# Patient Record
Sex: MALE | Race: WHITE | Employment: FULL TIME | URBAN - METROPOLITAN AREA
[De-identification: names, ages, dates, MRNs, and addresses within clinical notes are randomized per-mention and may not be internally consistent; named-entity substitution may affect disease eponyms.]

---

## 2020-03-12 ENCOUNTER — HOSPITAL ENCOUNTER (EMERGENCY)
Age: 30
Discharge: HOME | End: 2020-03-12
Payer: COMMERCIAL

## 2020-03-12 ENCOUNTER — APPOINTMENT (OUTPATIENT)
Dept: GENERAL RADIOLOGY | Age: 30
End: 2020-03-12
Payer: COMMERCIAL

## 2020-03-12 VITALS
HEART RATE: 104 BPM | WEIGHT: 315 LBS | OXYGEN SATURATION: 95 % | DIASTOLIC BLOOD PRESSURE: 73 MMHG | TEMPERATURE: 98.9 F | BODY MASS INDEX: 38.36 KG/M2 | SYSTOLIC BLOOD PRESSURE: 119 MMHG | RESPIRATION RATE: 18 BRPM | HEIGHT: 76 IN

## 2020-03-12 LAB
A/G RATIO: 1.3 (ref 1.1–2.2)
ALBUMIN SERPL-MCNC: 3.8 G/DL (ref 3.4–5)
ALP BLD-CCNC: 61 U/L (ref 40–129)
ALT SERPL-CCNC: 64 U/L (ref 10–40)
ANION GAP SERPL CALCULATED.3IONS-SCNC: 10 MMOL/L (ref 3–16)
AST SERPL-CCNC: 47 U/L (ref 15–37)
BILIRUB SERPL-MCNC: 0.3 MG/DL (ref 0–1)
BUN BLDV-MCNC: 8 MG/DL (ref 7–20)
CALCIUM SERPL-MCNC: 8.5 MG/DL (ref 8.3–10.6)
CHLORIDE BLD-SCNC: 106 MMOL/L (ref 99–110)
CO2: 22 MMOL/L (ref 21–32)
CREAT SERPL-MCNC: 0.7 MG/DL (ref 0.9–1.3)
GFR AFRICAN AMERICAN: >60
GFR NON-AFRICAN AMERICAN: >60
GLOBULIN: 3 G/DL
GLUCOSE BLD-MCNC: 96 MG/DL (ref 70–99)
HCT VFR BLD CALC: 42.3 % (ref 40.5–52.5)
HEMOGLOBIN: 14.2 G/DL (ref 13.5–17.5)
LACTIC ACID: 1.6 MMOL/L (ref 0.4–2)
MCH RBC QN AUTO: 27.2 PG (ref 26–34)
MCHC RBC AUTO-ENTMCNC: 33.5 G/DL (ref 31–36)
MCV RBC AUTO: 81.4 FL (ref 80–100)
PDW BLD-RTO: 14.6 % (ref 12.4–15.4)
PLATELET # BLD: 182 K/UL (ref 135–450)
PMV BLD AUTO: 8.4 FL (ref 5–10.5)
POTASSIUM SERPL-SCNC: 3.8 MMOL/L (ref 3.5–5.1)
RAPID INFLUENZA  B AGN: NEGATIVE
RAPID INFLUENZA A AGN: POSITIVE
RBC # BLD: 5.2 M/UL (ref 4.2–5.9)
SODIUM BLD-SCNC: 138 MMOL/L (ref 136–145)
TOTAL PROTEIN: 6.8 G/DL (ref 6.4–8.2)
WBC # BLD: 6.7 K/UL (ref 4–11)

## 2020-03-12 PROCEDURE — 87804 INFLUENZA ASSAY W/OPTIC: CPT

## 2020-03-12 PROCEDURE — 96374 THER/PROPH/DIAG INJ IV PUSH: CPT

## 2020-03-12 PROCEDURE — 99283 EMERGENCY DEPT VISIT LOW MDM: CPT

## 2020-03-12 PROCEDURE — 6370000000 HC RX 637 (ALT 250 FOR IP): Performed by: NURSE PRACTITIONER

## 2020-03-12 PROCEDURE — 2580000003 HC RX 258: Performed by: NURSE PRACTITIONER

## 2020-03-12 PROCEDURE — 80053 COMPREHEN METABOLIC PANEL: CPT

## 2020-03-12 PROCEDURE — 6360000002 HC RX W HCPCS: Performed by: NURSE PRACTITIONER

## 2020-03-12 PROCEDURE — 83605 ASSAY OF LACTIC ACID: CPT

## 2020-03-12 PROCEDURE — 85027 COMPLETE CBC AUTOMATED: CPT

## 2020-03-12 PROCEDURE — 71046 X-RAY EXAM CHEST 2 VIEWS: CPT

## 2020-03-12 RX ORDER — OSELTAMIVIR PHOSPHATE 75 MG/1
75 CAPSULE ORAL 2 TIMES DAILY
Qty: 10 CAPSULE | Refills: 0 | Status: SHIPPED | OUTPATIENT
Start: 2020-03-12 | End: 2020-03-17

## 2020-03-12 RX ORDER — 0.9 % SODIUM CHLORIDE 0.9 %
1000 INTRAVENOUS SOLUTION INTRAVENOUS ONCE
Status: COMPLETED | OUTPATIENT
Start: 2020-03-12 | End: 2020-03-12

## 2020-03-12 RX ORDER — OSELTAMIVIR PHOSPHATE 75 MG/1
75 CAPSULE ORAL ONCE
Status: COMPLETED | OUTPATIENT
Start: 2020-03-12 | End: 2020-03-12

## 2020-03-12 RX ORDER — ACETAMINOPHEN 500 MG
1000 TABLET ORAL ONCE
Status: COMPLETED | OUTPATIENT
Start: 2020-03-12 | End: 2020-03-12

## 2020-03-12 RX ORDER — KETOROLAC TROMETHAMINE 30 MG/ML
15 INJECTION, SOLUTION INTRAMUSCULAR; INTRAVENOUS ONCE
Status: COMPLETED | OUTPATIENT
Start: 2020-03-12 | End: 2020-03-12

## 2020-03-12 RX ORDER — IBUPROFEN 800 MG/1
800 TABLET ORAL EVERY 8 HOURS PRN
Qty: 20 TABLET | Refills: 0 | Status: SHIPPED | OUTPATIENT
Start: 2020-03-12

## 2020-03-12 RX ADMIN — KETOROLAC TROMETHAMINE 15 MG: 30 INJECTION, SOLUTION INTRAMUSCULAR at 17:34

## 2020-03-12 RX ADMIN — OSELTAMIVIR PHOSPHATE 75 MG: 75 CAPSULE ORAL at 17:34

## 2020-03-12 RX ADMIN — ACETAMINOPHEN 1000 MG: 500 TABLET ORAL at 17:34

## 2020-03-12 RX ADMIN — SODIUM CHLORIDE 1000 ML: 9 INJECTION, SOLUTION INTRAVENOUS at 17:34

## 2020-03-12 SDOH — HEALTH STABILITY: MENTAL HEALTH: HOW OFTEN DO YOU HAVE A DRINK CONTAINING ALCOHOL?: NEVER

## 2020-03-12 ASSESSMENT — ENCOUNTER SYMPTOMS
ABDOMINAL DISTENTION: 0
DIARRHEA: 0
EYES NEGATIVE: 1
VOMITING: 0
ALLERGIC/IMMUNOLOGIC NEGATIVE: 1
ABDOMINAL PAIN: 0
SHORTNESS OF BREATH: 0
CONSTIPATION: 0
BACK PAIN: 0
NAUSEA: 0
WHEEZING: 0
COUGH: 1

## 2020-03-12 ASSESSMENT — PAIN SCALES - GENERAL: PAINLEVEL_OUTOF10: 0

## 2020-03-12 NOTE — ED PROVIDER NOTES
cardiopulmonary process. Xr Chest Standard (2 Vw)    Result Date: 3/12/2020  EXAMINATION: TWO XRAY VIEWS OF THE CHEST 3/12/2020 5:12 pm COMPARISON: None HISTORY: ORDERING SYSTEM PROVIDED HISTORY: cough TECHNOLOGIST PROVIDED HISTORY: Reason for exam:->cough Reason for Exam: flu Acuity: Acute Type of Exam: Initial FINDINGS: The cardiomediastinal silhouette is normal in size. The lungs are clear. No pleural effusion or pneumothorax is present. No acute cardiopulmonary process. MEDICAL DECISION MAKING / ED COURSE:      PROCEDURES:   Procedures    None    Patient was given:  Medications   0.9 % sodium chloride bolus (1,000 mLs Intravenous New Bag 3/12/20 1734)   acetaminophen (TYLENOL) tablet 1,000 mg (1,000 mg Oral Given 3/12/20 1734)   ketorolac (TORADOL) injection 15 mg (15 mg Intravenous Given 3/12/20 1734)   oseltamivir (TAMIFLU) capsule 75 mg (75 mg Oral Given 3/12/20 1734)       Patient is alert and oriented x4. Skin is pwd, no cyanosis or pallor. Moist mucus membranes. Pharynx is clear, uvula is midline and no tonsilar exudate noted. Bilateral TMs with no erythema. Patient is tachy with regular rhythm. Lungs are clear to auscultation throughout all fields. Abdomen is soft, non-tender and non-distended. Distal pulses are intact  Differentials: URI, pneumonia, influenza, bronchitis, sepsis  Labs: unremarkable  Xray: negative  Flu: influenza A  NS bolus given along with tylenol, toradol and Tamiflu  On Re-evaluation at 1850, patient's heart rate and fever have improved. Diagnosis: Influenza A  Patient will be discharged with Tamiflu and ibuprofen. Follow up with PCP this week     The patient tolerated their visit well. I evaluated the patient. The physician was available for consultation as needed. The patient and / or the family were informed of the results of any tests, a time was given to answer questions, a plan was proposed and they agreed with plan. CLINICAL IMPRESSION:  1.

## 2023-01-15 ENCOUNTER — APPOINTMENT (OUTPATIENT)
Dept: CT IMAGING | Age: 33
DRG: 219 | End: 2023-01-15
Payer: COMMERCIAL

## 2023-01-15 ENCOUNTER — ANESTHESIA (OUTPATIENT)
Dept: OPERATING ROOM | Age: 33
End: 2023-01-15
Payer: COMMERCIAL

## 2023-01-15 ENCOUNTER — HOSPITAL ENCOUNTER (INPATIENT)
Age: 33
LOS: 11 days | Discharge: NURSING, REHAB OR LONG-TERM CARE | DRG: 219 | End: 2023-01-26
Attending: STUDENT IN AN ORGANIZED HEALTH CARE EDUCATION/TRAINING PROGRAM | Admitting: INTERNAL MEDICINE
Payer: COMMERCIAL

## 2023-01-15 ENCOUNTER — ANESTHESIA EVENT (OUTPATIENT)
Dept: OPERATING ROOM | Age: 33
End: 2023-01-15
Payer: COMMERCIAL

## 2023-01-15 DIAGNOSIS — R07.89 CHEST WALL DISCOMFORT: ICD-10-CM

## 2023-01-15 DIAGNOSIS — R29.898 WEAKNESS OF RIGHT LOWER EXTREMITY: ICD-10-CM

## 2023-01-15 DIAGNOSIS — R29.898 WEAKNESS OF LEFT LOWER EXTREMITY: ICD-10-CM

## 2023-01-15 DIAGNOSIS — G89.18 ACUTE POST-OPERATIVE PAIN: ICD-10-CM

## 2023-01-15 DIAGNOSIS — I71.21 DISSECTING ANEURYSM OF THORACIC AORTA, STANFORD TYPE A: Primary | ICD-10-CM

## 2023-01-15 DIAGNOSIS — I71.010 DISSECTING ANEURYSM OF THORACIC AORTA, STANFORD TYPE A: Primary | ICD-10-CM

## 2023-01-15 PROBLEM — I71.00 AORTIC DISSECTION (HCC): Status: ACTIVE | Noted: 2023-01-15

## 2023-01-15 LAB
A/G RATIO: 1.6 (ref 1.1–2.2)
ABO/RH: NORMAL
ALBUMIN SERPL-MCNC: 4.4 G/DL (ref 3.4–5)
ALP BLD-CCNC: 69 U/L (ref 40–129)
ALT SERPL-CCNC: 36 U/L (ref 10–40)
ANION GAP SERPL CALCULATED.3IONS-SCNC: 21 MMOL/L (ref 3–16)
AST SERPL-CCNC: 25 U/L (ref 15–37)
BASE EXCESS VENOUS: -6.8 MMOL/L (ref -3–3)
BASOPHILS ABSOLUTE: 0.1 K/UL (ref 0–0.2)
BASOPHILS RELATIVE PERCENT: 0.9 %
BILIRUB SERPL-MCNC: 0.3 MG/DL (ref 0–1)
BUN BLDV-MCNC: 16 MG/DL (ref 7–20)
CALCIUM SERPL-MCNC: 9.2 MG/DL (ref 8.3–10.6)
CARBOXYHEMOGLOBIN: 1 % (ref 0–1.5)
CHLORIDE BLD-SCNC: 102 MMOL/L (ref 99–110)
CO2: 15 MMOL/L (ref 21–32)
CREAT SERPL-MCNC: 1.1 MG/DL (ref 0.9–1.3)
EOSINOPHILS ABSOLUTE: 0.2 K/UL (ref 0–0.6)
EOSINOPHILS RELATIVE PERCENT: 1.6 %
ETHANOL: NORMAL MG/DL (ref 0–0.08)
GFR SERPL CREATININE-BSD FRML MDRD: >60 ML/MIN/{1.73_M2}
GLUCOSE BLD-MCNC: 243 MG/DL (ref 70–99)
HCO3 VENOUS: 17.9 MMOL/L (ref 23–29)
HCT VFR BLD CALC: 45.1 % (ref 40.5–52.5)
HEMOGLOBIN: 14.2 G/DL (ref 13.5–17.5)
LACTIC ACID, SEPSIS: 5.6 MMOL/L (ref 0.4–1.9)
LIPASE: 21 U/L (ref 13–60)
LYMPHOCYTES ABSOLUTE: 4 K/UL (ref 1–5.1)
LYMPHOCYTES RELATIVE PERCENT: 25.9 %
MCH RBC QN AUTO: 26.2 PG (ref 26–34)
MCHC RBC AUTO-ENTMCNC: 31.4 G/DL (ref 31–36)
MCV RBC AUTO: 83.4 FL (ref 80–100)
METHEMOGLOBIN VENOUS: 0.6 %
MONOCYTES ABSOLUTE: 1.2 K/UL (ref 0–1.3)
MONOCYTES RELATIVE PERCENT: 8 %
NEUTROPHILS ABSOLUTE: 9.8 K/UL (ref 1.7–7.7)
NEUTROPHILS RELATIVE PERCENT: 63.6 %
O2 SAT, VEN: 97 %
O2 THERAPY: ABNORMAL
PCO2, VEN: 33.8 MMHG (ref 40–50)
PDW BLD-RTO: 14.3 % (ref 12.4–15.4)
PH VENOUS: 7.34 (ref 7.35–7.45)
PLATELET # BLD: 290 K/UL (ref 135–450)
PMV BLD AUTO: 8 FL (ref 5–10.5)
PO2, VEN: 99.7 MMHG (ref 25–40)
POTASSIUM REFLEX MAGNESIUM: 4 MMOL/L (ref 3.5–5.1)
RBC # BLD: 5.41 M/UL (ref 4.2–5.9)
SODIUM BLD-SCNC: 138 MMOL/L (ref 136–145)
TCO2 CALC VENOUS: 19 MMOL/L
TOTAL PROTEIN: 7.2 G/DL (ref 6.4–8.2)
TROPONIN: <0.01 NG/ML
WBC # BLD: 15.5 K/UL (ref 4–11)

## 2023-01-15 PROCEDURE — 34714 OPN FEM ART EXPOS CNDT CRTJ: CPT | Performed by: THORACIC SURGERY (CARDIOTHORACIC VASCULAR SURGERY)

## 2023-01-15 PROCEDURE — 80053 COMPREHEN METABOLIC PANEL: CPT

## 2023-01-15 PROCEDURE — 82077 ASSAY SPEC XCP UR&BREATH IA: CPT

## 2023-01-15 PROCEDURE — 71275 CT ANGIOGRAPHY CHEST: CPT

## 2023-01-15 PROCEDURE — 2000000000 HC ICU R&B

## 2023-01-15 PROCEDURE — 86900 BLOOD TYPING SEROLOGIC ABO: CPT

## 2023-01-15 PROCEDURE — 86901 BLOOD TYPING SEROLOGIC RH(D): CPT

## 2023-01-15 PROCEDURE — 86923 COMPATIBILITY TEST ELECTRIC: CPT

## 2023-01-15 PROCEDURE — 83690 ASSAY OF LIPASE: CPT

## 2023-01-15 PROCEDURE — 82803 BLOOD GASES ANY COMBINATION: CPT

## 2023-01-15 PROCEDURE — 99285 EMERGENCY DEPT VISIT HI MDM: CPT

## 2023-01-15 PROCEDURE — 96372 THER/PROPH/DIAG INJ SC/IM: CPT

## 2023-01-15 PROCEDURE — 96365 THER/PROPH/DIAG IV INF INIT: CPT

## 2023-01-15 PROCEDURE — 85025 COMPLETE CBC W/AUTO DIFF WBC: CPT

## 2023-01-15 PROCEDURE — 82010 KETONE BODYS QUAN: CPT

## 2023-01-15 PROCEDURE — 96375 TX/PRO/DX INJ NEW DRUG ADDON: CPT

## 2023-01-15 PROCEDURE — 2500000003 HC RX 250 WO HCPCS: Performed by: INTERNAL MEDICINE

## 2023-01-15 PROCEDURE — 6370000000 HC RX 637 (ALT 250 FOR IP): Performed by: STUDENT IN AN ORGANIZED HEALTH CARE EDUCATION/TRAINING PROGRAM

## 2023-01-15 PROCEDURE — 6360000002 HC RX W HCPCS: Performed by: STUDENT IN AN ORGANIZED HEALTH CARE EDUCATION/TRAINING PROGRAM

## 2023-01-15 PROCEDURE — 6360000002 HC RX W HCPCS

## 2023-01-15 PROCEDURE — 2500000003 HC RX 250 WO HCPCS: Performed by: STUDENT IN AN ORGANIZED HEALTH CARE EDUCATION/TRAINING PROGRAM

## 2023-01-15 PROCEDURE — 6360000004 HC RX CONTRAST MEDICATION: Performed by: STUDENT IN AN ORGANIZED HEALTH CARE EDUCATION/TRAINING PROGRAM

## 2023-01-15 PROCEDURE — 33858 AS-AORT GRF F/AORTIC DSJ: CPT | Performed by: THORACIC SURGERY (CARDIOTHORACIC VASCULAR SURGERY)

## 2023-01-15 PROCEDURE — 84484 ASSAY OF TROPONIN QUANT: CPT

## 2023-01-15 PROCEDURE — 96374 THER/PROPH/DIAG INJ IV PUSH: CPT

## 2023-01-15 PROCEDURE — 2500000003 HC RX 250 WO HCPCS

## 2023-01-15 PROCEDURE — 72131 CT LUMBAR SPINE W/O DYE: CPT

## 2023-01-15 PROCEDURE — 83605 ASSAY OF LACTIC ACID: CPT

## 2023-01-15 PROCEDURE — 93005 ELECTROCARDIOGRAM TRACING: CPT | Performed by: STUDENT IN AN ORGANIZED HEALTH CARE EDUCATION/TRAINING PROGRAM

## 2023-01-15 PROCEDURE — 86850 RBC ANTIBODY SCREEN: CPT

## 2023-01-15 DEVICE — HEMASHIELD PLATINUM WOVEN STRAIGHT DOUBLE VELOUR VASCULAR GRAFT WITH GRAFT SIZER ACCESSORY
Type: IMPLANTABLE DEVICE | Site: HEART | Status: FUNCTIONAL
Brand: HEMASHIELD

## 2023-01-15 RX ORDER — SODIUM CHLORIDE 9 MG/ML
INJECTION, SOLUTION INTRAVENOUS PRN
Status: DISCONTINUED | OUTPATIENT
Start: 2023-01-15 | End: 2023-01-16

## 2023-01-15 RX ORDER — DEXTROSE MONOHYDRATE 100 MG/ML
INJECTION, SOLUTION INTRAVENOUS CONTINUOUS PRN
Status: DISCONTINUED | OUTPATIENT
Start: 2023-01-15 | End: 2023-01-16

## 2023-01-15 RX ORDER — LABETALOL HYDROCHLORIDE 5 MG/ML
INJECTION, SOLUTION INTRAVENOUS
Status: COMPLETED
Start: 2023-01-15 | End: 2023-01-15

## 2023-01-15 RX ORDER — ESMOLOL HYDROCHLORIDE 10 MG/ML
25-300 INJECTION, SOLUTION INTRAVENOUS CONTINUOUS
Status: DISCONTINUED | OUTPATIENT
Start: 2023-01-15 | End: 2023-01-16

## 2023-01-15 RX ORDER — LABETALOL HYDROCHLORIDE 5 MG/ML
10 INJECTION, SOLUTION INTRAVENOUS ONCE
Status: COMPLETED | OUTPATIENT
Start: 2023-01-15 | End: 2023-01-15

## 2023-01-15 RX ORDER — 0.9 % SODIUM CHLORIDE 0.9 %
2000 INTRAVENOUS SOLUTION INTRAVENOUS ONCE
Status: DISCONTINUED | OUTPATIENT
Start: 2023-01-15 | End: 2023-01-15

## 2023-01-15 RX ORDER — ACETAMINOPHEN 500 MG
1000 TABLET ORAL ONCE
Status: COMPLETED | OUTPATIENT
Start: 2023-01-15 | End: 2023-01-15

## 2023-01-15 RX ORDER — LABETALOL HYDROCHLORIDE 5 MG/ML
10 INJECTION, SOLUTION INTRAVENOUS ONCE
Status: CANCELLED | OUTPATIENT
Start: 2023-01-15 | End: 2023-01-15

## 2023-01-15 RX ORDER — ORPHENADRINE CITRATE 30 MG/ML
60 INJECTION INTRAMUSCULAR; INTRAVENOUS ONCE
Status: COMPLETED | OUTPATIENT
Start: 2023-01-15 | End: 2023-01-15

## 2023-01-15 RX ADMIN — HYDROMORPHONE HYDROCHLORIDE 0.5 MG: 0.5 INJECTION, SOLUTION INTRAMUSCULAR; INTRAVENOUS; SUBCUTANEOUS at 22:49

## 2023-01-15 RX ADMIN — ESMOLOL HYDROCHLORIDE IN SODIUM CHLORIDE 25 MCG/KG/MIN: 10 INJECTION INTRAVENOUS at 22:47

## 2023-01-15 RX ADMIN — ORPHENADRINE CITRATE 60 MG: 30 INJECTION INTRAMUSCULAR; INTRAVENOUS at 21:32

## 2023-01-15 RX ADMIN — NICARDIPINE HYDROCHLORIDE 5 MG/HR: 0.1 INJECTION, SOLUTION INTRAVENTRICULAR at 23:46

## 2023-01-15 RX ADMIN — IOPAMIDOL 40 ML: 755 INJECTION, SOLUTION INTRAVENOUS at 22:37

## 2023-01-15 RX ADMIN — Medication 0.5 MG: at 22:49

## 2023-01-15 RX ADMIN — LABETALOL HYDROCHLORIDE 10 MG: 5 INJECTION INTRAVENOUS at 22:48

## 2023-01-15 RX ADMIN — ACETAMINOPHEN 1000 MG: 500 TABLET ORAL at 21:32

## 2023-01-15 RX ADMIN — LABETALOL HYDROCHLORIDE 10 MG: 5 INJECTION, SOLUTION INTRAVENOUS at 22:48

## 2023-01-15 ASSESSMENT — PAIN DESCRIPTION - ORIENTATION
ORIENTATION: RIGHT;LEFT
ORIENTATION: RIGHT;LEFT

## 2023-01-15 ASSESSMENT — PAIN DESCRIPTION - LOCATION
LOCATION: LEG
LOCATION: LEG

## 2023-01-15 ASSESSMENT — PAIN SCALES - GENERAL
PAINLEVEL_OUTOF10: 10
PAINLEVEL_OUTOF10: 10

## 2023-01-15 ASSESSMENT — PAIN - FUNCTIONAL ASSESSMENT: PAIN_FUNCTIONAL_ASSESSMENT: NONE - DENIES PAIN

## 2023-01-16 ENCOUNTER — APPOINTMENT (OUTPATIENT)
Dept: GENERAL RADIOLOGY | Age: 33
DRG: 219 | End: 2023-01-16
Payer: COMMERCIAL

## 2023-01-16 ENCOUNTER — ANESTHESIA EVENT (OUTPATIENT)
Dept: OPERATING ROOM | Age: 33
End: 2023-01-16
Payer: COMMERCIAL

## 2023-01-16 ENCOUNTER — ANESTHESIA (OUTPATIENT)
Dept: OPERATING ROOM | Age: 33
End: 2023-01-16
Payer: COMMERCIAL

## 2023-01-16 PROBLEM — I71.010 TYPE 1 DISSECTION OF ASCENDING AORTA (HCC): Status: ACTIVE | Noted: 2023-01-16

## 2023-01-16 PROBLEM — R07.89 CHEST WALL DISCOMFORT: Status: ACTIVE | Noted: 2023-01-16

## 2023-01-16 LAB
ACTIVATED CLOTTING TIME: 116 SEC (ref 99–130)
ACTIVATED CLOTTING TIME: 122 SEC (ref 99–130)
ACTIVATED CLOTTING TIME: 456 SEC (ref 99–130)
ACTIVATED CLOTTING TIME: 472 SEC (ref 99–130)
ACTIVATED CLOTTING TIME: 494 SEC (ref 99–130)
ACTIVATED CLOTTING TIME: 508 SEC (ref 99–130)
ACTIVATED CLOTTING TIME: 585 SEC (ref 99–130)
ANION GAP SERPL CALCULATED.3IONS-SCNC: 11 MMOL/L (ref 3–16)
ANTIBODY SCREEN: NORMAL
BASE EXCESS ARTERIAL: -1 (ref -3–3)
BASE EXCESS ARTERIAL: -1 (ref -3–3)
BASE EXCESS ARTERIAL: -2 (ref -3–3)
BASE EXCESS ARTERIAL: -3 (ref -3–3)
BASE EXCESS ARTERIAL: -3 (ref -3–3)
BASE EXCESS ARTERIAL: -4 (ref -3–3)
BASE EXCESS ARTERIAL: -4 (ref -3–3)
BASE EXCESS ARTERIAL: -5 (ref -3–3)
BASE EXCESS ARTERIAL: -5 (ref -3–3)
BASE EXCESS ARTERIAL: -6 (ref -3–3)
BASE EXCESS ARTERIAL: 0 (ref -3–3)
BASE EXCESS ARTERIAL: 2 (ref -3–3)
BASE EXCESS ARTERIAL: 2 (ref -3–3)
BETA-HYDROXYBUTYRATE: 0.13 MMOL/L (ref 0–0.27)
BUN BLDV-MCNC: 20 MG/DL (ref 7–20)
CALCIUM IONIZED: 0.97 MMOL/L (ref 1.12–1.32)
CALCIUM IONIZED: 1 MMOL/L (ref 1.12–1.32)
CALCIUM IONIZED: 1.04 MMOL/L (ref 1.12–1.32)
CALCIUM IONIZED: 1.06 MMOL/L (ref 1.12–1.32)
CALCIUM IONIZED: 1.07 MMOL/L (ref 1.12–1.32)
CALCIUM IONIZED: 1.12 MMOL/L (ref 1.12–1.32)
CALCIUM IONIZED: 1.12 MMOL/L (ref 1.12–1.32)
CALCIUM IONIZED: 1.14 MMOL/L (ref 1.12–1.32)
CALCIUM IONIZED: 1.17 MMOL/L (ref 1.12–1.32)
CALCIUM IONIZED: 1.2 MMOL/L (ref 1.12–1.32)
CALCIUM IONIZED: 1.22 MMOL/L (ref 1.12–1.32)
CALCIUM SERPL-MCNC: 7.7 MG/DL (ref 8.3–10.6)
CHLORIDE BLD-SCNC: 105 MMOL/L (ref 99–110)
CO2: 25 MMOL/L (ref 21–32)
CREAT SERPL-MCNC: 1.3 MG/DL (ref 0.9–1.3)
EKG ATRIAL RATE: 99 BPM
EKG DIAGNOSIS: NORMAL
EKG P AXIS: 35 DEGREES
EKG P-R INTERVAL: 142 MS
EKG Q-T INTERVAL: 366 MS
EKG QRS DURATION: 98 MS
EKG QTC CALCULATION (BAZETT): 469 MS
EKG R AXIS: 57 DEGREES
EKG T AXIS: 39 DEGREES
EKG VENTRICULAR RATE: 99 BPM
GFR SERPL CREATININE-BSD FRML MDRD: >60 ML/MIN/{1.73_M2}
GLUCOSE BLD-MCNC: 133 MG/DL (ref 70–99)
GLUCOSE BLD-MCNC: 138 MG/DL (ref 70–99)
GLUCOSE BLD-MCNC: 144 MG/DL (ref 70–99)
GLUCOSE BLD-MCNC: 144 MG/DL (ref 70–99)
GLUCOSE BLD-MCNC: 146 MG/DL (ref 70–99)
GLUCOSE BLD-MCNC: 148 MG/DL (ref 70–99)
GLUCOSE BLD-MCNC: 149 MG/DL (ref 70–99)
GLUCOSE BLD-MCNC: 154 MG/DL (ref 70–99)
GLUCOSE BLD-MCNC: 154 MG/DL (ref 70–99)
GLUCOSE BLD-MCNC: 158 MG/DL (ref 70–99)
GLUCOSE BLD-MCNC: 163 MG/DL (ref 70–99)
GLUCOSE BLD-MCNC: 164 MG/DL (ref 70–99)
GLUCOSE BLD-MCNC: 164 MG/DL (ref 70–99)
GLUCOSE BLD-MCNC: 165 MG/DL (ref 70–99)
GLUCOSE BLD-MCNC: 166 MG/DL (ref 70–99)
GLUCOSE BLD-MCNC: 169 MG/DL (ref 70–99)
GLUCOSE BLD-MCNC: 179 MG/DL (ref 70–99)
GLUCOSE BLD-MCNC: 179 MG/DL (ref 70–99)
GLUCOSE BLD-MCNC: 183 MG/DL (ref 70–99)
GLUCOSE BLD-MCNC: 191 MG/DL (ref 70–99)
GLUCOSE BLD-MCNC: 94 MG/DL (ref 70–99)
HCO3 ARTERIAL: 20.4 MMOL/L (ref 21–29)
HCO3 ARTERIAL: 21.5 MMOL/L (ref 21–29)
HCO3 ARTERIAL: 21.8 MMOL/L (ref 21–29)
HCO3 ARTERIAL: 22.4 MMOL/L (ref 21–29)
HCO3 ARTERIAL: 22.8 MMOL/L (ref 21–29)
HCO3 ARTERIAL: 23 MMOL/L (ref 21–29)
HCO3 ARTERIAL: 23.2 MMOL/L (ref 21–29)
HCO3 ARTERIAL: 23.4 MMOL/L (ref 21–29)
HCO3 ARTERIAL: 23.5 MMOL/L (ref 21–29)
HCO3 ARTERIAL: 23.7 MMOL/L (ref 21–29)
HCO3 ARTERIAL: 24.2 MMOL/L (ref 21–29)
HCO3 ARTERIAL: 24.6 MMOL/L (ref 21–29)
HCO3 ARTERIAL: 25.3 MMOL/L (ref 21–29)
HCO3 ARTERIAL: 26.6 MMOL/L (ref 21–29)
HCO3 ARTERIAL: 27.7 MMOL/L (ref 21–29)
HCT VFR BLD CALC: 42.3 % (ref 40.5–52.5)
HEMOGLOBIN: 11.1 GM/DL (ref 13.5–17.5)
HEMOGLOBIN: 11.2 GM/DL (ref 13.5–17.5)
HEMOGLOBIN: 11.4 GM/DL (ref 13.5–17.5)
HEMOGLOBIN: 11.6 GM/DL (ref 13.5–17.5)
HEMOGLOBIN: 11.7 GM/DL (ref 13.5–17.5)
HEMOGLOBIN: 13.3 GM/DL (ref 13.5–17.5)
HEMOGLOBIN: 13.5 GM/DL (ref 13.5–17.5)
HEMOGLOBIN: 13.7 G/DL (ref 13.5–17.5)
HEMOGLOBIN: 14 GM/DL (ref 13.5–17.5)
HEMOGLOBIN: 14.4 GM/DL (ref 13.5–17.5)
HEMOGLOBIN: 9.7 GM/DL (ref 13.5–17.5)
INR BLD: 1.47 (ref 0.87–1.14)
LACTATE: 1.45 MMOL/L (ref 0.4–2)
LACTATE: 1.68 MMOL/L (ref 0.4–2)
LACTATE: 1.95 MMOL/L (ref 0.4–2)
LACTATE: 2 MMOL/L (ref 0.4–2)
LACTATE: 2.06 MMOL/L (ref 0.4–2)
LACTATE: 2.28 MMOL/L (ref 0.4–2)
LACTATE: 2.43 MMOL/L (ref 0.4–2)
LACTATE: 2.56 MMOL/L (ref 0.4–2)
LACTATE: 2.76 MMOL/L (ref 0.4–2)
LACTATE: 3.02 MMOL/L (ref 0.4–2)
LACTATE: 3.96 MMOL/L (ref 0.4–2)
LACTATE: 3.98 MMOL/L (ref 0.4–2)
LACTATE: 5.23 MMOL/L (ref 0.4–2)
MAGNESIUM: 3.4 MG/DL (ref 1.8–2.4)
MCH RBC QN AUTO: 26.6 PG (ref 26–34)
MCHC RBC AUTO-ENTMCNC: 32.3 G/DL (ref 31–36)
MCV RBC AUTO: 82.3 FL (ref 80–100)
O2 SAT, ARTERIAL: 100 % (ref 93–100)
O2 SAT, ARTERIAL: 97 % (ref 93–100)
O2 SAT, ARTERIAL: 99 % (ref 93–100)
PCO2 ARTERIAL: 37.9 MM HG (ref 35–45)
PCO2 ARTERIAL: 39.6 MM HG (ref 35–45)
PCO2 ARTERIAL: 39.8 MM HG (ref 35–45)
PCO2 ARTERIAL: 43.1 MM HG (ref 35–45)
PCO2 ARTERIAL: 43.1 MM HG (ref 35–45)
PCO2 ARTERIAL: 43.2 MM HG (ref 35–45)
PCO2 ARTERIAL: 44.4 MM HG (ref 35–45)
PCO2 ARTERIAL: 44.5 MM HG (ref 35–45)
PCO2 ARTERIAL: 45.5 MM HG (ref 35–45)
PCO2 ARTERIAL: 45.5 MM HG (ref 35–45)
PCO2 ARTERIAL: 46.3 MM HG (ref 35–45)
PCO2 ARTERIAL: 46.7 MM HG (ref 35–45)
PCO2 ARTERIAL: 50 MM HG (ref 35–45)
PCO2 ARTERIAL: 53.1 MM HG (ref 35–45)
PCO2 ARTERIAL: 54.5 MM HG (ref 35–45)
PDW BLD-RTO: 14.6 % (ref 12.4–15.4)
PERFORMED ON: ABNORMAL
PERFORMED ON: NORMAL
PH ARTERIAL: 7.24 (ref 7.35–7.45)
PH ARTERIAL: 7.28 (ref 7.35–7.45)
PH ARTERIAL: 7.28 (ref 7.35–7.45)
PH ARTERIAL: 7.29 (ref 7.35–7.45)
PH ARTERIAL: 7.29 (ref 7.35–7.45)
PH ARTERIAL: 7.3 (ref 7.35–7.45)
PH ARTERIAL: 7.31 (ref 7.35–7.45)
PH ARTERIAL: 7.32 (ref 7.35–7.45)
PH ARTERIAL: 7.34 (ref 7.35–7.45)
PH ARTERIAL: 7.34 (ref 7.35–7.45)
PH ARTERIAL: 7.35 (ref 7.35–7.45)
PH ARTERIAL: 7.37 (ref 7.35–7.45)
PH ARTERIAL: 7.38 (ref 7.35–7.45)
PH ARTERIAL: 7.38 (ref 7.35–7.45)
PH ARTERIAL: 7.42 (ref 7.35–7.45)
PLATELET # BLD: 164 K/UL (ref 135–450)
PMV BLD AUTO: 7.6 FL (ref 5–10.5)
PO2 ARTERIAL: 104.2 MM HG (ref 75–108)
PO2 ARTERIAL: 138 MM HG (ref 75–108)
PO2 ARTERIAL: 156.5 MM HG (ref 75–108)
PO2 ARTERIAL: 175.2 MM HG (ref 75–108)
PO2 ARTERIAL: 206.8 MM HG (ref 75–108)
PO2 ARTERIAL: 223.2 MM HG (ref 75–108)
PO2 ARTERIAL: 233 MM HG (ref 75–108)
PO2 ARTERIAL: 237.6 MM HG (ref 75–108)
PO2 ARTERIAL: 244.7 MM HG (ref 75–108)
PO2 ARTERIAL: 302.3 MM HG (ref 75–108)
PO2 ARTERIAL: 356.9 MM HG (ref 75–108)
PO2 ARTERIAL: 365.3 MM HG (ref 75–108)
PO2 ARTERIAL: 379.1 MM HG (ref 75–108)
PO2 ARTERIAL: 434.1 MM HG (ref 75–108)
PO2 ARTERIAL: 654.2 MM HG (ref 75–108)
POC HEMATOCRIT: 29 % (ref 40.5–52.5)
POC HEMATOCRIT: 33 % (ref 40.5–52.5)
POC HEMATOCRIT: 34 % (ref 40.5–52.5)
POC HEMATOCRIT: 34 % (ref 40.5–52.5)
POC HEMATOCRIT: 39 % (ref 40.5–52.5)
POC HEMATOCRIT: 40 % (ref 40.5–52.5)
POC HEMATOCRIT: 41 % (ref 40.5–52.5)
POC HEMATOCRIT: 42 % (ref 40.5–52.5)
POC PATIENT TEMP: 37
POC POTASSIUM: 4.9 MMOL/L (ref 3.5–5.1)
POC POTASSIUM: 5.1 MMOL/L (ref 3.5–5.1)
POC POTASSIUM: 5.2 MMOL/L (ref 3.5–5.1)
POC POTASSIUM: 5.3 MMOL/L (ref 3.5–5.1)
POC POTASSIUM: 5.7 MMOL/L (ref 3.5–5.1)
POC POTASSIUM: 5.8 MMOL/L (ref 3.5–5.1)
POC POTASSIUM: 5.9 MMOL/L (ref 3.5–5.1)
POC POTASSIUM: 6 MMOL/L (ref 3.5–5.1)
POC POTASSIUM: 6 MMOL/L (ref 3.5–5.1)
POC POTASSIUM: 6.1 MMOL/L (ref 3.5–5.1)
POC POTASSIUM: 7.2 MMOL/L (ref 3.5–5.1)
POC POTASSIUM: 7.5 MMOL/L (ref 3.5–5.1)
POC POTASSIUM: 7.6 MMOL/L (ref 3.5–5.1)
POC SAMPLE TYPE: ABNORMAL
POC SODIUM: 132 MMOL/L (ref 136–145)
POC SODIUM: 132 MMOL/L (ref 136–145)
POC SODIUM: 134 MMOL/L (ref 136–145)
POC SODIUM: 137 MMOL/L (ref 136–145)
POC SODIUM: 139 MMOL/L (ref 136–145)
POC SODIUM: 141 MMOL/L (ref 136–145)
POC SODIUM: 144 MMOL/L (ref 136–145)
POC SODIUM: 146 MMOL/L (ref 136–145)
POTASSIUM SERPL-SCNC: 5.7 MMOL/L (ref 3.5–5.1)
PROTHROMBIN TIME: 17.8 SEC (ref 11.7–14.5)
RBC # BLD: 5.14 M/UL (ref 4.2–5.9)
SODIUM BLD-SCNC: 141 MMOL/L (ref 136–145)
TCO2 ARTERIAL: 22 MMOL/L
TCO2 ARTERIAL: 23 MMOL/L
TCO2 ARTERIAL: 23 MMOL/L
TCO2 ARTERIAL: 24 MMOL/L
TCO2 ARTERIAL: 25 MMOL/L
TCO2 ARTERIAL: 26 MMOL/L
TCO2 ARTERIAL: 26 MMOL/L
TCO2 ARTERIAL: 27 MMOL/L
TCO2 ARTERIAL: 28 MMOL/L
TCO2 ARTERIAL: 29 MMOL/L
WBC # BLD: 24.6 K/UL (ref 4–11)

## 2023-01-16 PROCEDURE — C1768 GRAFT, VASCULAR: HCPCS | Performed by: THORACIC SURGERY (CARDIOTHORACIC VASCULAR SURGERY)

## 2023-01-16 PROCEDURE — C9290 INJ, BUPIVACAINE LIPOSOME: HCPCS | Performed by: THORACIC SURGERY (CARDIOTHORACIC VASCULAR SURGERY)

## 2023-01-16 PROCEDURE — 2000000000 HC ICU R&B

## 2023-01-16 PROCEDURE — 2500000003 HC RX 250 WO HCPCS: Performed by: ANESTHESIOLOGY

## 2023-01-16 PROCEDURE — 2580000003 HC RX 258: Performed by: THORACIC SURGERY (CARDIOTHORACIC VASCULAR SURGERY)

## 2023-01-16 PROCEDURE — 6360000002 HC RX W HCPCS: Performed by: THORACIC SURGERY (CARDIOTHORACIC VASCULAR SURGERY)

## 2023-01-16 PROCEDURE — 2720000010 HC SURG SUPPLY STERILE: Performed by: SURGERY

## 2023-01-16 PROCEDURE — 2580000003 HC RX 258: Performed by: SURGERY

## 2023-01-16 PROCEDURE — 74018 RADEX ABDOMEN 1 VIEW: CPT

## 2023-01-16 PROCEDURE — 71045 X-RAY EXAM CHEST 1 VIEW: CPT

## 2023-01-16 PROCEDURE — 84295 ASSAY OF SERUM SODIUM: CPT

## 2023-01-16 PROCEDURE — 94640 AIRWAY INHALATION TREATMENT: CPT

## 2023-01-16 PROCEDURE — 2720000010 HC SURG SUPPLY STERILE: Performed by: THORACIC SURGERY (CARDIOTHORACIC VASCULAR SURGERY)

## 2023-01-16 PROCEDURE — P9045 ALBUMIN (HUMAN), 5%, 250 ML: HCPCS | Performed by: THORACIC SURGERY (CARDIOTHORACIC VASCULAR SURGERY)

## 2023-01-16 PROCEDURE — 2500000003 HC RX 250 WO HCPCS: Performed by: THORACIC SURGERY (CARDIOTHORACIC VASCULAR SURGERY)

## 2023-01-16 PROCEDURE — 3700000000 HC ANESTHESIA ATTENDED CARE: Performed by: THORACIC SURGERY (CARDIOTHORACIC VASCULAR SURGERY)

## 2023-01-16 PROCEDURE — 76942 ECHO GUIDE FOR BIOPSY: CPT | Performed by: ANESTHESIOLOGY

## 2023-01-16 PROCEDURE — 3600000017 HC SURGERY HYBRID ADDL 15MIN: Performed by: SURGERY

## 2023-01-16 PROCEDURE — 85610 PROTHROMBIN TIME: CPT

## 2023-01-16 PROCEDURE — 83605 ASSAY OF LACTIC ACID: CPT

## 2023-01-16 PROCEDURE — 83036 HEMOGLOBIN GLYCOSYLATED A1C: CPT

## 2023-01-16 PROCEDURE — 80048 BASIC METABOLIC PNL TOTAL CA: CPT

## 2023-01-16 PROCEDURE — 6370000000 HC RX 637 (ALT 250 FOR IP): Performed by: ANESTHESIOLOGY

## 2023-01-16 PROCEDURE — 82330 ASSAY OF CALCIUM: CPT

## 2023-01-16 PROCEDURE — 2709999900 HC NON-CHARGEABLE SUPPLY: Performed by: THORACIC SURGERY (CARDIOTHORACIC VASCULAR SURGERY)

## 2023-01-16 PROCEDURE — 85027 COMPLETE CBC AUTOMATED: CPT

## 2023-01-16 PROCEDURE — C2615 SEALANT, PULMONARY, LIQUID: HCPCS | Performed by: THORACIC SURGERY (CARDIOTHORACIC VASCULAR SURGERY)

## 2023-01-16 PROCEDURE — 6370000000 HC RX 637 (ALT 250 FOR IP): Performed by: THORACIC SURGERY (CARDIOTHORACIC VASCULAR SURGERY)

## 2023-01-16 PROCEDURE — 3600000007 HC SURGERY HYBRID BASE: Performed by: SURGERY

## 2023-01-16 PROCEDURE — 3600000008 HC SURGERY OHS BASE: Performed by: THORACIC SURGERY (CARDIOTHORACIC VASCULAR SURGERY)

## 2023-01-16 PROCEDURE — 93010 ELECTROCARDIOGRAM REPORT: CPT | Performed by: INTERNAL MEDICINE

## 2023-01-16 PROCEDURE — 99222 1ST HOSP IP/OBS MODERATE 55: CPT | Performed by: SURGERY

## 2023-01-16 PROCEDURE — 99222 1ST HOSP IP/OBS MODERATE 55: CPT | Performed by: THORACIC SURGERY (CARDIOTHORACIC VASCULAR SURGERY)

## 2023-01-16 PROCEDURE — A4217 STERILE WATER/SALINE, 500 ML: HCPCS | Performed by: THORACIC SURGERY (CARDIOTHORACIC VASCULAR SURGERY)

## 2023-01-16 PROCEDURE — 36415 COLL VENOUS BLD VENIPUNCTURE: CPT

## 2023-01-16 PROCEDURE — C9290 INJ, BUPIVACAINE LIPOSOME: HCPCS | Performed by: ANESTHESIOLOGY

## 2023-01-16 PROCEDURE — C1768 GRAFT, VASCULAR: HCPCS | Performed by: SURGERY

## 2023-01-16 PROCEDURE — 3700000001 HC ADD 15 MINUTES (ANESTHESIA): Performed by: SURGERY

## 2023-01-16 PROCEDURE — 3700000001 HC ADD 15 MINUTES (ANESTHESIA): Performed by: THORACIC SURGERY (CARDIOTHORACIC VASCULAR SURGERY)

## 2023-01-16 PROCEDURE — 6360000002 HC RX W HCPCS: Performed by: SURGERY

## 2023-01-16 PROCEDURE — 6360000002 HC RX W HCPCS

## 2023-01-16 PROCEDURE — 2580000003 HC RX 258

## 2023-01-16 PROCEDURE — 6360000002 HC RX W HCPCS: Performed by: ANESTHESIOLOGY

## 2023-01-16 PROCEDURE — 88305 TISSUE EXAM BY PATHOLOGIST: CPT

## 2023-01-16 PROCEDURE — A4217 STERILE WATER/SALINE, 500 ML: HCPCS | Performed by: SURGERY

## 2023-01-16 PROCEDURE — 83735 ASSAY OF MAGNESIUM: CPT

## 2023-01-16 PROCEDURE — 94002 VENT MGMT INPAT INIT DAY: CPT

## 2023-01-16 PROCEDURE — 02RX0JZ REPLACEMENT OF THORACIC AORTA, ASCENDING/ARCH WITH SYNTHETIC SUBSTITUTE, OPEN APPROACH: ICD-10-PCS | Performed by: THORACIC SURGERY (CARDIOTHORACIC VASCULAR SURGERY)

## 2023-01-16 PROCEDURE — 85014 HEMATOCRIT: CPT

## 2023-01-16 PROCEDURE — 041L0JH BYPASS LEFT FEMORAL ARTERY TO RIGHT FEMORAL ARTERY WITH SYNTHETIC SUBSTITUTE, OPEN APPROACH: ICD-10-PCS | Performed by: SURGERY

## 2023-01-16 PROCEDURE — C1894 INTRO/SHEATH, NON-LASER: HCPCS | Performed by: THORACIC SURGERY (CARDIOTHORACIC VASCULAR SURGERY)

## 2023-01-16 PROCEDURE — 2709999900 HC NON-CHARGEABLE SUPPLY: Performed by: SURGERY

## 2023-01-16 PROCEDURE — 83874 ASSAY OF MYOGLOBIN: CPT

## 2023-01-16 PROCEDURE — 3700000000 HC ANESTHESIA ATTENDED CARE: Performed by: SURGERY

## 2023-01-16 PROCEDURE — 2580000003 HC RX 258: Performed by: ANESTHESIOLOGY

## 2023-01-16 PROCEDURE — 2500000003 HC RX 250 WO HCPCS

## 2023-01-16 PROCEDURE — 6370000000 HC RX 637 (ALT 250 FOR IP)

## 2023-01-16 PROCEDURE — 5A1221Z PERFORMANCE OF CARDIAC OUTPUT, CONTINUOUS: ICD-10-PCS | Performed by: THORACIC SURGERY (CARDIOTHORACIC VASCULAR SURGERY)

## 2023-01-16 PROCEDURE — 82947 ASSAY GLUCOSE BLOOD QUANT: CPT

## 2023-01-16 PROCEDURE — 84132 ASSAY OF SERUM POTASSIUM: CPT

## 2023-01-16 PROCEDURE — 2700000000 HC OXYGEN THERAPY PER DAY

## 2023-01-16 PROCEDURE — 85347 COAGULATION TIME ACTIVATED: CPT

## 2023-01-16 PROCEDURE — 94761 N-INVAS EAR/PLS OXIMETRY MLT: CPT

## 2023-01-16 PROCEDURE — 82803 BLOOD GASES ANY COMBINATION: CPT

## 2023-01-16 PROCEDURE — 3600000018 HC SURGERY OHS ADDTL 15MIN: Performed by: THORACIC SURGERY (CARDIOTHORACIC VASCULAR SURGERY)

## 2023-01-16 DEVICE — PROPATEN VASCULAR GRAFT TW IR 8MMX40CM 40CM RINGS HEPARIN
Type: IMPLANTABLE DEVICE | Site: GROIN | Status: FUNCTIONAL
Brand: GORE PROPATEN VASCULAR GRAFT

## 2023-01-16 RX ORDER — MEPERIDINE HYDROCHLORIDE 50 MG/ML
25 INJECTION INTRAMUSCULAR; INTRAVENOUS; SUBCUTANEOUS
Status: ACTIVE | OUTPATIENT
Start: 2023-01-16 | End: 2023-01-17

## 2023-01-16 RX ORDER — HYDRALAZINE HYDROCHLORIDE 20 MG/ML
5 INJECTION INTRAMUSCULAR; INTRAVENOUS EVERY 5 MIN PRN
Status: DISCONTINUED | OUTPATIENT
Start: 2023-01-16 | End: 2023-01-20

## 2023-01-16 RX ORDER — SODIUM CHLORIDE 0.9 % (FLUSH) 0.9 %
10 SYRINGE (ML) INJECTION EVERY 12 HOURS SCHEDULED
Status: DISCONTINUED | OUTPATIENT
Start: 2023-01-16 | End: 2023-01-16

## 2023-01-16 RX ORDER — DEXMEDETOMIDINE HYDROCHLORIDE 4 UG/ML
.1-1.5 INJECTION, SOLUTION INTRAVENOUS CONTINUOUS
Status: DISCONTINUED | OUTPATIENT
Start: 2023-01-16 | End: 2023-01-19

## 2023-01-16 RX ORDER — MIDAZOLAM HYDROCHLORIDE 1 MG/ML
INJECTION INTRAMUSCULAR; INTRAVENOUS PRN
Status: DISCONTINUED | OUTPATIENT
Start: 2023-01-16 | End: 2023-01-16 | Stop reason: SDUPTHER

## 2023-01-16 RX ORDER — FAMOTIDINE 20 MG/1
20 TABLET, FILM COATED ORAL 2 TIMES DAILY
Status: DISCONTINUED | OUTPATIENT
Start: 2023-01-16 | End: 2023-01-20

## 2023-01-16 RX ORDER — PROTAMINE SULFATE 10 MG/ML
INJECTION, SOLUTION INTRAVENOUS PRN
Status: DISCONTINUED | OUTPATIENT
Start: 2023-01-16 | End: 2023-01-16 | Stop reason: SDUPTHER

## 2023-01-16 RX ORDER — VANCOMYCIN HYDROCHLORIDE 1 G/20ML
INJECTION, POWDER, LYOPHILIZED, FOR SOLUTION INTRAVENOUS
Status: COMPLETED
Start: 2023-01-16 | End: 2023-01-16

## 2023-01-16 RX ORDER — INSULIN LISPRO 100 [IU]/ML
0-12 INJECTION, SOLUTION INTRAVENOUS; SUBCUTANEOUS
Status: DISCONTINUED | OUTPATIENT
Start: 2023-01-19 | End: 2023-01-16

## 2023-01-16 RX ORDER — ONDANSETRON 4 MG/1
4 TABLET, ORALLY DISINTEGRATING ORAL EVERY 8 HOURS PRN
Status: DISCONTINUED | OUTPATIENT
Start: 2023-01-16 | End: 2023-01-26 | Stop reason: HOSPADM

## 2023-01-16 RX ORDER — ACETAMINOPHEN 325 MG/1
650 TABLET ORAL EVERY 6 HOURS PRN
Status: DISCONTINUED | OUTPATIENT
Start: 2023-01-16 | End: 2023-01-16 | Stop reason: SDUPTHER

## 2023-01-16 RX ORDER — ESMOLOL HYDROCHLORIDE 10 MG/ML
25-300 INJECTION, SOLUTION INTRAVENOUS CONTINUOUS
Status: DISCONTINUED | OUTPATIENT
Start: 2023-01-16 | End: 2023-01-19

## 2023-01-16 RX ORDER — CARBOXYMETHYLCELLULOSE SODIUM 10 MG/ML
1 GEL OPHTHALMIC
Status: DISCONTINUED | OUTPATIENT
Start: 2023-01-16 | End: 2023-01-19

## 2023-01-16 RX ORDER — CALCIUM GLUCONATE 94 MG/ML
INJECTION, SOLUTION INTRAVENOUS PRN
Status: DISCONTINUED | OUTPATIENT
Start: 2023-01-16 | End: 2023-01-16 | Stop reason: SDUPTHER

## 2023-01-16 RX ORDER — POTASSIUM CHLORIDE 29.8 MG/ML
20 INJECTION INTRAVENOUS PRN
Status: DISCONTINUED | OUTPATIENT
Start: 2023-01-16 | End: 2023-01-26 | Stop reason: HOSPADM

## 2023-01-16 RX ORDER — PROMETHAZINE HYDROCHLORIDE 25 MG/1
12.5 TABLET ORAL EVERY 6 HOURS PRN
Status: DISCONTINUED | OUTPATIENT
Start: 2023-01-16 | End: 2023-01-16

## 2023-01-16 RX ORDER — FUROSEMIDE 40 MG/1
40 TABLET ORAL 2 TIMES DAILY
Status: DISCONTINUED | OUTPATIENT
Start: 2023-01-19 | End: 2023-01-18

## 2023-01-16 RX ORDER — SODIUM CHLORIDE 9 MG/ML
INJECTION, SOLUTION INTRAVENOUS CONTINUOUS PRN
Status: DISCONTINUED | OUTPATIENT
Start: 2023-01-16 | End: 2023-01-16 | Stop reason: SDUPTHER

## 2023-01-16 RX ORDER — MIDAZOLAM HYDROCHLORIDE 1 MG/ML
1 INJECTION INTRAMUSCULAR; INTRAVENOUS
Status: DISPENSED | OUTPATIENT
Start: 2023-01-16 | End: 2023-01-17

## 2023-01-16 RX ORDER — INSULIN GLARGINE 100 [IU]/ML
0.15 INJECTION, SOLUTION SUBCUTANEOUS NIGHTLY
Status: DISCONTINUED | OUTPATIENT
Start: 2023-01-18 | End: 2023-01-20

## 2023-01-16 RX ORDER — FENTANYL CITRATE-0.9 % NACL/PF 10 MCG/ML
25-200 PLASTIC BAG, INJECTION (ML) INTRAVENOUS CONTINUOUS
Status: DISCONTINUED | OUTPATIENT
Start: 2023-01-16 | End: 2023-01-16

## 2023-01-16 RX ORDER — FENTANYL CITRATE 0.05 MG/ML
INJECTION, SOLUTION INTRAMUSCULAR; INTRAVENOUS PRN
Status: DISCONTINUED | OUTPATIENT
Start: 2023-01-16 | End: 2023-01-16 | Stop reason: SDUPTHER

## 2023-01-16 RX ORDER — CHLORHEXIDINE GLUCONATE 0.12 MG/ML
15 RINSE ORAL 2 TIMES DAILY
Status: DISCONTINUED | OUTPATIENT
Start: 2023-01-16 | End: 2023-01-26 | Stop reason: HOSPADM

## 2023-01-16 RX ORDER — INSULIN LISPRO 100 [IU]/ML
0-6 INJECTION, SOLUTION INTRAVENOUS; SUBCUTANEOUS NIGHTLY
Status: DISCONTINUED | OUTPATIENT
Start: 2023-01-19 | End: 2023-01-16

## 2023-01-16 RX ORDER — FUROSEMIDE 10 MG/ML
40 INJECTION INTRAMUSCULAR; INTRAVENOUS 2 TIMES DAILY
Status: DISCONTINUED | OUTPATIENT
Start: 2023-01-17 | End: 2023-01-18

## 2023-01-16 RX ORDER — ROCURONIUM BROMIDE 10 MG/ML
INJECTION, SOLUTION INTRAVENOUS PRN
Status: DISCONTINUED | OUTPATIENT
Start: 2023-01-16 | End: 2023-01-16 | Stop reason: SDUPTHER

## 2023-01-16 RX ORDER — POTASSIUM CHLORIDE 7.45 MG/ML
10 INJECTION INTRAVENOUS PRN
Status: DISCONTINUED | OUTPATIENT
Start: 2023-01-16 | End: 2023-01-16

## 2023-01-16 RX ORDER — POTASSIUM CHLORIDE 750 MG/1
10 TABLET, EXTENDED RELEASE ORAL
Status: DISCONTINUED | OUTPATIENT
Start: 2023-01-17 | End: 2023-01-19

## 2023-01-16 RX ORDER — FENTANYL CITRATE-0.9 % NACL/PF 10 MCG/ML
25-200 PLASTIC BAG, INJECTION (ML) INTRAVENOUS CONTINUOUS
Status: DISCONTINUED | OUTPATIENT
Start: 2023-01-16 | End: 2023-01-18

## 2023-01-16 RX ORDER — ACETAMINOPHEN 325 MG/1
650 TABLET ORAL EVERY 4 HOURS PRN
Status: DISCONTINUED | OUTPATIENT
Start: 2023-01-16 | End: 2023-01-18

## 2023-01-16 RX ORDER — DEXTROSE MONOHYDRATE 100 MG/ML
INJECTION, SOLUTION INTRAVENOUS CONTINUOUS PRN
Status: DISCONTINUED | OUTPATIENT
Start: 2023-01-16 | End: 2023-01-26 | Stop reason: HOSPADM

## 2023-01-16 RX ORDER — ONDANSETRON 2 MG/ML
4 INJECTION INTRAMUSCULAR; INTRAVENOUS EVERY 6 HOURS PRN
Status: DISCONTINUED | OUTPATIENT
Start: 2023-01-16 | End: 2023-01-26 | Stop reason: HOSPADM

## 2023-01-16 RX ORDER — ACETAMINOPHEN 650 MG/1
650 SUPPOSITORY RECTAL EVERY 6 HOURS PRN
Status: DISCONTINUED | OUTPATIENT
Start: 2023-01-16 | End: 2023-01-16 | Stop reason: SDUPTHER

## 2023-01-16 RX ORDER — LANOLIN ALCOHOL/MO/W.PET/CERES
400 CREAM (GRAM) TOPICAL 2 TIMES DAILY
Status: DISCONTINUED | OUTPATIENT
Start: 2023-01-17 | End: 2023-01-26 | Stop reason: HOSPADM

## 2023-01-16 RX ORDER — SODIUM CHLORIDE 9 MG/ML
INJECTION, SOLUTION INTRAVENOUS PRN
Status: DISCONTINUED | OUTPATIENT
Start: 2023-01-16 | End: 2023-01-26 | Stop reason: HOSPADM

## 2023-01-16 RX ORDER — PHENYLEPHRINE HCL IN 0.9% NACL 1 MG/10 ML
SYRINGE (ML) INTRAVENOUS PRN
Status: DISCONTINUED | OUTPATIENT
Start: 2023-01-16 | End: 2023-01-16 | Stop reason: SDUPTHER

## 2023-01-16 RX ORDER — ALBUTEROL SULFATE 2.5 MG/3ML
2.5 SOLUTION RESPIRATORY (INHALATION)
Status: DISCONTINUED | OUTPATIENT
Start: 2023-01-16 | End: 2023-01-26

## 2023-01-16 RX ORDER — MORPHINE SULFATE 4 MG/ML
4 INJECTION, SOLUTION INTRAMUSCULAR; INTRAVENOUS
Status: DISCONTINUED | OUTPATIENT
Start: 2023-01-16 | End: 2023-01-26 | Stop reason: HOSPADM

## 2023-01-16 RX ORDER — FONDAPARINUX SODIUM 2.5 MG/.5ML
2.5 INJECTION SUBCUTANEOUS DAILY
Status: DISCONTINUED | OUTPATIENT
Start: 2023-01-17 | End: 2023-01-26 | Stop reason: HOSPADM

## 2023-01-16 RX ORDER — OXYCODONE HYDROCHLORIDE 5 MG/1
5 TABLET ORAL EVERY 4 HOURS PRN
Status: DISCONTINUED | OUTPATIENT
Start: 2023-01-16 | End: 2023-01-26 | Stop reason: HOSPADM

## 2023-01-16 RX ORDER — SODIUM CHLORIDE 9 MG/ML
INJECTION, SOLUTION INTRAVENOUS PRN
Status: DISCONTINUED | OUTPATIENT
Start: 2023-01-16 | End: 2023-01-16

## 2023-01-16 RX ORDER — MAGNESIUM HYDROXIDE 1200 MG/15ML
LIQUID ORAL CONTINUOUS PRN
Status: COMPLETED | OUTPATIENT
Start: 2023-01-16 | End: 2023-01-16

## 2023-01-16 RX ORDER — ETOMIDATE 2 MG/ML
INJECTION INTRAVENOUS PRN
Status: DISCONTINUED | OUTPATIENT
Start: 2023-01-16 | End: 2023-01-16 | Stop reason: SDUPTHER

## 2023-01-16 RX ORDER — BUPIVACAINE HYDROCHLORIDE 2.5 MG/ML
INJECTION, SOLUTION EPIDURAL; INFILTRATION; INTRACAUDAL
Status: COMPLETED | OUTPATIENT
Start: 2023-01-16 | End: 2023-01-16

## 2023-01-16 RX ORDER — METOPROLOL TARTRATE 5 MG/5ML
2.5 INJECTION INTRAVENOUS EVERY 10 MIN PRN
Status: DISCONTINUED | OUTPATIENT
Start: 2023-01-16 | End: 2023-01-20

## 2023-01-16 RX ORDER — SODIUM CHLORIDE 0.9 % (FLUSH) 0.9 %
10 SYRINGE (ML) INJECTION PRN
Status: DISCONTINUED | OUTPATIENT
Start: 2023-01-16 | End: 2023-01-16

## 2023-01-16 RX ORDER — AMINOCAPROIC ACID 250 MG/ML
INJECTION, SOLUTION INTRAVENOUS PRN
Status: DISCONTINUED | OUTPATIENT
Start: 2023-01-16 | End: 2023-01-16 | Stop reason: SDUPTHER

## 2023-01-16 RX ORDER — DEXAMETHASONE SODIUM PHOSPHATE 4 MG/ML
INJECTION, SOLUTION INTRA-ARTICULAR; INTRALESIONAL; INTRAMUSCULAR; INTRAVENOUS; SOFT TISSUE PRN
Status: DISCONTINUED | OUTPATIENT
Start: 2023-01-16 | End: 2023-01-16 | Stop reason: SDUPTHER

## 2023-01-16 RX ORDER — POLYETHYLENE GLYCOL 3350 17 G/17G
17 POWDER, FOR SOLUTION ORAL DAILY
Status: DISCONTINUED | OUTPATIENT
Start: 2023-01-17 | End: 2023-01-26 | Stop reason: HOSPADM

## 2023-01-16 RX ORDER — FAMOTIDINE 10 MG/ML
20 INJECTION, SOLUTION INTRAVENOUS 2 TIMES DAILY
Status: DISCONTINUED | OUTPATIENT
Start: 2023-01-16 | End: 2023-01-20

## 2023-01-16 RX ORDER — INSULIN LISPRO 100 [IU]/ML
0-12 INJECTION, SOLUTION INTRAVENOUS; SUBCUTANEOUS EVERY 4 HOURS
Status: DISCONTINUED | OUTPATIENT
Start: 2023-01-19 | End: 2023-01-20

## 2023-01-16 RX ORDER — MAGNESIUM SULFATE IN WATER 40 MG/ML
2000 INJECTION, SOLUTION INTRAVENOUS PRN
Status: DISCONTINUED | OUTPATIENT
Start: 2023-01-16 | End: 2023-01-26 | Stop reason: HOSPADM

## 2023-01-16 RX ORDER — NITROGLYCERIN 5 MG/ML
INJECTION, SOLUTION INTRAVENOUS PRN
Status: DISCONTINUED | OUTPATIENT
Start: 2023-01-16 | End: 2023-01-16 | Stop reason: SDUPTHER

## 2023-01-16 RX ORDER — SODIUM CHLORIDE 0.9 % (FLUSH) 0.9 %
5-40 SYRINGE (ML) INJECTION PRN
Status: DISCONTINUED | OUTPATIENT
Start: 2023-01-16 | End: 2023-01-26 | Stop reason: HOSPADM

## 2023-01-16 RX ORDER — SUCCINYLCHOLINE CHLORIDE 20 MG/ML
INJECTION INTRAMUSCULAR; INTRAVENOUS PRN
Status: DISCONTINUED | OUTPATIENT
Start: 2023-01-16 | End: 2023-01-16 | Stop reason: SDUPTHER

## 2023-01-16 RX ORDER — ASPIRIN 81 MG/1
81 TABLET ORAL DAILY
Status: DISCONTINUED | OUTPATIENT
Start: 2023-01-17 | End: 2023-01-26 | Stop reason: HOSPADM

## 2023-01-16 RX ORDER — PROTAMINE SULFATE 10 MG/ML
50 INJECTION, SOLUTION INTRAVENOUS
Status: ACTIVE | OUTPATIENT
Start: 2023-01-16 | End: 2023-01-17

## 2023-01-16 RX ORDER — ATORVASTATIN CALCIUM 40 MG/1
40 TABLET, FILM COATED ORAL NIGHTLY
Status: DISCONTINUED | OUTPATIENT
Start: 2023-01-17 | End: 2023-01-26 | Stop reason: HOSPADM

## 2023-01-16 RX ORDER — HEPARIN SODIUM 1000 [USP'U]/ML
INJECTION, SOLUTION INTRAVENOUS; SUBCUTANEOUS PRN
Status: DISCONTINUED | OUTPATIENT
Start: 2023-01-16 | End: 2023-01-16 | Stop reason: SDUPTHER

## 2023-01-16 RX ORDER — ONDANSETRON 2 MG/ML
4 INJECTION INTRAMUSCULAR; INTRAVENOUS EVERY 6 HOURS PRN
Status: DISCONTINUED | OUTPATIENT
Start: 2023-01-16 | End: 2023-01-16

## 2023-01-16 RX ORDER — SODIUM CHLORIDE 0.9 % (FLUSH) 0.9 %
5-40 SYRINGE (ML) INJECTION EVERY 12 HOURS SCHEDULED
Status: DISCONTINUED | OUTPATIENT
Start: 2023-01-16 | End: 2023-01-26 | Stop reason: HOSPADM

## 2023-01-16 RX ORDER — VANCOMYCIN HYDROCHLORIDE 1 G/20ML
INJECTION, POWDER, LYOPHILIZED, FOR SOLUTION INTRAVENOUS PRN
Status: DISCONTINUED | OUTPATIENT
Start: 2023-01-16 | End: 2023-01-16 | Stop reason: SDUPTHER

## 2023-01-16 RX ORDER — MAGNESIUM SULFATE IN WATER 40 MG/ML
2000 INJECTION, SOLUTION INTRAVENOUS PRN
Status: DISCONTINUED | OUTPATIENT
Start: 2023-01-16 | End: 2023-01-16 | Stop reason: SDUPTHER

## 2023-01-16 RX ORDER — ONDANSETRON 2 MG/ML
INJECTION INTRAMUSCULAR; INTRAVENOUS PRN
Status: DISCONTINUED | OUTPATIENT
Start: 2023-01-16 | End: 2023-01-16 | Stop reason: SDUPTHER

## 2023-01-16 RX ORDER — DEXTROSE AND SODIUM CHLORIDE 5; .45 G/100ML; G/100ML
INJECTION, SOLUTION INTRAVENOUS CONTINUOUS
Status: DISCONTINUED | OUTPATIENT
Start: 2023-01-16 | End: 2023-01-19

## 2023-01-16 RX ORDER — MORPHINE SULFATE 2 MG/ML
2 INJECTION, SOLUTION INTRAMUSCULAR; INTRAVENOUS
Status: DISCONTINUED | OUTPATIENT
Start: 2023-01-16 | End: 2023-01-26 | Stop reason: HOSPADM

## 2023-01-16 RX ORDER — ALBUMIN, HUMAN INJ 5% 5 %
25 SOLUTION INTRAVENOUS PRN
Status: DISCONTINUED | OUTPATIENT
Start: 2023-01-16 | End: 2023-01-20

## 2023-01-16 RX ORDER — PROPOFOL 10 MG/ML
INJECTION, EMULSION INTRAVENOUS PRN
Status: DISCONTINUED | OUTPATIENT
Start: 2023-01-16 | End: 2023-01-16 | Stop reason: SDUPTHER

## 2023-01-16 RX ADMIN — ALBUMIN (HUMAN) 12.5 G: 12.5 INJECTION, SOLUTION INTRAVENOUS at 10:25

## 2023-01-16 RX ADMIN — Medication 100 MCG: at 17:13

## 2023-01-16 RX ADMIN — FAMOTIDINE 20 MG: 10 INJECTION, SOLUTION INTRAVENOUS at 20:31

## 2023-01-16 RX ADMIN — FENTANYL CITRATE 25 MCG/HR: 0.05 INJECTION, SOLUTION INTRAMUSCULAR; INTRAVENOUS at 06:36

## 2023-01-16 RX ADMIN — BUPIVACAINE 20 ML: 13.3 INJECTION, SUSPENSION, LIPOSOMAL INFILTRATION at 01:05

## 2023-01-16 RX ADMIN — Medication 75 MCG/HR: at 08:18

## 2023-01-16 RX ADMIN — Medication 3000 MG: at 15:06

## 2023-01-16 RX ADMIN — Medication 100 MCG: at 04:45

## 2023-01-16 RX ADMIN — Medication 200 MCG: at 17:40

## 2023-01-16 RX ADMIN — ROCURONIUM BROMIDE 20 MG: 10 SOLUTION INTRAVENOUS at 16:32

## 2023-01-16 RX ADMIN — SODIUM ZIRCONIUM CYCLOSILICATE 5 G: 5 POWDER, FOR SUSPENSION ORAL at 08:57

## 2023-01-16 RX ADMIN — Medication 100 MCG: at 15:27

## 2023-01-16 RX ADMIN — PROTAMINE SULFATE 500 MG: 10 INJECTION, SOLUTION INTRAVENOUS at 04:33

## 2023-01-16 RX ADMIN — VANCOMYCIN HYDROCHLORIDE 2000 MG: 1 INJECTION, POWDER, LYOPHILIZED, FOR SOLUTION INTRAVENOUS at 01:26

## 2023-01-16 RX ADMIN — AMINOCAPROIC ACID 1 G/HR: 250 INJECTION, SOLUTION INTRAVENOUS at 01:35

## 2023-01-16 RX ADMIN — Medication 100 MCG/HR: at 15:16

## 2023-01-16 RX ADMIN — ROCURONIUM BROMIDE 20 MG: 10 SOLUTION INTRAVENOUS at 16:06

## 2023-01-16 RX ADMIN — SODIUM CHLORIDE: 9 INJECTION, SOLUTION INTRAVENOUS at 17:47

## 2023-01-16 RX ADMIN — MUPIROCIN: 20 OINTMENT TOPICAL at 20:31

## 2023-01-16 RX ADMIN — SODIUM BICARBONATE 50 MEQ: 84 INJECTION, SOLUTION INTRAVENOUS at 04:58

## 2023-01-16 RX ADMIN — CARBOXYMETHYLCELLULOSE SODIUM 1 DROP: 10 GEL OPHTHALMIC at 20:31

## 2023-01-16 RX ADMIN — Medication 100 MCG: at 04:50

## 2023-01-16 RX ADMIN — Medication 100 MCG: at 16:43

## 2023-01-16 RX ADMIN — Medication 200 MCG: at 17:33

## 2023-01-16 RX ADMIN — ROCURONIUM BROMIDE 100 MG: 10 SOLUTION INTRAVENOUS at 00:38

## 2023-01-16 RX ADMIN — VANCOMYCIN HYDROCHLORIDE 2000 MG: 1 INJECTION, POWDER, LYOPHILIZED, FOR SOLUTION INTRAVENOUS at 13:17

## 2023-01-16 RX ADMIN — ROCURONIUM BROMIDE 50 MG: 10 SOLUTION INTRAVENOUS at 15:02

## 2023-01-16 RX ADMIN — FENTANYL CITRATE 250 MCG: 50 INJECTION, SOLUTION INTRAMUSCULAR; INTRAVENOUS at 00:31

## 2023-01-16 RX ADMIN — Medication 100 MCG: at 16:56

## 2023-01-16 RX ADMIN — ROCURONIUM BROMIDE 50 MG: 10 SOLUTION INTRAVENOUS at 04:47

## 2023-01-16 RX ADMIN — CALCIUM GLUCONATE 1 G: 98 INJECTION, SOLUTION INTRAVENOUS at 16:29

## 2023-01-16 RX ADMIN — ESMOLOL HYDROCHLORIDE IN SODIUM CHLORIDE 50 MCG/KG/MIN: 10 INJECTION INTRAVENOUS at 08:06

## 2023-01-16 RX ADMIN — ROCURONIUM BROMIDE 50 MG: 10 SOLUTION INTRAVENOUS at 03:06

## 2023-01-16 RX ADMIN — Medication 100 MCG: at 16:12

## 2023-01-16 RX ADMIN — ONDANSETRON 4 MG: 2 INJECTION INTRAMUSCULAR; INTRAVENOUS at 15:30

## 2023-01-16 RX ADMIN — SODIUM BICARBONATE 50 MEQ: 84 INJECTION, SOLUTION INTRAVENOUS at 08:58

## 2023-01-16 RX ADMIN — FENTANYL CITRATE 250 MCG: 50 INJECTION, SOLUTION INTRAMUSCULAR; INTRAVENOUS at 04:47

## 2023-01-16 RX ADMIN — ACETAMINOPHEN 325MG 650 MG: 325 TABLET ORAL at 10:30

## 2023-01-16 RX ADMIN — ALBUMIN (HUMAN) 12.5 G: 12.5 INJECTION, SOLUTION INTRAVENOUS at 09:05

## 2023-01-16 RX ADMIN — Medication 100 MCG: at 17:05

## 2023-01-16 RX ADMIN — BUPIVACAINE HYDROCHLORIDE 20 ML: 2.5 INJECTION, SOLUTION EPIDURAL; INFILTRATION; INTRACAUDAL; PERINEURAL at 01:05

## 2023-01-16 RX ADMIN — SODIUM BICARBONATE 50 MEQ: 84 INJECTION, SOLUTION INTRAVENOUS at 04:45

## 2023-01-16 RX ADMIN — CALCIUM CHLORIDE INJECTION 1000 MG: 100 INJECTION, SOLUTION INTRAVENOUS at 08:52

## 2023-01-16 RX ADMIN — Medication 150 MCG: at 01:09

## 2023-01-16 RX ADMIN — ALBUTEROL SULFATE 2.5 MG: 2.5 SOLUTION RESPIRATORY (INHALATION) at 11:55

## 2023-01-16 RX ADMIN — SODIUM BICARBONATE 50 MEQ: 84 INJECTION, SOLUTION INTRAVENOUS at 01:10

## 2023-01-16 RX ADMIN — SODIUM CHLORIDE 4 UNITS/HR: 9 INJECTION, SOLUTION INTRAVENOUS at 02:46

## 2023-01-16 RX ADMIN — ROCURONIUM BROMIDE 20 MG: 10 SOLUTION INTRAVENOUS at 17:10

## 2023-01-16 RX ADMIN — AMINOCAPROIC ACID 5000 MG: 250 INJECTION, SOLUTION INTRAVENOUS at 00:58

## 2023-01-16 RX ADMIN — PROPOFOL 100 MG: 10 INJECTION, EMULSION INTRAVENOUS at 17:53

## 2023-01-16 RX ADMIN — DEXAMETHASONE SODIUM PHOSPHATE 8 MG: 4 INJECTION, SOLUTION INTRAMUSCULAR; INTRAVENOUS at 15:30

## 2023-01-16 RX ADMIN — FENTANYL CITRATE 250 MCG: 50 INJECTION, SOLUTION INTRAMUSCULAR; INTRAVENOUS at 01:36

## 2023-01-16 RX ADMIN — MIDAZOLAM 2 MG/HR: 5 INJECTION INTRAMUSCULAR; INTRAVENOUS at 06:49

## 2023-01-16 RX ADMIN — Medication 100 MCG: at 16:30

## 2023-01-16 RX ADMIN — Medication 100 MCG: at 17:21

## 2023-01-16 RX ADMIN — SODIUM CHLORIDE: 9 INJECTION, SOLUTION INTRAVENOUS at 14:45

## 2023-01-16 RX ADMIN — DEXTROSE MONOHYDRATE 14 MCG/MIN: 50 INJECTION, SOLUTION INTRAVENOUS at 19:21

## 2023-01-16 RX ADMIN — MIDAZOLAM HYDROCHLORIDE 4 MG: 2 INJECTION, SOLUTION INTRAMUSCULAR; INTRAVENOUS at 00:27

## 2023-01-16 RX ADMIN — CEFAZOLIN 3000 MG: 10 INJECTION, POWDER, FOR SOLUTION INTRAVENOUS at 20:33

## 2023-01-16 RX ADMIN — CALCIUM GLUCONATE 1 G: 98 INJECTION, SOLUTION INTRAVENOUS at 16:47

## 2023-01-16 RX ADMIN — ROCURONIUM BROMIDE 30 MG: 10 SOLUTION INTRAVENOUS at 15:20

## 2023-01-16 RX ADMIN — DEXTROSE AND SODIUM CHLORIDE: 5; 450 INJECTION, SOLUTION INTRAVENOUS at 08:08

## 2023-01-16 RX ADMIN — ALBUTEROL SULFATE 2.5 MG: 2.5 SOLUTION RESPIRATORY (INHALATION) at 08:06

## 2023-01-16 RX ADMIN — NITROGLYCERIN 100 MCG: 5 INJECTION, SOLUTION INTRAVENOUS at 02:29

## 2023-01-16 RX ADMIN — FENTANYL CITRATE 250 MCG: 50 INJECTION, SOLUTION INTRAMUSCULAR; INTRAVENOUS at 02:31

## 2023-01-16 RX ADMIN — ALBUTEROL SULFATE 2.5 MG: 2.5 SOLUTION RESPIRATORY (INHALATION) at 20:18

## 2023-01-16 RX ADMIN — HEPARIN SODIUM 6000 UNITS: 1000 INJECTION, SOLUTION INTRAVENOUS; SUBCUTANEOUS at 16:04

## 2023-01-16 RX ADMIN — SODIUM BICARBONATE 50 MEQ: 84 INJECTION, SOLUTION INTRAVENOUS at 04:51

## 2023-01-16 RX ADMIN — HEPARIN SODIUM 52000 UNITS: 1000 INJECTION, SOLUTION INTRAVENOUS; SUBCUTANEOUS at 02:35

## 2023-01-16 RX ADMIN — ETOMIDATE 20 MG: 2 INJECTION INTRAVENOUS at 00:31

## 2023-01-16 RX ADMIN — CEFAZOLIN 2 G: 10 INJECTION, POWDER, FOR SOLUTION INTRAVENOUS at 05:12

## 2023-01-16 RX ADMIN — SODIUM CHLORIDE: 9 INJECTION, SOLUTION INTRAVENOUS at 14:53

## 2023-01-16 RX ADMIN — MIDAZOLAM HYDROCHLORIDE 2 MG: 2 INJECTION, SOLUTION INTRAMUSCULAR; INTRAVENOUS at 04:47

## 2023-01-16 RX ADMIN — ESMOLOL HYDROCHLORIDE IN SODIUM CHLORIDE 35 MCG/KG/MIN: 10 INJECTION INTRAVENOUS at 23:27

## 2023-01-16 RX ADMIN — MIDAZOLAM HYDROCHLORIDE 2 MG: 2 INJECTION, SOLUTION INTRAMUSCULAR; INTRAVENOUS at 03:06

## 2023-01-16 RX ADMIN — DEXTROSE MONOHYDRATE 5 MCG/MIN: 50 INJECTION, SOLUTION INTRAVENOUS at 01:35

## 2023-01-16 RX ADMIN — SODIUM CHLORIDE: 9 INJECTION, SOLUTION INTRAVENOUS at 00:22

## 2023-01-16 RX ADMIN — ROCURONIUM BROMIDE 50 MG: 10 SOLUTION INTRAVENOUS at 06:14

## 2023-01-16 RX ADMIN — SUCCINYLCHOLINE CHLORIDE 180 MG: 20 INJECTION, SOLUTION INTRAMUSCULAR; INTRAVENOUS at 00:32

## 2023-01-16 RX ADMIN — CEFAZOLIN 2 G: 10 INJECTION, POWDER, FOR SOLUTION INTRAVENOUS at 01:19

## 2023-01-16 RX ADMIN — Medication 100 MCG: at 05:10

## 2023-01-16 RX ADMIN — DEXMEDETOMIDINE 1 MCG/KG/HR: 100 INJECTION, SOLUTION INTRAVENOUS at 04:49

## 2023-01-16 RX ADMIN — DEXTROSE AND SODIUM CHLORIDE: 5; 450 INJECTION, SOLUTION INTRAVENOUS at 21:50

## 2023-01-16 RX ADMIN — SODIUM BICARBONATE 50 MEQ: 84 INJECTION, SOLUTION INTRAVENOUS at 11:37

## 2023-01-16 RX ADMIN — Medication 15 ML: at 20:31

## 2023-01-16 ASSESSMENT — PULMONARY FUNCTION TESTS
PIF_VALUE: 22
PIF_VALUE: 29
PIF_VALUE: 20
PIF_VALUE: 21
PIF_VALUE: 20
PIF_VALUE: 21
PIF_VALUE: 21
PIF_VALUE: 20
PIF_VALUE: 14
PIF_VALUE: 20
PIF_VALUE: 20
PIF_VALUE: 19
PIF_VALUE: 22
PIF_VALUE: 21
PIF_VALUE: 29
PIF_VALUE: 21
PIF_VALUE: 40
PIF_VALUE: 20
PIF_VALUE: 25
PIF_VALUE: 21
PIF_VALUE: 21
PIF_VALUE: 20

## 2023-01-16 NOTE — CONSULTS
Vascular Surgery Consultation    Date of Admission:  1/15/2023  9:08 PM  Date of Consultation:  1/16/2023    PCP:  Meghan Grijalva DO       Chief Complaint: CP, LE numbness. History of Present Illness: We are asked to see this patient in consultation by Dr. Jenne Crigler regarding Aortic dissection. Cierra Saleem is a 34 y.o. male who presented with above c/o. He was found to have a Type A Aortic dissection. He was taken to OR where repair of ascending aorta performed. Pre and Post op he did have palpable or doppler pulse in left foot. He is currently in ICU on Levo and Esmolol. Lactate level is coming down. Past Medical History:  Past Medical History:   Diagnosis Date    Influenza A 03/12/2020    Marfan syndrome        Past Surgical History:  History reviewed. No pertinent surgical history.     Home Medications:   Prior to Admission medications    Not on File        Facility Administered Medications:    sodium chloride flush  5-40 mL IntraVENous 2 times per day    [START ON 1/17/2023] fondaparinux  2.5 mg SubCUTAneous Daily    [START ON 1/17/2023] aspirin  81 mg Oral Daily    aspirin  325 mg Oral Once    chlorhexidine  15 mL Mouth/Throat BID    [START ON 1/17/2023] furosemide  40 mg IntraVENous BID    [START ON 1/19/2023] furosemide  40 mg Oral BID    [START ON 1/17/2023] magnesium oxide  400 mg Oral BID    mupirocin   Nasal BID    [Held by provider] potassium chloride  10 mEq Oral TID WC    [START ON 1/17/2023] bisacodyl  5 mg Oral Daily    [START ON 1/17/2023] polyethylene glycol  17 g Oral Daily    [START ON 1/17/2023] metoprolol tartrate  12.5 mg Oral BID    [START ON 1/17/2023] atorvastatin  40 mg Oral Nightly    famotidine  20 mg Oral BID    Or    famotidine (PEPCID) injection  20 mg IntraVENous BID    ceFAZolin (ANCEF) IVPB  3,000 mg IntraVENous Q8H    vancomycin (VANCOCIN) IV  2,000 mg IntraVENous Q12H    albuterol  2.5 mg Nebulization Q4H WA    [START ON 1/18/2023] insulin glargine  0.15 Units/kg SubCUTAneous Nightly    carboxymethylcellulose PF  1 drop Both Eyes 6 times per day    [START ON 1/19/2023] insulin lispro  0-12 Units SubCUTAneous Q4H       Allergies:  Patient has no known allergies. Social History:      Social History     Socioeconomic History    Marital status: Single     Spouse name: Not on file    Number of children: Not on file    Years of education: Not on file    Highest education level: Not on file   Occupational History    Not on file   Tobacco Use    Smoking status: Never    Smokeless tobacco: Never   Vaping Use    Vaping Use: Never used   Substance and Sexual Activity    Alcohol use: Not Currently     Comment: socially    Drug use: Never    Sexual activity: Not on file   Other Topics Concern    Not on file   Social History Narrative    Not on file     Social Determinants of Health     Financial Resource Strain: Not on file   Food Insecurity: Not on file   Transportation Needs: Not on file   Physical Activity: Not on file   Stress: Not on file   Social Connections: Not on file   Intimate Partner Violence: Not on file   Housing Stability: Not on file       Family History:    History reviewed. No pertinent family history. Review of Systems:  A 14 point review of systems was completed. Pertinent positives identified in the HPI, all other review of systems negative. Physical Examination:    BP (!) 112/57   Pulse 94   Temp 98.3 °F (36.8 °C) (Oral)   Resp 23   Ht 6' 4\" (1.93 m)   Wt (!) 340 lb (154.2 kg)   SpO2 98%   BMI 41.39 kg/m²        Admission Weight: (!) 340 lb (154.2 kg)       General appearance: NAD  Eyes: PERRLA  Neck: no JVD, no lymphadenopathy. Respiratory: effort is unlabored, no crackles, wheezes or rubs. Cardiovascular: regular, no murmur. No carotid bruits. Pulses:    DP PT   RIGHT doppler doppler   LEFT - -   GI: abdomen soft, nondistended, no organomegaly. Musculoskeletal: strength and tone normal.  Extremities: BLE cool.   Good capp refill bilaterally. Neuro/psychiatric: grossly intact. MEDICAL DECISION MAKING/TESTING      CTA: images personally reviewed and interpreted. Type A Aortic dissection. No contrast seen in Left Common Iliac artery. Reconstituted at distal external with good contrast opacification at bilateral femoral bifurcations. Labs:   CBC:   Recent Labs     01/15/23  2115 01/16/23  0059 01/16/23  0546 01/16/23  0630 01/16/23  0633   WBC 15.5*  --   --   --  24.6*   HGB 14.2   < > 13.3* 14.0 13.7   HCT 45.1  --   --   --  42.3   MCV 83.4  --   --   --  82.3     --   --   --  164    < > = values in this interval not displayed. BMP:   Recent Labs     01/15/23  2115 01/16/23  0633    141   K 4.0 5.7*    105   CO2 15* 25   BUN 16 20   CREATININE 1.1 1.3   CALCIUM 9.2 7.7*   MG  --  3.40*     Cardiac Enzymes:   Recent Labs     01/15/23  2115   TROPONINI <0.01     PT/INR:   Recent Labs     01/16/23 0633   PROTIME 17.8*   INR 1.47*     APTT: No results for input(s): APTT in the last 72 hours. Liver Profile:  Lab Results   Component Value Date/Time    AST 25 01/15/2023 09:15 PM    ALT 36 01/15/2023 09:15 PM    BILITOT 0.3 01/15/2023 09:15 PM    ALKPHOS 69 01/15/2023 09:15 PM   No results found for: CHOL, HDL, TRIG  TSH:  No results found for: TSH  UA: No results found for: NITRITE, COLORU, PHUR, LABCAST, [address], RBCUA, MUCUS, TRICHOMONAS, YEAST, BACTERIA, CLARITYU, SPECGRAV, LEUKOCYTESUR, UROBILINOGEN, BILIRUBINUR, BLOODU, GLUCOSEU, AMORPHOUS        Assessment/Plan:  Type A Aortic dissection with lower extremity malperfusion. Now s/p Ascending repair. Lack of doppler signal concerning but good capp refill at present and no overt signs of ischemia. Lactate decreasing. Will continue to observe for improvement- proximal repair may direct more flow into true lumen to re expand and improve flow through compressed true lumen of iliac artery.   If signs of worsening then would need either angio and endovascular intervention or fem-fem bypass.

## 2023-01-16 NOTE — PROGRESS NOTES
01/16/23 0631   Patient Observation   Heart Rate 96   Resp 16   SpO2 98 %   Breath Sounds   Right Upper Lobe Diminished   Right Middle Lobe Diminished   Right Lower Lobe Diminished   Left Upper Lobe Diminished   Left Lower Lobe Diminished   Vent Information   Ventilator Initiate Yes   Vent Mode AC/VC   Ventilator Settings   Vt (Set, mL) 600 mL   Resp Rate (Set) 16 bmp   PEEP/CPAP (cmH2O) 5   FiO2  100 %   Pressure Support (cm H2O) 0 cm H2O   Vent Patient Data (Readings)   Vt (Measured) 612 mL   Peak Inspiratory Pressure (cmH2O) 21 cmH2O   Rate Measured 16 br/min   Minute Volume (L/min) 9.8 Liters   Mean Airway Pressure (cmH2O) 11 cmH20   I:E Ratio 1:1.60   Vent Alarm Settings   High Pressure (cmH2O) 40 cmH2O   Low Minute Volume (lpm) 2 L/min   Low Exhaled Vt (ml) 200 mL   RR High (bpm) 40 br/min   Additional Respiratoray Assessments   Humidification Source HME   Ambu Bag With Mask At Bedside Yes   ETT    Placement Date/Time: 01/16/23 0036   Present on Admission/Arrival: No  Placed By: In surgery  Placement Verified By: Auscultation;Capnometry  Preoxygenation: Yes  Mask Ventilation: Ventilated by mask (1)  Technique: Video laryngoscopy  Airway Type: Cu...    Secured At 23 cm   Measured From Lips   ETT Placement Center   Secured By Commercial tube stanton

## 2023-01-16 NOTE — ED NOTES
10 mg Labetalol given IVP as verbal ordered by Dr. [place]. Unable to place order in [facility] at this time.        Velma Li RN  01/15/23 6366

## 2023-01-16 NOTE — ANESTHESIA POSTPROCEDURE EVALUATION
Department of Anesthesiology  Postprocedure Note    Patient: Ashanti Beltran  MRN: [id number]  YOB: 1990  Date of evaluation: 1/16/2023      Procedure Summary     Date: 01/16/23 Room / Location: [address] / [facility]    Anesthesia Start: 0022 Anesthesia Stop: 6818    Procedure: ASCENDING AORTA VALVE REPLACEMENT WITH TUBE GRAFT USING AN EPPERSON GRAFT SIZE 28CM  ANTEGRADE FLOW TO THE LEFT LEG, RESUSPENSION OF AORTIC VALVE (Chest) Diagnosis:       Chest wall discomfort      (THORASCIC AORTIC DISECTION)    Surgeons: Best Chowdhury MD Responsible Provider: Sara Alcantar MD    Anesthesia Type: general ASA Status: 4 - Emergent          Anesthesia Type: No value filed.     Manav Phase I:      Manav Phase II:        Anesthesia Post Evaluation    Patient location during evaluation: ICU  Patient participation: complete - patient cannot participate  Level of consciousness: sedated and ventilated  Pain score: 0  Airway patency: patent  Nausea & Vomiting: no nausea and no vomiting  Complications: no  Cardiovascular status: vasoactive/inotropes  Respiratory status: acceptable  Hydration status: stable

## 2023-01-16 NOTE — PROGRESS NOTES
Vascular    Patient reassessed. R Foot warm, left cold but pink. Strong R Doppler signals, still no left doppler signals. Will take to OR for Fem-fem bypass rather than risk worsening ischemia. Discussed with wife.

## 2023-01-16 NOTE — PROGRESS NOTES
CVTS Cardiothoracic Progress Note:                                Chief Complaint:  Post op follow-up    Surgery:  ASCENDING AORTIC REPLACEMENT WITH TUBE GRAFT USING AN EPPERSON GRAFT SIZE 28CM  ANTEGRADE FLOW TO THE LEFT LEG, RESUSPENSION OF AORTIC VALVE 1/16    Post Op Course:      1/16 Patient intubated, sedated. PEEP 5, Fio2 85%. On several gtts for hemodynamic support at this time. Past Medical History:   Diagnosis Date    Influenza A 03/12/2020    Marfan syndrome         History reviewed. No pertinent surgical history. Allergies as of 01/15/2023    (No Known Allergies)        Patient Active Problem List   Diagnosis    Aortic dissection (HCC)    Type 1 dissection of ascending aorta        Vital Signs: BP (!) 112/57   Pulse 94   Temp 98.3 °F (36.8 °C) (Oral)   Resp 23   Ht 6' 4\" (1.93 m)   Wt (!) 340 lb (154.2 kg)   SpO2 98%   BMI 41.39 kg/m²        Admission Weight: Weight: (!) 340 lb (154.2 kg)    Weight on 1/15 (154.2 kg) Pre-op (stated)      Intake/Output:   Intake/Output Summary (Last 24 hours) at 1/16/2023 0933  Last data filed at 1/16/2023 0700  Gross per 24 hour   Intake 3154.83 ml   Output 1000 ml   Net 2154.83 ml        Extubation Time:  Transition Time:    Gtts: norepi 22, esmolol 75, fentanyl, versed     LABORATORY DATA:     CBC:   Recent Labs     01/15/23  2115 01/16/23  0059 01/16/23  0546 01/16/23  0630 01/16/23  0633   WBC 15.5*  --   --   --  24.6*   HGB 14.2   < > 13.3* 14.0 13.7   HCT 45.1  --   --   --  42.3   MCV 83.4  --   --   --  82.3     --   --   --  164    < > = values in this interval not displayed.      BMP:   Recent Labs     01/15/23  2115 01/16/23  0633    141   K 4.0 5.7*    105   CO2 15* 25   BUN 16 20   CREATININE 1.1 1.3     MG:    Recent Labs     01/16/23  0633   MG 3.40*      Cardiac Enzymes:   Recent Labs     01/15/23  2115   TROPONINI <0.01     PT/INR:   Recent Labs     01/16/23  0633   PROTIME 17.8*   INR 1.47*     APTT: No results for input(s): APTT in the last 72 hours. CXRAY: 1/16/23  FINDINGS:   Medical devices: Endotracheal tube tip projects over the midthoracic trachea. Enteric tube courses past the diaphragm with distal tip not visualized. A   surgical drain projecting over the mediastinum is present. A left IJ   approach central venous sheath catheter is present, cinched along the lower   cervical soft tissues. Interval placement of median sternotomy wires. Mediastinum/Heart: No significant interval change in the cardiomediastinal   contours compared to prior exam.       Lungs: Patchy airspace disease is present projecting over the left lung apex   as well as the right mid lung. Pleura: No findings to suggest pneumothorax or large pleural effusion. Bones/Soft tissues: Nothing acute. Impression   Patchy airspace disease at the left lung apex as well as the right mid lung,   which is nonspecific but likely represents atelectasis. Asymmetric pulmonary   edema may have a similar appearance. Medical support devices as described above.     ___________________________________________________________    Subjective:   Dietary Intake: remains intubated   Nausea: n/a   Pain Control: sedated  Complaints: Bowels: have not moved    Objective:   General appearance: intubated & sedated  Lungs: Diminished bilaterally  Heart: S1S2; SR on monitor  Chest: symmetrical expansion with inspiration and expirations; no rocking of sternum noted   Abdomen: soft, nontender  Bowel sounds: normoactive  Kidneys:  Cr 1.3;  ml/shift  Wound/Incisions: Midsternal incision with dressing CDI;  Pacing wires taped and secure  Extremities: BLE pulses absent; 1+ swelling noted in BLE  Neurological: sedated  Chest tubes/Drains: Chest tube # 1 RP with 0 ml/shift serosanguinous drainage in 8 hours overnight; Chest tube # 2 with 10 ml/shift serosanguinous drainage in 8 hours overnight; no airleaks noted in either tube     Assessment: Post-op: 0 days. Condition: In critical condition. Plan:   1. Cardiovascular: s/p ascending aorta replacement 1/16  ASA, statin  Gtts: esmolol at 75 mcg/kg/min, norepi 22 mcg/min  Pulses absent in BLE - discussed with vascular; extremities remain warm, with cap refill present; lactic trending down (1.45 from 2.76). Dr. Kin Jimenez is going to recheck extremities in several hours to determine need for surgery vs not. 2. Pulmonary: intubated FiO2 85%, PEEP 5  Will remain intubated as vascular may take patient to OR today    3. Neurology: sedated    4. Nephrology: Cr 1.3,  mL/8 hrs  Diurese as tolerated    5. Endocrinology: insulin gtt, HgbA1C ordered    6. Hematology: H&H 13.7/42.3     7. Microbiology: nothing at this time    7. Nutrition: ADAT after extubation    8. Labs: labs & imaging reviewed as above  Hypocalcemia - 1g Ca replacement  Hyperkalemia - Lokelma x 1 given, recheck K after admin    9. Post-op Drains/Wires: all chest tubes & TPWs to remain in place at this time    10. D/C Goals: discharge planning following, too soon to tell    11.  Continue post-op care of patient in the ICU    Meds:    The patient is on a beta-blocker   The patient is NOT on an ace-i/ARB - 2/2 hypotension   The patient is on a statin   The patient is NOT on oral antiplatelet therapy - 2/2 possible return to OR with vascular    Elías Veliz PA-C  1/16/2023  9:33 AM

## 2023-01-16 NOTE — ANESTHESIA PROCEDURE NOTES
Central Venous Line:    A central venous line was placed using surface landmarks, in the OR for the following indication(s): central venous access and CVP monitoring. 1/16/2023 12:50 AM1/16/2023 12:58 AM    Sterility preparation included the following: hand hygiene performed prior to procedure, maximum sterile barriers used and sterile technique used to drape from head to toe. The patient was placed in Trendelenburg position. The right internal jugular vein was prepped. The site was prepped with Chloraprep. A 9 Fr (size), 20 (length), introducer triple lumen was placed. During the procedure, the following specific steps were taken: target vein identified, needle advanced into vein and blood aspirated and guidewire advanced into vein. Post insertion care included: all ports aspirated, all ports flushed easily, guidewire removed intact, Biopatch applied, line sutured in place and dressing applied. During the procedure the patient experienced: patient tolerated procedure well with no complications and EBL < 5mL.       Outcomes: uncomplicated and patient tolerated procedure wellno  Anesthesia type: general..No  Staffing  Performed: Anesthesiologist   Anesthesiologist: Judith Avila MD  Preanesthetic Checklist  Completed: patient identified, timeout performed and monitors applied/VS acknowledged

## 2023-01-16 NOTE — ANESTHESIA PRE PROCEDURE
Department of Anesthesiology  Preprocedure Note       Name:  Svetlana Hines   Age:  34 y.o.  :  1990                                          MRN:  [id number]         Date:  2023      Surgeon: Mehnaz Woodard):  Yara Garay MD    Procedure: Procedure(s): FEMORAL FEMORAL BYPASS    Medications prior to admission:   Prior to Admission medications    Not on File       Current medications:    No current facility-administered medications for this visit. No current outpatient medications on file.      Facility-Administered Medications Ordered in Other Visits   Medication Dose Route Frequency Provider Last Rate Last Admin    protamine injection 50 mg  50 mg IntraVENous Once PRN Key Babb MD        dextrose 5 % and 0.45 % sodium chloride infusion   IntraVENous Continuous Kia Ann MD 75 mL/hr at 23 1345 NoRateChange at 23 1345    sodium chloride flush 0.9 % injection 5-40 mL  5-40 mL IntraVENous 2 times per day Key Babb MD        sodium chloride flush 0.9 % injection 5-40 mL  5-40 mL IntraVENous PRN Key Babb MD        0.9 % sodium chloride infusion   IntraVENous PRN Key Babb MD        [START ON 2023] fondaparinux (ARIXTRA) injection 2.5 mg  2.5 mg SubCUTAneous Daily Kia Ann MD        ondansetron (ZOFRAN-ODT) disintegrating tablet 4 mg  4 mg Oral Q8H PRN Kia Ann MD        Or    ondansetron (ZOFRAN) injection 4 mg  4 mg IntraVENous Q6H PRN Kia Dial MD        [START ON 2023] aspirin EC tablet 81 mg  81 mg Oral Daily Kia Dial MD        aspirin EC tablet 325 mg  325 mg Oral Once Key Babb MD        acetaminophen (TYLENOL) tablet 650 mg  650 mg Oral Q4H PRN Kia Ann MD   650 mg at 23 1030    oxyCODONE (ROXICODONE) immediate release tablet 5 mg  5 mg Oral Q4H PRN Kia Ann MD        morphine (PF) injection 2 mg  2 mg IntraVENous Q2H PRN Kia Ann MD        Or    morphine sulfate (PF) injection 4 mg  4 mg IntraVENous Q2H PRN Kia Chandler MD        meperidine (DEMEROL) injection 25 mg  25 mg IntraVENous Once PRN Meghan Ellis MD        chlorhexidine (PERIDEX) 0.12 % solution 15 mL  15 mL Mouth/Throat BID Kia Chandler MD        [START ON 1/17/2023] furosemide (LASIX) injection 40 mg  40 mg IntraVENous BID Meghan Ellis MD        [START ON 1/19/2023] furosemide (LASIX) tablet 40 mg  40 mg Oral BID Kia Chandler MD        hydrALAZINE (APRESOLINE) injection 5 mg  5 mg IntraVENous Q5 Min PRN Kia Chandler MD        metoprolol (LOPRESSOR) injection 2.5 mg  2.5 mg IntraVENous Q10 Min PRN Meghan Ellis MD        [START ON 1/17/2023] magnesium oxide (MAG-OX) tablet 400 mg  400 mg Oral BID Meghan Ellis MD        mupirocin (BACTROBAN) 2 % ointment   Nasal BID Meghan Ellis MD        [Held by provider] potassium chloride (KLOR-CON M) extended release tablet 10 mEq  10 mEq Oral TID  Kia Ann MD        sodium bicarbonate 8.4 % injection 50 mEq  50 mEq IntraVENous Q30 Min PRN Kia Ann MD   50 mEq at 01/16/23 1137    midazolam (VERSED) injection 1 mg  1 mg IntraVENous Q1H PRN Kia Chandler MD        [START ON 1/17/2023] bisacodyl (DULCOLAX) EC tablet 5 mg  5 mg Oral Daily Kia Chandler MD        [START ON 1/17/2023] polyethylene glycol (GLYCOLAX) packet 17 g  17 g Oral Daily Kia Ann MD        magnesium hydroxide (MILK OF MAGNESIA) 400 MG/5ML suspension 30 mL  30 mL Oral Daily PRN Kia Chandler MD        [START ON 1/17/2023] metoprolol tartrate (LOPRESSOR) tablet 12.5 mg  12.5 mg Oral BID Meghan Ellis MD        [START ON 1/17/2023] atorvastatin (LIPITOR) tablet 40 mg  40 mg Oral Nightly Kia Ann MD        famotidine (PEPCID) tablet 20 mg  20 mg Oral BID Kia Ann MD        Or    famotidine (PEPCID) injection 20 mg  20 mg IntraVENous BID Kia Ann MD        ceFAZolin (ANCEF) 3000 mg in dextrose 5 % 100 mL IVPB  3,000 mg IntraVENous Cordelia Acres, MD Ardyth Moritz vancomycin (VANCOCIN) 2,000 mg in dextrose 5 % 500 mL IVPB  2,000 mg IntraVENous Q12H Stacey Christine  mL/hr at 01/16/23 1317 2,000 mg at 01/16/23 1317    potassium chloride 20 mEq/50 mL IVPB (Central Line)  20 mEq IntraVENous PRN Kia Rodriges MD        magnesium sulfate 2000 mg in 50 mL IVPB premix  2,000 mg IntraVENous PRN Stacey Christine MD        calcium chloride 1,000 mg in sodium chloride 0.9 % 100 mL IVPB  1,000 mg IntraVENous PRN Stacey Christine MD   Stopped at 01/16/23 1224    Or    calcium chloride 2,000 mg in sodium chloride 0.9 % 100 mL IVPB  2,000 mg IntraVENous PRN Kia Rodriges MD        albuterol (PROVENTIL) nebulizer solution 2.5 mg  2.5 mg Nebulization Q4H WA Kia Ann MD   2.5 mg at 01/16/23 1155    albumin human 5 % IV solution 25 g  25 g IntraVENous PRN Kai Ann  mL/hr at 01/16/23 1025 12.5 g at 01/16/23 1025    norepinephrine (LEVOPHED) 16 mg in dextrose 5 % 250 mL infusion  1-30 mcg/min IntraVENous Continuous PRN Kia Ann MD 18.8 mL/hr at 01/16/23 1146 20 mcg/min at 01/16/23 1146    niCARdipine (CARDENE) 50 mg in dextrose 5 % 250 mL infusion  5 mg/hr IntraVENous Continuous PRN Kia Ann MD        insulin regular (HUMULIN R;NOVOLIN R) 100 Units in sodium chloride 0.9 % 100 mL infusion  1 Units/hr IntraVENous Continuous Kia Ann MD 2.3 mL/hr at 01/16/23 1345 2.34 Units/hr at 01/16/23 1345    [START ON 1/18/2023] insulin glargine (LANTUS) injection vial 23 Units  0.15 Units/kg SubCUTAneous Nightly Kia Ann MD        glucose chewable tablet 16 g  4 tablet Oral PRN Kia Ann MD        dextrose bolus 10% 125 mL  125 mL IntraVENous PRN Kia Ann MD        Or    dextrose bolus 10% 250 mL  250 mL IntraVENous PRN Kia Ann MD        glucagon (rDNA) injection 1 mg  1 mg IntraMUSCular PRN Kia Ann MD        dextrose 10 % infusion   IntraVENous Continuous PRN Kia Ann MD        midazolam (VERSED) 100 mg in dextrose 5 % 100 mL infusion  1-10 mg/hr IntraVENous Continuous Kia Mcelroy MD 2 mL/hr at 01/16/23 1345 2 mg/hr at 01/16/23 1345    carboxymethylcellulose PF (THERATEARS) 1 % ophthalmic gel 1 drop  1 drop Both Eyes 6 times per day Aileen Livingston MD       Creston Sicard [START ON 1/19/2023] insulin lispro (HUMALOG) injection vial 0-12 Units  0-12 Units SubCUTAneous Q4H Aileen Livingston MD        fentaNYL (SUBLIMAZE) 1,000 mcg in sodium chloride 0.9% 100 mL infusion   mcg/hr IntraVENous Continuous Kia Ann MD 10 mL/hr at 01/16/23 1345 100 mcg/hr at 01/16/23 1345    dexmedetomidine (PRECEDEX) 400 mcg in sodium chloride 0.9 % 100 mL infusion  0.1-1.5 mcg/kg/hr IntraVENous Continuous Kia Mcelroy MD   Held at 01/16/23 0802    esmolol (BREVIBLOC) 2.5gm/250ml NS infusion   mcg/kg/min IntraVENous Continuous Kia Ann MD 23.1 mL/hr at 01/16/23 1345 25 mcg/kg/min at 01/16/23 1345    ceFAZolin (ANCEF) 3000 mg in dextrose 5 % 100 mL IVPB  3,000 mg IntraVENous On Call to Brooklyn Weiss MD           Allergies:  No Known Allergies    Problem List:    Patient Active Problem List   Diagnosis Code    [facility] I71.00    Type 1 dissection of ascending aorta I71.010    Chest wall discomfort R07.89       Past Medical History:        Diagnosis Date    Influenza A 03/12/2020    Marfan syndrome        Past Surgical History:        Procedure Laterality Date    THORACIC AORTIC ANEURYSM REPAIR N/A 1/15/2023    ASCENDING AORTA VALVE REPLACEMENT WITH TUBE GRAFT USING AN EPPERSON GRAFT SIZE 28CM  ANTEGRADE FLOW TO THE LEFT LEG, RESUSPENSION OF AORTIC VALVE performed by Aileen Livingston MD at [address] [place] [address] History:    Social History     Tobacco Use    Smoking status: Never    Smokeless tobacco: Never   Substance Use Topics    Alcohol use: Not Currently     Comment: socially                                Counseling given: Not Answered      Vital Signs (Current): There were no vitals filed for this visit. BP Readings from Last 3 Encounters:   01/16/23 (!) 112/57   03/12/20 119/73       NPO Status:                                                                                 BMI:   Wt Readings from Last 3 Encounters:   01/15/23 (!) 340 lb (154.2 kg)   03/12/20 (!) 350 lb (158.8 kg)     There is no height or weight on file to calculate BMI.    CBC:   Lab Results   Component Value Date/Time    WBC 24.6 01/16/2023 06:33 AM    RBC 5.14 01/16/2023 06:33 AM    HGB 13.7 01/16/2023 06:33 AM    HCT 42.3 01/16/2023 06:33 AM    MCV 82.3 01/16/2023 06:33 AM    RDW 14.6 01/16/2023 06:33 AM     01/16/2023 06:33 AM       CMP:   Lab Results   Component Value Date/Time     01/16/2023 06:33 AM    K 5.7 01/16/2023 06:33 AM    K 4.0 01/15/2023 09:15 PM     01/16/2023 06:33 AM    CO2 25 01/16/2023 06:33 AM    BUN 20 01/16/2023 06:33 AM    CREATININE 1.3 01/16/2023 06:33 AM    GFRAA >60 03/12/2020 05:35 PM    AGRATIO 1.6 01/15/2023 09:15 PM    LABGLOM >60 01/16/2023 06:33 AM    GLUCOSE 164 01/16/2023 06:33 AM    PROT 7.2 01/15/2023 09:15 PM    CALCIUM 7.7 01/16/2023 06:33 AM    BILITOT 0.3 01/15/2023 09:15 PM    ALKPHOS 69 01/15/2023 09:15 PM    AST 25 01/15/2023 09:15 PM    ALT 36 01/15/2023 09:15 PM       POC Tests:   Recent Labs     01/16/23  0630 01/16/23  0830 01/16/23  1115 01/16/23  1201 01/16/23  1315   POCGLU 163*   < > 148*   < > 138*   POCNA 144  --   --   --   --    POCK 5.3*   < > 4.9  --   --    POCHCT 41.0  --   --   --   --     < > = values in this interval not displayed.        Coags:   Lab Results   Component Value Date/Time    PROTIME 17.8 01/16/2023 06:33 AM    INR 1.47 01/16/2023 06:33 AM       HCG (If Applicable): No results found for: PREGTESTUR, PREGSERUM, HCG, HCGQUANT     ABGs:   Lab Results   Component Value Date/Time    PHART 7.323 01/16/2023 11:15 AM    PO2ART 206.8 01/16/2023 11:15 AM    JBK5KFR 43.2 01/16/2023 11:15 AM    JVZ6SKY 22.4 01/16/2023 11:15 AM    BEART -4 01/16/2023 11:15 AM    C5RAZYBS 100 01/16/2023 11:15 AM        Type & Screen (If Applicable):  No results found for: LABABO, LABRH    Drug/Infectious Status (If Applicable):  No results found for: HIV, HEPCAB    COVID-19 Screening (If Applicable): No results found for: COVID19        Anesthesia Evaluation  Patient summary reviewed and Nursing notes reviewed  Airway: Mallampati: Unable to assess / NA       Comment: Intubated, on vent    Intubated Dental:    (+) poor dentition      Pulmonary:Negative Pulmonary ROS breath sounds clear to auscultation                             Cardiovascular:            Rhythm: regular  Rate: normal                 ROS comment: Marfan syndrome s/p type a dissection repair in last 24 hours    PE comment: On esmolol and norepi   Neuro/Psych:   Negative Neuro/Psych ROS              GI/Hepatic/Renal:   (+) morbid obesity          Endo/Other: Negative Endo/Other ROS                    Abdominal:   (+) obese,           Vascular: negative vascular ROS. Other Findings:             Anesthesia Plan      general     ASA 4       Induction: intravenous. central line, arterial line and CVP  MIPS: Postoperative opioids intended and Prophylactic antiemetics administered. Plan discussed with CRNA.     Attending anesthesiologist reviewed and agrees with Preprocedure content                Myra Patricia MD   1/16/2023

## 2023-01-16 NOTE — ANESTHESIA PROCEDURE NOTES
Peripheral Block    Patient location during procedure: OR  Reason for block: post-op pain management and at surgeon's request  Start time: 1/16/2023 1:05 AM  End time: 1/16/2023 1:10 AM  Staffing  Performed: anesthesiologist   Anesthesiologist: Kayla Reynolds MD  Preanesthetic Checklist  Completed: patient identified, IV checked, site marked, risks and benefits discussed, surgical/procedural consents, equipment checked, pre-op evaluation, timeout performed, anesthesia consent given, oxygen available and monitors applied/VS acknowledged  Peripheral Block   Patient position: supine  Prep: ChloraPrep  Provider prep: sterile gloves and mask  Patient monitoring: IV access and continuous pulse ox  Block type: PECS II  Laterality: bilateral  Injection technique: single-shot  Guidance: ultrasound guided  Local infiltration: lidocaine  Infiltration strength: 2 %  Local infiltration: lidocaine  Dose: 1 mL    Needle   Needle type: insulated echogenic nerve stimulator needle   Needle gauge: 22 G  Needle localization: ultrasound guidance  Needle length: 5 cm  Assessment   Injection assessment: negative aspiration for heme  Paresthesia pain: none  Slow fractionated injection: yes  Hemodynamics: stable  Real-time US image taken/store: yes  Outcomes: patient tolerated procedure well    Medications Administered  bupivacaine (PF) 0.25 % - Perineural   20 mL - 1/16/2023 1:05:00 AM  bupivacaine liposome 1.3 % - Perineural   20 mL - 1/16/2023 1:05:00 AM

## 2023-01-16 NOTE — CARE COORDINATION
Pt is here w Aortic Dissection- urgent OR performed. Has Marfan syndrome. May need additional procedure. Pt is from home w spouse and infant child. Call placed to spouse for full CM assess, unable to leave message. CM is following. Edy Wen RN     [id number]    CASE MANAGEMENT INITIAL ASSESSMENT      Reviewed chart and completed assessment with patient:spouse at bedside- pt intub on vent  Family present: spouse  Explained Case Management role/services. yes    Primary contact information:    Health Care Decision Maker :   Primary Decision Maker: Ravi Duarte - Spouse - [phone]          Can this person be reached and be able to respond quickly, such as within a few minutes or hours? Yes  Who would be your back-up decision maker? Name none named  Phone Number:    Admit date/status:1/15/2023 IP  Diagnosis:Aortic Dissection   Is this a Readmission?:  No    Readmission Screening completed?: No     Insurance:BCBS   Precert required for SNF: Yes       3 night stay required: No    Living arrangements, Adls, care needs, prior to admission:Lives w spouse and 3 children- youngest is 9 weeks old- in [address] [place] [address]. IPTA- no DME or services. Durable Medical Equipment at home:  Walker__Cane__RTS__ BSC__Shower Chair__  02__ HHN__ CPAP__  BiPap__  Hospital Bed__ W/C___ Other__none___    Services in the home and/or outpatient, prior to admission:none    Current PCP: States has none- one is listed but is an OBGYN. [facility] info                               Medications: Prescription coverage? Yes Will pt require financial assistance with medications No     Transportation needs: family         PT/OT recs:none yet    Hospital Exemption Notification (HEN):na    Barriers to discharge:unknown    Plan/comments:pending clinical course     ECOC on chart for MD signature    Edy Wen RN

## 2023-01-16 NOTE — BRIEF OP NOTE
Brief Postoperative Note      Patient: Amari French  YOB: 1990  MRN: [id number]    Date of Procedure: 1/15/2023    Pre-Op Diagnosis: THORASCIC AORTIC DISECTION    Post-Op Diagnosis: Same       Procedure(s):  ASCENDING AORTA VALVE REPLACEMENT WITH TUBE GRAFT USING AN EPPERSON GRAFT SIZE 28CM  ANTEGRADE FLOW TO THE LEFT LEG, RESUSPENSION OF AORTIC VALVE    Surgeon(s):  Neisha Baeza MD    Assistant:  Surgical Assistant: Jaren Jaramillo    Anesthesia: General    Estimated Blood Loss (mL): 689    Complications: None    Specimens:   ID Type Source Tests Collected by Time Destination   A : AORTA  Tissue Tissue SURGICAL PATHOLOGY Neisha Baeza MD 1/16/2023 0428        Implants:  Implant Name Type Inv. Item Serial No.  Lot No. LRB No. Used Action   GRAFT VASC L30CM [id number] THOR CLLGN SFT FLX WVN DBL DARSHAN - [id number] Vascular grafts GRAFT VASC L30CM [id number] THOR CLLGN SFT FLX WVN DBL DARSHAN [id number] Group 1 Per[employer]- [id number] N/A 1 Implanted         Drains:   Chest Tube Right Pleural 1 (Active)       Chest Tube Left Mediastinal 2 (Active)       Urinary Catheter 01/16/23 Colin-Temperature (Active)       Findings: Antegrade flow to the left leg was established by 7 Turkish cannula and connected to arterial bypass cannula. Tear was identified and ascending aorta and resected aortic valve was suspended.   No aortic regurg seen in the    Electronically signed by Neisha Baeza MD on 1/16/2023 at 5:39 AM

## 2023-01-16 NOTE — CONSULTS
Consultation H&P    Date of Admission:  1/15/2023  9:08 PM  Date of Consultation:  1/16/2023    PCP:  Vianney Maki DO      Chief Complaint: Chest pain    History of Present Illness: We are asked to see this patient in consultation by Dr. elias  Jameel Salgado is a 34 y.o. male who significant chest pain came to the emergency room CT scan showed type a dissection acute severe enough to come to the emergency room improved with blood pressure control     Past Medical History:  Past Medical History:   Diagnosis Date    Influenza A 03/12/2020    Marfan syndrome        Past Surgical History:  History reviewed. No pertinent surgical history. Home Medications:   Prior to Admission medications    Medication Sig Start Date End Date Taking?  Authorizing Provider   ibuprofen (ADVIL;MOTRIN) 800 MG tablet Take 1 tablet by mouth every 8 hours as needed for Pain or Fever 3/12/20   [facility] - CNP        Facility Administered Medications:    del nido cardioplegia custom solution   Perfusion Once    Followed by    del nido cardioplegia custom solution   Perfusion Once    Followed by    del nido cardioplegia custom solution   Perfusion Once       Allergies:  No Known Allergies     Social History:    Working:   Caffeine:   Lifestyle:    Social History     Socioeconomic History    Marital status: Single     Spouse name: Not on file    Number of children: Not on file    Years of education: Not on file    Highest education level: Not on file   Occupational History    Not on file   Tobacco Use    Smoking status: Never    Smokeless tobacco: Never   Vaping Use    Vaping Use: Never used   Substance and Sexual Activity    Alcohol use: Not Currently     Comment: socially    Drug use: Never    Sexual activity: Not on file   Other Topics Concern    Not on file   Social History Narrative    Not on file     Social Determinants of Health     Financial Resource Strain: Not on file   Food Insecurity: Not on file   Transportation Needs: Not on file   Physical Activity: Not on file   Stress: Not on file   Social Connections: Not on file   Intimate Partner Violence: Not on file   Housing Stability: Not on file       Family History:  Heart Disease:   Stroke:   Cancer:   Diabetes:   Hypertension:   Aneurysm/PVD:     History reviewed. No pertinent family history. Review of Systems:  Constitutional:  No night sweats, headaches, weight loss. Eyes:  No glaucoma, cataracts. ENMT:  No nosebleeds, deviated septum. Cardiac:  No arrhythmias, previous MI. Vascular:  No claudication, varicosities. GI:  No PUD, heartburn. :  No kidney stones, frequent UTIs  Musculoskeletal:  No arthritis, gout. Respiratory:  No SOB, emphysema, asthma. Integumentary:  No dermatitis, itching, rash. Neurological:  No stroke, TIAs, seizures. Psychiatric:  No depression, anxiety. Endocrine: No diabetes, thyroid issues. Hematologic:  No bleeding, easy bruising. Immunologic:  No known cancer, steroid therapies. Physical Examination:    BP (!) 112/57   Pulse 84   Temp 98.3 °F (36.8 °C) (Oral)   Resp 16   Ht 6' 4\" (1.93 m)   Wt (!) 340 lb (154.2 kg)   SpO2 94%   BMI 41.39 kg/m²      BP RUE:  BP LUE:   Admission Weight: (!) 340 lb (154.2 kg)   Hand dominance:    General appearance: NAD, well nourished  Eyes: anicteric, PERRLA  ENMT: no scars or lesions, no nasal deformity, normal dentition, no cyanosis of oral mucosa  Neck: no masses, no thyroid enlargement, no JVD. Respiratory: effort is unlabored, symmetric, no crackles, wheezes or rubs. No palpable/percussable abnormalities. Cardiovascular: regular, no murmur. PMI normal, no thrill. No carotid bruits. No edema or varicosities. Abdominal aorta cannot be appreciated given body habitus. GI: abdomen soft, nondistended, no organomegaly. No masses. Lymphatic: no cervical/supraclavicular adenopathy  Musculoskeletal: strength and tone normal. Full ROM. No scoliosis.   Extremities: warm and pink. No clubbing or petechiae. Skin: no dermatitis or ulceration. No nodularity or induration. Neuro: CN grossly intact. Sensation and motor function grossly intact. Psychiatric: oriented, appropriate mood/affect. MEDICAL DECISION MAKING/TESTING  Studies personally reviewed. Cath:    Echo:     CXR:     CT:   Aortic dissection that starts in the ascending thoracic aorta near the aortic   root and extends to the aortic hiatus. The dissection flap appears to extend   into the proximal aspect of the brachiocephalic artery and left common   carotid artery. Periaortic stranding and inflammation involving the distal abdominal aorta   extending along the common iliac arteries bilaterally that is worse on the   left compared to the right. There is associated loss of flow in the left   common iliac artery extending into the proximal left external and internal   iliac arteries with subsequent reconstitution. This is of uncertain etiology. Hepatic steatosis. Uncomplicated colonic diverticulosis. Findings were discussed with Jennifer Suarez at 10:57 pm on 1/15/2023. Carotid duplex:     PFT      Labs:   CBC:   Recent Labs     01/15/23  2115   WBC 15.5*   HGB 14.2   HCT 45.1   MCV 83.4        BMP:   Recent Labs     01/15/23  2115      K 4.0      CO2 15*   BUN 16   CREATININE 1.1   CALCIUM 9.2     Cardiac Enzymes:   Recent Labs     01/15/23  2115   Keyla Domitila <0.01     PT/INR: No results for input(s): PROTIME, INR in the last 72 hours. APTT: No results for input(s): APTT in the last 72 hours.   Liver Profile:  Lab Results   Component Value Date/Time    AST 25 01/15/2023 09:15 PM    ALT 36 01/15/2023 09:15 PM    BILITOT 0.3 01/15/2023 09:15 PM    ALKPHOS 69 01/15/2023 09:15 PM   No results found for: CHOL, HDL, TRIG  UA: No results found for: NITRITE, COLORU, PHUR, LABCAST, WBCUA, RBCUA, MUCUS, TRICHOMONAS, YEAST, BACTERIA, CLARITYU, SPECGRAV, LEUKOCYTESUR, UROBILINOGEN, [facility], BLOODU, GLUCOSEU, AMORPHOUS    History obtained: chart, pt    Risk factors:       Diagnosis:    Type a dissection  Acute left leg ischemia  Lactic acidosis  Renal insufficiency  Morbidly obese    Plan:   Type a dissection starting from the road all the way down to the by iliac with total occlusion of the left iliac and reconstitution of the external iliac left side patient barely able to move his leg his lactic acid was 5.6. #1 emergency type a dissection repair with all indicated procedure hypothermic arrest possible aortic valve replacement  #2 we will establish flow to the left lower leg by cutdown report femoral artery placement on the right leg bypass and create an antegrade flow with 7 [employer] connection to the arterial line  This patient after his dissection repair did not improve vascular is aware for possible femorofemoral bypass  #3 unfortunately I am really worried about the rest of his vasculature as significant amount of his blood vessels come off false lumen    Typical periop/postop course reviewed including initial limitations on driving/heavy lifting. Risks, benefits and postoperative complications discussed including bleeding, infection, stroke, death, postop pulmonary and renal issues. I spent 60 minutes of care and visit with this patient, greater than 50% of time was spent in counseling and coordinating care, image interpretation, discussion with other caregiver.   And future planning    Yessica Lynn MD FACS

## 2023-01-16 NOTE — PROGRESS NOTES
WIFE AT BEDSIDE DR. PHILIPPE EXPLAINING PROCEDURE. RISK AND BENEFITS EXPLAINED. CONSENTS SIGNED. NO BELONGINGS SECURED BY THE OR. EXPLAINED COMMUNICATION AND DISPOSITION TO THE ICU.

## 2023-01-16 NOTE — ANESTHESIA PROCEDURE NOTES
Procedure Performed: LAMAR       Start Time:  1/16/2023 1:40 AM       End Time:   1/16/2023 1:50 AM    Preanesthesia Checklist:  Patient identified, IV assessed, risks and benefits discussed, monitors and equipment assessed, procedure being performed at surgeon's request and anesthesia consent obtained. General Procedure Information  Diagnostic Indications for Echo:  hemodynamic monitoring  Physician Requesting Echo: Gifty Dial MD  Location performed:  OR  Intubated  Bite block placed  Heart visualized  Probe Insertion:  Easy  Probe Type:  3D  Modalities:  2D only    Echocardiographic and Doppler Measurements    Ventricles    Right Ventricle:  Cavity size normal.  Hypertrophy not present. Thrombus not present. Global function normal.    Left Ventricle:  Cavity size normal.  Hypertrophy not present. Thrombus not present. Global Function normal.          Valves    Aortic Valve: Annulus normal.  Stenosis not present. Regurgitation severe. Leaflets normal.  Leaflet motions normal.      Mitral Valve: Annulus normal.  Stenosis not present. Regurgitation mild. Leaflets normal.  Leaflet motions normal.      Tricuspid Valve: Annulus normal.  Stenosis not present. Regurgitation mild. Leaflet motions normal.    Pulmonic Valve: Annulus normal.  Stenosis not present. Regurgitation none. Aorta    Ascending Aorta:  Size aneurysmal.  Diameter 5.1 cm. Dissection present. Aortic Arch:  Size dilated. Diameter 3.8 cm. Dissection present. Descending Aorta:  Size normal.  Dissection present.         Atria    Right Atrium:  Size normal.    Left Atrium:  Size normal.  Left atrial appendage normal.      Septa    Atrial Septum:  Intra-atrial septal morphology normal.      Ventricular Septum:  Intra-ventricular septum morphology normal.          Other Findings  Pericardium:  normal  Pleural Effusion:  none  Pulmonary Arteries:  normal

## 2023-01-16 NOTE — CONSULTS
[facility] Cardiothoracic Surgery  Per   RE type A dissection  2925-  returned page

## 2023-01-16 NOTE — BRIEF OP NOTE
Brief Postoperative Note      Patient: Nadia Mae  YOB: 1990  MRN: [id number]    Date of Procedure: 1/16/2023    Pre-Op Diagnosis: LEFT LOWER EXREMITY ISCHEMIA    Post-Op Diagnosis: Same       Procedure(s): FEMORAL FEMORAL BYPASS    Surgeon(s):  Renetta Santos MD    Assistant:  Surgical Assistant: Kaley Curtis    Anesthesia: General    Estimated Blood Loss (mL): 344     Complications: None    Specimens:   * No specimens in log *    Implants:  Implant Name Type Inv.  Item Serial No.  Lot No. LRB No. Used Action   GRAFT VASC L40CM DIA8MM RAD L40CM EPTFE HEP THN WALLED - [id number] Vascular grafts GRAFT VASC L40CM DIA8MM RAD L40CM EPTFE HEP THN WALLED [id number]  [employer]   1 Implanted         Drains:   Chest Tube Right Pleural 1 (Active)   Output (ml) 5 ml 01/16/23 1157       Chest Tube Left Mediastinal 2 (Active)   Output (ml) 10 ml 01/16/23 1157       NG/OG/NJ/NE Tube Orogastric Right mouth (Active)       Urinary Catheter 01/16/23 Colin-Temperature (Active)   Output (mL) 75 mL 01/16/23 1200           Electronically signed by Renetta Santos MD on 1/16/2023 at 5:46 PM

## 2023-01-16 NOTE — ANESTHESIA PRE PROCEDURE
Department of Anesthesiology  Preprocedure Note       Name:  Yadira Martinez   Age:  34 y.o.  :  1990                                          MRN:  [id number]         Date:  2023      Surgeon: Ramandeep Jenkins):  Alena Downing MD    Procedure: Procedure(s):  THORACIC AORTIC ANEURYSM REPAIR ASCENDING- DISECTION    Medications prior to admission:   Prior to Admission medications    Medication Sig Start Date End Date Taking?  Authorizing Provider   ibuprofen (ADVIL;MOTRIN) 800 MG tablet Take 1 tablet by mouth every 8 hours as needed for Pain or Fever 3/12/20   [facility] - CNP       Current medications:    Current Facility-Administered Medications   Medication Dose Route Frequency Provider Last Rate Last Admin    ceFAZolin (ANCEF) IVPB             ceFAZolin (ANCEF) IVPB             esmolol (BREVIBLOC) 2.5gm/250ml NS infusion   mcg/kg/min IntraVENous Continuous Tommy Bloch, MD   Stopped at 23 0154    0.9 % sodium chloride infusion   IntraVENous PRN Tommy Bloch, MD        niCARdipine (CARDENE) 20 mg in 0.86 % sodium chloride 200 mL infusion  2.5-15 mg/hr IntraVENous Continuous Jody Victor DO   Stopped at 01/15/23 2353    [place] Cardioplegic Solution: Bag 1   Perfusion Once Alena Downing MD        Followed by   [facility] Cardioplegic Solution: Bag 2   Perfusion Once Alena Downing MD        Followed by   [facility] Cardioplegic Solution: Bag 3   Perfusion Once Alena Downing MD        norepinephrine (LEVOPHED) 16 mg in dextrose 5 % 250 mL infusion  1-100 mcg/min IntraVENous Continuous Gale Nicole MD        insulin regular (HUMULIN R;NOVOLIN R) 100 Units in sodium chloride 0.9 % 100 mL infusion  1-50 Units/hr IntraVENous Continuous Gale Nicole MD        glucose chewable tablet 16 g  4 tablet Oral PRN Gale Nicole MD        dextrose bolus 10% 125 mL  125 mL IntraVENous PRN Gale Nicole MD        Or    dextrose bolus 10% 250 mL  250 mL IntraVENous PRN Angeles Johnson MD        glucagon (rDNA) injection 1 mg  1 mg SubCUTAneous PRN Angeles Johnson MD        dextrose 10 % infusion   IntraVENous Continuous PRN Angeles Johnson MD        dexmedetomidine (PRECEDEX) 400 mcg in sodium chloride 0.9 % 100 mL infusion  0.1-1.5 mcg/kg/hr IntraVENous Continuous Angeles Johnson MD         Current Outpatient Medications   Medication Sig Dispense Refill    ibuprofen (ADVIL;MOTRIN) 800 MG tablet Take 1 tablet by mouth every 8 hours as needed for Pain or Fever 20 tablet 0     Facility-Administered Medications Ordered in Other Encounters   Medication Dose Route Frequency Provider Last Rate Last Admin    sodium bicarbonate 8.4 % injection   IntraVENous PRN Angeles Johnson MD   50 mEq at 01/16/23 0110    phenylephrine (DELORIS-SYNEPHRINE) 1 MG/10ML prefilled syringe (Push Dose)   IntraVENous PRN Angeles Johnson MD   150 mcg at 01/16/23 0109    etomidate (AMIDATE) injection   IntraVENous PRN Angeles Johnson MD   20 mg at 01/16/23 0031    rocuronium (ZEMURON) injection   IntraVENous PRN Angeles Johnson MD   100 mg at 01/16/23 0038    succinylcholine (ANECTINE) injection   IntraVENous PRN Angeles Johnson MD   180 mg at 01/16/23 0032    fentaNYL (SUBLIMAZE) injection   IntraVENous PRN Angeles Johnson MD   250 mcg at 01/16/23 0136    midazolam (VERSED) injection   IntraVENous PRN Angeles Johnson MD   4 mg at 01/16/23 0027    0.9 % sodium chloride infusion   IntraVENous Continuous PRN Angeles Johnson MD   New Bag at 01/16/23 0022    ceFAZolin (ANCEF) 2000 mg in dextrose 5 % 100 mL IVPB   IntraVENous PRN Angeles Johnson MD   2 g at 01/16/23 0119    aminocaproic acid (AMICAR) injection   IntraVENous PRN Angeles Johnson MD   5,000 mg at 01/16/23 0058    vancomycin (VANCOCIN) injection   IntraVENous PRN Angeles Johnson MD   2,000 mg at 01/16/23 0126    aminocaproic acid (AMICAR) 10,000 mg in sodium chloride 0.9 % 500 mL infusion   IntraVENous Continuous MAXX Garvey MD 50 mL/hr at 01/16/23 0135 1 g/hr at 01/16/23 0135       Allergies:  No Known Allergies    Problem List:    Patient Active Problem List   Diagnosis Code    [facility] I[facility].00       Past Medical History:        Diagnosis Date    Influenza A 03/12/2020    Marfan syndrome        Past Surgical History:  History reviewed. No pertinent surgical history. Social History:    Social History     Tobacco Use    Smoking status: Never    Smokeless tobacco: Never   Substance Use Topics    Alcohol use: Not Currently     Comment: socially                                Counseling given: Not Answered      Vital Signs (Current):   Vitals:    01/15/23 2356 01/16/23 0000 01/16/23 0005 01/16/23 0009   BP:  (!) 107/54 (!) 96/58 (!) 112/57   Pulse: 85 86 84 84   Resp:  23 14 16   Temp:       TempSrc:       SpO2:  93% 94% 94%   Weight:       Height:                                                  BP Readings from Last 3 Encounters:   01/16/23 (!) 112/57   03/12/20 119/73       NPO Status:                                                                                 BMI:   Wt Readings from Last 3 Encounters:   01/15/23 (!) 340 lb (154.2 kg)   03/12/20 (!) 350 lb (158.8 kg)     Body mass index is 41.39 kg/m².     CBC:   Lab Results   Component Value Date/Time    WBC 15.5 01/15/2023 09:15 PM    RBC 5.41 01/15/2023 09:15 PM    HGB 14.4 01/16/2023 12:59 AM    HCT 45.1 01/15/2023 09:15 PM    MCV 83.4 01/15/2023 09:15 PM    RDW 14.3 01/15/2023 09:15 PM     01/15/2023 09:15 PM       CMP:   Lab Results   Component Value Date/Time     01/15/2023 09:15 PM    K 4.0 01/15/2023 09:15 PM     01/15/2023 09:15 PM    CO2 15 01/15/2023 09:15 PM    BUN 16 01/15/2023 09:15 PM    CREATININE 1.1 01/15/2023 09:15 PM    GFRAA >60 03/12/2020 05:35 PM    AGRATIO 1.6 01/15/2023 09:15 PM    LABGLOM >60 01/15/2023 09:15 PM    GLUCOSE 243 01/15/2023 09:15 PM    PROT 7.2 01/15/2023 09:15 PM    CALCIUM 9.2 01/15/2023 09:15 PM    BILITOT 0.3 01/15/2023 09:15 PM    ALKPHOS 69 01/15/2023 09:15 PM    AST 25 01/15/2023 09:15 PM    ALT 36 01/15/2023 09:15 PM       POC Tests:   Recent Labs     01/16/23  0059   POCGLU 144*   POCNA 146*   POCK 5.3*   POCHCT 42.0       Coags: No results found for: PROTIME, INR, APTT    HCG (If Applicable): No results found for: PREGTESTUR, PREGSERUM, HCG, HCGQUANT     ABGs:   Lab Results   Component Value Date/Time    PHART 7.283 01/16/2023 12:59 AM    PO2ART 175.2 01/16/2023 12:59 AM    KNN3UOY 43.1 01/16/2023 12:59 AM    SPM9QZJ 20.4 01/16/2023 12:59 AM    BEART -6 01/16/2023 12:59 AM    N5ZSTJWJ 99 01/16/2023 12:59 AM        Type & Screen (If Applicable):  No results found for: LABABO, LABRH    Drug/Infectious Status (If Applicable):  No results found for: HIV, HEPCAB    COVID-19 Screening (If Applicable): No results found for: COVID19        Anesthesia Evaluation  Patient summary reviewed and Nursing notes reviewed  Airway: Mallampati: IV  TM distance: <3 FB   Neck ROM: full  Mouth opening: < 3 FB   Dental:    (+) poor dentition      Pulmonary:Negative Pulmonary ROS and normal exam  breath sounds clear to auscultation                             Cardiovascular:            Rhythm: regular  Rate: normal                 ROS comment: Marfan syndrome     Neuro/Psych:   Negative Neuro/Psych ROS              GI/Hepatic/Renal:   (+) morbid obesity          Endo/Other: Negative Endo/Other ROS                    Abdominal:   (+) obese,           Vascular: negative vascular ROS. Other Findings:           Anesthesia Plan      general     ASA 4 - emergent       Induction: intravenous. central line, arterial line, continuous noninvasive hemodynamic monitor, CVP and LAMAR  MIPS: Postoperative opioids intended and Prophylactic antiemetics administered. Anesthetic plan and risks discussed with patient.                         Kayla Reynolds MD   1/16/2023

## 2023-01-16 NOTE — ED PROVIDER NOTES
[facility]  ED  EMERGENCY DEPARTMENT ENCOUNTER        Pt Name: Sergey Mcdonough  MRN: [id number]  [place] 1990  Date of evaluation: 1/15/2023  Provider: Jasper William MD  PCP: Oswaldo Aponte DO  Note Started: 11:13 PM EST 1/15/23    CHIEF COMPLAINT       Chief Complaint   Patient presents with    Numbness     BLE numbness and tingling since 1900. Chest Pain     L pain, 9/10, non radiating. Resolved before arrival       HISTORY OF PRESENT ILLNESS: 1 or more Elements     History from : Patient    Limitations to history : None    Sergey Mcdonough is a 34 y.o. male who presents complaint of chest pain that occurred prior to arrival, described as severe radiating from the chest to the neck, sharp. Occurred after an episode of vomiting after the patient exited his shower, patient subsequently fell onto the bathroom floor, now complaining of back pain described as in the middle of his back, the lumbar region, states that he has difficulty feeling both of his legs, right worse than left, associated with tingling, patient voices that he is worried about being paralyzed. Symptoms not otherwise alleviated or exacerbated by other factors. Nursing Notes were all reviewed and agreed with or any disagreements were addressed in the HPI. REVIEW OF SYSTEMS :      Positives and Pertinent negatives as per HPI. ROS otherwise unremarkable. SURGICAL HISTORY   History reviewed. No pertinent surgical history. CURRENTMEDICATIONS       Previous Medications    IBUPROFEN (ADVIL;MOTRIN) 800 MG TABLET    Take 1 tablet by mouth every 8 hours as needed for Pain or Fever       ALLERGIES     Patient has no known allergies. FAMILYHISTORY     History reviewed. No pertinent family history.      SOCIAL HISTORY       Social History     Tobacco Use    Smoking status: Never    Smokeless tobacco: Never   Vaping Use    Vaping Use: Never used   Substance Use Topics    Alcohol use: Not Currently     Comment: socially    Drug use: Never       SCREENINGS                         CIWA Assessment  BP: (!) 135/57  Heart Rate: 89           PHYSICAL EXAM  1 or more Elements     ED Triage Vitals   BP Temp Temp Source Heart Rate Resp SpO2 Height Weight   01/15/23 2108 01/15/23 2108 01/15/23 2108 01/15/23 2108 01/15/23 2108 01/15/23 2108 01/15/23 2104 01/15/23 2104   (!) 167/60 98.3 °F (36.8 °C) Oral (!) 101 16 97 % 6' 4\" (1.93 m) (!) 340 lb (154.2 kg)       Physical Exam  Vitals and nursing note reviewed. Constitutional:       General: He is in acute distress (2/2 pain). Appearance: He is obese. He is toxic-appearing and diaphoretic. HENT:      Head: Normocephalic and atraumatic. Mouth/Throat:      Mouth: Mucous membranes are moist.      Pharynx: Oropharynx is clear. Eyes:      Extraocular Movements: Extraocular movements intact. Pupils: Pupils are equal, round, and reactive to light. Cardiovascular:      Rate and Rhythm: Regular rhythm. Tachycardia present. Pulses:           Dorsalis pedis pulses are 0 on the right side and 1+ on the left side. Posterior tibial pulses are 0 on the right side and 1+ on the left side. Pulmonary:      Effort: Pulmonary effort is normal.      Breath sounds: Normal breath sounds. Abdominal:      Palpations: Abdomen is soft. Tenderness: There is no abdominal tenderness. There is no guarding. Musculoskeletal:      Cervical back: Normal range of motion and neck supple. No rigidity or tenderness. Normal range of motion. Thoracic back: Normal. No deformity or bony tenderness. Normal range of motion. Lumbar back: Bony tenderness (L3) present. No deformity. Right lower leg: No edema. Left lower leg: No edema. Skin:     General: Skin is cool. Capillary Refill: Capillary refill takes more than 3 seconds. Coloration: Skin is mottled. Neurological:      Mental Status: He is alert.       Sensory: Sensory deficit (right lower extremity, intact saddle sensation, normal rectal tone) present. Motor: Weakness (unable to dorsiflex the right foot) present. DIAGNOSTIC RESULTS   LABS:    Labs Reviewed   CBC WITH AUTO DIFFERENTIAL - Abnormal; Notable for the following components:       Result Value    WBC 15.5 (*)     Neutrophils Absolute 9.8 (*)     All other components within normal limits   COMPREHENSIVE METABOLIC PANEL W/ REFLEX TO MG FOR LOW K - Abnormal; Notable for the following components:    CO2 15 (*)     Anion Gap 21 (*)     Glucose 243 (*)     All other components within normal limits   LACTATE, SEPSIS - Abnormal; Notable for the following components:    Lactic Acid, Sepsis 5.6 (*)     All other components within normal limits    Narrative:     Jennifer Lu tel. [phone],  Chemistry results called to and read back by Theo Leahy RN, 01/15/2023  23:08, by Tg Lan   BLOOD GAS, VENOUS - Abnormal; Notable for the following components:    pH, Osmany 7.342 (*)     pCO2, Osmany 33.8 (*)     pO2, Osmany 99.7 (*)     HCO3, Venous 17.9 (*)     Base Excess, Osmany -6.8 (*)     All other components within normal limits   TROPONIN   ETHANOL   LIPASE   LACTATE, SEPSIS   URINALYSIS WITH REFLEX TO CULTURE   BETA-HYDROXYBUTYRATE   TYPE AND SCREEN   PREPARE RBC (CROSSMATCH)       When ordered only abnormal lab results are displayed. All other labs were within normal range or not returned as of this dictation. EKG: The Ekg interpreted by me shows  Rhythm normal sinus rhythm  Rate of 99 bpm  Axis is normal  Intervals and durations normal  ST Segments: Normal  No prior EKG for comparison. RADIOLOGY:   Non-plain film images such as CT, Ultrasound and MRI are read by the radiologist. Plain radiographic images are visualized and preliminarily interpreted by the ED Provider with the below findings:    Obvious type aortic dissection on the CTA once imaging able to be reviewed.     Interpretation per the Radiologist below, if available at the time of this note:    CT LUMBAR SPINE WO CONTRAST   Final Result   No acute fracture or traumatic malalignment identified within the lumbar   spine. Please refer to the abdomen and pelvis CT for additional details regarding   the edema in the retroperitoneum. CTA CHEST ABDOMEN PELVIS W CONTRAST   Final Result   Aortic dissection that starts in the ascending thoracic aorta near the aortic   root and extends to the aortic hiatus. The dissection flap appears to extend   into the proximal aspect of the brachiocephalic artery and left common   carotid artery. Periaortic stranding and inflammation involving the distal abdominal aorta   extending along the common iliac arteries bilaterally that is worse on the   left compared to the right. There is associated loss of flow in the left   common iliac artery extending into the proximal left external and internal   iliac arteries with subsequent reconstitution. This is of uncertain etiology. Hepatic steatosis. Uncomplicated colonic diverticulosis. Findings were discussed with Jihan Gary at 10:57 pm on 1/15/2023. CT LUMBAR SPINE WO CONTRAST    Result Date: 1/15/2023  EXAMINATION: CT OF THE LUMBAR SPINE WITHOUT CONTRAST  1/15/2023 TECHNIQUE: CT of the lumbar spine was performed without the administration of intravenous contrast. Multiplanar reformatted images are provided for review. Adjustment of mA and/or kV according to patient size was utilized. Automated exposure control, iterative reconstruction, and/or weight based adjustment of the mA/kV was utilized to reduce the radiation dose to as low as reasonably achievable.  COMPARISON: None HISTORY: ORDERING SYSTEM PROVIDED HISTORY: fall, lumbar back pain, tenderness at L3, decreased strength bilateral lower extremities TECHNOLOGIST PROVIDED HISTORY: Reason for exam:->fall, lumbar back pain, tenderness at L3, decreased strength bilateral lower extremities Decision Support Exception - unselect if not a suspected or confirmed emergency medical condition->Emergency Medical Condition (MA) Reason for Exam: fell today and went down on his knees back pain FINDINGS: BONES/ALIGNMENT: No acute fracture or traumatic malalignment identified within the lumbar spine. DEGENERATIVE CHANGES: Mild disc disease is seen within the upper lumbar spine, characterized by vacuum effect. No disc herniation is found. No central canal stenosis identified. SOFT TISSUES/RETROPERITONEUM: Edema within the retroperitoneum is noted, which will be better evaluated on the abdomen pelvis CT. The paravertebral muscles are symmetric. No acute fracture or traumatic malalignment identified within the lumbar spine. Please refer to the abdomen and pelvis CT for additional details regarding the edema in the retroperitoneum. CTA CHEST ABDOMEN PELVIS W CONTRAST    Result Date: 1/15/2023  EXAMINATION: CTA OF THE CHEST, ABDOMEN AND PELVIS WITH CONTRAST 1/15/2023 10:36 pm: TECHNIQUE: CTA of the chest, abdomen and pelvis was performed after the administration of intravenous contrast.  Multiplanar reformatted images are provided for review. MIP images are provided for review. Automated exposure control, iterative reconstruction, and/or weight based adjustment of the mA/kV was utilized to reduce the radiation dose to as low as reasonably achievable. COMPARISON: None. HISTORY: ORDERING SYSTEM PROVIDED HISTORY: chest pain rad to the back, decreased bilateral lower extremity strength, dx of Marsalvador's TECHNOLOGIST PROVIDED HISTORY: Reason for exam:->chest pain rad to the back, decreased bilateral lower extremity strength, dx of Marfan's Decision Support Exception - unselect if not a suspected or confirmed emergency medical condition->Emergency Medical Condition (MA) Reason for Exam: fell on his knees today and now can not feel his legs. back pain FINDINGS: CTA CHEST: Thoracic aorta:  There is a dissection flap that appears to start in the ascending thoracic aorta near the aortic root and this extends to the aortic hiatus. The flap may extend into the origin of the brachiocephalic and left common carotid artery. Mediastinum: No evidence of mediastinal lymphadenopathy. The heart and pericardium demonstrate no acute abnormality. Lungs/Pleura: The lungs are without acute process. No focal consolidation or pulmonary edema. No evidence of pleural effusion or pneumothorax. Soft Tissues/Bones: No acute bone or soft tissue abnormality. CTA ABDOMEN: Abdominal aorta/Branches: The dissection flap appears to terminate near the aortic hiatus. The celiac, superior mesenteric, bilateral renal, and inferior mesenteric arteries are patent. Organs: There is hepatic steatosis. The gallbladder has been removed. The pancreas, spleen, and adrenal glands are unremarkable. The kidneys are without obstructive uropathy. GI/Bowel: The stomach is unremarkable. Loops of small bowel are normal in caliber without evidence for obstruction. The colon contains air and fecal residue and is otherwise unremarkable. There is uncomplicated colonic diverticulosis. The appendix is normal.  There is no intraperitoneal free air or free fluid. Peritoneum/Retroperitoneum: The inferior vena cava is unremarkable. The psoas muscles are symmetric. There is no retroperitoneal or mesenteric adenopathy. Bones/Soft Tissues: The extra-abdominal soft tissues are unremarkable. There is no acute osseous abnormality. CTA PELVIS: Aorta/Iliacs: There is mild stranding adjacent to the inferior aspect of the abdominal aorta and along the proximal aspect of the common iliac arteries. This is worse on the left compared to the right and there is lack of flow visualized in the distal left common iliac artery extending into the proximal aspect of the left internal and external iliac arteries. There is reconstitution of the remainder of the left internal and external iliac arteries.  Other: The urinary bladder is unremarkable. The prostate gland is unremarkable. There is no pelvic adenopathy. There is no pelvic free air or free fluid. Bones/Soft Tissues: The extra pelvic soft tissues are unremarkable. There is no acute osseous abnormality. Aortic dissection that starts in the ascending thoracic aorta near the aortic root and extends to the aortic hiatus. The dissection flap appears to extend into the proximal aspect of the brachiocephalic artery and left common carotid artery. Periaortic stranding and inflammation involving the distal abdominal aorta extending along the common iliac arteries bilaterally that is worse on the left compared to the right. There is associated loss of flow in the left common iliac artery extending into the proximal left external and internal iliac arteries with subsequent reconstitution. This is of uncertain etiology. Hepatic steatosis. Uncomplicated colonic diverticulosis. Findings were discussed with Sanjay Deleon at 10:57 pm on 1/15/2023. CRITICAL CARE TIME   I independently provided 62 minutes of non-concurrent critical care out of the total shared critical care time provided, excluding separately reportable procedures. There was a high probability of clinically significant/life threatening deterioration in the patient's condition which required my urgent intervention. Repeat neurovascular exam revealing pulse deficit in the right lower extremity, neurodeficit in the right lower extremity, obtainment of Marfan's history from the chart as patient notes that he had no prior medical problems, management of esmolol drip for heart rate control, BP control after established diagnosis of type A aortic dissection, discussion of the patient's case with cardiothoracic surgery, Dr. Delphine Parada and vascular surgery, Dr. Tony Lentz, for acute surgical management of type A aortic dissection.     PAST MEDICAL HISTORY      has a past medical history of Influenza A (03/12/2020) and Marfan syndrome.     EMERGENCY DEPARTMENT COURSE and DIFFERENTIAL DIAGNOSIS/MDM:   Vitals:    Vitals:    01/15/23 2255 01/15/23 2256 01/15/23 2300 01/15/23 2307   BP: (!) 181/72 (!) 181/72 (!) 169/70 (!) 135/57   Pulse: 89 89 86 89   Resp: 19 14 20 23   Temp:       TempSrc:       SpO2: 90% 92% 92%    Weight:       Height:           Patient was given the following medications:  Medications   esmolol (BREVIBLOC) 2.5gm/250ml NS infusion (75 mcg/kg/min × 154.2 kg IntraVENous Rate/Dose Change 1/15/23 2308)   0.9 % sodium chloride infusion (has no administration in time range)   orphenadrine (NORFLEX) injection 60 mg (60 mg IntraMUSCular Given 1/15/23 2132)   acetaminophen (TYLENOL) tablet 1,000 mg (1,000 mg Oral Given 1/15/23 2132)   iopamidol (ISOVUE-370) 76 % injection 40 mL (40 mLs IntraVENous Given 1/15/23 2237)   HYDROmorphone (DILAUDID) injection 0.5 mg (0.5 mg IntraVENous Given 1/15/23 2249)   labetalol (NORMODYNE;TRANDATE) injection 10 mg (10 mg IntraVENous Given 1/15/23 2248)             Is this patient to be included in the SEP-1 Core Measure due to severe sepsis or septic shock?   No   Exclusion criteria - the patient is NOT to be included for SEP-1 Core Measure due to:  Alternative explanation for abnormal labs/vitals that do not relate to sepsis, see MDM for further explanation    Chronic Conditions: Marfan syndrome    CONSULTS: (Who and What was discussed)  IP CONSULT TO VASCULAR SURGERY  IP CONSULT TO CARDIOTHORACIC SURGERY    Discussion with Other Profesionals : Admitting Team Dr. Rader who agreed to admit the patient, came down to evaluate the patient while the patient was still in the ER pending surgery, Consultant Dr. Ann with cardiothoracic surgery and Dr. Elvis Thorpe with vascular surgery for surgical management of aortic dissection, and Radiologist to discuss findings of type a aortic dissection.    Social Determinants : None    Records Reviewed : None    CC/HPI Summary, DDx, ED  Course, and Reassessment: 76-year-old man with history of Marfan syndrome who had an episode of vomiting and then fell while getting out of the shower, subsequently sharp tearing type chest pain that he had in route that is now resolved, mottled lower extremities skin, complaint of lower extremity weakness, appears to be worse in the right, patient able to move both lower extremities but has difficulty dorsiflexing the right foot. There was also lumbar tenderness on logroll assessment there is concern for potential lumbar spine fracture with associated neurologic deficit. Patient had normal rectal tone and no saddle anesthesia. The absence of palpable pulse in the right lower extremity and diminished pulse in the left lower extremity raise concern for acute vascular emergency, given the patient's history of Marfan syndrome aortic dissection. Patient labs obtained, requested patient go to a CT without labs. Labs did result however prior to patient returning from CT. Given the abnormalities there there was concern for potential diabetic ketoacidosis given the elevated anion gap, bicarbonate of 15, hyperglycemia to 243, no prior history of diabetes. This also may be secondary to stress hyperglycemia secondary to an acute problem. CT chest abdomen pelvis independently reviewed by me prior to radiology interpretation reveals a type a thoracic aortic dissection, esmolol immediately verbally ordered as my epic was locked out at this time. Radiologist called me shortly thereafter to give me the critical results, we also reviewed together the lumbar spine and there is no evidence of any traumatic injury to the lumbar spine. All of the neurovascular deficits are secondary to the aortic dissection. Labetalol push IV was ordered given that that medication is readily available in the [facility] and the esmolol comes from pharmacy. Goal heart rate less than 60 with goal systolic less than 792.   Required continued up titration throughout the patient's emergency department stay, vascular surgery was immediately consulted as well, spoke with Dr. Tarik Rogers who recommended cardiothoracic surgery consultation, and that he would be available for assistance with the abdominal aortic component, cardiothoracic surgery consult was immediately placed. Spoke with Dr. Ora Andrews who immediately began coming to the hospital for emergent repair of the dissection, OR team called in. On serial reassessments, patient without further development of any other pulse deficit or neurologic deficit, no weakness in the bilateral upper extremities, normal mentation, no perfusion deficit in the upper extremities. Patient does have improvement in his blood pressure, heart rate difficult to control but staying below 90, mostly in the 80s during evaluation, requires continued up titration of esmolol, this is actively directed by me to nursing. Discussed with Dr. Meir Carlson who will admit the patient to the ICU after surgery. Patient went to the OR without clinical deterioration from arrival.    Disposition Considerations (tests considered but not done, Shared Decision Making, Pt Expectation of Test or Tx.): Patient to go directly to the OR, then the CVICU. I am the Primary Clinician of Record. FINAL IMPRESSION      1. Dissecting aneurysm of thoracic aorta, Leo type A    2. Weakness of right lower extremity    3.  Weakness of left lower extremity          DISPOSITION/PLAN     DISPOSITION Decision To Admit 01/15/2023 11:09:16 PM             (Please note that portions of this note were completed with a voice recognition program.  Efforts were made to edit the dictations but occasionally words are mis-transcribed.)    Michael Blake MD (electronically signed)            Michael Blake MD  01/17/23 [id number]

## 2023-01-16 NOTE — H&P
Hospital Medicine History & Physical      PCP: Vianney Maki DO    Date of Admission: 1/15/2023    Date of Service: Pt seen/examined on 1/15/2023    Pt seen/examined face to face on and admitted as inpatient with expected LOS to be two days but can change depending on diagnostic work up and treatment response. Chief Complaint:    Chief Complaint   Patient presents with    Numbness     BLE numbness and tingling since 1900. Chest Pain     L pain, 9/10, non radiating. Resolved before arrival        History Of Present Illness:      34 y.o. male who presented to [facility] with past medical history of Marfan syndrome, presented to the ED with chief complaint of severe chest pain 10/10 nonradiating in addition to lower extremity numbness      Patient reported that his medication has not been to work for almost a week now. Patient today a at a [employer], patient was at home and then suddenly without any warning signs start developing some chest pain that was severe 9/10 associated with nausea, shortness of breath initially they thought it was anxiety however the patient condition continued to worsen and his lower extremity change color with having some numbness thus came to the ED. In the ED patient underwent CT that showed type A aortic dissection. Patient denied having any trauma, motorcycle accident, straining, or lifting any heavy object recently. Patient does work at [employer] where he does lift heavy objects sometimes but has not worked for over a week. Patient denied having any drug abuse drugs or alcoholism        Past Medical History:          Diagnosis Date    Influenza A 03/12/2020    Marfan syndrome        Past Surgical History:    Surgical History reviewed with patient and negative for recent surgies. History reviewed. No pertinent surgical history. Medications Prior to Admission:      Prior to Admission medications    Medication Sig Start Date End Date Taking?  Authorizing Provider   ibuprofen (ADVIL;MOTRIN) 800 MG tablet Take 1 tablet by mouth every 8 hours as needed for Pain or Fever 3/12/20   Brandon Musa, APRN - CNP       Allergies:  Patient has no known allergies. Social History:          TOBACCO:   reports that he has never smoked. He has never used smokeless tobacco.  ETOH:   reports that he does not currently use alcohol. E-cigarette/Vaping       Questions Responses    E-cigarette/Vaping Use Never User    Start Date     Passive Exposure     Quit Date     Counseling Given     Comments               Family History:      Family History reviewed with patient, and does not pertain and non-contributory to the current illness  Positive for Marfan syndrome cardiac disease  History reviewed. No pertinent family history. REVIEW OF SYSTEMS:     Constitutional:  No Fever, No Chills, No Night Sweats  ENT/Mouth:  No Nasal Congestion,  No Hoarseness, No new mouth lesion  Eyes:  No Eye Pain, No Redness, No Discharge  Cardiovascular: + Chest Pain, No Orthopnea, No Palpitations  Respiratory:  No Cough, No Sputum, No Dyspnea  Gastrointestinal: No Vomiting, No Diarrhea, No abdominal pain  Genitourinary: No Urinary Frequency, No Hematuria, No Urinary pain  Musculoskeletal:  No worsening Arthralgias, No worsening Myalgias  Skin:  No new Skin Lesions, No new skin rash  Neuro:  No new weakness, No new numbness. Psych:  No suicial ideation, No Violence ideation    PHYSICAL EXAM PERFORMED:    BP (!) 119/58   Pulse 85   Temp 98.3 °F (36.8 °C) (Oral)   Resp 18   Ht 6' 4\" (1.93 m)   Wt (!) 340 lb (154.2 kg)   SpO2 95%   BMI 41.39 kg/m²     General appearance:  mild acute distress, appears older than stated age  HEENT:   atraumatic, sclera anicteric, Conjunctivae clear. Neck: Supple,Trachea midline, no goiter  Respiratory:minimal accessory muscle usage, Normal respiratory effort.  Clear to auscultation, bilaterally without wheezing  Cardiovascular:  Regular rate and rhythm, capillary refill 2 seconds  Abdomen: Soft, non-tender, non-distended with normal bowel sounds. Musculoskeletal:  No clubbing, cyanosis. trace edema LE bilaterally. Skin: turgor normal.  No new rashes or lesions. Neurologic: Alert and oriented x4, no new focal sensory/motor deficits. Labs:     Recent Labs     01/15/23  2115   WBC 15.5*   HGB 14.2   HCT 45.1        Recent Labs     01/15/23  2115      K 4.0      CO2 15*   BUN 16   CREATININE 1.1   CALCIUM 9.2     Recent Labs     01/15/23  2115   AST 25   ALT 36   BILITOT 0.3   ALKPHOS 69     No results for input(s): INR in the last 72 hours. Recent Labs     01/15/23  2115   TROPONINI <0.01       Urinalysis:    No results found for: Radha Lazier, BACTERIA, RBCUA, BLOODU, [place], GLUCOSEU    Radiology:       EKG:  I have reviewed the EKG with the following interpretation:   Normal sinus rhythm QTc 469  CT LUMBAR SPINE WO CONTRAST   Final Result   No acute fracture or traumatic malalignment identified within the lumbar   spine. Please refer to the abdomen and pelvis CT for additional details regarding   the edema in the retroperitoneum. CTA CHEST ABDOMEN PELVIS W CONTRAST   Final Result   Aortic dissection that starts in the ascending thoracic aorta near the aortic   root and extends to the aortic hiatus. The dissection flap appears to extend   into the proximal aspect of the brachiocephalic artery and left common   carotid artery. Periaortic stranding and inflammation involving the distal abdominal aorta   extending along the common iliac arteries bilaterally that is worse on the   left compared to the right. There is associated loss of flow in the left   common iliac artery extending into the proximal left external and internal   iliac arteries with subsequent reconstitution. This is of uncertain etiology. Hepatic steatosis. Uncomplicated colonic diverticulosis.       Findings were discussed with Damien Mcclendon at 10:57 pm on 1/15/2023. ASSESSMENT AND PLAN:    Aortic type A dissection: Likely suspected spontaneous secondary to patient having risk factor of Marfan syndrome  CT surgery consulted, much appreciated  Plan to do emergent surgery  IV drips ordered for BP control  Start patient on esmolol for heart rate control of less than 60  Once achieved and patient's BP remains elevated above 860 systolic will need to have another agent to be added. Lactic acidosis: Secondary to hypoperfusion from dissection  IV BP control    Leukocytosis: Most likely reactive in setting of type a dissection next    Class III obesity:Complicating assessment and treatment. Placing patient at risk for multiple co-morbidities as well as early death and contributing to the patient's presentation. Education, and counseling      Face-to-face discussion with the primary ER physician in regards to symptoms, history, physical exam, diagnosis and treatment, collaborative decision was to admit the patient. total critical care time caring for this patient with life threatening, unstable organ failure, including direct patient contact, management of life support systems, review of data including imaging and labs, discussions with other team members and physicians at least 55 min so far today, excluding procedures.       Diet: NPO except meds ordered    DVT Prophylaxis: held    Dispo:   Expected LOS of two days         Michelle Ramon DO

## 2023-01-16 NOTE — PROGRESS NOTES
Occupational & Physical Therapy    Chart reviewed, attempted to see pt for OT & PT eval this date;  per RN hold therapies this date, just extubated. [facility].  PT

## 2023-01-17 ENCOUNTER — APPOINTMENT (OUTPATIENT)
Dept: GENERAL RADIOLOGY | Age: 33
DRG: 219 | End: 2023-01-17
Payer: COMMERCIAL

## 2023-01-17 LAB
A/G RATIO: 1.6 (ref 1.1–2.2)
ALBUMIN SERPL-MCNC: 3.1 G/DL (ref 3.4–5)
ALP BLD-CCNC: 41 U/L (ref 40–129)
ALT SERPL-CCNC: 231 U/L (ref 10–40)
ANION GAP SERPL CALCULATED.3IONS-SCNC: 7 MMOL/L (ref 3–16)
AST SERPL-CCNC: 878 U/L (ref 15–37)
BANDED NEUTROPHILS RELATIVE PERCENT: 4 % (ref 0–7)
BASE EXCESS ARTERIAL: -1 (ref -3–3)
BASE EXCESS ARTERIAL: -2 (ref -3–3)
BASE EXCESS ARTERIAL: -2 (ref -3–3)
BASE EXCESS ARTERIAL: -3 (ref -3–3)
BASE EXCESS ARTERIAL: -3 (ref -3–3)
BASOPHILS ABSOLUTE: 0 K/UL (ref 0–0.2)
BASOPHILS RELATIVE PERCENT: 0 %
BILIRUB SERPL-MCNC: 0.5 MG/DL (ref 0–1)
BUN BLDV-MCNC: 21 MG/DL (ref 7–20)
CALCIUM IONIZED: 1.06 MMOL/L (ref 1.12–1.32)
CALCIUM IONIZED: 1.09 MMOL/L (ref 1.12–1.32)
CALCIUM IONIZED: 1.09 MMOL/L (ref 1.12–1.32)
CALCIUM IONIZED: 1.1 MMOL/L (ref 1.12–1.32)
CALCIUM SERPL-MCNC: 7.2 MG/DL (ref 8.3–10.6)
CHLORIDE BLD-SCNC: 107 MMOL/L (ref 99–110)
CO2: 23 MMOL/L (ref 21–32)
CREAT SERPL-MCNC: 1 MG/DL (ref 0.9–1.3)
EOSINOPHILS ABSOLUTE: 0 K/UL (ref 0–0.6)
EOSINOPHILS RELATIVE PERCENT: 0 %
ESTIMATED AVERAGE GLUCOSE: 114 MG/DL
GFR SERPL CREATININE-BSD FRML MDRD: >60 ML/MIN/{1.73_M2}
GLUCOSE BLD-MCNC: 103 MG/DL (ref 70–99)
GLUCOSE BLD-MCNC: 114 MG/DL (ref 70–99)
GLUCOSE BLD-MCNC: 119 MG/DL (ref 70–99)
GLUCOSE BLD-MCNC: 119 MG/DL (ref 70–99)
GLUCOSE BLD-MCNC: 120 MG/DL (ref 70–99)
GLUCOSE BLD-MCNC: 123 MG/DL (ref 70–99)
GLUCOSE BLD-MCNC: 126 MG/DL (ref 70–99)
GLUCOSE BLD-MCNC: 130 MG/DL (ref 70–99)
GLUCOSE BLD-MCNC: 132 MG/DL (ref 70–99)
GLUCOSE BLD-MCNC: 134 MG/DL (ref 70–99)
GLUCOSE BLD-MCNC: 135 MG/DL (ref 70–99)
GLUCOSE BLD-MCNC: 152 MG/DL (ref 70–99)
GLUCOSE BLD-MCNC: 155 MG/DL (ref 70–99)
GLUCOSE BLD-MCNC: 161 MG/DL (ref 70–99)
GLUCOSE BLD-MCNC: 164 MG/DL (ref 70–99)
GLUCOSE BLD-MCNC: 165 MG/DL (ref 70–99)
GLUCOSE BLD-MCNC: 198 MG/DL (ref 70–99)
HBA1C MFR BLD: 5.6 %
HCO3 ARTERIAL: 22.6 MMOL/L (ref 21–29)
HCO3 ARTERIAL: 23.1 MMOL/L (ref 21–29)
HCO3 ARTERIAL: 23.3 MMOL/L (ref 21–29)
HCO3 ARTERIAL: 23.4 MMOL/L (ref 21–29)
HCO3 ARTERIAL: 24.3 MMOL/L (ref 21–29)
HCT VFR BLD CALC: 36.8 % (ref 40.5–52.5)
HEMOGLOBIN: 11.3 GM/DL (ref 13.5–17.5)
HEMOGLOBIN: 11.6 GM/DL (ref 13.5–17.5)
HEMOGLOBIN: 11.6 GM/DL (ref 13.5–17.5)
HEMOGLOBIN: 11.9 G/DL (ref 13.5–17.5)
INR BLD: 1.42 (ref 0.87–1.14)
LACTATE: 0.8 MMOL/L (ref 0.4–2)
LACTATE: 0.83 MMOL/L (ref 0.4–2)
LACTATE: 1.04 MMOL/L (ref 0.4–2)
LYMPHOCYTES ABSOLUTE: 2 K/UL (ref 1–5.1)
LYMPHOCYTES RELATIVE PERCENT: 10 %
MAGNESIUM: 2.9 MG/DL (ref 1.8–2.4)
MCH RBC QN AUTO: 27 PG (ref 26–34)
MCHC RBC AUTO-ENTMCNC: 32.5 G/DL (ref 31–36)
MCV RBC AUTO: 83.3 FL (ref 80–100)
MONOCYTES ABSOLUTE: 2 K/UL (ref 0–1.3)
MONOCYTES RELATIVE PERCENT: 10 %
NEUTROPHILS ABSOLUTE: 16.2 K/UL (ref 1.7–7.7)
NEUTROPHILS RELATIVE PERCENT: 76 %
O2 SAT, ARTERIAL: 94 % (ref 93–100)
O2 SAT, ARTERIAL: 95 % (ref 93–100)
O2 SAT, ARTERIAL: 96 % (ref 93–100)
O2 SAT, ARTERIAL: 96 % (ref 93–100)
O2 SAT, ARTERIAL: 99 % (ref 93–100)
PCO2 ARTERIAL: 37.8 MM HG (ref 35–45)
PCO2 ARTERIAL: 40.5 MM HG (ref 35–45)
PCO2 ARTERIAL: 43.4 MM HG (ref 35–45)
PCO2 ARTERIAL: 43.6 MM HG (ref 35–45)
PCO2 ARTERIAL: 44.6 MM HG (ref 35–45)
PDW BLD-RTO: 15.1 % (ref 12.4–15.4)
PERFORMED ON: ABNORMAL
PH ARTERIAL: 7.33 (ref 7.35–7.45)
PH ARTERIAL: 7.34 (ref 7.35–7.45)
PH ARTERIAL: 7.36 (ref 7.35–7.45)
PH ARTERIAL: 7.36 (ref 7.35–7.45)
PH ARTERIAL: 7.38 (ref 7.35–7.45)
PLATELET # BLD: 167 K/UL (ref 135–450)
PLATELET SLIDE REVIEW: ADEQUATE
PMV BLD AUTO: 7.8 FL (ref 5–10.5)
PO2 ARTERIAL: 121.7 MM HG (ref 75–108)
PO2 ARTERIAL: 71.6 MM HG (ref 75–108)
PO2 ARTERIAL: 80.5 MM HG (ref 75–108)
PO2 ARTERIAL: 85.4 MM HG (ref 75–108)
PO2 ARTERIAL: 89.4 MM HG (ref 75–108)
POC HEMATOCRIT: 33 % (ref 40.5–52.5)
POC HEMATOCRIT: 34 % (ref 40.5–52.5)
POC HEMATOCRIT: 34 % (ref 40.5–52.5)
POC PATIENT TEMP: 37
POC PATIENT TEMP: 37
POC POTASSIUM: 4.6 MMOL/L (ref 3.5–5.1)
POC POTASSIUM: 4.7 MMOL/L (ref 3.5–5.1)
POC POTASSIUM: 4.8 MMOL/L (ref 3.5–5.1)
POC SAMPLE TYPE: ABNORMAL
POC SODIUM: 142 MMOL/L (ref 136–145)
POTASSIUM REFLEX MAGNESIUM: 4.8 MMOL/L (ref 3.5–5.1)
POTASSIUM SERPL-SCNC: 4.8 MMOL/L (ref 3.5–5.1)
PROTHROMBIN TIME: 17.3 SEC (ref 11.7–14.5)
RBC # BLD: 4.42 M/UL (ref 4.2–5.9)
RBC # BLD: NORMAL 10*6/UL
SODIUM BLD-SCNC: 137 MMOL/L (ref 136–145)
TCO2 ARTERIAL: 24 MMOL/L
TCO2 ARTERIAL: 24 MMOL/L
TCO2 ARTERIAL: 25 MMOL/L
TCO2 ARTERIAL: 25 MMOL/L
TCO2 ARTERIAL: 26 MMOL/L
TOTAL PROTEIN: 5 G/DL (ref 6.4–8.2)
WBC # BLD: 20.3 K/UL (ref 4–11)

## 2023-01-17 PROCEDURE — 83735 ASSAY OF MAGNESIUM: CPT

## 2023-01-17 PROCEDURE — 2500000003 HC RX 250 WO HCPCS: Performed by: THORACIC SURGERY (CARDIOTHORACIC VASCULAR SURGERY)

## 2023-01-17 PROCEDURE — 87040 BLOOD CULTURE FOR BACTERIA: CPT

## 2023-01-17 PROCEDURE — 82330 ASSAY OF CALCIUM: CPT

## 2023-01-17 PROCEDURE — 84295 ASSAY OF SERUM SODIUM: CPT

## 2023-01-17 PROCEDURE — P9045 ALBUMIN (HUMAN), 5%, 250 ML: HCPCS | Performed by: THORACIC SURGERY (CARDIOTHORACIC VASCULAR SURGERY)

## 2023-01-17 PROCEDURE — 87086 URINE CULTURE/COLONY COUNT: CPT

## 2023-01-17 PROCEDURE — 80053 COMPREHEN METABOLIC PANEL: CPT

## 2023-01-17 PROCEDURE — 6360000002 HC RX W HCPCS: Performed by: THORACIC SURGERY (CARDIOTHORACIC VASCULAR SURGERY)

## 2023-01-17 PROCEDURE — 85014 HEMATOCRIT: CPT

## 2023-01-17 PROCEDURE — 2000000000 HC ICU R&B

## 2023-01-17 PROCEDURE — 94799 UNLISTED PULMONARY SVC/PX: CPT

## 2023-01-17 PROCEDURE — 71045 X-RAY EXAM CHEST 1 VIEW: CPT

## 2023-01-17 PROCEDURE — 82947 ASSAY GLUCOSE BLOOD QUANT: CPT

## 2023-01-17 PROCEDURE — 85025 COMPLETE CBC W/AUTO DIFF WBC: CPT

## 2023-01-17 PROCEDURE — 99024 POSTOP FOLLOW-UP VISIT: CPT | Performed by: NURSE PRACTITIONER

## 2023-01-17 PROCEDURE — 36592 COLLECT BLOOD FROM PICC: CPT

## 2023-01-17 PROCEDURE — 94003 VENT MGMT INPAT SUBQ DAY: CPT

## 2023-01-17 PROCEDURE — 2700000000 HC OXYGEN THERAPY PER DAY

## 2023-01-17 PROCEDURE — 6370000000 HC RX 637 (ALT 250 FOR IP): Performed by: THORACIC SURGERY (CARDIOTHORACIC VASCULAR SURGERY)

## 2023-01-17 PROCEDURE — 82803 BLOOD GASES ANY COMBINATION: CPT

## 2023-01-17 PROCEDURE — 87070 CULTURE OTHR SPECIMN AEROBIC: CPT

## 2023-01-17 PROCEDURE — 84132 ASSAY OF SERUM POTASSIUM: CPT

## 2023-01-17 PROCEDURE — 2580000003 HC RX 258: Performed by: THORACIC SURGERY (CARDIOTHORACIC VASCULAR SURGERY)

## 2023-01-17 PROCEDURE — 83605 ASSAY OF LACTIC ACID: CPT

## 2023-01-17 PROCEDURE — 87205 SMEAR GRAM STAIN: CPT

## 2023-01-17 PROCEDURE — 94640 AIRWAY INHALATION TREATMENT: CPT

## 2023-01-17 PROCEDURE — 36415 COLL VENOUS BLD VENIPUNCTURE: CPT

## 2023-01-17 PROCEDURE — 94761 N-INVAS EAR/PLS OXIMETRY MLT: CPT

## 2023-01-17 PROCEDURE — 85610 PROTHROMBIN TIME: CPT

## 2023-01-17 RX ADMIN — MORPHINE SULFATE 2 MG: 2 INJECTION, SOLUTION INTRAMUSCULAR; INTRAVENOUS at 20:53

## 2023-01-17 RX ADMIN — FAMOTIDINE 20 MG: 10 INJECTION, SOLUTION INTRAVENOUS at 20:35

## 2023-01-17 RX ADMIN — ATORVASTATIN CALCIUM 40 MG: 40 TABLET, FILM COATED ORAL at 20:45

## 2023-01-17 RX ADMIN — ALBUTEROL SULFATE 2.5 MG: 2.5 SOLUTION RESPIRATORY (INHALATION) at 20:51

## 2023-01-17 RX ADMIN — ESMOLOL HYDROCHLORIDE IN SODIUM CHLORIDE 40 MCG/KG/MIN: 10 INJECTION INTRAVENOUS at 06:41

## 2023-01-17 RX ADMIN — SODIUM CHLORIDE, PRESERVATIVE FREE 10 ML: 5 INJECTION INTRAVENOUS at 10:27

## 2023-01-17 RX ADMIN — ESMOLOL HYDROCHLORIDE IN SODIUM CHLORIDE 25 MCG/KG/MIN: 10 INJECTION INTRAVENOUS at 17:04

## 2023-01-17 RX ADMIN — FONDAPARINUX SODIUM 2.5 MG: 2.5 INJECTION, SOLUTION SUBCUTANEOUS at 10:26

## 2023-01-17 RX ADMIN — POLYETHYLENE GLYCOL 3350 17 G: 17 POWDER, FOR SOLUTION ORAL at 10:27

## 2023-01-17 RX ADMIN — MIDAZOLAM 1 MG: 1 INJECTION INTRAMUSCULAR; INTRAVENOUS at 03:33

## 2023-01-17 RX ADMIN — Medication 400 MG: at 10:25

## 2023-01-17 RX ADMIN — CARBOXYMETHYLCELLULOSE SODIUM 1 DROP: 10 GEL OPHTHALMIC at 20:35

## 2023-01-17 RX ADMIN — CARBOXYMETHYLCELLULOSE SODIUM 1 DROP: 10 GEL OPHTHALMIC at 00:00

## 2023-01-17 RX ADMIN — MUPIROCIN: 20 OINTMENT TOPICAL at 10:27

## 2023-01-17 RX ADMIN — CARBOXYMETHYLCELLULOSE SODIUM 1 DROP: 10 GEL OPHTHALMIC at 12:27

## 2023-01-17 RX ADMIN — SODIUM CHLORIDE, PRESERVATIVE FREE 10 ML: 5 INJECTION INTRAVENOUS at 20:35

## 2023-01-17 RX ADMIN — VANCOMYCIN HYDROCHLORIDE 2000 MG: 1 INJECTION, POWDER, LYOPHILIZED, FOR SOLUTION INTRAVENOUS at 01:11

## 2023-01-17 RX ADMIN — ALBUTEROL SULFATE 2.5 MG: 2.5 SOLUTION RESPIRATORY (INHALATION) at 12:13

## 2023-01-17 RX ADMIN — ALBUTEROL SULFATE 2.5 MG: 2.5 SOLUTION RESPIRATORY (INHALATION) at 16:40

## 2023-01-17 RX ADMIN — CALCIUM CHLORIDE INJECTION 1000 MG: 100 INJECTION, SOLUTION INTRAVENOUS at 05:46

## 2023-01-17 RX ADMIN — CARBOXYMETHYLCELLULOSE SODIUM 1 DROP: 10 GEL OPHTHALMIC at 09:16

## 2023-01-17 RX ADMIN — Medication 15 ML: at 20:57

## 2023-01-17 RX ADMIN — CEFAZOLIN 3000 MG: 10 INJECTION, POWDER, FOR SOLUTION INTRAVENOUS at 13:32

## 2023-01-17 RX ADMIN — Medication 0.5 MCG/KG/HR: at 08:03

## 2023-01-17 RX ADMIN — CARBOXYMETHYLCELLULOSE SODIUM 1 DROP: 10 GEL OPHTHALMIC at 05:11

## 2023-01-17 RX ADMIN — CEFAZOLIN 3000 MG: 10 INJECTION, POWDER, FOR SOLUTION INTRAVENOUS at 05:12

## 2023-01-17 RX ADMIN — ALBUMIN (HUMAN) 12.5 G: 12.5 INJECTION, SOLUTION INTRAVENOUS at 16:22

## 2023-01-17 RX ADMIN — MUPIROCIN: 20 OINTMENT TOPICAL at 20:57

## 2023-01-17 RX ADMIN — Medication 400 MG: at 20:45

## 2023-01-17 RX ADMIN — ACETAMINOPHEN 325MG 650 MG: 325 TABLET ORAL at 22:21

## 2023-01-17 RX ADMIN — VANCOMYCIN HYDROCHLORIDE 2000 MG: 1 INJECTION, POWDER, LYOPHILIZED, FOR SOLUTION INTRAVENOUS at 15:43

## 2023-01-17 RX ADMIN — Medication 100 MCG/HR: at 05:32

## 2023-01-17 RX ADMIN — Medication 15 ML: at 10:26

## 2023-01-17 RX ADMIN — FAMOTIDINE 20 MG: 10 INJECTION, SOLUTION INTRAVENOUS at 10:32

## 2023-01-17 RX ADMIN — ALBUMIN (HUMAN) 12.5 G: 12.5 INJECTION, SOLUTION INTRAVENOUS at 08:32

## 2023-01-17 RX ADMIN — SODIUM CHLORIDE 7.3 UNITS/HR: 9 INJECTION, SOLUTION INTRAVENOUS at 01:08

## 2023-01-17 RX ADMIN — ALBUTEROL SULFATE 2.5 MG: 2.5 SOLUTION RESPIRATORY (INHALATION) at 08:42

## 2023-01-17 RX ADMIN — ALBUMIN (HUMAN) 12.5 G: 12.5 INJECTION, SOLUTION INTRAVENOUS at 09:46

## 2023-01-17 RX ADMIN — CARBOXYMETHYLCELLULOSE SODIUM 1 DROP: 10 GEL OPHTHALMIC at 15:54

## 2023-01-17 RX ADMIN — CEFAZOLIN 3000 MG: 10 INJECTION, POWDER, FOR SOLUTION INTRAVENOUS at 22:26

## 2023-01-17 RX ADMIN — METOPROLOL TARTRATE 12.5 MG: 25 TABLET, FILM COATED ORAL at 20:45

## 2023-01-17 RX ADMIN — ACETAMINOPHEN 325MG 650 MG: 325 TABLET ORAL at 10:25

## 2023-01-17 ASSESSMENT — PULMONARY FUNCTION TESTS
PIF_VALUE: 22
PIF_VALUE: 22
PIF_VALUE: 23
PIF_VALUE: 22
PIF_VALUE: 17
PIF_VALUE: 18
PIF_VALUE: 22
PIF_VALUE: 21
PIF_VALUE: 18
PIF_VALUE: 21
PIF_VALUE: 22
PIF_VALUE: 21
PIF_VALUE: 18
PIF_VALUE: 40
PIF_VALUE: 30
PIF_VALUE: 18
PIF_VALUE: 18
PIF_VALUE: 24
PIF_VALUE: 21
PIF_VALUE: 31
PIF_VALUE: 21

## 2023-01-17 ASSESSMENT — PAIN SCALES - WONG BAKER: WONGBAKER_NUMERICALRESPONSE: 4

## 2023-01-17 NOTE — PROGRESS NOTES
01/17/23 0003   Patient Observation   Heart Rate 99   Resp 14   SpO2 98 %   Breath Sounds   Right Upper Lobe Clear   Right Middle Lobe Diminished   Right Lower Lobe Diminished   Left Upper Lobe Clear   Left Lower Lobe Diminished   Vent Information   Vent Mode AC/VC   Ventilator Settings   Vt (Set, mL) 600 mL   Resp Rate (Set) 16 bmp   PEEP/CPAP (cmH2O) 5   FiO2  50 %   Pressure Support (cm H2O) 0 cm H2O   Vent Patient Data (Readings)   Vt (Measured) 632 mL   Peak Inspiratory Pressure (cmH2O) 21 cmH2O   Rate Measured 16 br/min   Minute Volume (L/min) 10.3 Liters   Mean Airway Pressure (cmH2O) 9.3 cmH20   I:E Ratio 1:2.10   Vent Alarm Settings   High Pressure (cmH2O) 40 cmH2O   Low Minute Volume (lpm) 2 L/min   Low Exhaled Vt (ml) 200 mL   RR High (bpm) 40 br/min   Additional Respiratoray Assessments   Humidification Source HME   Ambu Bag With Mask At Bedside Yes   ETT    Placement Date/Time: 01/16/23 0036   Present on Admission/Arrival: No  Placed By: In surgery  Placement Verified By: Auscultation;Capnometry  Preoxygenation: Yes  Mask Ventilation: Ventilated by mask (1)  Technique: Video laryngoscopy  Airway Type: Cu...    Secured At 24 cm   Measured From Lips   ETT Placement Center   Secured By Commercial tube stanton   Site Assessment Dry   Cuff Pressure 26 cm H2O

## 2023-01-17 NOTE — PROGRESS NOTES
Spontaneous parameters done at 1334, see flowsheet for further information. Pt pre oxygenated with 100% FIO2, orally suctioned and deep suction down ETT. Extubated and placed on 4 lpm o2 nc, sat 93%, Rebeka well.

## 2023-01-17 NOTE — PROGRESS NOTES
01/17/23 0848   Patient Observation   Heart Rate (!) 101   Resp 17   SpO2 95 %   Breath Sounds   Right Upper Lobe Diminished;Rhonchi   Right Middle Lobe Diminished   Right Lower Lobe Rhonchi;Diminished   Left Upper Lobe Diminished   Left Lower Lobe Diminished   Vent Information   Equipment Changed HME   Vent Mode AC/VC   Ventilator Settings   Vt (Set, mL) 600 mL   Resp Rate (Set) 16 bmp   PEEP/CPAP (cmH2O) 5   FiO2  40 %   Pressure Support (cm H2O) 0 cm H2O   Vent Patient Data (Readings)   Vt (Measured) 609 mL   Peak Inspiratory Pressure (cmH2O) 21 cmH2O   Rate Measured 19 br/min   Minute Volume (L/min) 11.9 Liters   Mean Airway Pressure (cmH2O) 6.3 cmH20   I:E Ratio 1:2.1   Flow Sensitivity 3 L/min   Vent Alarm Settings   High Pressure (cmH2O) 40 cmH2O   Low Minute Volume (lpm) 2 L/min   Low Exhaled Vt (ml) 200 mL   High Exhaled Vt (ml) 1000 mL   RR High (bpm) 40 br/min   Apnea (secs) 20 secs   Additional Respiratoray Assessments   Humidification Source HME   Ambu Bag With Mask At Bedside Yes   Airway Clearance   Suction ET Tube;Oral   Suction Device Inline suction catheter;Courtuer   Sputum Method Obtained Endotracheal   Sputum Amount   (none)   ETT    Placement Date/Time: 01/16/23 0036   Present on Admission/Arrival: No  Placed By: In surgery  Placement Verified By: Auscultation;Capnometry  Preoxygenation: Yes  Mask Ventilation: Ventilated by mask (1)  Technique: Video laryngoscopy  Airway Type: Cu...    Secured At 24 cm   Measured From Lips   ETT Placement Right   Secured By Commercial tube do   Site Assessment Dry   Cuff Pressure 30 cm H2O   Tie/Do Changed No   Supplemental Gases   Supplemental Gases None   Respiratory   Respiratory Interventions   (hhn with aerogen)

## 2023-01-17 NOTE — PROGRESS NOTES
01/16/23 2022   Patient Observation   Heart Rate (!) 101   Resp 17   SpO2 98 %   Breath Sounds   Right Upper Lobe Clear   Right Middle Lobe Diminished   Right Lower Lobe Diminished   Left Upper Lobe Clear   Left Lower Lobe Diminished   Vent Information   Vent Mode AC/VC   Ventilator Settings   Vt (Set, mL) 600 mL   Resp Rate (Set) 16 bmp   PEEP/CPAP (cmH2O) 5   FiO2  60 %   Pressure Support (cm H2O) 0 cm H2O   Vent Patient Data (Readings)   Vt (Measured) 615 mL   Peak Inspiratory Pressure (cmH2O) 22 cmH2O   Rate Measured 17 br/min   Minute Volume (L/min) 9.26 Liters   Mean Airway Pressure (cmH2O) 5.4 cmH20   I:E Ratio 1:2.10   Vent Alarm Settings   High Pressure (cmH2O) 40 cmH2O   Low Minute Volume (lpm) 2 L/min   Low Exhaled Vt (ml) 200 mL   RR High (bpm) 40 br/min   Additional Respiratoray Assessments   Humidification Source HME   Ambu Bag With Mask At Bedside Yes   Airway Clearance   Suction ET Tube   Sputum Amount Other (comment)  (none)   ETT    Placement Date/Time: 01/16/23 0036   Present on Admission/Arrival: No  Placed By: In surgery  Placement Verified By: Auscultation;Capnometry  Preoxygenation: Yes  Mask Ventilation: Ventilated by mask (1)  Technique: Video laryngoscopy  Airway Type: Cu...    Secured At 24 cm   Measured From Lips   ETT Placement Right   Secured By Commercial tube stanton   Site Assessment Dry   Cuff Pressure 28 cm H2O

## 2023-01-17 NOTE — PROGRESS NOTES
01/17/23 0413   Patient Observation   Heart Rate (!) 104   Resp 18   SpO2 96 %   Breath Sounds   Right Upper Lobe Rhonchi   Right Middle Lobe Rhonchi   Right Lower Lobe Rhonchi   Left Upper Lobe Rhonchi   Left Lower Lobe Rhonchi   Vent Information   Equipment Changed HME   Vent Mode AC/VC   Ventilator Settings   Vt (Set, mL) 600 mL   Resp Rate (Set) 16 bmp   PEEP/CPAP (cmH2O) 5   FiO2  50 %   Pressure Support (cm H2O) 0 cm H2O   Vent Patient Data (Readings)   Vt (Measured) 604 mL   Peak Inspiratory Pressure (cmH2O) 18 cmH2O   Rate Measured 17 br/min   Minute Volume (L/min) 10.4 Liters   Mean Airway Pressure (cmH2O) 4.9 cmH20   I:E Ratio 1:1.80   Vent Alarm Settings   High Pressure (cmH2O) 40 cmH2O   Low Minute Volume (lpm) 2 L/min   Low Exhaled Vt (ml) 200 mL   RR High (bpm) 40 br/min   Additional Respiratoray Assessments   Humidification Source HME   Ambu Bag With Mask At Bedside Yes   Airway Clearance   Suction ET Tube   Suction Device Inline suction catheter   Sputum Method Obtained Endotracheal   Sputum Amount Large   Sputum Color/Odor Bloody; Tan   Sputum Consistency Tenacious;Mucous plugs; Thick   ETT    Placement Date/Time: 01/16/23 0036   Present on Admission/Arrival: No  Placed By: In surgery  Placement Verified By: Auscultation;Capnometry  Preoxygenation: Yes  Mask Ventilation: Ventilated by mask (1)  Technique: Video laryngoscopy  Airway Type: Cu...    Secured At 24 cm   Measured From Lips   ETT Placement Left   Secured By Commercial tube stanton   Site Assessment Dry   Cuff Pressure 30 cm H2O

## 2023-01-17 NOTE — ANESTHESIA POSTPROCEDURE EVALUATION
Department of Anesthesiology  Postprocedure Note    Patient: Alisa Galloway  MRN: [id number]  YOB: 1990  Date of evaluation: 1/16/2023      Procedure Summary     Date: 01/16/23 Room / Location[place] [facility] [place] [address] ([place]BRID) / [facility]    Anesthesia Start: 5586 Anesthesia Stop: 1838    Procedure: FEMORAL FEMORAL BYPASS (Bilateral: Groin) Diagnosis:       Vascular insufficiency of extremity      (femoral vascular insufficiency)    Surgeons: Jeanette Ross MD Responsible Provider: Kath Sandoval MD    Anesthesia Type: general ASA Status: 4          Anesthesia Type: No value filed.     Manav Phase I:      Manav Phase II:        Anesthesia Post Evaluation    Comments: Postoperative Anesthesia Note    Name:    Alisa Galloway  MRN:      [id number]    Patient Vitals in the past 12 hrs:  01/16/23 1930, Pulse:99, Resp:16, SpO2:98 %  01/16/23 1900, Temp:99.9 °F (37.7 °C), Temp src:Oral, Pulse:96, Resp:16, SpO2:98 %  01/16/23 1200, Temp:(!) 101.6 °F (38.7 °C), Temp src:Axillary  01/16/23 1157, Pulse:100, Resp:26, SpO2:100 %  01/16/23 1156, Pulse:99, Resp:26, SpO2:100 %  01/16/23 1045, Pulse:96, Resp:19, SpO2:100 %  01/16/23 1030, Pulse:96, Resp:19, SpO2:100 %  01/16/23 1015, Pulse:98, Resp:28, SpO2:100 %  01/16/23 1000, Pulse:96, Resp:19, SpO2:99 %  01/16/23 0945, Pulse:95, Resp:19, SpO2:100 %  01/16/23 0930, Pulse:95, Resp:18  01/16/23 0915, Pulse:95, Resp:19  01/16/23 0900, Pulse:96, Resp:18  01/16/23 0845, Pulse:95, Resp:19  01/16/23 0830, Pulse:95, Resp:20  01/16/23 0816, Pulse:94, Resp:23, SpO2:98 %  01/16/23 0815, Pulse:94, Resp:23  01/16/23 0800, Temp:(!) 101.7 °F (38.7 °C), Temp src:Bladder, Pulse:95, Resp:29, SpO2:97 %     LABS:    CBC  Lab Results       Component                Value               Date/Time                  WBC                      24.6 (H)            01/16/2023 06:33 AM        HGB                      13.7                01/16/2023 06:33 AM        HCT                      42.3                01/16/2023 06:33 AM        PLT                      164                 01/16/2023 06:33 AM   RENAL  Lab Results       Component                Value               Date/Time                  NA                       141                 01/16/2023 06:33 AM        K                        5.7 (H)             01/16/2023 06:33 AM        K                        4.0                 01/15/2023 09:15 PM        CL                       105                 01/16/2023 06:33 AM        CO2                      25                  01/16/2023 06:33 AM        BUN                      20                  01/16/2023 06:33 AM        CREATININE               1.3                 01/16/2023 06:33 AM        GLUCOSE                  164 (H)             01/16/2023 06:33 AM   COAGS  Lab Results       Component                Value               Date/Time                  PROTIME                  17.8 (H)            01/16/2023 06:33 AM        INR                      1.47 (H)            01/16/2023 06:33 AM     Intake & Output:  In: 4654.8 (I.V.:3054.8; Blood:1500)  Out: 2275 (Urine:1955)    Nausea & Vomiting:  No    Level of Consciousness:  Awake    Pain Assessment:  Adequate analgesia    Anesthesia Complications:  No apparent anesthetic complications    SUMMARY      Vital signs stable  OK to discharge from Stage I post anesthesia care.  Care transferred from Anesthesiology department on discharge from perioperative area

## 2023-01-17 NOTE — OP NOTE
[address] [address]                                OPERATIVE REPORT    PATIENT NAME: Reji Lomas                     :        1990  MED REC NO:   [id number]                          ROOM:       0236  ACCOUNT NO:   [de-identified]                           ADMIT DATE: 01/15/2023  PROVIDER:     Paulo Mae MD    DATE OF PROCEDURE:  2023    PREOPERATIVE DIAGNOSIS:  Aortic dissection with ischemic left lower  extremity. POSTOPERATIVE DIAGNOSIS:  Aortic dissection with ischemic left lower  extremity. PROCEDURE:  Left to right femoral-femoral bypass using 8 mm internally  reinforced GORE Propaten graft. ANESTHESIA:  General endotracheal.    SURGEON:  Paulo Mae MD    INDICATION:  The patient is a 69-year-old male who presented one day  prior with acute type A aortic dissection. The initial angiograms  demonstrated occlusion of the left common iliac artery with  reconstitution of the distal external iliac artery. The patient was  taken emergently to the operating room and underwent replacement of the  ascending aorta. Postoperatively in the intensive care unit, the  patient continued to have evidence of ischemia of the left foot with no  Doppler signals. There were good Doppler signals in the right foot. The patient was taken to the operating room at this time to undergo  femoral-femoral bypass to reestablished flow into the left lower  extremity. PROCEDURE:  The patient was brought to the operating room, placed in  supine position. General endotracheal anesthesia induced. After  adequate anesthesia, the lower abdomen, bilateral groins and upper  thighs were prepped and draped in sterile fashion. The patient had  undergone an incisions in both groins from the prior procedure. The  staples were removed from bilateral groin incisions.   Then sutures were  removed and the femoral arteries were re-exposed. The previous  dissection by cardiothoracic surgery had exposed only the superficial  femoral arteries. The common femoral arteries were not exposed. On the  left side, this required extension of the incision more proximally. Retractors were placed in the common, deep and superficial femoral  arteries were isolated and encircled with vessel loops in a similar  fashion. The right common femoral artery was completely dissected as  was the deep femoral and superficial femoral artery and these were  encircled with vessel loops. Using blunt dissection, a tunnel was  created in the space of Retzius underneath the abdominal wall. A  Taina-Wick tunneler was then passed through this tunnel and an 8-mm GORE  Propaten graft was pulled through the tunnel. The patient was then  given 5000 units of intravenous heparin. After approximately 3 minutes  starting in the right side, the common, deep and superficial femoral  arteries were clamped. A longitudinal arteriotomy was made in the  common femoral artery. The graft was cut to the appropriate angle and  the graft to femoral anastomosis was performed using running 5-0  GORE-JESSICA suture. After completing the anastomosis, clamps were released  and the arteries were backbled and flushed through the open end of the  graft which was then clamped, flow was then restored in the right lower  extremity. I should note there was a palpable femoral pulse in the  right, but no palpable pulse in the femoral artery on the left. On the  left side, the arteries were again clamped. Longitudinal arteriotomy  was made. The graft was cut to the appropriate length and angle and the  femoral anastomosis performed to the common femoral artery using running  5-0 GORE-JESSICA suture. After appropriate backbleeding and flushing, flow  was established into the left lower extremity. There were excellent  Doppler signals both on the right than the left.   The operative sites  were then irrigated with antibiotic saline solution. The subcutaneous  and deep tissues were closed in multiple layers using 2-0 Vicryl suture,  3-0 Vicryl suture and skin edges reapproximated using skin staples and  intermittent 3-0 Vicryl vertical mattress sutures were also placed. Bilateral groin incisions were dressed with Prineo negative pressure  incisional wound dressings. There were no complications to this  procedure. Estimated blood was approximately 200 mL. At the end of  procedure, all sponge, needle and instrument counts were correct. The  patient remained intubated in a stable, but critical condition and was  transferred back to the intensive care unit. He was noted to have  Doppler pulses now in the right dorsalis pedis and posterior tibial at  the ankle and foot.         Susana Mayer MD    D: 01/16/2023 18:55:16       T: 01/16/2023 18:57:36     [email or web address]  Job#: [id number]     Doc#: [id number]    CC:

## 2023-01-17 NOTE — PROGRESS NOTES
01/17/23 1215   Patient Observation   Heart Rate 100   Resp 16   SpO2 94 %   Breath Sounds   Right Upper Lobe Diminished;Clear   Right Middle Lobe Diminished   Right Lower Lobe Clear;Diminished   Left Upper Lobe Diminished   Left Lower Lobe Diminished   Vent Information   Vent Mode (S)  AC/VC  (pt placed on sbt 5/5 at 1220)   Ventilator Settings   Vt (Set, mL) 600 mL   Resp Rate (Set) 16 bmp   PEEP/CPAP (cmH2O) 5   FiO2  30 %   Pressure Support (cm H2O) 0 cm H2O   Vent Patient Data (Readings)   Vt (Measured) 612 mL   Peak Inspiratory Pressure (cmH2O) 31 cmH2O   Rate Measured 16 br/min   Minute Volume (L/min) 9.81 Liters   Mean Airway Pressure (cmH2O) 13 cmH20   I:E Ratio 1:2.1   Flow Sensitivity 3 L/min   Vent Alarm Settings   High Pressure (cmH2O) 40 cmH2O   Low Minute Volume (lpm) 2 L/min   Low Exhaled Vt (ml) 200 mL   High Exhaled Vt (ml) 1000 mL   RR High (bpm) 40 br/min   Apnea (secs) 20 secs   Additional Respiratoray Assessments   Humidification Source HME   Ambu Bag With Mask At Bedside Yes   Airway Clearance   Suction ET Tube;Oral   Suction Device Inline suction catheter   Sputum Method Obtained Endotracheal   Sputum Amount Scant   Sputum Color/Odor Clear   Sputum Consistency Thin   ETT    Placement Date/Time: 01/16/23 0036   Present on Admission/Arrival: No  Placed By: In surgery  Placement Verified By: Auscultation;Capnometry  Preoxygenation: Yes  Mask Ventilation: Ventilated by mask (1)  Technique: Video laryngoscopy  Airway Type: Cu...    Secured At 24 cm   Measured From Lips   ETT Placement Left   Secured By Commercial tube stanton   Site Assessment Dry   Cuff Pressure 34 cm H2O   Supplemental Gases   Supplemental Gases None   Ventilator Associated Pneumonia Bundle   Oral Care Performed Mouth suctioned   Respiratory   Respiratory Interventions   (hhn with aerogen, pt placed on aerogen at 1220 5/5 sat 96%) axillary

## 2023-01-17 NOTE — PROGRESS NOTES
CVTS Cardiothoracic Progress Note:                                Chief Complaint:  Post op follow-up    Surgeries:    1/16/23 ASCENDING AORTIC REPLACEMENT WITH TUBE GRAFT USING AN EPPERSON GRAFT SIZE 28CM  ANTEGRADE FLOW TO THE LEFT LEG, RESUSPENSION OF AORTIC VALVE (Dr. Ann)     1/16/23:  Left to right femoral-femoral bypass using 8 mm internally  reinforced GORE Propaten graft. (Dr. Thorpe)     Post Op Course:      1/16 Patient intubated, sedated. PEEP 5, Fio2 85%. On several gtts for hemodynamic support at this time.   1/17 remains intubated, vent settings of 40% FiO2 with PEEP 5.      Past Medical History:   Diagnosis Date    Influenza A 03/12/2020    Marfan syndrome         Past Surgical History:   Procedure Laterality Date    THORACIC AORTIC ANEURYSM REPAIR N/A 1/15/2023    ASCENDING AORTA VALVE REPLACEMENT WITH TUBE GRAFT USING AN EPPRESON GRAFT SIZE 28CM  ANTEGRADE FLOW TO THE LEFT LEG, RESUSPENSION OF AORTIC VALVE performed by Kia Ann MD at [facility]        Allergies as of 01/15/2023    (No Known Allergies)        Patient Active Problem List   Diagnosis    Aortic dissection (HCC)    Type 1 dissection of ascending aorta    Chest wall discomfort        Vital Signs: BP (!) 112/57   Pulse (!) 101   Temp (!) 100.6 °F (38.1 °C) (Bladder)   Resp 17   Ht 6' 4\" (1.93 m)   Wt (!) 338 lb 10 oz (153.6 kg)   SpO2 95%   BMI 41.22 kg/m²        Admission Weight: Weight: (!) 340 lb (154.2 kg)    Weight on 1/15 (154.2 kg) Pre-op (stated)      Intake/Output:   Intake/Output Summary (Last 24 hours) at 1/17/2023 0930  Last data filed at 1/17/2023 0800  Gross per 24 hour   Intake 7354.92 ml   Output 2015 ml   Net 5339.92 ml        Extubation Time:  Transition Time:    Gtts: norepi 12, esmolol 30, fentanyl 50, versed 2, insulin    LABORATORY DATA:     CBC:   Recent Labs     01/15/23  2115 01/16/23  0059 01/16/23  0630 01/16/23  0633 01/17/23  0405   WBC 15.5*  --   --  24.6* 20.3*   HGB 14.2   < > 14.0 13.7 11.9*  HCT 45.1  --   --  42.3 36.8*   MCV 83.4  --   --  82.3 83.3     --   --  164 167    < > = values in this interval not displayed. BMP:   Recent Labs     01/15/23  2115 01/16/23  0633 01/17/23  0405    141 137   K 4.0 5.7* 4.8  4.8    105 107   CO2 15* 25 23   BUN 16 20 21*   CREATININE 1.1 1.3 1.0     MG:    Recent Labs     01/16/23 0633 01/17/23  0405   MG 3.40* 2.90*      Cardiac Enzymes:   Recent Labs     01/15/23  2115   TROPONINI <0.01     PT/INR:   Recent Labs     01/16/23 0633 01/17/23  0405   PROTIME 17.8* 17.3*   INR 1.47* 1.42*       CXRAY: 1/16/23  FINDINGS:   Medical devices: Endotracheal tube tip projects over the midthoracic trachea. Enteric tube courses past the diaphragm with distal tip not visualized. A   surgical drain projecting over the mediastinum is present. A left IJ   approach central venous sheath catheter is present, cinched along the lower   cervical soft tissues. Interval placement of median sternotomy wires. Mediastinum/Heart: No significant interval change in the cardiomediastinal   contours compared to prior exam.       Lungs: Patchy airspace disease is present projecting over the left lung apex   as well as the right mid lung. Pleura: No findings to suggest pneumothorax or large pleural effusion. Bones/Soft tissues: Nothing acute. Impression   Patchy airspace disease at the left lung apex as well as the right mid lung,   which is nonspecific but likely represents atelectasis. Asymmetric pulmonary   edema may have a similar appearance.        Medical support devices as described above.     ___________________________________________________________    Subjective:   Dietary Intake: NPO  Nausea: n/a   Pain Control: sedated  Complaints: none  Bowels: have not moved    Objective:   General appearance: intubated & sedated  Lungs: Diminished bilaterally  Heart: S1S2; SR on monitor  Chest: symmetrical expansion with inspiration and expirations; no rocking of sternum noted   Abdomen: soft, nontender  Bowel sounds: normoactive  Kidneys:  Cr 1 ; UOP 1710 ml/shift  Wound/Incisions: Midsternal incision with dressing CDI; Pacing wires taped and secure  Extremities: BLE pulses palpable, Right pedal +1, Left pedal +1; 1+ swelling noted in BLE  Neurological: sedated  Chest tubes/Drains: Chest tube # 1 RP with 20-35-15= 70 ml/shift serosanguinous drainage in 24 hours overnight; Chest tube # 2 with 90-60-70= 220 ml/shift serosanguinous drainage in 24 hours overnight; no airleaks noted in either tube     Assessment:   Post-op: 3 days. Condition: In critical condition. Plan:   1. Cardiovascular: s/p ascending aorta replacement 1/16; also s/p L to Rt fem-fem bypass. ASA, statin, BB being held while he is on pressor support  Gtts: esmolol at 30 mcg/kg/min, norepi 12 mcg/min  Pulses now palpable in BLE - extremities remain warm, with cap refill present. 2. Pulmonary: intubated, vent being weaned as tolerated. His  FiO2 is down to 40%, PEEP 5. Hope to be able to extubate him this afternoon. 3. Neurology: only on precedex    4. Nephrology: Cr 1, UOP 1710 mL/8 hrs  Diurese as tolerated, continue iv lasix 40 mg bid. 5. Endocrinology: insulin gtt, HgbA1C ordered 1/16    6. Hematology: acute blood loss anemia; H&H 11.9/36.8     7. Microbiology: nothing at this time    7. Nutrition: ADAT after extubation     8. Labs: labs & imaging reviewed as above  Hypocalcemia - 1g Ca replacement  Hyperkalemia - 4.8, resolved. 9. Post-op Drains/Wires: all chest tubes & TPWs to remain in place at this time    10. D/C Goals: discharge planning following, too soon to tell    11.  Continue post-op care of patient in the ICU    Meds:    The patient is on a beta-blocker   The patient is NOT on an ace-i/ARB - 2/2 hypotension   The patient is on a statin   The patient is on oral antiplatelet therapy     Critical care time spent on this patient was 60 rhonda.   ________________________________________________    MERCY Harvey CNP  1/17/2023  9:30 AM

## 2023-01-17 NOTE — PROGRESS NOTES
Shift: Day    Procedure: ASCENDING AORTA VALVE REPLACEMENT WITH TUBE GRAFT USING AN EPPERSON GRAFT SIZE 28CM  ANTEGRADE FLOW TO THE LEFT LEG, RESUSPENSION OF AORTIC VALVE    Admit from OR (time and date): 1/16/23 0630    Transition (time and date):     Surgery, return to OR yes - Fem to Fem bypass with Dr. Bernardo Harvey at handoff  stable    Most recent vitals: BP (!) 112/57   Pulse 100   Temp (!) 101.6 °F (38.7 °C) (Axillary)   Resp 26   Ht 6' 4\" (1.93 m)   Wt (!) 340 lb (154.2 kg)   SpO2 100%   BMI 41.39 kg/m²      Increased O2 requirements: no O2 requirements: Oxygen Therapy  SpO2: 100 %  O2 Device: Ventilator  FiO2 : 70 %  Vent Mode: AC/VC     Admission weight Weight: (!) 340 lb (154.2 kg)  Today's weight   Wt Readings from Last 1 Encounters:   01/15/23 (!) 340 lb (154.2 kg)         EF: unknown    Drop in Urinary Output no     Rhythm Changes Normal Sinus Rhythm 90's    Pacing Wires Removed:  [] Yes  [x] NO  [] Platelets < 98,958  [] Arrhythmia  [] Bradycardia [] Valve Replacement  [] Pacing for Cardiac Index  []Physician Order    Lines/Drains  LDA Insertion Date Discontinued Date Dressing Changes   Art line 1/16     Central Line 1/16     Colin 1/16     Chest Tube P: 1/16  M: 1/16     Wires  1/16     ETT 1/16     TYRELL Drain      VasCath      Impella        Interventions After Office Hours  Problem(Brief) Date Time Intervention Physician contacted                                               Drip rates at handoff:    dextrose 5 % and 0.45 % NaCl 75 mL/hr at 01/16/23 1453    sodium chloride 5 mL/hr at 01/16/23 1453    norepinephrine 14 mcg/min (01/16/23 1921)    niCARdipine      insulin 2.34 Units/hr (01/16/23 1453)    dextrose      midazolam 2 mg/hr (01/16/23 1453)    fentaNYL 75 mcg/hr (01/16/23 1744)    dexmedetomidine Stopped (01/16/23 0802)    esmolol 25 mcg/kg/min (01/16/23 1453)       Hospital Course:  POD# DOS  -Ca replaced  -Sarah Hawley given for  5.9 K -VSS  -Fem to Fem bypass with  Ila @ [id number]  -OGT [facility]     Lab Data:  CBC:   Recent Labs     01/15/23  2115 01/16/23  0059 01/16/23  0630 01/16/23 0633   WBC 15.5*  --   --  24.6*   HGB 14.2   < > 14.0 13.7   HCT 45.1  --   --  42.3   MCV 83.4  --   --  82.3     --   --  164    < > = values in this interval not displayed. BMP:    Recent Labs     01/15/23  2115 01/16/23  0633    141   K 4.0 5.7*   CO2 15* 25   BUN 16 20   CREATININE 1.1 1.3     LIVR:   Recent Labs     01/15/23  2115   AST 25   ALT 36     PT/INR:   Recent Labs     01/15/23  2115 01/16/23  0633   PROT 7.2  --    INR  --  1.47*     APTT: No results for input(s): APTT in the last 72 hours.   ABG:   Recent Labs     01/16/23  1004 01/16/23  1115   PHART 7.309* 7.323*   DMA9XMC 46.7* 43.2   PO2ART 223.2* 206.8*

## 2023-01-17 NOTE — PROGRESS NOTES
Vascular    S/P Fem-fem  Palp R DP pulse  L DP and PT strong by doppler  Prevena groin dressings intact with good seal.

## 2023-01-17 NOTE — PROGRESS NOTES
Physical Therapy/Occupational Therapy    Orders received, chart reviewed. Attempted PT/OT Evaluation  Pt remains ventilated. Will continue to follow. Thank you.     Gladys Babinski, OTR/COMF[facility]

## 2023-01-17 NOTE — CONSULTS
Comprehensive Nutrition Assessment    Type and Reason for Visit:  Initial, Consult    Nutrition Recommendations/Plan:   If unable to extubate, recommend initiation of tube feeds via OG   Recommend TF initiation. Order: \"Diet: Tube feed continuous/ NPO\". Initiate Vital HP (peptide based high protein formula) at 10 mL/hr and as tolerated, increase by 10 mL/hr q 4 hours until goal of 60 mL/hr is met via O/G   Recommend 60 mL H20 flush q 4 hours. Monitor IVF infusion, Na labs and need for adjustments in water flush  Recommend 1 Bottle Proteinex P2Go daily via syringe. Flush with 30 mL H20 before and after  Monitor TF tolerance (abd distention, bowel habits, N/V, cramping)  Monitor nutrition adequacy, pertinent labs, bowel habits, wt changes, and clinical progress    Malnutrition Assessment:  Malnutrition Status: At risk for malnutrition (Comment) (01/17/23 1149)    Context:  Acute Illness     Findings of the 6 clinical characteristics of malnutrition:  Energy Intake:  Mild decrease in energy intake (Comment)  Weight Loss:  Unable to assess       Nutrition Assessment:    New vent. Admitted with aortic dissection. Hx of Marfan's syndrome. S/p ascending aorta replacement on 1/15. S/p femoral bypass on 1/16. Pt remains intubated following procedures, FiO2 40% and PEEP 5. Sedated on precedex and fentanyl. On amio. Hypotensive on levo. Pt with OG in place. On D5 with 1/2 NS at 75 mL/hr to provide 306 kcal from dextrose. MD with hopes to extubate this afternoon. If unable to extubate today, recommend initiation of tube feeds via OG. Tube feed recommendations provided. Consulted for diet education, not appropriate at this time as pt on vent. Will monitor. Nutrition Related Findings:    +7 L per I&Os. A1c of 5.6% 1/16/23. +Bowel regimen.  Wound Type: Surgical Incision       Current Nutrition Intake & Therapies:    Average Meal Intake: NPO  Average Supplements Intake: NPO  Diet NPO Exceptions are: Ice Chips, Sips of [employer] with Meds  Current Tube Feeding (TF) Orders:  Feeding Route: Orogastric  Formula: Peptide Based High Protein  Schedule: Continuous  Additives/Modulars: Protein  Goal TF & Flush Orders Provides: Vital HP at goal rate of 60 mL/hr x20 hours to provide 1200 mL TV, 1200 kcal, 105 g protein, and 1003 mL free water. +1 bottle of proteinex daily for additional 104 kcal and 26 g protein. +60 mL free water flush q 4 hrs. Anthropometric Measures:  Height: 6' 4\" (193 cm)  Ideal Body Weight (IBW): 202 lbs (92 kg)       Current Body Weight: 338 lb (153.3 kg), 167.3 % IBW. Weight Source: Bed Scale  Current BMI (kg/m2): 41.2  Usual Body Weight:  (stated weight hx 330-340 lb)                       BMI Categories: Obese Class 3 (BMI 40.0 or greater)    Estimated Daily Nutrient Needs:  Energy Requirements Based On: Kcal/kg (15-18)  Weight Used for Energy Requirements: Ideal  Energy (kcal/day): 9223-0493 kcal  Weight Used for Protein Requirements: Ideal (1.2-2.0 g/kg)  Protein (g/day): 110-184 g  Method Used for Fluid Requirements: 1 ml/kcal  Fluid (ml/day): 0864-7281 mL    Nutrition Diagnosis:   Inadequate energy intake related to inadequate protein-energy intake, lack or limited access to food as evidenced by NPO or clear liquid status due to medical condition, intubation    Nutrition Interventions:   Food and/or Nutrient Delivery: Continue NPO, Start Tube Feeding  Nutrition Education/Counseling: No recommendation at this time  Coordination of Nutrition Care: Continue to monitor while inpatient       Goals:     Goals: Initiate nutrition support, within 2 days       Nutrition Monitoring and Evaluation:   Behavioral-Environmental Outcomes: None Identified  Food/Nutrient Intake Outcomes: Enteral Nutrition Intake/Tolerance  Physical Signs/Symptoms Outcomes: Biochemical Data, GI Status, Hemodynamic Status, Fluid Status or Edema, Nutrition Focused Physical Findings, Weight    Discharge Planning:     Too soon to determine     Gwenlyn Kawasaki, MS, RD, LD  Contact: [phone]

## 2023-01-17 NOTE — PROGRESS NOTES
Shift: Day    Procedure: ASCENDING AORTA VALVE REPLACEMENT WITH TUBE GRAFT USING AN EPPERSON GRAFT SIZE 28CM  ANTEGRADE FLOW TO THE LEFT LEG, RESUSPENSION OF AORTIC VALVE    Admit from OR (time and date): 1/16/23 0630    Transition (time and date):     Surgery, return to OR yes - Fem - Fem bypass with Dr. Thorpe     Nursing assessment at handoff  stable    Most recent vitals: BP (!) 112/57   Pulse (!) 104   Temp (!) 100.6 °F (38.1 °C) (Bladder)   Resp 16   Ht 6' 4\" (1.93 m)   Wt (!) 338 lb 10 oz (153.6 kg)   SpO2 95%   BMI 41.22 kg/m²      Increased O2 requirements: no O2 requirements: Oxygen Therapy  SpO2: 95 %  Pulse Oximetry Type: Continuous  Pulse Oximeter Device Mode: Continuous  Pulse Oximeter Device Location: Finger  O2 Device: Ventilator  FiO2 : 50 %  Vent Mode: AC/VC     Admission weight Weight: (!) 340 lb (154.2 kg)  Today's weight   Wt Readings from Last 1 Encounters:   01/17/23 (!) 338 lb 10 oz (153.6 kg)         EF: unknown    Drop in Urinary Output no     Rhythm Changes Normal Sinus Rhythm 90's    Pacing Wires Removed:  [] Yes  [x] NO  [] Platelets < 50,000  [] Arrhythmia  [] Bradycardia [] Valve Replacement  [] Pacing for Cardiac Index  []Physician Order    Lines/Drains  LDA Insertion Date Discontinued Date Dressing Changes   Art line 1/16     Central Line 1/16     Colin 1/16     Chest Tube P: 1/16  M: 1/16     Wires  1/16     ETT 1/16     TYRELL Drain      VasCath      Impella        Interventions After Office Hours  Problem(Brief) Date Time Intervention Physician contacted                                               Drip rates at handoff:    dextrose 5 % and 0.45 % NaCl 75 mL/hr at 01/17/23 0642    sodium chloride 5 mL/hr at 01/16/23 1453    norepinephrine 12 mcg/min (01/17/23 0642)    niCARdipine      insulin 3.9 Units/hr (01/17/23 0642)    dextrose      midazolam 4 mg/hr (01/17/23 0350)    fentaNYL 100 mcg/hr (01/17/23 0642)    dexmedetomidine Stopped (01/16/23 0802)    esmolol 40 mcg/kg/min  (01/17/23 9870)       Hospital Course:  POD# DOS  -Ca replaced  -Jesse Black given for  5.9 K -VSS  -Fem to Fem bypass with Dr. Fide Jhaveri @ [id number]  -OGT [facility]    POD NOS:  -Levo weaned slightly. -Remains on levo, esmolol, insulin, fentanyl, and versed.  -Fi02 weaned on vent. -Wakes up, moves all extremities, follows commands. Lab Data:  CBC:   Recent Labs     01/16/23 0633 01/17/23  0405   WBC 24.6* 20.3*   HGB 13.7 11.9*   HCT 42.3 36.8*   MCV 82.3 83.3    167       BMP:    Recent Labs     01/16/23 0633 01/17/23  0405    137   K 5.7* 4.8  4.8   CO2 25 23   BUN 20 21*   CREATININE 1.3 1.0       LIVR:   Recent Labs     01/15/23  2115 01/17/23  0405   AST 25 878*   ALT 36 231*       PT/INR:   Recent Labs     01/15/23  2115 01/16/23  0633 01/17/23  0405   PROT 7.2  --  5.0*   INR  --  1.47* 1.42*       APTT: No results for input(s): APTT in the last 72 hours.   ABG:   Recent Labs     01/17/23 0401 01/17/23  0638   PHART 7.338* 7.327*   QRV7DTT 43.6 44.6   PO2ART 89.4 85.4

## 2023-01-17 NOTE — PROGRESS NOTES
01/17/23 1334   Treatment   Treatment Type Spontaneous parameters   Oxygen Therapy/Pulse Ox   FiO2  30 %   Heart Rate (!) 106   Resp 26   SpO2 98 %   Spontaneous Parameters   NIF -45 cmH2O   Spont Vt 889 mL   Slow Vital Capacity 445   $NIF Charge Row $Yes   Patient Observation   Observations RSBI 30

## 2023-01-18 ENCOUNTER — APPOINTMENT (OUTPATIENT)
Dept: GENERAL RADIOLOGY | Age: 33
DRG: 219 | End: 2023-01-18
Payer: COMMERCIAL

## 2023-01-18 LAB
A/G RATIO: 2 (ref 1.1–2.2)
ALBUMIN SERPL-MCNC: 3 G/DL (ref 3.4–5)
ALP BLD-CCNC: 44 U/L (ref 40–129)
ALT SERPL-CCNC: 155 U/L (ref 10–40)
ANION GAP SERPL CALCULATED.3IONS-SCNC: 10 MMOL/L (ref 3–16)
ANION GAP SERPL CALCULATED.3IONS-SCNC: 8 MMOL/L (ref 3–16)
AST SERPL-CCNC: 687 U/L (ref 15–37)
BANDED NEUTROPHILS RELATIVE PERCENT: 9 % (ref 0–7)
BASE EXCESS ARTERIAL: -1 (ref -3–3)
BASE EXCESS ARTERIAL: -4 (ref -3–3)
BASOPHILS ABSOLUTE: 0 K/UL (ref 0–0.2)
BASOPHILS RELATIVE PERCENT: 0 %
BILIRUB SERPL-MCNC: 0.5 MG/DL (ref 0–1)
BUN BLDV-MCNC: 21 MG/DL (ref 7–20)
BUN BLDV-MCNC: 24 MG/DL (ref 7–20)
CALCIUM IONIZED: 1 MMOL/L (ref 1.12–1.32)
CALCIUM IONIZED: 1.01 MMOL/L (ref 1.12–1.32)
CALCIUM IONIZED: 1.01 MMOL/L (ref 1.12–1.32)
CALCIUM IONIZED: 1.11 MMOL/L (ref 1.12–1.32)
CALCIUM SERPL-MCNC: 7.1 MG/DL (ref 8.3–10.6)
CALCIUM SERPL-MCNC: 7.5 MG/DL (ref 8.3–10.6)
CHLORIDE BLD-SCNC: 105 MMOL/L (ref 99–110)
CHLORIDE BLD-SCNC: 106 MMOL/L (ref 99–110)
CO2: 22 MMOL/L (ref 21–32)
CO2: 23 MMOL/L (ref 21–32)
CREAT SERPL-MCNC: 0.9 MG/DL (ref 0.9–1.3)
CREAT SERPL-MCNC: 1.1 MG/DL (ref 0.9–1.3)
EOSINOPHILS ABSOLUTE: 0 K/UL (ref 0–0.6)
EOSINOPHILS RELATIVE PERCENT: 0 %
GFR SERPL CREATININE-BSD FRML MDRD: >60 ML/MIN/{1.73_M2}
GFR SERPL CREATININE-BSD FRML MDRD: >60 ML/MIN/{1.73_M2}
GLUCOSE BLD-MCNC: 103 MG/DL (ref 70–99)
GLUCOSE BLD-MCNC: 104 MG/DL (ref 70–99)
GLUCOSE BLD-MCNC: 105 MG/DL (ref 70–99)
GLUCOSE BLD-MCNC: 109 MG/DL (ref 70–99)
GLUCOSE BLD-MCNC: 114 MG/DL (ref 70–99)
GLUCOSE BLD-MCNC: 114 MG/DL (ref 70–99)
GLUCOSE BLD-MCNC: 116 MG/DL (ref 70–99)
GLUCOSE BLD-MCNC: 119 MG/DL (ref 70–99)
GLUCOSE BLD-MCNC: 123 MG/DL (ref 70–99)
GLUCOSE BLD-MCNC: 74 MG/DL (ref 70–99)
GLUCOSE BLD-MCNC: 88 MG/DL (ref 70–99)
GLUCOSE BLD-MCNC: 91 MG/DL (ref 70–99)
GLUCOSE BLD-MCNC: 99 MG/DL (ref 70–99)
HCO3 ARTERIAL: 20.5 MMOL/L (ref 21–29)
HCO3 ARTERIAL: 23.1 MMOL/L (ref 21–29)
HCT VFR BLD CALC: 29.6 % (ref 40.5–52.5)
HEMOGLOBIN: 9.1 GM/DL (ref 13.5–17.5)
HEMOGLOBIN: 9.8 G/DL (ref 13.5–17.5)
INR BLD: 1.39 (ref 0.87–1.14)
LACTATE: 0.58 MMOL/L (ref 0.4–2)
LYMPHOCYTES ABSOLUTE: 3.6 K/UL (ref 1–5.1)
LYMPHOCYTES RELATIVE PERCENT: 16 %
MAGNESIUM: 2.8 MG/DL (ref 1.8–2.4)
MCH RBC QN AUTO: 27.2 PG (ref 26–34)
MCHC RBC AUTO-ENTMCNC: 33.2 G/DL (ref 31–36)
MCV RBC AUTO: 82 FL (ref 80–100)
MONOCYTES ABSOLUTE: 2.2 K/UL (ref 0–1.3)
MONOCYTES RELATIVE PERCENT: 10 %
MYOGLOBIN URINE: 1306 MG/L (ref 0–1)
NEUTROPHILS ABSOLUTE: 16.4 K/UL (ref 1.7–7.7)
NEUTROPHILS RELATIVE PERCENT: 65 %
O2 SAT, ARTERIAL: 95 % (ref 93–100)
O2 SAT, ARTERIAL: 99 % (ref 93–100)
PCO2 ARTERIAL: 31.9 MM HG (ref 35–45)
PCO2 ARTERIAL: 33.5 MM HG (ref 35–45)
PDW BLD-RTO: 15.4 % (ref 12.4–15.4)
PERFORMED ON: ABNORMAL
PERFORMED ON: NORMAL
PH ARTERIAL: 7.42 (ref 7.35–7.45)
PH ARTERIAL: 7.45 (ref 7.35–7.45)
PH VENOUS: 7.41 (ref 7.35–7.45)
PH VENOUS: 7.51 (ref 7.35–7.45)
PLATELET # BLD: 178 K/UL (ref 135–450)
PLATELET SLIDE REVIEW: ADEQUATE
PMV BLD AUTO: 8 FL (ref 5–10.5)
PO2 ARTERIAL: 114.4 MM HG (ref 75–108)
PO2 ARTERIAL: 73.6 MM HG (ref 75–108)
POC HEMATOCRIT: 27 % (ref 40.5–52.5)
POC POTASSIUM: 3.9 MMOL/L (ref 3.5–5.1)
POC SAMPLE TYPE: ABNORMAL
POC SAMPLE TYPE: ABNORMAL
POC SODIUM: 142 MMOL/L (ref 136–145)
POTASSIUM REFLEX MAGNESIUM: 4.4 MMOL/L (ref 3.5–5.1)
POTASSIUM SERPL-SCNC: 3.9 MMOL/L (ref 3.5–5.1)
POTASSIUM SERPL-SCNC: 4.4 MMOL/L (ref 3.5–5.1)
PROTHROMBIN TIME: 17 SEC (ref 11.7–14.5)
RBC # BLD: 3.61 M/UL (ref 4.2–5.9)
RBC # BLD: NORMAL 10*6/UL
SODIUM BLD-SCNC: 136 MMOL/L (ref 136–145)
SODIUM BLD-SCNC: 138 MMOL/L (ref 136–145)
TCO2 ARTERIAL: 22 MMOL/L
TCO2 ARTERIAL: 24 MMOL/L
TOTAL PROTEIN: 4.5 G/DL (ref 6.4–8.2)
URINE CULTURE, ROUTINE: NORMAL
WBC # BLD: 22.2 K/UL (ref 4–11)

## 2023-01-18 PROCEDURE — 6370000000 HC RX 637 (ALT 250 FOR IP): Performed by: THORACIC SURGERY (CARDIOTHORACIC VASCULAR SURGERY)

## 2023-01-18 PROCEDURE — 82803 BLOOD GASES ANY COMBINATION: CPT

## 2023-01-18 PROCEDURE — 6360000002 HC RX W HCPCS: Performed by: NURSE PRACTITIONER

## 2023-01-18 PROCEDURE — 83735 ASSAY OF MAGNESIUM: CPT

## 2023-01-18 PROCEDURE — 94660 CPAP INITIATION&MGMT: CPT

## 2023-01-18 PROCEDURE — 99024 POSTOP FOLLOW-UP VISIT: CPT | Performed by: NURSE PRACTITIONER

## 2023-01-18 PROCEDURE — 97110 THERAPEUTIC EXERCISES: CPT

## 2023-01-18 PROCEDURE — 82947 ASSAY GLUCOSE BLOOD QUANT: CPT

## 2023-01-18 PROCEDURE — 2500000003 HC RX 250 WO HCPCS: Performed by: THORACIC SURGERY (CARDIOTHORACIC VASCULAR SURGERY)

## 2023-01-18 PROCEDURE — 83605 ASSAY OF LACTIC ACID: CPT

## 2023-01-18 PROCEDURE — 2700000000 HC OXYGEN THERAPY PER DAY

## 2023-01-18 PROCEDURE — 6360000002 HC RX W HCPCS: Performed by: THORACIC SURGERY (CARDIOTHORACIC VASCULAR SURGERY)

## 2023-01-18 PROCEDURE — 71045 X-RAY EXAM CHEST 1 VIEW: CPT

## 2023-01-18 PROCEDURE — 97163 PT EVAL HIGH COMPLEX 45 MIN: CPT

## 2023-01-18 PROCEDURE — 2000000000 HC ICU R&B

## 2023-01-18 PROCEDURE — 92526 ORAL FUNCTION THERAPY: CPT

## 2023-01-18 PROCEDURE — 94669 MECHANICAL CHEST WALL OSCILL: CPT

## 2023-01-18 PROCEDURE — 85014 HEMATOCRIT: CPT

## 2023-01-18 PROCEDURE — 97166 OT EVAL MOD COMPLEX 45 MIN: CPT

## 2023-01-18 PROCEDURE — 94640 AIRWAY INHALATION TREATMENT: CPT

## 2023-01-18 PROCEDURE — 2580000003 HC RX 258: Performed by: NURSE PRACTITIONER

## 2023-01-18 PROCEDURE — 85610 PROTHROMBIN TIME: CPT

## 2023-01-18 PROCEDURE — 85025 COMPLETE CBC W/AUTO DIFF WBC: CPT

## 2023-01-18 PROCEDURE — 92610 EVALUATE SWALLOWING FUNCTION: CPT

## 2023-01-18 PROCEDURE — 2500000003 HC RX 250 WO HCPCS: Performed by: STUDENT IN AN ORGANIZED HEALTH CARE EDUCATION/TRAINING PROGRAM

## 2023-01-18 PROCEDURE — 6370000000 HC RX 637 (ALT 250 FOR IP): Performed by: NURSE PRACTITIONER

## 2023-01-18 PROCEDURE — 84132 ASSAY OF SERUM POTASSIUM: CPT

## 2023-01-18 PROCEDURE — 2580000003 HC RX 258: Performed by: THORACIC SURGERY (CARDIOTHORACIC VASCULAR SURGERY)

## 2023-01-18 PROCEDURE — 84295 ASSAY OF SERUM SODIUM: CPT

## 2023-01-18 PROCEDURE — 2580000003 HC RX 258: Performed by: STUDENT IN AN ORGANIZED HEALTH CARE EDUCATION/TRAINING PROGRAM

## 2023-01-18 PROCEDURE — 80053 COMPREHEN METABOLIC PANEL: CPT

## 2023-01-18 PROCEDURE — 82330 ASSAY OF CALCIUM: CPT

## 2023-01-18 PROCEDURE — 94761 N-INVAS EAR/PLS OXIMETRY MLT: CPT

## 2023-01-18 PROCEDURE — 36415 COLL VENOUS BLD VENIPUNCTURE: CPT

## 2023-01-18 RX ORDER — INSULIN LISPRO 100 [IU]/ML
0-4 INJECTION, SOLUTION INTRAVENOUS; SUBCUTANEOUS NIGHTLY
Status: DISCONTINUED | OUTPATIENT
Start: 2023-01-18 | End: 2023-01-18

## 2023-01-18 RX ORDER — METHOCARBAMOL 750 MG/1
750 TABLET, FILM COATED ORAL 4 TIMES DAILY
Status: DISCONTINUED | OUTPATIENT
Start: 2023-01-18 | End: 2023-01-25

## 2023-01-18 RX ORDER — INSULIN LISPRO 100 [IU]/ML
0-4 INJECTION, SOLUTION INTRAVENOUS; SUBCUTANEOUS ONCE
Status: DISCONTINUED | OUTPATIENT
Start: 2023-01-19 | End: 2023-01-20

## 2023-01-18 RX ORDER — GABAPENTIN 100 MG/1
100 CAPSULE ORAL 3 TIMES DAILY
Status: DISCONTINUED | OUTPATIENT
Start: 2023-01-18 | End: 2023-01-26 | Stop reason: HOSPADM

## 2023-01-18 RX ORDER — ACETAMINOPHEN 325 MG/1
650 TABLET ORAL EVERY 6 HOURS
Status: DISCONTINUED | OUTPATIENT
Start: 2023-01-18 | End: 2023-01-26 | Stop reason: HOSPADM

## 2023-01-18 RX ADMIN — CARBOXYMETHYLCELLULOSE SODIUM 1 DROP: 10 GEL OPHTHALMIC at 04:29

## 2023-01-18 RX ADMIN — METOPROLOL TARTRATE 12.5 MG: 25 TABLET, FILM COATED ORAL at 20:09

## 2023-01-18 RX ADMIN — FONDAPARINUX SODIUM 2.5 MG: 2.5 INJECTION, SOLUTION SUBCUTANEOUS at 09:25

## 2023-01-18 RX ADMIN — Medication 15 ML: at 19:58

## 2023-01-18 RX ADMIN — ALBUTEROL SULFATE 2.5 MG: 2.5 SOLUTION RESPIRATORY (INHALATION) at 11:05

## 2023-01-18 RX ADMIN — SODIUM CHLORIDE 2.4 UNITS/HR: 9 INJECTION, SOLUTION INTRAVENOUS at 08:22

## 2023-01-18 RX ADMIN — CALCIUM CHLORIDE INJECTION 1000 MG: 100 INJECTION, SOLUTION INTRAVENOUS at 11:02

## 2023-01-18 RX ADMIN — METHOCARBAMOL TABLETS 750 MG: 750 TABLET, COATED ORAL at 12:54

## 2023-01-18 RX ADMIN — FAMOTIDINE 20 MG: 10 INJECTION, SOLUTION INTRAVENOUS at 20:09

## 2023-01-18 RX ADMIN — ALBUTEROL SULFATE 2.5 MG: 2.5 SOLUTION RESPIRATORY (INHALATION) at 16:03

## 2023-01-18 RX ADMIN — ESMOLOL HYDROCHLORIDE IN SODIUM CHLORIDE 50 MCG/KG/MIN: 10 INJECTION INTRAVENOUS at 20:20

## 2023-01-18 RX ADMIN — SODIUM CHLORIDE, PRESERVATIVE FREE 10 ML: 5 INJECTION INTRAVENOUS at 09:58

## 2023-01-18 RX ADMIN — CARBOXYMETHYLCELLULOSE SODIUM 1 DROP: 10 GEL OPHTHALMIC at 04:33

## 2023-01-18 RX ADMIN — POTASSIUM CHLORIDE 20 MEQ: 400 INJECTION, SOLUTION INTRAVENOUS at 19:16

## 2023-01-18 RX ADMIN — Medication 15 ML: at 09:36

## 2023-01-18 RX ADMIN — ALBUTEROL SULFATE 2.5 MG: 2.5 SOLUTION RESPIRATORY (INHALATION) at 09:04

## 2023-01-18 RX ADMIN — MUPIROCIN: 20 OINTMENT TOPICAL at 09:36

## 2023-01-18 RX ADMIN — ALBUTEROL SULFATE 2.5 MG: 2.5 SOLUTION RESPIRATORY (INHALATION) at 20:19

## 2023-01-18 RX ADMIN — MUPIROCIN: 20 OINTMENT TOPICAL at 19:58

## 2023-01-18 RX ADMIN — ASPIRIN 81 MG: 81 TABLET, COATED ORAL at 09:27

## 2023-01-18 RX ADMIN — GABAPENTIN 100 MG: 100 CAPSULE ORAL at 12:54

## 2023-01-18 RX ADMIN — GABAPENTIN 100 MG: 100 CAPSULE ORAL at 10:59

## 2023-01-18 RX ADMIN — ACETAMINOPHEN 325MG 650 MG: 325 TABLET ORAL at 12:55

## 2023-01-18 RX ADMIN — CALCIUM CHLORIDE INJECTION 1000 MG: 100 INJECTION, SOLUTION INTRAVENOUS at 04:46

## 2023-01-18 RX ADMIN — METHOCARBAMOL TABLETS 750 MG: 750 TABLET, COATED ORAL at 17:00

## 2023-01-18 RX ADMIN — ESMOLOL HYDROCHLORIDE IN SODIUM CHLORIDE 150 MCG/KG/MIN: 10 INJECTION INTRAVENOUS at 02:11

## 2023-01-18 RX ADMIN — FUROSEMIDE 10 MG/HR: 10 INJECTION, SOLUTION INTRAMUSCULAR; INTRAVENOUS at 16:20

## 2023-01-18 RX ADMIN — POLYETHYLENE GLYCOL 3350 17 G: 17 POWDER, FOR SOLUTION ORAL at 09:27

## 2023-01-18 RX ADMIN — CALCIUM CHLORIDE INJECTION 1000 MG: 100 INJECTION, SOLUTION INTRAVENOUS at 21:56

## 2023-01-18 RX ADMIN — SODIUM CHLORIDE, PRESERVATIVE FREE 10 ML: 5 INJECTION INTRAVENOUS at 19:58

## 2023-01-18 RX ADMIN — METOPROLOL TARTRATE 12.5 MG: 25 TABLET, FILM COATED ORAL at 09:27

## 2023-01-18 RX ADMIN — CARBOXYMETHYLCELLULOSE SODIUM 1 DROP: 10 GEL OPHTHALMIC at 19:58

## 2023-01-18 RX ADMIN — BISACODYL 5 MG: 5 TABLET, COATED ORAL at 09:27

## 2023-01-18 RX ADMIN — FAMOTIDINE 20 MG: 10 INJECTION, SOLUTION INTRAVENOUS at 09:27

## 2023-01-18 RX ADMIN — METHOCARBAMOL TABLETS 750 MG: 750 TABLET, COATED ORAL at 20:09

## 2023-01-18 RX ADMIN — ATORVASTATIN CALCIUM 40 MG: 40 TABLET, FILM COATED ORAL at 20:09

## 2023-01-18 RX ADMIN — OXYCODONE 5 MG: 5 TABLET ORAL at 12:54

## 2023-01-18 RX ADMIN — GABAPENTIN 100 MG: 100 CAPSULE ORAL at 20:09

## 2023-01-18 RX ADMIN — DEXTROSE AND SODIUM CHLORIDE: 5; 450 INJECTION, SOLUTION INTRAVENOUS at 15:56

## 2023-01-18 RX ADMIN — POTASSIUM CHLORIDE 20 MEQ: 400 INJECTION, SOLUTION INTRAVENOUS at 07:58

## 2023-01-18 RX ADMIN — FUROSEMIDE 10 MG/HR: 10 INJECTION, SOLUTION INTRAMUSCULAR; INTRAVENOUS at 08:25

## 2023-01-18 RX ADMIN — ACETAMINOPHEN 325MG 650 MG: 325 TABLET ORAL at 20:09

## 2023-01-18 ASSESSMENT — PAIN DESCRIPTION - LOCATION: LOCATION: GENERALIZED

## 2023-01-18 ASSESSMENT — PAIN SCALES - GENERAL
PAINLEVEL_OUTOF10: 6
PAINLEVEL_OUTOF10: 0

## 2023-01-18 NOTE — PROGRESS NOTES
01/18/23 1600   Oxygen Therapy/Pulse Ox   O2 Therapy Oxygen humidified   O2 Device Heated high flow cannula   O2 Flow Rate (L/min) 35 L/min   FiO2  (S)  45 %   Heart Rate (!) 103   Resp 18   SpO2 97 %   Humidification Source Heated wire   Humidification Temp 37   Humidification Temp Measured 37

## 2023-01-18 NOTE — PROGRESS NOTES
0124- Patient began having increased blood pressure support as titration of Esmolol increased. Heart rate goal was not attainable, and blood pressure would decrease as Esmolol increased. Edgardo labs, electrolyte replacements required. Spoke to MD, advised to stop Esmolol immediately, titrate levo down, and replace calcium per [facility]     0448- Patient had increased respiratory effort, HFNC 6L. Patient tolerating well, Sat 90%  0520- Patient sating 87%, increased HFNC 10L, sating 88.  Spoke to MD, requested BIPAP

## 2023-01-18 NOTE — PROGRESS NOTES
CVTS Cardiothoracic Progress Note:                                Chief Complaint:  Post op follow-up    Surgeries:    1/16/23 ASCENDING AORTIC REPLACEMENT WITH TUBE GRAFT USING AN EPPERSON GRAFT SIZE 28CM  ANTEGRADE FLOW TO THE LEFT LEG, RESUSPENSION OF AORTIC VALVE (Dr. Cory Ford)     1/16/23:  Left to right femoral-femoral bypass using 8 mm internally  reinforced GORE Propaten graft. (Dr. Alex Fay)     Post Op Course:      1/16 Patient intubated, sedated. PEEP 5, Fio2 85%. On several gtts for hemodynamic support at this time. 1/17 remains intubated, vent settings of 40% FiO2 with PEEP 5.    1/18 sitting up in bed on BiPap, awake, alert, moving all extremities.     Past Medical History:   Diagnosis Date    Influenza A 03/12/2020    Marfan syndrome         Past Surgical History:   Procedure Laterality Date    FEMORAL-FEMORAL BYPASS GRAFT Bilateral 1/16/2023    FEMORAL FEMORAL BYPASS performed by Asif Horowitz MD at [facility] Inf N/A 1/15/2023    ASCENDING AORTA  REPLACEMENT WITH TUBE GRAFT USING AN EPPERSON GRAFT SIZE 28CM  ANTEGRADE FLOW TO THE LEFT LEG, RESUSPENSION OF AORTIC VALVE performed by Jude Harding MD at [address] [place] [address] as of 01/15/2023    (No Known Allergies)        Patient Active Problem List   Diagnosis    Aortic dissection (HCC)    Type 1 dissection of ascending aorta    Chest wall discomfort        Vital Signs: BP (!) 93/43   Pulse (!) 104   Temp 99.6 °F (37.6 °C) (Bladder)   Resp 26   Ht 6' 4\" (1.93 m)   Wt (!) 319 lb 3.6 oz (144.8 kg)   SpO2 91%   BMI 38.86 kg/m²  O2 Flow Rate (L/min): 40 L/min     Admission Weight: Weight: (!) 340 lb (154.2 kg)    Weight on 1/15 (154.2 kg) Pre-op (stated)  1/18 144.8 kg    Intake/Output:   Intake/Output Summary (Last 24 hours) at 1/18/2023 1107  Last data filed at 1/18/2023 1100  Gross per 24 hour   Intake 3693.44 ml   Output 2070 ml   Net 1623.44 ml      Extubation Time: 1/17 at 13:40     Gtts: lasix and insulin    LABORATORY DATA:     CBC:   Recent Labs     01/16/23  0633 01/17/23  0405 01/17/23  1014 01/17/23  1316 01/18/23  0326 01/18/23  0815   WBC 24.6* 20.3*  --   --  22.2*  --    HGB 13.7 11.9*   < > 11.3* 9.8* 9.1*   HCT 42.3 36.8*  --   --  29.6*  --    MCV 82.3 83.3  --   --  82.0  --     167  --   --  178  --     < > = values in this interval not displayed. BMP:   Recent Labs     01/16/23  0633 01/17/23  0405 01/18/23  0326    137 136   K 5.7* 4.8  4.8 4.4  4.4    107 105   CO2 25 23 23   BUN 20 21* 24*   CREATININE 1.3 1.0 1.1     MG:    Recent Labs     01/16/23  0633 01/17/23  0405 01/18/23  0326   MG 3.40* 2.90* 2.80*      Cardiac Enzymes:   Recent Labs     01/15/23  2115   TROPONINI <0.01     PT/INR:   Recent Labs     01/16/23  0633 01/17/23  0405 01/18/23  0326   PROTIME 17.8* 17.3* 17.0*   INR 1.47* 1.42* 1.39*       CXRAY: 1/16/23  FINDINGS:   Medical devices: Endotracheal tube tip projects over the midthoracic trachea. Enteric tube courses past the diaphragm with distal tip not visualized. A   surgical drain projecting over the mediastinum is present. A left IJ   approach central venous sheath catheter is present, cinched along the lower   cervical soft tissues. Interval placement of median sternotomy wires. Mediastinum/Heart: No significant interval change in the cardiomediastinal   contours compared to prior exam.       Lungs: Patchy airspace disease is present projecting over the left lung apex   as well as the right mid lung. Pleura: No findings to suggest pneumothorax or large pleural effusion. Bones/Soft tissues: Nothing acute. Impression   Patchy airspace disease at the left lung apex as well as the right mid lung,   which is nonspecific but likely represents atelectasis. Asymmetric pulmonary   edema may have a similar appearance. Medical support devices as described above. ___________________________________________________________    Subjective:   Dietary Intake: improving  Nausea: n/a   Pain Control: moderate, could be improved  Complaints: none  Bowels: have not moved    Objective:   General appearance: awake, A&O, in nad  Lungs: Diminished bilaterally  Heart: S1S2; SR on monitor  Chest: symmetrical expansion with inspiration and expirations; no rocking of sternum noted   Abdomen: soft, nontender  Bowel sounds: normoactive  Kidneys:  Cr 1.1 ; UOP 1295 ml/shift  Wound/Incisions: Midsternal incision with dressing CDI; Pacing wires taped and secure  Extremities: BLE pulses palpable, Right pedal +1, Left pedal +1; 1+ swelling noted in BLE  Neurological: intact, non focal exam   Chest tubes/Drains: Chest tube # 1 RP with 35-10-35= 80 ml/shift serosanguinous drainage in 24 hours overnight; Chest tube # 2 with 125-30-25= 180 ml/shift serosanguinous drainage in 24 hours overnight; no airleaks noted in either tube     Assessment:   Post-op: 4 days. Condition: In critical condition. Plan:   1. Cardiovascular: s/p ascending aorta replacement 1/16; also s/p L to Rt fem-fem bypass. ASA, statin, PO BB being held     2. Pulmonary: extubated yesterday afternoon. Was on BiPap this morning and is now on vapotherm so he is able to have EZPAP/treatments etc.     3. Neurology: needs better pain control, will add scheduled tylenol, gabapentin and robaxin. Keeping prn pain  meds. 4. Nephrology: Cr 1.1, UOP 1295 mL/24 hrs  Diurese as tolerated, continue iv lasix 40 mg bid. 5. Endocrinology: BG stable, continue Lantus and SSI. HgbA1C 5.6    6. Hematology: acute blood loss anemia; H&H 9.8/29.6    7. Microbiology: nothing at this time    7. Nutrition: cardiac    8. Labs: labs & imaging reviewed as above  Hypocalcemia - 1g Ca replacement    9. Post-op Drains/Wires: all chest tubes & TPWs to remain in place at this time    10. D/C Goals: discharge planning following, too soon to tell    11. Continue post-op care of patient in the ICU    Meds:    The patient is on a beta-blocker   The patient is NOT on an ace-i/ARB - 2/2 hypotension   The patient is on a statin   The patient is on oral antiplatelet therapy     Critical care time spent on this patient was 40 minutes.   ________________________________________________    MERCY Santos - CNP  1/18/2023  11:07 AM

## 2023-01-18 NOTE — CARE COORDINATION
[facility] - Acute Rehab Unit   After review, this patient is felt to be:       []  Appropriate for Acute Inpatient Rehab    []  Appropriate for Acute Inpatient Rehab Pending Insurance Authorization    []  Not appropriate for Acute Inpatient Rehab    [x]  Referral received and ARU reviewing patient; Evaluation ongoing. Will follow for therapy progress  Requested PMR consult. Will notify DCP with further updates.  Thank you for the referral.   Jenni Tanner RN

## 2023-01-18 NOTE — PROGRESS NOTES
01/18/23 0621   NIV Type   Skin Assessment Clean, dry, & intact   Skin Protection for O2 Device Yes   Orientation Middle   Location Nose   NIV Started/Stopped On   Equipment Type v60   Mode Bilevel   Mask Type Full face mask   Mask Size Large   Settings/Measurements   IPAP 12 cmH20   CPAP/EPAP 6 cmH2O   Vt (Measured) 667 mL   Resp 16   FiO2  100 %   Minute Volume (L/min) 11.9 Liters   Mask Leak (lpm) 55 lpm   Comfort Level Good   Using Accessory Muscles No   SpO2 95   Patient's Home Machine No

## 2023-01-18 NOTE — PROGRESS NOTES
01/18/23 0900   Oxygen Therapy/Pulse Ox   O2 Therapy Oxygen humidified   O2 Device Heated high flow cannula   O2 Flow Rate (L/min) 40 L/min   FiO2  60 %   Heart Rate 96   Resp 16   SpO2 97 %   Humidification Temp 37   $Pulse Oximeter $Spot check (multiple/continuous)

## 2023-01-18 NOTE — PROGRESS NOTES
Speech Language Pathology  Clinical Bedside Swallow Assessment  Facility/Department: [facility] C2 CARD TELEMETRY      Instrumentation: Not clinically indicated at this time. Will continue to monitor  Diet recommendation: IDDSI 6 Soft and Bite Sized Solids (per pt preference); IDDSI 0 Thin Liquids; Meds crushed/whole in puree as able  Risk management: upright for all intake, stay upright for at least 30 mins after intake, small bites/sips, assist feed, oral care q4 hrs to reduce adverse affects in the event of aspiration, increase physical mobility as able, alternate bites/sips, slow rate of intake, general aspiration precautions, and hold PO and contact SLP if s/s of aspiration or worsening respiratory status develop. Manual Standard  : 1990 (27 y.o.)   MRN: [id number]  ROOM: [facility]/0236-01  ADMISSION DATE: 1/15/2023  PATIENT DIAGNOSIS(ES): Aortic dissection (HCC) [I71.00]  Chest wall discomfort [R07.89]  Type 1 dissection of ascending aorta [I71.010]  Chief Complaint   Patient presents with    Numbness     BLE numbness and tingling since 1900. Chest Pain     L pain, 9/10, non radiating.  Resolved before arrival     Patient Active Problem List    Diagnosis Date Noted    Type 1 dissection of ascending aorta 2023    Chest wall discomfort 2023    Aortic dissection ([facility] [address] 01/15/2023     Past Medical History:   Diagnosis Date    Influenza A 2020    Marfan syndrome      Past Surgical History:   Procedure Laterality Date    FEMORAL-FEMORAL BYPASS GRAFT Bilateral 2023    FEMORAL FEMORAL BYPASS performed by Maureen Ruelas MD at [address] Inf N/A 1/15/2023    ASCENDING AORTA  REPLACEMENT WITH TUBE GRAFT USING AN EPPERSON GRAFT SIZE 28CM  ANTEGRADE FLOW TO THE LEFT LEG, RESUSPENSION OF AORTIC VALVE performed by Avani Helton MD at [address]     No Known Allergies    DATE ONSET: 01/15/2023    Date of Evaluation: 2023   Evaluating Therapist: Hali Crigler, SLP    Chart Reviewed: : [x] Yes [] No    Current Diet: Diet NPO Exceptions are: Ice Chips, Sips of Water with Meds    Recent Chest Radiography: [x] Chest XR   [] CT of Chest  Date: 01/16/2023  Impressions  Impression   Cardiomegaly with scattered infiltrates. Pain: The patient does not complain of pain     Reason for Referral  Nika Marrero was referred for a bedside swallow evaluation to assess the efficiency of their swallow function, identify signs and symptoms of aspiration and make recommendations regarding safe dietary consistencies, effective compensatory strategies, and safe eating environment. Assessment    Medical record review/interview: Per MD H&P: \"33 y.o. male who presented to [facility] with past medical history of Marfan syndrome, presented to the ED with chief complaint of severe chest pain 10/10 nonradiating in addition to lower extremity numbness        Patient reported that his medication has not been to work for almost a week now. Patient today a at a [employer], patient was at home and then suddenly without any warning signs start developing some chest pain that was severe 9/10 associated with nausea, shortness of breath initially they thought it was anxiety however the patient condition continued to worsen and his lower extremity change color with having some numbness thus came to the ED. In the ED patient underwent CT that showed type A aortic dissection. Patient denied having any trauma, motorcycle accident, straining, or lifting any heavy object recently. Patient does work at [employer] where he does lift heavy objects sometimes but has not worked for over a week.   Patient denied having any drug abuse drugs or alcoholism\"    Predisposing dysphagia risk factors: N/A  Clinical signs of possible chronic dysphagia: reduced PO intake  Precipitating dysphagia risk factors: reduced physical mobility, increased O2 demands, and recent intubation    Patient Complaints:  Odynophagia: [] Yes [x] No  Globus Sensation: [] Yes [x] No  SOB with PO intake: [] Yes [x] No  Increased WOB with PO intake: [] Yes [x] No  Reflux Sx's: [] Yes [x] No  Weight loss: [] Yes [x] No  Coughing/Choking with PO intake: [x] Yes [] No- \"occasionally\"   Reduced Appetite: [x] Yes [] No    Additional Reported Symptoms/Complaints/Hospital Course:   No prior SLP intervention on record. Vitals/labs:    SpO2: 96  RR: 21  BP: 93/43  HR: 103  O2 device: 35L FiO2 55    CBC:   Recent Labs     01/18/23 0326 01/18/23  0815   WBC 22.2*  --    HGB 9.8* 9.1*     --       BMP:  Recent Labs     01/18/23 0326      K 4.4  4.4      CO2 23   BUN 24*   CREATININE 1.1   GLUCOSE 116*          Cranial nerve exam:   CN V (trigeminal): ophthalmic, maxillary, and mandibular facial sensation- WNL b/l  CN VII (facial): WNL b/l  CN IX/X (glossopharyngeal/vagus): MPT: DNT; pitch range: DNT; vocal quality: Reduced and weak; cough: Weak- perceptually, Congested, and Non-Productive  CN XII (hypoglossal): WNL b/l    Laryngeal function exam:   Secretions: oral mucosa pink and dry   Vocal quality: See CN exam above  MPT: See CN exam above  S/Z ratio: DNT  Pitch range: See CN exam above  Cough: See CN exam above    Oral Care Status:    [] Oral Care [facility]  [x] Poor oral care status  [] Edentulous  [] Upper Dentures  [] Lower Dentures  [] Missing/Broken Teeth  [] Evidence of dental cavities/carries    PO trials:   IDDSI 0 (thin): via cup and straw: WFL     IDDSI 4 (puree): WFL     IDDSI 7 (regular): WFL     3 oz water: PASS    Impressions:  No clinical signs of oropharyngeal dysphagia. Swallow prognosis is good. Instrumental swallow study is not indicated. Given tolerance to PO at bedside and lack of clinical s/s of aspiration at bedside, pt is safe for oral diet at this time    Instrumentation: Not clinically indicated at this time.  Will continue to monitor  Diet recommendation: IDDSI 6 Soft and Bite Sized Solids (per pt preference); IDDSI 0 Thin Liquids; Meds crushed/whole in puree as able  Risk management: upright for all intake, stay upright for at least 30 mins after intake, small bites/sips, assist feed, oral care q4 hrs to reduce adverse affects in the event of aspiration, increase physical mobility as able, alternate bites/sips, slow rate of intake, general aspiration precautions, and hold PO and contact SLP if s/s of aspiration or worsening respiratory status develop.     Prognosis: Good    Recommended Intervention:   [x] Dysphagia tx  [] Videostroboscopy                      [] NPO   [] MBS       [] Speech/Cog Eval  [x] Therapeutic PO Trials     [x] Ice Chips   [] Other:  [] FEES                                                 Dysphagia Therapeutic Intervention:   []  Bolus control Exercises  []  Oral Motor Exercises  []  [employer] Protocol  []  Thermal Stimulation  []  Oral Care    []  Vital Stim/NMES  []  Laryngeal Exercises  [x]  Patient/Family Education  []  Pharyngeal Exercises  []  Therapeutic PO trials with SLP  [x]  Diet tolerance monitoring  []  Other:     Referrals:  [] ENT    [] PT  [] Pulmonology [] GI  [] Neurology  [] RD  [] OT   []     Goals:  Short Term Goals:  Timeframe for Short Term Goals: (5 days 01/23/2023)  Goal 1: The patient will tolerate recommended diet with no clinical s/s of aspiration 5/5  Goal 2: The patient will tolerate therapeutic diet upgrade trials with no clinical s/s of aspiration 5/5  Goal 3: The patient/caregiver will demonstrate understanding of compensatory swallow strategies, for improved swallow safety    Long Term Goals:   Timeframe for Long Term Goals: (7 days 01/25/2023)  Goal 1: The patient will tolerate least restrictive diet with no clinical s/s of aspiration or worsening respiratory/pulmonary status    Treatment:  Skilled instruction completed with patient re: evidenced based practice regarding recommendations and POC, importance of oral care to reduce adverse affects in the event of aspiration, and instruction of recommended compensatory strategies developed based upon clinical exam. Pt able to recall/demonstrate compensatory strategies with mod cues. Pt Education: SLP educated the patient re: Role of SLP, rationale for completion of assessment, anatomical components of swallow structures as they pertain to airway protection, results of assessment, recommendations, and POC  Pt Education Response: verbalized understanding, would benefit from ongoing education, RN aware, and caregiver aware    Duration/Frequency of Tx: 2-3x/wk     Individuals Consulted:   [x]  Patient     []  NP         [x]  RN   []  RD                   []  MD      [x]  Family Member                        []  PA    []  Other:      Safety Devices / Report:  [x]  All fall risk precautions in place [x]  Safety handoff completed with RN  [x]  Bed alarm in place  [x]  Left in bed     []  Chair alarm in place  []  Left in chair   [x]  Call light in reach   []  Other:                     Total Treatment Time / Charges       Time in Time out Total Time / units   Swallow Eval/Tx Time  1030 1053 23 min/ 2 units     Signature:  Milady BECKMAN-LIZ  Clinical Fellow  [id number]

## 2023-01-18 NOTE — PLAN OF CARE
UE There ex, Bed mobility, Transfers, ADL training, Adaptative equipment training, Therapeutic activity, Neuromuscular re-education, Patient education

## 2023-01-18 NOTE — PROGRESS NOTES
01/18/23 1112   Oxygen Therapy/Pulse Ox   O2 Flow Rate (L/min) 35 L/min   FiO2  55 %   Heart Rate (!) 103   Resp 21   SpO2 96 % [place]

## 2023-01-18 NOTE — CARE COORDINATION
[facility]    Referral received from  to follow for home care services. I will follow for needs, and speak with patient to verify demos.     Lela Jerome RN, BSN CTN  [facility] [phone]

## 2023-01-18 NOTE — PROGRESS NOTES
01/18/23 0816   NIV Type   NIV Started/Stopped On   Equipment Type v60   Mode Bilevel   Settings/Measurements   IPAP 12 cmH20   CPAP/EPAP 6 cmH2O   Vt (Measured) 1000 mL   Rate Ordered 32   Resp 15   FiO2  100 %   I Time/ I Time % 1 s   Breath Sounds   Right Upper Lobe Diminished   Right Middle Lobe Diminished   Right Lower Lobe Diminished   Left Upper Lobe Diminished   Left Lower Lobe Diminished   Oxygen Therapy/Pulse Ox   Heart Rate 86   SpO2 100 %

## 2023-01-18 NOTE — PLAN OF CARE
SLP completed evaluation. Please refer to notes in EMR.     Jacinta BECKMAN-SLP  Clinical Fellow  [id number]

## 2023-01-18 NOTE — CARE COORDINATION
LOS 3. Care managed by CV Surg, Vasc. Aortic dissect- urgent OR. Extub yesterday- now Vapotherm. Still w CTs. From home w spouse and 3 children- IPTA. CM is following. Ivan Beavers RN     64 Discussed dispo w spouse at bedside- [facility] rehab or DME. No preference of providers. Call to [address] [place] [address] for poss 02- may need BSC as restroom is upstairs. Call to [facility]-  will follow. Next called ARU- tentative referral given- await therapy evals when stable.  Ivan Beavers RN

## 2023-01-18 NOTE — PROGRESS NOTES
Physical Therapy  Facility/Department: [facility] C2 CARD TELEMETRY  Physical Therapy Initial Assessment/Treatment     Name: Jean-Pierre Miller  : 1990  MRN: [id number]  Date of Service: 2023    Discharge Recommendations:  IP Rehab   PT Equipment Recommendations  Equipment Needed: No      Patient Diagnosis(es): The primary encounter diagnosis was Dissecting aneurysm of thoracic aorta, Leo type A. Diagnoses of Chest wall discomfort, Weakness of right lower extremity, and Weakness of left lower extremity were also pertinent to this visit. Past Medical History:  has a past medical history of Influenza A and Marfan syndrome. Past Surgical History:  has a past surgical history that includes Femoral-femoral Bypass Graft (Bilateral, 2023) and Thoracic aortic aneurysm repair (N/A, 1/15/2023). Assessment   Body Structures, Functions, Activity Limitations Requiring Skilled Therapeutic Intervention: Decreased functional mobility ; Decreased posture;Decreased balance;Decreased strength;Decreased ADL status; Decreased ROM; Decreased endurance  Assessment: Pt to [facility] with diagnosis of aortic dissection, post op AVR on 1/15 and femoral-femoral bypass on . PTA pt lives in [facility] with wife and 3 kids, bed/bathroom are on second floor. Pt currently limited to bed level exerices per RN. REquire mod A for L LE and min A for R LE for AAROM. Pt not safe to return home at this time, it is rec pt DC to IPR when deemed medically appropriate. Will continue to follow in acute setting in order to address the above functional deficits. Co-tx collaboration this date to safely meet goals and will have better occupational performance outcomes with in a co-treatment than 1:1 session.     Therapy Prognosis: Fair  Barriers to Learning: None  Requires PT Follow-Up: Yes  Activity Tolerance  Activity Tolerance: Patient tolerated evaluation without incident;Patient limited by fatigue;Patient limited by endurance;Treatment limited secondary to medical complications     Plan   Physcial Therapy Plan  General Plan: 3-5 times per week  Current Treatment Recommendations: Strengthening, ROM, Balance training, Gait training, Home exercise program, Safety education & training, Stair training, Functional mobility training, Neuromuscular re-education, Patient/Caregiver education & training, Transfer training, Therapeutic activities, Endurance training, Equipment evaluation, education, & procurement, Positioning  Safety Devices  Type of Devices: Patient at risk for falls, Left in bed, Nurse notified, Gait belt, Call light within reach, Heels elevated for pressure relief, Bed alarm in place  Restraints  Restraints Initially in Place: No     Restrictions  Restrictions/Precautions  Restrictions/Precautions: Fall Risk, NPO (Diet NPO Exceptions are: Ice Chips, Sips of Water with Meds)  Position Activity Restriction  Sternal Precautions: No Pushing, No Pulling, 5# Lifting Restrictions  Other position/activity restrictions: up in chair for meals, ambulate patient     Subjective   Pain: Denies pain at rest  General  Chart Reviewed: Yes  Patient assessed for rehabilitation services?: Yes  Response To Previous Treatment: Not applicable  Family / Caregiver Present: No (Wife arrived at [address] [place] [address])  Referring Practitioner: Nestor Thakur MD  Referral Date : 01/16/23  Diagnosis: Aortic dissection. Post-op AVR on 1/15/23  Follows Commands: Within Functional Limits  General Comment  Comments: Pt in bed upon arrival in chiar position. Rn cleared for bed level exericses  Subjective  Subjective: Pt agreeable to therapy eval and tx.          Social/Functional History  Social/Functional History  Lives With: Spouse (3 kids at home (35yo and 9week old))  Type of Home: [facility]  Home Layout: Two level, Bed/Bath upstairs  Home Access: Level entry  Bathroom Shower/Tub: Tub/Shower unit, Shower chair with back  [employer]: Standard  Home Equipment: Crutches  Has the patient had two or more falls in the past year or any fall with injury in the past year?: No (1 fall that led to this admission)  ADL Assistance: [address]: Independent  Homemaking Responsibilities: Yes  Ambulation Assistance: Independent  Transfer Assistance: Independent  Active : Yes  Type of Occupation: [place]    Vision/Hearing  Vision  Vision: Within Functional Limits  Hearing  Hearing: Within functional limits      Cognition   Orientation  Overall Orientation Status: Within Functional Limits  Orientation Level: Oriented X4     Objective   Heart Rate: (!) 106  Heart Rate Source: Monitor  BP: (!) 123/54  BP Location: Arterial  BP Method: Automatic  MAP (Calculated): 77  Resp: 22  SpO2: 98 %  O2 Device: Heated high flow cannula  Comment: 10L O2     Observation/Palpation  Edema: L foot and ankle swelling  Gross Assessment  AROM: Grossly decreased, non-functional  PROM: Grossly decreased, non-functional  Strength: Grossly decreased, non-functional        Bed Mobility Training  Bed Mobility Training: No (RN cleared for bed level exericses only this date)  Transfer Training  Transfer Training: No  Gait Training  Gait Training: No    Exercise Treatment: BLE AAROM 10x each: AP, knee/hip flexion, hip abaduction. min A on R LE, mod A on L LE        OutComes Score  AM-PAC Score  AM-PAC Inpatient Mobility Raw Score : 7 (01/18/23 1218)  AM-PAC Inpatient T-Scale Score : 26.42 (01/18/23 1218)  Mobility Inpatient CMS 0-100% Score: 92.36 (01/18/23 1218)  Mobility Inpatient CMS G-Code Modifier : CM (01/18/23 1218)    Goals  Short Term Goals  Time Frame for Short Term Goals: 1/27/23  Short Term Goal 1: p will complete bed mobility max Ax2  Short Term Goal 2: pt will sit EOB for 5 min with mod A  Short Term Goal 3: pt will complete tranfers with max Ax2  Short Term Goal 4: pt will participate in gait assessment when appropriate.   Short Term Goal 5: pt will participate in 12-15 reps of BLE exercises by 1/23/22  Additional Goals?: No  Patient Goals   Patient Goals : \"To get stronger\"     Education  Patient Education  Education Given To: Patient; Family  Education Provided: Role of Therapy;Plan of Care;Home Exercise Program  Education Method: Verbal;Demonstration  Barriers to Learning: None  Education Outcome: Verbalized understanding;Continued education needed      Therapy Time   Individual Concurrent Group Co-treatment   Time In 1120         Time Out 1140         Minutes 20         Timed Code Treatment Minutes: 10 Minutes (10 min eval)       Nury Erickson, PT, DPT    If pt is unable to be seen after this session, please let this note serve as discharge summary. Please see case management note for discharge disposition. Thank you.

## 2023-01-18 NOTE — PROGRESS NOTES
Occupational Therapy  Facility/Department: [facility] C2 CARD TELEMETRY  Occupational Therapy Initial Assessment    Name: Candelaria Pacheco  : 1990  MRN: [id number]  Date of Service: 2023    Discharge Recommendations:  IP Rehab  Barriers to home discharge:   [x] Steps to access home entry or bed/bath:      [x] Reported available assist at home upon discharge limited            Patient Diagnosis(es): The primary encounter diagnosis was Dissecting aneurysm of thoracic aorta, Leo type A. Diagnoses of Chest wall discomfort, Weakness of right lower extremity, and Weakness of left lower extremity were also pertinent to this visit. Past Medical History:  has a past medical history of Influenza A and Marfan syndrome. Past Surgical History:  has a past surgical history that includes Femoral-femoral Bypass Graft (Bilateral, 2023) and Thoracic aortic aneurysm repair (N/A, 1/15/2023). Assessment   Performance deficits / Impairments: Decreased functional mobility ; Decreased safe awareness;Decreased ADL status; Decreased cognition;Decreased strength;Decreased endurance;Decreased high-level IADLs;Decreased coordination  Assessment: pt from home normally independent with IADL's & functional mobility without AD, working; pt admitted with Dissecting aneurysm of thoracic aorta, s/p AVR 1-15-23 & Fem-Fem Bypass 22, extubated but on VAPO Therm, dependent with all care in bed, lethargic but cooperative for BUE AROM exercises; pt to benefit from skilled OT services;  REQUIRES OT FOLLOW-UP: Yes  Activity Tolerance  Activity Tolerance: Patient Tolerated treatment well  Activity Tolerance Comments: vitals stable per ICU monitering        Plan   Occupational Therapy Plan  Times Per Week: 3-5 x/ week  Current Treatment Recommendations: ROM, Balance training, Functional mobility training, Endurance training, Patient/Caregiver education & training, Safety education & training, Positioning, Self-Care / ADL Restrictions  Restrictions/Precautions  Restrictions/Precautions: Fall Risk, NPO, Surgical Protocols, General Precautions, Cardiac  Position Activity Restriction  Sternal Precautions: No Pushing, No Pulling, 5# Lifting Restrictions  Other position/activity restrictions: Chest x 2, ICU monitering, VapoTherm, larsen catheter, sternal wound vac    Subjective   General  Chart Reviewed: Yes  Patient assessed for rehabilitation services?: Yes  Family / Caregiver Present: Yes (at end of session)  Referring Practitioner: Sonam Jose MD 1/16/23  Diagnosis: Aortic dissection with ischemic left lower  extremity, s/p Left to right femoral-femoral bypass reinforced GORE Propaten graft; thorascic aortic disection, s/p ascending aorta valve replacement 1/16  Subjective  Subjective: denies pain at rest  General Comment  Comments: RN cleared pt for OT eval; pt sitting up in [place] -bed \" position in ICU bed, agreeable to therapy     Social/Functional History  Social/Functional History  Lives With: Spouse (3 kids at home (35yo and 9week old))  Type of Home: [facility]  Home Layout: Two level, Bed/Bath upstairs  Home Access: Level entry  Bathroom Shower/Tub: Tub/Shower unit, Shower chair with back  Bathroom Toilet: Standard  Home Equipment: Crutches  Has the patient had two or more falls in the past year or any fall with injury in the past year?: No (1 fall that led to this admission)  ADL Assistance: Independent  Homemaking Assistance: Independent  Homemaking Responsibilities: Yes  Ambulation Assistance: Independent  Transfer Assistance: Independent  Active : Yes  Occupation: Full time employment  Type of Occupation: [employer]  Additional Comments: pt ~ 80% correct in providing PLOF/home environment, wife came at end of session & clarified home environment       Objective   Heart Rate: (!) 106  Heart Rate Source: Monitor  BP: (!) 123/54  BP Location: Arterial  BP Method: Automatic  MAP (Calculated): 77  Resp: 20  SpO2: 98 %  O2 Device: Heated high flow cannula  Comment: 10L O2          Observation/Palpation  Edema: L foot and ankle swelling  Safety Devices  Type of Devices: Patient at risk for falls; Left in bed;Nurse notified;Call light within reach; Heels elevated for pressure relief;Bed alarm in place; All nikolas prominences offloaded  Restraints  Restraints Initially in Place: No    Bed Mobility Training: No (RN cleared for bed level exericses only this date)       AROM: Generally decreased, functional  Strength: Grossly decreased, non-functional  Coordination: Grossly decreased, non-functional  Tone: Normal    ADL  Feeding: NPO  Grooming: Dependent/Total  UE Dressing: Dependent/Total  LE Dressing: Dependent/Total  Toileting: Dependent/Total     Activity Tolerance  Activity Tolerance: Patient tolerated evaluation without incident;Patient limited by fatigue;Patient limited by endurance;Treatment limited secondary to medical complications  Bed mobility  Supine to Sit: Unable to assess (due to multiple critical lines, on VAPO Therm)     Vision  Vision: Within Functional Limits    Hearing  Hearing: Within functional limits    Cognition  Overall Cognitive Status: Exceptions  Arousal/Alertness: Delayed responses to stimuli  Following Commands:  Follows one step commands with increased time  Attention Span: Attends with cues to redirect  Memory: Decreased long term memory;Decreased recall of biographical Information;Decreased short term memory  Safety Judgement: Decreased awareness of need for safety  Insights: Not aware of deficits  Initiation: Requires cues for some  Sequencing: Requires cues for some  Orientation  Overall Orientation Status: Within Functional Limits  Orientation Level: Oriented X4               Exercise Treatment: BUE AROM in high mix's  Hand flex/ext: x   10  Reps  Elbow flex/ext:  x   10 Reps  Forearm sup/pron:  x   10 Reps        AM-PAC Score        AM-PAC Inpatient Daily Activity Raw Score: 6 (01/18/23 1330)  AM-PAC Inpatient ADL T-Scale Score : 17.07 (01/18/23 1330)  ADL Inpatient CMS 0-100% Score: 100 (01/18/23 1330)  ADL Inpatient CMS G-Code Modifier : CN (01/18/23 1330)    Tinneti Score       Goals  Short Term Goals  Time Frame for Short Term Goals: 1 week(1-25-23)  Short Term Goal 1: mod assist of 2 with functional transfers by 1-25-23  Short Term Goal 2: min assist with UE self care seated in chair by 1-23-23  Short Term Goal 3: supervision with 15 reps BUE AROM  Short Term Goal 4: min cues for sternal precautions for transfers;   Patient Goals   Patient goals : Unable to verbalize at this time       Therapy Time   Individual Concurrent Group Co-treatment   Time In 1118         Time Out 1140         Minutes [address], [place]

## 2023-01-18 NOTE — PROGRESS NOTES
Patient comfortable SpO2 97% with RR of 18.    01/18/23 1600   Oxygen Therapy/Pulse Ox   O2 Therapy Oxygen humidified   O2 Device Heated high flow cannula   O2 Flow Rate (L/min) (S)  30 L/min   FiO2  (S)  45 %   Heart Rate (!) 103   Resp 18   SpO2 97 %   Humidification Source Heated wire   Humidification Temp 37   Humidification Temp Measured 37

## 2023-01-19 ENCOUNTER — APPOINTMENT (OUTPATIENT)
Dept: GENERAL RADIOLOGY | Age: 33
DRG: 219 | End: 2023-01-19
Payer: COMMERCIAL

## 2023-01-19 LAB
ANION GAP SERPL CALCULATED.3IONS-SCNC: 10 MMOL/L (ref 3–16)
ANION GAP SERPL CALCULATED.3IONS-SCNC: 8 MMOL/L (ref 3–16)
BANDED NEUTROPHILS RELATIVE PERCENT: 4 % (ref 0–7)
BASOPHILS ABSOLUTE: 0 K/UL (ref 0–0.2)
BASOPHILS RELATIVE PERCENT: 0 %
BLOOD BANK DISPENSE STATUS: NORMAL
BLOOD BANK PRODUCT CODE: NORMAL
BPU ID: NORMAL
BUN BLDV-MCNC: 21 MG/DL (ref 7–20)
BUN BLDV-MCNC: 22 MG/DL (ref 7–20)
CALCIUM IONIZED: 0.98 MMOL/L (ref 1.12–1.32)
CALCIUM IONIZED: 1.04 MMOL/L (ref 1.12–1.32)
CALCIUM SERPL-MCNC: 8.4 MG/DL (ref 8.3–10.6)
CALCIUM SERPL-MCNC: 9.4 MG/DL (ref 8.3–10.6)
CHLORIDE BLD-SCNC: 101 MMOL/L (ref 99–110)
CHLORIDE BLD-SCNC: 101 MMOL/L (ref 99–110)
CO2: 26 MMOL/L (ref 21–32)
CO2: 27 MMOL/L (ref 21–32)
CREAT SERPL-MCNC: 0.9 MG/DL (ref 0.9–1.3)
CREAT SERPL-MCNC: 0.9 MG/DL (ref 0.9–1.3)
DESCRIPTION BLOOD BANK: NORMAL
EOSINOPHILS ABSOLUTE: 0.3 K/UL (ref 0–0.6)
EOSINOPHILS RELATIVE PERCENT: 2 %
GFR SERPL CREATININE-BSD FRML MDRD: >60 ML/MIN/{1.73_M2}
GFR SERPL CREATININE-BSD FRML MDRD: >60 ML/MIN/{1.73_M2}
GLUCOSE BLD-MCNC: 111 MG/DL (ref 70–99)
GLUCOSE BLD-MCNC: 118 MG/DL (ref 70–99)
GLUCOSE BLD-MCNC: 118 MG/DL (ref 70–99)
GLUCOSE BLD-MCNC: 122 MG/DL (ref 70–99)
GLUCOSE BLD-MCNC: 154 MG/DL (ref 70–99)
GLUCOSE BLD-MCNC: 85 MG/DL (ref 70–99)
HCT VFR BLD CALC: 29.3 % (ref 40.5–52.5)
HEMOGLOBIN: 9.4 G/DL (ref 13.5–17.5)
LYMPHOCYTES ABSOLUTE: 2.7 K/UL (ref 1–5.1)
LYMPHOCYTES RELATIVE PERCENT: 19 %
MCH RBC QN AUTO: 26.4 PG (ref 26–34)
MCHC RBC AUTO-ENTMCNC: 32.1 G/DL (ref 31–36)
MCV RBC AUTO: 82.2 FL (ref 80–100)
MONOCYTES ABSOLUTE: 1.3 K/UL (ref 0–1.3)
MONOCYTES RELATIVE PERCENT: 9 %
NEUTROPHILS ABSOLUTE: 10.1 K/UL (ref 1.7–7.7)
NEUTROPHILS RELATIVE PERCENT: 66 %
PDW BLD-RTO: 14.3 % (ref 12.4–15.4)
PERFORMED ON: ABNORMAL
PERFORMED ON: NORMAL
PH VENOUS: 7.51 (ref 7.35–7.45)
PH VENOUS: 7.52 (ref 7.35–7.45)
PLATELET # BLD: 166 K/UL (ref 135–450)
PLATELET SLIDE REVIEW: ADEQUATE
PMV BLD AUTO: 7.9 FL (ref 5–10.5)
POTASSIUM SERPL-SCNC: 3.8 MMOL/L (ref 3.5–5.1)
POTASSIUM SERPL-SCNC: 4 MMOL/L (ref 3.5–5.1)
RBC # BLD: 3.57 M/UL (ref 4.2–5.9)
RBC # BLD: NORMAL 10*6/UL
SLIDE REVIEW: ABNORMAL
SODIUM BLD-SCNC: 135 MMOL/L (ref 136–145)
SODIUM BLD-SCNC: 138 MMOL/L (ref 136–145)
WBC # BLD: 14.4 K/UL (ref 4–11)

## 2023-01-19 PROCEDURE — 85025 COMPLETE CBC W/AUTO DIFF WBC: CPT

## 2023-01-19 PROCEDURE — 2580000003 HC RX 258: Performed by: STUDENT IN AN ORGANIZED HEALTH CARE EDUCATION/TRAINING PROGRAM

## 2023-01-19 PROCEDURE — 6360000002 HC RX W HCPCS: Performed by: THORACIC SURGERY (CARDIOTHORACIC VASCULAR SURGERY)

## 2023-01-19 PROCEDURE — 97530 THERAPEUTIC ACTIVITIES: CPT

## 2023-01-19 PROCEDURE — 2700000000 HC OXYGEN THERAPY PER DAY

## 2023-01-19 PROCEDURE — 6370000000 HC RX 637 (ALT 250 FOR IP): Performed by: THORACIC SURGERY (CARDIOTHORACIC VASCULAR SURGERY)

## 2023-01-19 PROCEDURE — 6360000002 HC RX W HCPCS: Performed by: NURSE PRACTITIONER

## 2023-01-19 PROCEDURE — 97110 THERAPEUTIC EXERCISES: CPT

## 2023-01-19 PROCEDURE — 99024 POSTOP FOLLOW-UP VISIT: CPT | Performed by: NURSE PRACTITIONER

## 2023-01-19 PROCEDURE — 94761 N-INVAS EAR/PLS OXIMETRY MLT: CPT

## 2023-01-19 PROCEDURE — 97535 SELF CARE MNGMENT TRAINING: CPT

## 2023-01-19 PROCEDURE — 2580000003 HC RX 258: Performed by: NURSE PRACTITIONER

## 2023-01-19 PROCEDURE — 94640 AIRWAY INHALATION TREATMENT: CPT

## 2023-01-19 PROCEDURE — 82330 ASSAY OF CALCIUM: CPT

## 2023-01-19 PROCEDURE — 2500000003 HC RX 250 WO HCPCS: Performed by: STUDENT IN AN ORGANIZED HEALTH CARE EDUCATION/TRAINING PROGRAM

## 2023-01-19 PROCEDURE — 2580000003 HC RX 258: Performed by: THORACIC SURGERY (CARDIOTHORACIC VASCULAR SURGERY)

## 2023-01-19 PROCEDURE — 6370000000 HC RX 637 (ALT 250 FOR IP): Performed by: NURSE PRACTITIONER

## 2023-01-19 PROCEDURE — 2000000000 HC ICU R&B

## 2023-01-19 PROCEDURE — 94669 MECHANICAL CHEST WALL OSCILL: CPT

## 2023-01-19 PROCEDURE — 80048 BASIC METABOLIC PNL TOTAL CA: CPT

## 2023-01-19 PROCEDURE — 71045 X-RAY EXAM CHEST 1 VIEW: CPT

## 2023-01-19 RX ORDER — FUROSEMIDE 10 MG/ML
40 INJECTION INTRAMUSCULAR; INTRAVENOUS 3 TIMES DAILY
Status: DISCONTINUED | OUTPATIENT
Start: 2023-01-19 | End: 2023-01-24

## 2023-01-19 RX ORDER — POTASSIUM CHLORIDE 20 MEQ/1
20 TABLET, EXTENDED RELEASE ORAL
Status: DISCONTINUED | OUTPATIENT
Start: 2023-01-19 | End: 2023-01-26 | Stop reason: HOSPADM

## 2023-01-19 RX ADMIN — FUROSEMIDE 40 MG: 10 INJECTION, SOLUTION INTRAMUSCULAR; INTRAVENOUS at 14:40

## 2023-01-19 RX ADMIN — CALCIUM CHLORIDE INJECTION 2000 MG: 100 INJECTION, SOLUTION INTRAVENOUS at 02:54

## 2023-01-19 RX ADMIN — FUROSEMIDE 10 MG/HR: 10 INJECTION, SOLUTION INTRAMUSCULAR; INTRAVENOUS at 02:53

## 2023-01-19 RX ADMIN — ALBUTEROL SULFATE 2.5 MG: 2.5 SOLUTION RESPIRATORY (INHALATION) at 11:41

## 2023-01-19 RX ADMIN — ACETAMINOPHEN 325MG 650 MG: 325 TABLET ORAL at 14:40

## 2023-01-19 RX ADMIN — POTASSIUM CHLORIDE 20 MEQ: 20 TABLET, EXTENDED RELEASE ORAL at 11:15

## 2023-01-19 RX ADMIN — ALBUTEROL SULFATE 2.5 MG: 2.5 SOLUTION RESPIRATORY (INHALATION) at 19:20

## 2023-01-19 RX ADMIN — FUROSEMIDE 40 MG: 10 INJECTION, SOLUTION INTRAMUSCULAR; INTRAVENOUS at 20:18

## 2023-01-19 RX ADMIN — CARBOXYMETHYLCELLULOSE SODIUM 1 DROP: 10 GEL OPHTHALMIC at 11:17

## 2023-01-19 RX ADMIN — POTASSIUM CHLORIDE 20 MEQ: 400 INJECTION, SOLUTION INTRAVENOUS at 06:15

## 2023-01-19 RX ADMIN — MORPHINE SULFATE 4 MG: 4 INJECTION, SOLUTION INTRAMUSCULAR; INTRAVENOUS at 14:14

## 2023-01-19 RX ADMIN — METHOCARBAMOL TABLETS 750 MG: 750 TABLET, COATED ORAL at 08:54

## 2023-01-19 RX ADMIN — BISACODYL 5 MG: 5 TABLET, COATED ORAL at 08:55

## 2023-01-19 RX ADMIN — INSULIN GLARGINE 23 UNITS: 100 INJECTION, SOLUTION SUBCUTANEOUS at 20:25

## 2023-01-19 RX ADMIN — GABAPENTIN 100 MG: 100 CAPSULE ORAL at 14:40

## 2023-01-19 RX ADMIN — OXYCODONE 5 MG: 5 TABLET ORAL at 08:53

## 2023-01-19 RX ADMIN — METHOCARBAMOL TABLETS 750 MG: 750 TABLET, COATED ORAL at 20:17

## 2023-01-19 RX ADMIN — CARBOXYMETHYLCELLULOSE SODIUM 1 DROP: 10 GEL OPHTHALMIC at 05:48

## 2023-01-19 RX ADMIN — Medication 400 MG: at 08:54

## 2023-01-19 RX ADMIN — METOPROLOL TARTRATE 12.5 MG: 25 TABLET, FILM COATED ORAL at 08:55

## 2023-01-19 RX ADMIN — POTASSIUM CHLORIDE 20 MEQ: 400 INJECTION, SOLUTION INTRAVENOUS at 01:45

## 2023-01-19 RX ADMIN — METOPROLOL TARTRATE 12.5 MG: 25 TABLET, FILM COATED ORAL at 11:15

## 2023-01-19 RX ADMIN — POTASSIUM CHLORIDE 20 MEQ: 400 INJECTION, SOLUTION INTRAVENOUS at 00:44

## 2023-01-19 RX ADMIN — METHOCARBAMOL TABLETS 750 MG: 750 TABLET, COATED ORAL at 17:00

## 2023-01-19 RX ADMIN — ASPIRIN 81 MG: 81 TABLET, COATED ORAL at 08:54

## 2023-01-19 RX ADMIN — OXYCODONE 5 MG: 5 TABLET ORAL at 18:22

## 2023-01-19 RX ADMIN — MUPIROCIN: 20 OINTMENT TOPICAL at 20:19

## 2023-01-19 RX ADMIN — METOPROLOL TARTRATE 25 MG: 25 TABLET, FILM COATED ORAL at 20:17

## 2023-01-19 RX ADMIN — MUPIROCIN: 20 OINTMENT TOPICAL at 08:56

## 2023-01-19 RX ADMIN — POLYETHYLENE GLYCOL 3350 17 G: 17 POWDER, FOR SOLUTION ORAL at 08:55

## 2023-01-19 RX ADMIN — ALBUTEROL SULFATE 2.5 MG: 2.5 SOLUTION RESPIRATORY (INHALATION) at 08:31

## 2023-01-19 RX ADMIN — Medication 15 ML: at 08:56

## 2023-01-19 RX ADMIN — SODIUM CHLORIDE, PRESERVATIVE FREE 10 ML: 5 INJECTION INTRAVENOUS at 11:16

## 2023-01-19 RX ADMIN — GABAPENTIN 100 MG: 100 CAPSULE ORAL at 08:55

## 2023-01-19 RX ADMIN — POTASSIUM CHLORIDE 20 MEQ: 20 TABLET, EXTENDED RELEASE ORAL at 18:22

## 2023-01-19 RX ADMIN — FAMOTIDINE 20 MG: 20 TABLET, FILM COATED ORAL at 08:53

## 2023-01-19 RX ADMIN — SODIUM CHLORIDE, PRESERVATIVE FREE 10 ML: 5 INJECTION INTRAVENOUS at 20:19

## 2023-01-19 RX ADMIN — GABAPENTIN 100 MG: 100 CAPSULE ORAL at 20:18

## 2023-01-19 RX ADMIN — ACETAMINOPHEN 325MG 650 MG: 325 TABLET ORAL at 18:22

## 2023-01-19 RX ADMIN — Medication 400 MG: at 20:17

## 2023-01-19 RX ADMIN — FAMOTIDINE 20 MG: 20 TABLET, FILM COATED ORAL at 20:18

## 2023-01-19 RX ADMIN — FONDAPARINUX SODIUM 2.5 MG: 2.5 INJECTION, SOLUTION SUBCUTANEOUS at 08:54

## 2023-01-19 RX ADMIN — CARBOXYMETHYLCELLULOSE SODIUM 1 DROP: 10 GEL OPHTHALMIC at 08:54

## 2023-01-19 RX ADMIN — ATORVASTATIN CALCIUM 40 MG: 40 TABLET, FILM COATED ORAL at 20:17

## 2023-01-19 RX ADMIN — DEXTROSE AND SODIUM CHLORIDE: 5; 450 INJECTION, SOLUTION INTRAVENOUS at 04:04

## 2023-01-19 RX ADMIN — Medication 15 ML: at 20:19

## 2023-01-19 RX ADMIN — INSULIN LISPRO 2 UNITS: 100 INJECTION, SOLUTION INTRAVENOUS; SUBCUTANEOUS at 14:54

## 2023-01-19 RX ADMIN — FUROSEMIDE 40 MG: 10 INJECTION, SOLUTION INTRAMUSCULAR; INTRAVENOUS at 11:15

## 2023-01-19 RX ADMIN — ALBUTEROL SULFATE 2.5 MG: 2.5 SOLUTION RESPIRATORY (INHALATION) at 15:59

## 2023-01-19 RX ADMIN — ACETAMINOPHEN 325MG 650 MG: 325 TABLET ORAL at 08:55

## 2023-01-19 ASSESSMENT — PAIN DESCRIPTION - PAIN TYPE: TYPE: SURGICAL PAIN

## 2023-01-19 ASSESSMENT — PAIN DESCRIPTION - LOCATION
LOCATION: CHEST
LOCATION: CHEST

## 2023-01-19 ASSESSMENT — PAIN DESCRIPTION - ONSET: ONSET: ON-GOING

## 2023-01-19 ASSESSMENT — PAIN SCALES - GENERAL
PAINLEVEL_OUTOF10: 0
PAINLEVEL_OUTOF10: 10
PAINLEVEL_OUTOF10: 4
PAINLEVEL_OUTOF10: 0
PAINLEVEL_OUTOF10: 0

## 2023-01-19 ASSESSMENT — PAIN DESCRIPTION - DESCRIPTORS: DESCRIPTORS: SHARP

## 2023-01-19 ASSESSMENT — PAIN - FUNCTIONAL ASSESSMENT: PAIN_FUNCTIONAL_ASSESSMENT: PREVENTS OR INTERFERES WITH MANY ACTIVE NOT PASSIVE ACTIVITIES

## 2023-01-19 ASSESSMENT — PAIN DESCRIPTION - ORIENTATION: ORIENTATION: MID

## 2023-01-19 ASSESSMENT — PAIN DESCRIPTION - FREQUENCY: FREQUENCY: INTERMITTENT

## 2023-01-19 NOTE — PROGRESS NOTES
CVTS Cardiothoracic Progress Note:                                Chief Complaint:  Post op follow-up    Surgeries:    1/16/23 ASCENDING AORTIC REPLACEMENT WITH TUBE GRAFT USING AN EPPERSON GRAFT SIZE 28CM  ANTEGRADE FLOW TO THE LEFT LEG, RESUSPENSION OF AORTIC VALVE (Dr. Evans Post)     1/16/23:  Left to right femoral-femoral bypass using 8 mm internally  reinforced GORE Propaten graft. (Dr. Marks Party)     Post Op Course:      1/16 Patient intubated, sedated. PEEP 5, Fio2 85%. On several gtts for hemodynamic support at this time. 1/17 remains intubated, vent settings of 40% FiO2 with PEEP 5.    1/18 sitting up in bed on BiPap, awake, alert, moving all extremities. 1/19 sitting up in bed, his wife is visiting at bedside.      Past Medical History:   Diagnosis Date    Influenza A 03/12/2020    Marfan syndrome         Past Surgical History:   Procedure Laterality Date    FEMORAL-FEMORAL BYPASS GRAFT Bilateral 1/16/2023    FEMORAL FEMORAL BYPASS performed by [facility] Inf N/A 1/15/2023    ASCENDING AORTA  REPLACEMENT WITH TUBE GRAFT USING AN EPPERSON GRAFT SIZE 28CM  ANTEGRADE FLOW TO THE LEFT LEG, RESUSPENSION OF AORTIC VALVE performed by Arianna Noble MD at [address] [place] [address] as of 01/15/2023    (No Known Allergies)        Patient Active Problem List   Diagnosis    Aortic dissection (HCC)    Type 1 dissection of ascending aorta    Chest wall discomfort        Vital Signs: BP (!) 126/55   Pulse (!) 110   Temp (!) 100.7 °F (38.2 °C) (Bladder)   Resp 22   Ht 6' 4\" (1.93 m)   Wt (!) 327 lb 2.6 oz (148.4 kg)   SpO2 94%   BMI 39.82 kg/m²  O2 Flow Rate (L/min): 30 L/min     Admission Weight: Weight: (!) 340 lb (154.2 kg)    Weight on 1/15 (154.2 kg) Pre-op (stated)  1/18 144.8 kg  1/19 148.4 kg     Intake/Output:   Intake/Output Summary (Last 24 hours) at 1/19/2023 1057  Last data filed at 1/19/2023 0600  Gross per 24 hour   Intake 1136.28 ml   Output 6511 ml   Net -5374.72 ml      Extubation Time: 1/17 at 13:40     LABORATORY DATA:     CBC:   Recent Labs     01/17/23  0405 01/17/23  1014 01/18/23  0326 01/18/23  0815 01/19/23  0527   WBC 20.3*  --  22.2*  --  14.4*   HGB 11.9*   < > 9.8* 9.1* 9.4*   HCT 36.8*  --  29.6*  --  29.3*   MCV 83.3  --  82.0  --  82.2     --  178  --  166    < > = values in this interval not displayed. BMP:   Recent Labs     01/18/23  1811 01/19/23  0013 01/19/23  0527    135* 138   K 3.9 3.8 4.0    101 101   CO2 22 26 27   BUN 21* 21* 22*   CREATININE 0.9 0.9 0.9     MG:    Recent Labs     01/17/23  0405 01/18/23  0326   MG 2.90* 2.80*        PT/INR:   Recent Labs     01/17/23  0405 01/18/23  0326   PROTIME 17.3* 17.0*   INR 1.42* 1.39*       CXRAY: 1/16/23  FINDINGS:   Medical devices: Endotracheal tube tip projects over the midthoracic trachea. Enteric tube courses past the diaphragm with distal tip not visualized. A   surgical drain projecting over the mediastinum is present. A left IJ   approach central venous sheath catheter is present, cinched along the lower   cervical soft tissues. Interval placement of median sternotomy wires. Mediastinum/Heart: No significant interval change in the cardiomediastinal   contours compared to prior exam.       Lungs: Patchy airspace disease is present projecting over the left lung apex   as well as the right mid lung. Pleura: No findings to suggest pneumothorax or large pleural effusion. Bones/Soft tissues: Nothing acute. Impression   Patchy airspace disease at the left lung apex as well as the right mid lung,   which is nonspecific but likely represents atelectasis. Asymmetric pulmonary   edema may have a similar appearance. Medical support devices as described above.      CXR: 1/19 stable  ___________________________________________________________    Subjective:   Dietary Intake: improving  Nausea: n/a   Pain Control: adequate  Complaints: none  Bowels: have not moved    Objective:   General appearance: awake, A&O, in nad  Lungs: Diminished bilaterally  Heart: S1S2; SR on monitor  Chest: symmetrical expansion with inspiration and expirations; no rocking of sternum noted   Abdomen: soft, nontender  Bowel sounds: normoactive  Kidneys:  Cr 0.9 ; UOP 6890 ml/shift  Wound/Incisions: Midsternal incision with dressing CDI; Pacing wires taped and secure  Extremities: BLE pulses palpable, Right pedal +1, Left pedal +1; 1+ swelling noted in BLE  Neurological: intact, non focal exam   Chest tubes/Drains: Chest tube # 1 RP with 5-5-6= 16 ml/shift serosanguinous drainage in 24 hours overnight; Chest tube # 2 with 75-10-0= 85 ml/shift serosanguinous drainage in 24 hours overnight; no airleaks noted in either tube     Assessment:   Post-op: 5 days. Condition: In critical condition. Plan:   1. Cardiovascular: s/p ascending aorta replacement 1/16; also s/p L to Rt fem-fem bypass. ASA, statin, BB. Needs tight BP control, <140 mmHg    2. Pulmonary: remains on Vapotherm, 40% FiO2 at 30 L/min, saturating 94%, wean as tolerated. Needs expansion maneuvers- is, oobtc, pt/ot, ambulation. 3. Neurology: analgesia as needed. 4. Nephrology: Cr 0.9, UOP almost 7L mL/24 hrs. Will d/c lasix gtt and change to 40 mg IVP TID. 5. Endocrinology: BG stable, no Hx of DM, HgbA1C 5.6. Will d/c Lantus and keep SSI. 6. Hematology: acute blood loss anemia; H&H stable    7. Microbiology: nothing at this time    7. Nutrition: cardiac    8. Labs: labs & imaging reviewed as above    9. Post-op Drains/Wires: will remove TPW's this morning. Two hours later, and after ambulation, will d/c chest tubes as they meet criteria. 10. D/C Goals: discharge planning following, too soon to tell    11.  Continue post-op care of patient in the ICU    Meds:    The patient is on a beta-blocker   The patient is NOT on an ace-i/ARB - 2/2 hypotension   The patient is on a statin   The patient is on oral antiplatelet therapy     ________________________________________________    MERCY Dunn CNP  1/19/2023  10:57 AM

## 2023-01-19 NOTE — PROGRESS NOTES
Occupational Therapy  Facility/Department: [facility] C2 CARD TELEMETRY  Daily Treatment Note  NAME: Sergey Mcdonough  : 1990  MRN: [id number]    Date of Service: 2023    Discharge Recommendations:  Subacute/Skilled Nursing Facility vs IP rehab pending progress and activity tolerance     AM-PAC score  AM-PAC Inpatient Daily Activity Raw Score: 6 (23 121)  AM-PAC Inpatient ADL T-Scale Score : 17.07 (23)  ADL Inpatient CMS 0-100% Score: 100 (23)  ADL Inpatient CMS G-Code Modifier : CN (23)      Patient Diagnosis(es): The primary encounter diagnosis was Dissecting aneurysm of thoracic aorta, Leo type A. Diagnoses of Chest wall discomfort, Weakness of right lower extremity, and Weakness of left lower extremity were also pertinent to this visit. Assessment    Assessment: Patient seen as Co-tx collaboration this date to safely meet goals and will have better occupational performance outcomes with in a co-treatment than 1:1 session. Patient able to follow simple 1 step commands today, flat affect. TotalA for bed mobility and sitting balance 2 mins EOB with heavy L lateral lean (unable to correct without therapist ax2). Patient totalA for ADLs, encouraged and education/demo BUE following sternal precautions, pt wife acknowledged understanding. Maxi-luis used for bed to chair transfer given pts poor sitting balance. Pt functioning below baseline, however at this time, patient would not be able to tolerate intense IP rehab level of therapy. Will continue to assess his strength/activity tolerance each session for determinatation of most appropriate d/c disposition.   Activity Tolerance: Patient tolerated evaluation without incident;Patient limited by fatigue;Patient limited by endurance  Discharge Recommendations: [address] (vs [facility])      Plan   Occupational Therapy Plan  Times Per Week: 3-5 x/ week Restrictions  Restrictions/Precautions  Restrictions/Precautions: Fall Risk;NPO;Surgical Protocols; General Precautions;Cardiac  Position Activity Restriction  Sternal Precautions: No Pushing; No Pulling;5# Lifting Restrictions  Other position/activity restrictions: Chest x 2, ICU monitering, VapoTherm, larsen catheter, sternal wound vac    Subjective   Subjective  Subjective: Pt reports no pain; agreeable to OT session, not resistive  Orientation  Overall Orientation Status: Within Functional Limits  Orientation Level: Oriented X4  Pain: Denies pain at rest  Cognition  Overall Cognitive Status: Exceptions  Arousal/Alertness: Delayed responses to stimuli  Following Commands: Follows one step commands with increased time  Attention Span: Attends with cues to redirect  Memory: Decreased long term memory;Decreased recall of biographical Information;Decreased short term memory  Safety Judgement: Decreased awareness of need for safety  Problem Solving: Decreased awareness of errors  Insights: Not aware of deficits  Initiation: Requires cues for some  Sequencing: Requires cues for some        Objective    Vitals  Heart Rate: (!) 103  BP: 95/60  BP Location: Left upper arm  MAP (Calculated): 72  SpO2: 95 %  O2 Device: Heated high flow cannula  Comment: 10L O2     Bed Mobility Training  Bed Mobility Training: Yes  Supine to Sit: Assist X2;Total assistance; Adaptive equipment; Additional time (HOB elevated)  Sit to Supine: Total assistance;Assist X2;Additional time (HOB flat)  Scooting: Total assistance;Assist X2  Balance  Sitting: Impaired (Total Ax2 for sitting balance)  Sitting - Static: Poor (constant support)  Sitting - Dynamic: Poor (constant support)  Transfer Training  Transfer Training: Yes  Overall Level of Assistance:  Total assistance;Assist X2  Bed to Chair: Total assistance;Assist X2 (with maxi luis lift)        OT Exercises  Exercise Treatment: Iris Sharps in high mix's in chair  Elbow/hands x 10 reps (flexion/ext and pro/sup)     Safety Devices  Type of Devices: Patient at risk for falls;Nurse notified;Call light within reach; Heels elevated for pressure relief; All nikolas prominences offloaded;Left in chair (RN in room with pt at end of session)  Restraints  Restraints Initially in Place: No          Goals  Short Term Goals  Time Frame for Short Term Goals: 1 week(1-25-23)-ongoing as of 1/19  Short Term Goal 1: mod assist of 2 with functional transfers by 1-25-23  Short Term Goal 2: min assist with UE self care seated in chair by 1-23-23  Short Term Goal 3: supervision with 15 reps BUE AROM  Short Term Goal 4: min cues for sternal precautions for transfers; Patient Goals   Patient goals : Unable to verbalize at this time       Therapy Time   Individual Concurrent Group Co-treatment   Time In 1050         Time Out 1120         Minutes 30         Timed Code Treatment Minutes: 30 Minutes       Eatonbrody Ocampo, RIGOBERTOR/L  If pt is unable to be seen after this session, please let this note serve as discharge summary. Please see case management note for discharge disposition. Thank you.

## 2023-01-19 NOTE — CARE COORDINATION
LOS 4. Care managed by CV surg, Vasc. S/P Aortic Dissect- urgent OR. On vapotherm, Has ARU recs- referred. Will need cert. From home  w spouse and children. [address] 77 w DME is back up plan. Discussed dispo in detail w spouse yesterday. CM following.   Brisa Ambriz RN

## 2023-01-19 NOTE — PROGRESS NOTES
Physician Progress Note      Nba Peguero  CSN #:                  [id number]  :                       1990  ADMIT DATE:       1/15/2023 9:08 PM  DISCH DATE:  RESPONDING  PROVIDER #:        Humberto Parisi CNP          QUERY TEXT:    Pt admitted with chest pains and numbness and underwent AVR and Fem Fem bypass   on . If possible, please document in the progress notes and discharge   summary if you are evaluating and/or treating any of the following: The medical record reflects the following:  Risk Factors: Marfan syndrome, Obesity  Clinical Indicators: Surgeries on , remained intubated on  until   extubated at 1340. Pt required Bipap w/ FiO2 100% started on  at [facility] for   prior O2 sat as low as 88% when on 10 L NC. Pt currently on FiO2 55% and HFNC   maintaining 98% O2 sat  Treatment: Mechanical ventilation, post extubation required Bipap and HFNC    Thank you,  Nancy Dye RN, BSN  [email or web address]  Options provided:  -- Acute pulmonary insufficiency following surgery  -- Acute respiratory failure unrelated to surgery but due to, please specify. -- Acute respiratory failure due to the surgery  -- Other - I will add my own diagnosis  -- Disagree - Not applicable / Not valid  -- Disagree - Clinically unable to determine / Unknown  -- Refer to Clinical Documentation Reviewer    PROVIDER RESPONSE TEXT:    This patient has acute postoperative pulmonary insufficiency.     Query created by: Jerrica Leyva on 2023 4:16 PM      Electronically signed by:  Bev Parisi CNP 2023 8:28 AM

## 2023-01-19 NOTE — PROGRESS NOTES
01/19/23 0326   Oxygen Therapy/Pulse Ox   O2 Therapy Oxygen humidified   O2 Device Heated high flow cannula   O2 Flow Rate (L/min) 30 L/min   FiO2  40 %   Heart Rate 94   Resp 16   SpO2 97 %   Humidification Source Heated wire   Humidification Temp 37   Humidification Temp Measured 37   Pulse Oximeter Device Mode Continuous   Pulse Oximeter Device Location Finger

## 2023-01-19 NOTE — PROGRESS NOTES
Shift: Day    Procedure: ASCENDING AORTA VALVE REPLACEMENT WITH TUBE GRAFT USING AN EPPERSON GRAFT SIZE 28CM  ANTEGRADE FLOW TO THE LEFT LEG, RESUSPENSION OF AORTIC VALVE    Admit from OR (time and date): 1/16/23 0630    Transition (time and date):     Surgery, return to OR yes - Fem - Fem bypass with Dr. Bernardo Harvey at handoff  stable    Most recent vitals: BP (!) 126/55   Pulse (!) 105   Temp (!) 100.7 °F (38.2 °C) (Bladder)   Resp 21   Ht 6' 4\" (1.93 m)   Wt (!) 327 lb 2.6 oz (148.4 kg)   SpO2 95%   BMI 39.82 kg/m²      Increased O2 requirements: no O2 requirements: Oxygen Therapy  SpO2: 95 %  Pulse Oximetry Type: Continuous  Pulse Oximeter Device Mode: Continuous  Pulse Oximeter Device Location: Finger  O2 Device: Heated high flow cannula  Oximetry Probe Site Changed: Yes  Skin Assessment: Clean, dry, & intact  Skin Protection for O2 Device: Yes  Orientation: Middle  Location: Nose  FiO2 : 40 %  O2 Flow Rate (L/min): 30 L/min  Vent Mode: (S) AC/VC (pt placed on sbt 5/5 at 1220)     Admission weight Weight: (!) 340 lb (154.2 kg)  Today's weight   Wt Readings from Last 1 Encounters:   01/19/23 (!) 327 lb 2.6 oz (148.4 kg)         EF: unknown    Drop in Urinary Output no     Rhythm Changes Normal Sinus Rhythm 90's    Pacing Wires Removed:  [] Yes  [x] NO  [] Platelets < 51,807  [] Arrhythmia  [] Bradycardia [] Valve Replacement  [] Pacing for Cardiac Index  []Physician Order    Lines/Drains  LDA Insertion Date Discontinued Date Dressing Changes   Art line 1/16     Central Line 1/16     Colin 1/16     Chest Tube P: 1/16  M: 1/16     Wires  1/16     ETT 1/16     TYRELL Drain      VasCath      Impella        Interventions After Office Hours  Problem(Brief) Date Time Intervention Physician contacted                                               Drip rates at handoff:    furosemide (LASIX) 1mg/ml infusion 10 mg/hr (01/19/23 0253)    dextrose 5 % and 0.45 % NaCl 75 mL/hr at 01/19/23 0404    sodium chloride 5 mL/hr at 01/16/23 1453    norepinephrine Stopped (01/18/23 0515)    niCARdipine      dextrose      dexmedetomidine Stopped (01/17/23 1525)    esmolol Stopped (01/18/23 2159)       Hospital Course:  POD# DOS  -Ca replaced  -Gato Rana given for  5.9 K -VSS  -Fem to Fem bypass with Dr. Stepan Sorensen @ [id number]  -OGT LWS    POD NOS:  -Levo weaned slightly. -Remains on levo, esmolol, insulin, fentanyl, and versed.  -Fi02 weaned on vent. -Wakes up, moves all extremities, follows commands. POD#3 01/18:   -HR in 90s, BP 110s-120s overnight   -notified on call CTS about blood sugar 74 and 23 units Lantus order. Will hold Lantus right now per order, and new order of low dose sliding scale order noted. Blood glucose recheck 118.   -Wife Shola Steven updated. -K replaced, Ca replace. -IS practiced with patient this shift   -bathed, CTS dressing changed   -thi morning pt's weight is 148.4kg  -Total urine output: 4050ml on Lasix gtt   -total MCT output: 0ml  -total PCT output: 6ml  -Daily BMP, CBC, CXR completed. Lab Data:  CBC:   Recent Labs     01/18/23  0326 01/18/23  0815 01/19/23  0527   WBC 22.2*  --  14.4*   HGB 9.8* 9.1* 9.4*   HCT 29.6*  --  29.3*   MCV 82.0  --  82.2     --  166       BMP:    Recent Labs     01/19/23  0013 01/19/23  0527   * 138   K 3.8 4.0   CO2 26 27   BUN 21* 22*   CREATININE 0.9 0.9       LIVR:   Recent Labs     01/17/23  0405 01/18/23  0326   * 687*   * 155*       PT/INR:   Recent Labs     01/17/23  0405 01/18/23  0326   PROT 5.0* 4.5*   INR 1.42* 1.39*       APTT: No results for input(s): APTT in the last 72 hours.   ABG:   Recent Labs     01/18/23  0341 01/18/23  0815   PHART 7.448 7.416   ARV6TRZ 33.5* 31.9*   PO2ART 73.6* 114.4*          Electronically signed by Gray Marrero RN on 1/19/2023 at 6:10 AM

## 2023-01-19 NOTE — CARE COORDINATION
[facility] - Acute Rehab Unit   After review, this patient is felt to be:       []  Appropriate for Acute Inpatient Rehab    [x]  Appropriate for Acute Inpatient Rehab Pending Insurance Authorization    []  Not appropriate for Acute Inpatient Rehab    []  Referral received and ARU reviewing patient; Evaluation ongoing. will need precert initiated when medically appropriate. Left VM for Chloe, CM  Will notify DCP with further updates.  Thank you for the referral.   Maged Bejarano RN

## 2023-01-19 NOTE — PROGRESS NOTES
01/18/23 2345   Oxygen Therapy/Pulse Ox   O2 Therapy Oxygen humidified   O2 Device Heated high flow cannula   O2 Flow Rate (L/min) 30 L/min   FiO2  (S)  40 %   Heart Rate 93   Resp (!) 0   SpO2 98 %   Humidification Source Heated wire   Humidification Temp 37   Humidification Temp Measured 37   Pulse Oximeter Device Mode Continuous   Pulse Oximeter Device Location Finger

## 2023-01-19 NOTE — PROGRESS NOTES
Physical Therapy  Facility/Department: [facility] C2 CARD TELEMETRY  Daily Treatment Note  NAME: Marc Zarco  : 1990  MRN: [id number]    Date of Service: 2023    Discharge Recommendations:  Subacute/Skilled Nursing Facility   PT Equipment Recommendations  Equipment Needed: No    Patient Diagnosis(es): The primary encounter diagnosis was Dissecting aneurysm of thoracic aorta, [place] type A. Diagnoses of Chest wall discomfort, Weakness of right lower extremity, and Weakness of left lower extremity were also pertinent to this visit. Assessment   Assessment: Pt with fair participation. Able to complete supine exercises with min A and max VC. Requires total Ax2 for bed mobility and balance at EOB with strong left posterior lean. Not safe to sit EOB for extended time due to poor balance and high level assist required. Used maxi-luis lift to transfer to the chair. Pt will continue to benefit from skilled PT services in acute setting in order to address the above functional deficits. It is rec pt DC to SNF at this time, if pt progresses well and ability to participate imporves pt may benefit from IPR. Co-tx collaboration this date to safely meet goals and will have better occupational performance outcomes with in a co-treatment than 1:1 session. Activity Tolerance: Patient tolerated evaluation without incident;Patient limited by fatigue;Patient limited by endurance  Equipment Needed: No     Plan    Physcial Therapy Plan  General Plan: 3-5 times per week  Current Treatment Recommendations: Strengthening;ROM;Balance training;Gait training;Home exercise program;Safety education & training;Stair training;Functional mobility training;Neuromuscular re-education;Patient/Caregiver education & training;Transfer training; Therapeutic activities; Endurance training;Equipment evaluation, education, & procurement;Positioning     Restrictions  Restrictions/Precautions  Restrictions/Precautions: Fall Risk, NPO, Surgical Protocols, General Precautions, Cardiac  Position Activity Restriction  Sternal Precautions: No Pushing, No Pulling, 5# Lifting Restrictions  Other position/activity restrictions: Chest x 2, ICU monitering, VapoTherm, larsen catheter, sternal wound vac     Subjective    Subjective  Subjective: Pt in bed upon arrival, Pt agreeable to PT/OT cotx. RN cleared for PT. Pain: Denies pain at rest  Orientation  Overall Orientation Status: Within Functional Limits  Orientation Level: Oriented X4     Objective   Vitals  Heart Rate: (!) 103  BP: 95/60  BP Location: Left upper arm  MAP (Calculated): 72  SpO2: 95 %  O2 Device: Heated high flow cannula  Comment: 10L O2  Bed Mobility Training  Bed Mobility Training: Yes  Supine to Sit: Assist X2;Total assistance; Adaptive equipment; Additional time (HOB elevated)  Sit to Supine: Total assistance;Assist X2;Additional time (HOB flat)  Scooting: Total assistance;Assist X2  Balance  Sitting: Impaired (Total Ax2 for sitting balance)  Sitting - Static: Poor (constant support)  Sitting - Dynamic: Poor (constant support)  Transfer Training  Transfer Training: Yes  Overall Level of Assistance: Total assistance;Assist X2  Bed to Chair: Total assistance;Assist X2 (with maxi luis lift)     PT Exercises  Exercise Treatment: BLE exericses supine 5-10x each: AP, heel slides, SLR min A for full ROM     Safety Devices  Type of Devices: Patient at risk for falls;Nurse notified;Call light within reach; Heels elevated for pressure relief; All nikolas prominences offloaded;Left in chair (RN in room with pt at end of session)  Restraints  Restraints Initially in Place: No     [facility] 6 Clicks Inpatient Mobility:  AM-PAC Mobility Inpatient   How much difficulty turning over in bed?: A Lot  How much difficulty sitting down on / standing up from a chair with arms?: Unable  How much difficulty moving from lying on back to sitting on side of bed?: Unable  How much help from another person moving to and from a bed to a chair?: Total  How much help from another person needed to walk in hospital room?: Total  How much help from another person for climbing 3-5 steps with a railing?: Total  AM-PAC Inpatient Mobility Raw Score : 7  AM-PAC Inpatient T-Scale Score : 26.42  Mobility Inpatient CMS 0-100% Score: 92.36  Mobility Inpatient CMS G-Code Modifier : CM    Goals  Short Term Goals  Time Frame for Short Term Goals: 1/27/23  Short Term Goal 1: pt will complete bed mobility max Ax2  Short Term Goal 2: pt will sit EOB for 5 min with mod A  Short Term Goal 3: pt will complete tranfers with max Ax2  Short Term Goal 4: pt will participate in gait assessment when appropriate. Short Term Goal 5: pt will participate in 12-15 reps of BLE exercises by 1/23/22  Additional Goals?: No  Patient Goals   Patient Goals : \"To get stronger\"    Education  Patient Education  Education Given To: Patient; Family  Education Provided: Role of Therapy;Plan of Care;Home Exercise Program;Transfer Training;Equipment  Education Method: Verbal  Barriers to Learning: None  Education Outcome: Verbalized understanding;Continued education needed    Therapy Time   Individual Concurrent Group Co-treatment   Time In 1050         Time Out 1120         Minutes 30         Timed Code Treatment Minutes: [facility], PT, DPT    If pt is unable to be seen after this session, please let this note serve as discharge summary. Please see case management note for discharge disposition. Thank you.

## 2023-01-19 NOTE — PLAN OF CARE
Problem: Discharge Planning  Goal: Discharge to home or other facility with appropriate resources  Outcome: Progressing     Problem: Skin/Tissue Integrity  Goal: Absence of new skin breakdown  Description: 1. Monitor for areas of redness and/or skin breakdown  2. Assess vascular access sites hourly  3. Every 4-6 hours minimum:  Change oxygen saturation probe site  4. Every 4-6 hours:  If on nasal continuous positive airway pressure, respiratory therapy assess nares and determine need for appliance change or resting period.   Outcome: Progressing     Problem: Nutrition Deficit:  Goal: Optimize nutritional status  Outcome: Progressing     Problem: Pain  Goal: Verbalizes/displays adequate comfort level or baseline comfort level  Outcome: Progressing

## 2023-01-19 NOTE — PROGRESS NOTES
Chest tubes meet criteria to remove per open heart protocol. No  air leak. no crepitus. Pt instructed on procedure. Pt premedicated with morphine per MAR. Site cleansed and prepped per protocol. Chest tubes X 1 (mediastinal) removed without difficulty. Dry sterile dressing applied. Bilateral breath sounds audible. O2 Sats 94% on 4 nasal cannula.

## 2023-01-19 NOTE — CONSULTS
Patient: Jameel Salgado  [id number]  Date: 1/19/2023      Chief Complaint: chest pain, lower extremities numbness    History of Present Illness/Hospital Course:  Bravo Colin is a 34year old male with a past medical history significant for Marfan syndrome and morbid obesity who presented to [facility] on 1/15/23 with severe chest pain and lower extremity numbness. CT revealed type A aortic dissection. CT surgery was consulted and on 1/16 he underwent ascending aorta replacement. Vascular surgery was consulted and on 1/16 he underwent left to right femoral-femoral bypass. Course notable for acute blood loss anemia. Today Olvin Mata is seen without family present. He reports tingling and weakness in his left foot and ankle. He denies chest pain or shortness of breath. He is motivated to work with therapies and is interested in [employer]. has a past medical history of Influenza A and Marfan syndrome. has a past surgical history that includes Femoral-femoral Bypass Graft (Bilateral, 1/16/2023) and Thoracic aortic aneurysm repair (N/A, 1/15/2023). reports that he has never smoked. He has never used smokeless tobacco. He reports that he does not currently use alcohol. He reports that he does not use drugs. family history is not on file. REVIEW OF SYSTEMS:   CONSTITUTIONAL: negative for fevers, chills, diaphoresis, activity change, appetite change, fatigue, night sweats and unexpected weight change.    EYES: negative for blurred vision, eye discharge, visual disturbance and icterus  HEENT: negative for hearing loss, tinnitus, ear drainage, sinus pressure, nasal congestion, epistaxis and snoring  RESPIRATORY: Negative for hemoptysis, cough, sputum production  CARDIOVASCULAR: negative for chest pain, palpitations, exertional chest pressure/discomfort, edema, syncope  GASTROINTESTINAL: negative for nausea, vomiting, diarrhea, constipation, blood in stool and abdominal pain  GENITOURINARY: negative for frequency, dysuria, urinary incontinence, decreased urine volume, and hematuria  HEMATOLOGIC/LYMPHATIC: negative for easy bruising, bleeding and lymphadenopathy  ALLERGIC/IMMUNOLOGIC: negative for recurrent infections, angioedema, anaphylaxis and drug reactions  ENDOCRINE: negative for weight changes and diabetic symptoms including polyuria, polydipsia and polyphagia  MUSCULOSKELETAL: negative for pain, joint swelling, decreased range of motion and muscle weakness  NEUROLOGICAL: positive for left leg weakness; negative for headaches, slurred speech  PSYCHIATRIC/BEHAVIORAL: negative for hallucinations, behavioral problems, confusion and agitation.      Physical Examination:  Vitals: Patient Vitals for the past 24 hrs:   BP Temp Temp src Pulse Resp SpO2 Weight   01/19/23 1603 -- -- -- (!) 105 21 93 % --   01/19/23 1601 -- -- -- (!) 105 21 94 % --   01/19/23 1500 (!) 88/54 (!) 101.1 °F (38.4 °C) Bladder (!) 104 -- 97 % --   01/19/23 1444 -- -- -- -- 20 -- --   01/19/23 1400 102/69 -- -- (!) 109 -- 94 % --   01/19/23 1300 96/60 -- -- (!) 102 -- 95 % --   01/19/23 1208 95/60 -- -- (!) 103 -- 95 % --   01/19/23 1200 (!) 93/56 -- -- (!) 101 -- 95 % --   01/19/23 1145 95/60 (!) 100.5 °F (38.1 °C) Bladder (!) 103 20 95 % --   01/19/23 1142 -- -- -- (!) 102 -- 95 % --   01/19/23 1130 -- (!) 100.5 °F (38.1 °C) Bladder (!) 102 20 95 % --   01/19/23 1100 -- -- -- (!) 103 15 95 % --   01/19/23 1045 -- -- -- (!) 103 19 96 % --   01/19/23 1030 -- -- -- (!) 106 21 95 % --   01/19/23 1015 -- -- -- (!) 104 24 95 % --   01/19/23 1000 -- -- -- (!) 106 18 94 % --   01/19/23 0945 -- -- -- (!) 107 20 92 % --   01/19/23 0930 -- -- -- (!) 109 21 93 % --   01/19/23 0923 -- -- -- -- 20 -- --   01/19/23 0915 -- -- -- (!) 108 22 95 % --   01/19/23 0900 -- -- -- (!) 111 21 93 % --   01/19/23 0845 -- -- -- (!) 111 11 94 % --   01/19/23 0832 -- -- -- (!) 110 22 94 % --   01/19/23 0830 -- -- -- (!) 110 18 94 % --   01/19/23 0815 -- -- -- (!) 112 (!) 0 94 % -- 01/19/23 0800 -- (!) 100.7 °F (38.2 °C) Bladder (!) 110 15 95 % --   01/19/23 0745 -- -- -- (!) 110 23 94 % --   01/19/23 0730 -- -- -- (!) 111 23 94 % --   01/19/23 0715 -- -- -- (!) 110 20 94 % --   01/19/23 0700 -- -- -- (!) 111 18 94 % --   01/19/23 0645 -- -- -- (!) 108 21 94 % --   01/19/23 0630 -- -- -- (!) 109 20 95 % --   01/19/23 0615 -- -- -- (!) 103 19 95 % --   01/19/23 0600 -- -- -- (!) 105 21 95 % --   01/19/23 0545 -- -- -- (!) 102 21 95 % --   01/19/23 0530 -- -- -- (!) 101 19 96 % --   01/19/23 0515 -- -- -- (!) 101 19 96 % --   01/19/23 0500 -- -- -- 97 22 96 % --   01/19/23 0445 -- -- -- 92 27 96 % --   01/19/23 0430 -- -- -- 89 23 99 % --   01/19/23 0415 -- -- -- 91 16 96 % --   01/19/23 0400 -- (!) 100.7 °F (38.2 °C) Bladder 94 14 96 % (!) 327 lb 2.6 oz (148.4 kg)   01/19/23 0345 -- -- -- 94 12 97 % --   01/19/23 0330 -- -- -- 93 (!) 6 96 % --   01/19/23 0326 -- -- -- 94 16 97 % --   01/19/23 0315 -- -- -- 92 13 98 % --   01/19/23 0300 -- -- -- 90 16 96 % --   01/19/23 0245 -- -- -- 93 (!) 5 96 % --   01/19/23 0230 -- -- -- 89 21 97 % --   01/19/23 0215 -- -- -- 92 (!) 0 97 % --   01/19/23 0200 -- -- -- 89 (!) 0 97 % --   01/19/23 0145 -- -- -- 91 11 97 % --   01/19/23 0130 -- -- -- 93 19 97 % --   01/19/23 0115 -- -- -- 90 15 97 % --   01/19/23 0100 -- -- -- 89 19 97 % --   01/19/23 0045 -- -- -- 91 (!) 0 99 % --   01/19/23 0030 -- -- -- 92 (!) 7 98 % --   01/19/23 0015 -- -- -- 92 11 97 % --   01/19/23 0000 -- 100.2 °F (37.9 °C) Bladder 93 19 97 % --   01/18/23 2345 -- -- -- 93 (!) 0 98 % --   01/18/23 2330 -- -- -- 93 11 99 % --   01/18/23 2315 -- -- -- 93 (!) 7 97 % --   01/18/23 2300 -- -- -- 94 18 98 % --   01/18/23 2245 -- -- -- 99 12 97 % --   01/18/23 2230 -- -- -- (!) 101 (!) 0 97 % --   01/18/23 2215 -- -- -- 98 19 98 % --   01/18/23 2200 -- -- -- 91 20 99 % --   01/18/23 2145 -- -- -- 92 21 99 % --   01/18/23 2130 -- -- -- 91 19 98 % --   01/18/23 2115 -- -- -- 92 19 99 % -- 01/18/23 2100 -- -- -- 92 18 100 % --   01/18/23 2045 -- -- -- 89 18 100 % --   01/18/23 2030 -- -- -- 95 18 (!) 89 % --   01/18/23 2029 -- -- -- 95 19 99 % --   01/18/23 2028 -- -- -- 95 22 98 % --   01/18/23 2015 -- -- -- (!) 110 19 95 % --   01/18/23 2000 (!) 126/55 (!) 100.7 °F (38.2 °C) Bladder (!) 107 23 96 % --   01/18/23 1900 -- -- -- (!) 110 20 96 % --     Mood: Stable  Const: No distress  ENT: Oral mucosa moist  Eyes: No discharge or injection  CV: Regular rate and rhythm, no murmur rub or gallop noted  Resp: Lungs clear to auscultation bilaterally, no rales wheezes or ronchi  GI: Soft, nontender, nondistended. Neuro: Alert, oriented, appropriate. No cranial nerve deficits appreciated. Sensation decreased to light touch of left foot. Motor examination reveals normal strength in all four limbs diffusely except left ADF. Skin: No lesions or rashes noted. MSK: No joint abnormalities noted. Lab Results   Component Value Date    WBC 14.4 (H) 01/19/2023    HGB 9.4 (L) 01/19/2023    HCT 29.3 (L) 01/19/2023    MCV 82.2 01/19/2023     01/19/2023     Lab Results   Component Value Date    INR 1.39 (H) 01/18/2023    INR 1.42 (H) 01/17/2023    INR 1.47 (H) 01/16/2023    PROTIME 17.0 (H) 01/18/2023    PROTIME 17.3 (H) 01/17/2023    PROTIME 17.8 (H) 01/16/2023     Lab Results   Component Value Date    CREATININE 0.9 01/19/2023    BUN 22 (H) 01/19/2023     01/19/2023    K 4.0 01/19/2023     01/19/2023    CO2 27 01/19/2023     Lab Results   Component Value Date     (H) 01/18/2023     (H) 01/18/2023    ALKPHOS 44 01/18/2023    BILITOT 0.5 01/18/2023     Most recent EKG revealed NSR. IMAGING    CTA Chest Abdomen Pelvis 1/15/23  Aortic dissection that starts in the ascending thoracic aorta near the aortic   root and extends to the aortic hiatus. The dissection flap appears to extend   into the proximal aspect of the brachiocephalic artery and left common   carotid artery. Periaortic stranding and inflammation involving the distal abdominal aorta   extending along the common iliac arteries bilaterally that is worse on the   left compared to the right. There is associated loss of flow in the left   common iliac artery extending into the proximal left external and internal   iliac arteries with subsequent reconstitution. This is of uncertain etiology. Hepatic steatosis. Uncomplicated colonic diverticulosis. CT Lumbar Spine WO contrast 1/15/23  No acute fracture or traumatic malalignment identified within the lumbar   spine. Please refer to the abdomen and pelvis CT for additional details regarding   the edema in the retroperitoneum. XR chest 1/19/23  Right IJ Cordis and thoracic drainage tube are unchanged in position. Patchy   bilateral perihilar and lower lobe airspace opacities persist.  The heart is   enlarged with a stable appearance of pulmonary vasculature. There is no   pneumothorax or effusion. Assessment:  1. Type A aortic dissection s/p replacement  2. Right to left fem-fem bypass   3. Pulmonary insufficiency   4. Acute blood loss anemia  5. Marfan syndrome  6. Morbid obesity     Recommendations:  Patient with new functional deficits and ongoing medical complexity. Demonstrates ability to tolerate 3 hours therapy/day. Urszula Springer is a good candidate for acute inpatient rehab when medically appropriate. Thank you for this consult. Please contact me with any questions or concerns. [facility]  Guillermina Contreras MD 1/19/2023, 6:06 PM

## 2023-01-20 ENCOUNTER — APPOINTMENT (OUTPATIENT)
Dept: GENERAL RADIOLOGY | Age: 33
DRG: 219 | End: 2023-01-20
Payer: COMMERCIAL

## 2023-01-20 LAB
ANION GAP SERPL CALCULATED.3IONS-SCNC: 12 MMOL/L (ref 3–16)
BUN BLDV-MCNC: 25 MG/DL (ref 7–20)
CALCIUM SERPL-MCNC: 8 MG/DL (ref 8.3–10.6)
CHLORIDE BLD-SCNC: 98 MMOL/L (ref 99–110)
CO2: 28 MMOL/L (ref 21–32)
CREAT SERPL-MCNC: 0.8 MG/DL (ref 0.9–1.3)
CULTURE, RESPIRATORY: NORMAL
GFR SERPL CREATININE-BSD FRML MDRD: >60 ML/MIN/{1.73_M2}
GLUCOSE BLD-MCNC: 105 MG/DL (ref 70–99)
GLUCOSE BLD-MCNC: 108 MG/DL (ref 70–99)
GLUCOSE BLD-MCNC: 122 MG/DL (ref 70–99)
GRAM STAIN RESULT: NORMAL
HCT VFR BLD CALC: 34.7 % (ref 40.5–52.5)
HEMOGLOBIN: 10.9 G/DL (ref 13.5–17.5)
MCH RBC QN AUTO: 25.8 PG (ref 26–34)
MCHC RBC AUTO-ENTMCNC: 31.4 G/DL (ref 31–36)
MCV RBC AUTO: 82.1 FL (ref 80–100)
PDW BLD-RTO: 14.7 % (ref 12.4–15.4)
PERFORMED ON: ABNORMAL
PERFORMED ON: ABNORMAL
PLATELET # BLD: 300 K/UL (ref 135–450)
PMV BLD AUTO: 7.8 FL (ref 5–10.5)
POTASSIUM REFLEX MAGNESIUM: 4 MMOL/L (ref 3.5–5.1)
RBC # BLD: 4.22 M/UL (ref 4.2–5.9)
SODIUM BLD-SCNC: 138 MMOL/L (ref 136–145)
WBC # BLD: 17.7 K/UL (ref 4–11)

## 2023-01-20 PROCEDURE — 94761 N-INVAS EAR/PLS OXIMETRY MLT: CPT

## 2023-01-20 PROCEDURE — 97110 THERAPEUTIC EXERCISES: CPT

## 2023-01-20 PROCEDURE — 6370000000 HC RX 637 (ALT 250 FOR IP): Performed by: NURSE PRACTITIONER

## 2023-01-20 PROCEDURE — 6360000002 HC RX W HCPCS: Performed by: NURSE PRACTITIONER

## 2023-01-20 PROCEDURE — 97530 THERAPEUTIC ACTIVITIES: CPT

## 2023-01-20 PROCEDURE — 6370000000 HC RX 637 (ALT 250 FOR IP): Performed by: THORACIC SURGERY (CARDIOTHORACIC VASCULAR SURGERY)

## 2023-01-20 PROCEDURE — 94640 AIRWAY INHALATION TREATMENT: CPT

## 2023-01-20 PROCEDURE — 99024 POSTOP FOLLOW-UP VISIT: CPT | Performed by: NURSE PRACTITIONER

## 2023-01-20 PROCEDURE — 71045 X-RAY EXAM CHEST 1 VIEW: CPT

## 2023-01-20 PROCEDURE — 2700000000 HC OXYGEN THERAPY PER DAY

## 2023-01-20 PROCEDURE — 2500000003 HC RX 250 WO HCPCS: Performed by: STUDENT IN AN ORGANIZED HEALTH CARE EDUCATION/TRAINING PROGRAM

## 2023-01-20 PROCEDURE — 36592 COLLECT BLOOD FROM PICC: CPT

## 2023-01-20 PROCEDURE — 94669 MECHANICAL CHEST WALL OSCILL: CPT

## 2023-01-20 PROCEDURE — 6360000002 HC RX W HCPCS: Performed by: THORACIC SURGERY (CARDIOTHORACIC VASCULAR SURGERY)

## 2023-01-20 PROCEDURE — 85027 COMPLETE CBC AUTOMATED: CPT

## 2023-01-20 PROCEDURE — 97535 SELF CARE MNGMENT TRAINING: CPT

## 2023-01-20 PROCEDURE — 6370000000 HC RX 637 (ALT 250 FOR IP): Performed by: STUDENT IN AN ORGANIZED HEALTH CARE EDUCATION/TRAINING PROGRAM

## 2023-01-20 PROCEDURE — 80048 BASIC METABOLIC PNL TOTAL CA: CPT

## 2023-01-20 PROCEDURE — 2000000000 HC ICU R&B

## 2023-01-20 PROCEDURE — 2580000003 HC RX 258: Performed by: THORACIC SURGERY (CARDIOTHORACIC VASCULAR SURGERY)

## 2023-01-20 RX ORDER — METOPROLOL TARTRATE 50 MG/1
50 TABLET, FILM COATED ORAL 2 TIMES DAILY
Status: DISCONTINUED | OUTPATIENT
Start: 2023-01-20 | End: 2023-01-25

## 2023-01-20 RX ORDER — FAMOTIDINE 20 MG/1
20 TABLET, FILM COATED ORAL 2 TIMES DAILY
Status: DISCONTINUED | OUTPATIENT
Start: 2023-01-20 | End: 2023-01-26 | Stop reason: HOSPADM

## 2023-01-20 RX ORDER — FAMOTIDINE 10 MG/ML
20 INJECTION, SOLUTION INTRAVENOUS 2 TIMES DAILY
Status: DISCONTINUED | OUTPATIENT
Start: 2023-01-20 | End: 2023-01-24

## 2023-01-20 RX ADMIN — ATORVASTATIN CALCIUM 40 MG: 40 TABLET, FILM COATED ORAL at 20:04

## 2023-01-20 RX ADMIN — ALBUTEROL SULFATE 2.5 MG: 2.5 SOLUTION RESPIRATORY (INHALATION) at 12:14

## 2023-01-20 RX ADMIN — FUROSEMIDE 40 MG: 10 INJECTION, SOLUTION INTRAMUSCULAR; INTRAVENOUS at 20:04

## 2023-01-20 RX ADMIN — METOPROLOL TARTRATE 12.5 MG: 25 TABLET, FILM COATED ORAL at 10:30

## 2023-01-20 RX ADMIN — FAMOTIDINE 20 MG: 20 TABLET, FILM COATED ORAL at 09:01

## 2023-01-20 RX ADMIN — FONDAPARINUX SODIUM 2.5 MG: 2.5 INJECTION, SOLUTION SUBCUTANEOUS at 09:05

## 2023-01-20 RX ADMIN — ALBUTEROL SULFATE 2.5 MG: 2.5 SOLUTION RESPIRATORY (INHALATION) at 16:14

## 2023-01-20 RX ADMIN — FUROSEMIDE 40 MG: 10 INJECTION, SOLUTION INTRAMUSCULAR; INTRAVENOUS at 09:07

## 2023-01-20 RX ADMIN — METOPROLOL TARTRATE 50 MG: 50 TABLET, FILM COATED ORAL at 20:04

## 2023-01-20 RX ADMIN — GABAPENTIN 100 MG: 100 CAPSULE ORAL at 20:04

## 2023-01-20 RX ADMIN — ACETAMINOPHEN 325MG 650 MG: 325 TABLET ORAL at 20:04

## 2023-01-20 RX ADMIN — METOPROLOL TARTRATE 25 MG: 25 TABLET, FILM COATED ORAL at 08:56

## 2023-01-20 RX ADMIN — POTASSIUM CHLORIDE 20 MEQ: 20 TABLET, EXTENDED RELEASE ORAL at 10:29

## 2023-01-20 RX ADMIN — METHOCARBAMOL TABLETS 750 MG: 750 TABLET, COATED ORAL at 20:04

## 2023-01-20 RX ADMIN — METHOCARBAMOL TABLETS 750 MG: 750 TABLET, COATED ORAL at 18:01

## 2023-01-20 RX ADMIN — METHOCARBAMOL TABLETS 750 MG: 750 TABLET, COATED ORAL at 10:28

## 2023-01-20 RX ADMIN — SODIUM CHLORIDE, PRESERVATIVE FREE 10 ML: 5 INJECTION INTRAVENOUS at 07:45

## 2023-01-20 RX ADMIN — GABAPENTIN 100 MG: 100 CAPSULE ORAL at 10:30

## 2023-01-20 RX ADMIN — ASPIRIN 81 MG: 81 TABLET, COATED ORAL at 09:02

## 2023-01-20 RX ADMIN — FUROSEMIDE 40 MG: 10 INJECTION, SOLUTION INTRAMUSCULAR; INTRAVENOUS at 14:25

## 2023-01-20 RX ADMIN — ALBUTEROL SULFATE 2.5 MG: 2.5 SOLUTION RESPIRATORY (INHALATION) at 08:34

## 2023-01-20 RX ADMIN — BISACODYL 5 MG: 5 TABLET, COATED ORAL at 09:04

## 2023-01-20 RX ADMIN — ACETAMINOPHEN 325MG 650 MG: 325 TABLET ORAL at 10:52

## 2023-01-20 RX ADMIN — ACETAMINOPHEN 325MG 650 MG: 325 TABLET ORAL at 02:14

## 2023-01-20 RX ADMIN — ALBUTEROL SULFATE 2.5 MG: 2.5 SOLUTION RESPIRATORY (INHALATION) at 19:31

## 2023-01-20 RX ADMIN — POLYETHYLENE GLYCOL 3350 17 G: 17 POWDER, FOR SOLUTION ORAL at 14:24

## 2023-01-20 RX ADMIN — POTASSIUM CHLORIDE 20 MEQ: 20 TABLET, EXTENDED RELEASE ORAL at 18:01

## 2023-01-20 RX ADMIN — FAMOTIDINE 20 MG: 10 INJECTION, SOLUTION INTRAVENOUS at 20:04

## 2023-01-20 ASSESSMENT — PAIN DESCRIPTION - DESCRIPTORS
DESCRIPTORS: ACHING
DESCRIPTORS: ACHING

## 2023-01-20 ASSESSMENT — PAIN SCALES - GENERAL
PAINLEVEL_OUTOF10: 4
PAINLEVEL_OUTOF10: 0
PAINLEVEL_OUTOF10: 4

## 2023-01-20 ASSESSMENT — PAIN DESCRIPTION - LOCATION
LOCATION: OTHER (COMMENT);ABDOMEN
LOCATION: ABDOMEN
LOCATION: ABDOMEN;GENERALIZED
LOCATION: ABDOMEN;BACK

## 2023-01-20 ASSESSMENT — PAIN DESCRIPTION - ORIENTATION: ORIENTATION: OTHER (COMMENT)

## 2023-01-20 NOTE — PROGRESS NOTES
Comprehensive Nutrition Assessment    Type and Reason for Visit:  Reassess    Nutrition Recommendations/Plan:   Continue 2 gm sodium diet   Add Ensure BID - monitor acceptance   Encourage PO intakes as tolerated for healing s/p surgery   Monitor diet education needs   Monitor nutrition adequacy, pertinent labs, bowel habits, wt changes, and clinical progress     Malnutrition Assessment:  Malnutrition Status: At risk for malnutrition (Comment) (01/17/23 1149)    Context:  Acute Illness     Findings of the 6 clinical characteristics of malnutrition:  Energy Intake:  Mild decrease in energy intake (Comment)    Nutrition Assessment:    Follow up: Extubated on 1/17. Diet advanced to low sodium diet. PO intakes variable this admission. Weights trending down this admission. PT/OT evaluations today. Possible plans for ARU. Recommend adding ONS to promote PO intakes following surgery. RD to add heart healthy diet education to AVS. Will continue to monitor. Nutrition Related Findings:    +1 generalized edema. Labs reviewed. A1c of 5.6% this admission. Wound Type: Surgical Incision       Current Nutrition Intake & Therapies:    Average Meal Intake: 1-25%, %  Average Supplements Intake: None Ordered  ADULT DIET; Regular; Low Sodium (2 gm)    Anthropometric Measures:  Height: 6' 4\" (193 cm)  Ideal Body Weight (IBW): 202 lbs (92 kg)    Admission Body Weight: 338 lb (153.3 kg) (bed scale)  Current Body Weight: 326 lb (147.9 kg), 167.3 % IBW.  Weight Source: Bed Scale  Current BMI (kg/m2): 39.7  Usual Body Weight:  (stated weight hx 330-340 lb)                       BMI Categories: Obese Class 3 (BMI 40.0 or greater)    Estimated Daily Nutrient Needs:  Energy Requirements Based On: Kcal/kg (25-30)  Weight Used for Energy Requirements: Ideal  Energy (kcal/day): 5113-0825 kcal  Weight Used for Protein Requirements: Ideal  Protein (g/day):  g  Method Used for Fluid Requirements: 1 ml/kcal  Fluid (ml/day): 7961-4022 mL    Nutrition Diagnosis:   Inadequate oral intake related to inadequate protein-energy intake, increase demand for energy/nutrients as evidenced by intake 0-25% (s/p surgery)    Nutrition Interventions:   Food and/or Nutrient Delivery: Continue Current Diet, Start Oral Nutrition Supplement  Nutrition Education/Counseling:  (added to AVS)  Coordination of Nutrition Care: Continue to monitor while inpatient       Goals:     Goals: PO intake 50% or greater, prior to discharge       Nutrition Monitoring and Evaluation:   Behavioral-Environmental Outcomes: None Identified  Food/Nutrient Intake Outcomes: Food and Nutrient Intake, Supplement Intake  Physical Signs/Symptoms Outcomes: Biochemical Data, Nutrition Focused Physical Findings, Weight    Discharge Planning:    Continue current diet, Continue Oral Nutrition Supplement     Dotty Malhotra MS, RD, LD  Contact: [phone]

## 2023-01-20 NOTE — PROGRESS NOTES
Physical Therapy  Facility/Department: [facility] C2 CARD TELEMETRY  Daily Treatment Note  NAME: Ramona Reyna  : 1990  MRN: [id number]    Date of Service: 2023    Discharge Recommendations:  Subacute/Skilled Nursing Facility   PT Equipment Recommendations  Equipment Needed: No    Patient Diagnosis(es): The primary encounter diagnosis was Dissecting aneurysm of thoracic aorta, Leo type A. Diagnoses of Chest wall discomfort, Weakness of right lower extremity, and Weakness of left lower extremity were also pertinent to this visit. Assessment   Assessment: Pt with fair participation. Able to complete seated exercises with min A for marches. Used maxi-luis lift for bed<>chair with Ax2. Pt with imporved seated balance thoughout session progressing from mod Ax2 to mod Ax1 sitting in chair. pt requieting to move back to bed after transfering to chair to attempt to use bed pan. PLace pt on bed pan and notofed RN. Pt will continue to benefit from skilled PT services in acute setting in order to address the above functional deficits. It is rec pt DC to SNF at this time, if pt progresses well and ability to participate imporves pt may benefit from IPR. Co-tx collaboration this date to safely meet goals and will have better occupational performance outcomes with in a co-treatment than 1:1 session. Activity Tolerance: Patient limited by fatigue;Patient limited by endurance  Equipment Needed: No     Plan    Physcial Therapy Plan  General Plan: 5-7 times per week  Current Treatment Recommendations: Strengthening;ROM;Balance training;Gait training;Home exercise program;Safety education & training;Stair training;Functional mobility training;Neuromuscular re-education;Patient/Caregiver education & training;Transfer training; Therapeutic activities; Endurance training;Equipment evaluation, education, & procurement;Positioning     Restrictions  Restrictions/Precautions  Restrictions/Precautions: Fall Risk, NPO, Surgical Protocols, General Precautions, Cardiac  Position Activity Restriction  Sternal Precautions: No Pushing, No Pulling, 5# Lifting Restrictions  Other position/activity restrictions: ICU monitoring, sternal wound vac     Subjective    Subjective  Subjective: Pt in bed upon arrival, Pt agreeable to PT/OT cotx. RN cleared for PT. Pain: Denies pain at rest  Orientation  Overall Orientation Status: Within Functional Limits  Orientation Level: Oriented X4  Cognition  Overall Cognitive Status: Exceptions  Arousal/Alertness: Delayed responses to stimuli  Following Commands: Follows one step commands with increased time  Attention Span: Attends with cues to redirect  Memory: Decreased long term memory;Decreased recall of biographical Information;Decreased short term memory  Safety Judgement: Decreased awareness of need for safety  Problem Solving: Decreased awareness of errors  Insights: Not aware of deficits  Initiation: Requires cues for some  Sequencing: Requires cues for some     Objective   Vitals  Heart Rate: (!) 105  BP: 94/61  BP Location: Right upper arm  MAP (Calculated): 72  SpO2: 94 %  O2 Device: Nasal cannula  Bed Mobility Training  Bed Mobility Training: Yes  Rolling: Maximum assistance;Assist X2 (to L for bedpan placement)  Scooting: Total assistance;Assist X2  Balance  Sitting: Impaired (Initially total Ax2 progressing to mod Ax1)  Sitting - Static: Poor (constant support)  Sitting - Dynamic: Poor (constant support)  Transfer Training  Transfer Training: Yes  Overall Level of Assistance: Total assistance;Assist X2 (DONALDO STS 2/2 to poor trunk strength and inability to maintain upright positioning in unsupported sitting. Use of Maxi Skylift to bed<>chair transfer)  Bed to Chair: Total assistance;Assist X2 (with maxi luis lift)     PT Exercises  Exercise Treatment: Seated BLE exercises 12x: AP, LAQ, marches, hip abduction (min A for marches). low back stretnch 3x 10sec holds with max Ax2.  Seated balance 30sec, progressed to mod Ax1 on 3rd rep initially mod AX2     Safety Devices  Type of Devices: Patient at risk for falls; Left in bed;Nurse notified;Call light within reach; Heels elevated for pressure relief;Bed alarm in place; All nikolas prominences offloaded  Restraints  Restraints Initially in Place: No     [facility] 6 Clicks Inpatient Mobility:  AM-PAC Mobility Inpatient   How much difficulty turning over in bed?: A Lot  How much difficulty sitting down on / standing up from a chair with arms?: Unable  How much difficulty moving from lying on back to sitting on side of bed?: Unable  How much help from another person moving to and from a bed to a chair?: Total  How much help from another person needed to walk in hospital room?: Total  How much help from another person for climbing 3-5 steps with a railing?: Total  AM-PAC Inpatient Mobility Raw Score : 7  AM-PAC Inpatient T-Scale Score : 26.42  Mobility Inpatient CMS 0-100% Score: 92.36  Mobility Inpatient CMS G-Code Modifier : CM    Goals  Short Term Goals  Time Frame for Short Term Goals: 1/27/23  Short Term Goal 1: pt will complete bed mobility max Ax2  Short Term Goal 2: pt will sit EOB for 5 min with mod A  Short Term Goal 3: pt will complete tranfers with max Ax2  Short Term Goal 4: pt will participate in gait assessment when appropriate. Short Term Goal 5: pt will participate in 12-15 reps of BLE exercises by 1/23/22 -- MET 1/20/23  Additional Goals?: No  Patient Goals   Patient Goals : \"To get stronger\"    Education  Patient Education  Education Given To: Patient; Family  Education Provided: Role of Therapy;Plan of Care;Home Exercise Program;Transfer Training;Equipment  Education Method: Verbal  Barriers to Learning: None  Education Outcome: Verbalized understanding;Continued education needed    Therapy Time   Individual Concurrent Group Co-treatment   Time In 1440         Time Out 1535         Minutes 55         Timed Code Treatment Minutes: [address] Rick Cook, PT, DPT    If pt is unable to be seen after this session, please let this note serve as discharge summary. Please see case management note for discharge disposition. Thank you.

## 2023-01-20 NOTE — PROGRESS NOTES
Shift: Day    Procedure: ASCENDING AORTA VALVE REPLACEMENT WITH TUBE GRAFT USING AN EPPERSON GRAFT SIZE 28CM  ANTEGRADE FLOW TO THE LEFT LEG, RESUSPENSION OF AORTIC VALVE    Admit from OR (time and date): 1/16/23 0630    Transition (time and date):     Surgery, return to OR yes - Fem - Fem bypass with Dr. Master Tijerina at handoff  stable    Most recent vitals: BP 94/61   Pulse (!) 105   Temp 99.1 °F (37.3 °C)   Resp 24   Ht 6' 4\" (1.93 m)   Wt (!) 326 lb 4.5 oz (148 kg)   SpO2 94%   BMI 39.72 kg/m²      Increased O2 requirements: no O2 requirements: Oxygen Therapy  SpO2: 94 %  Pulse Oximetry Type: Continuous  Pulse Oximeter Device Mode: Continuous  Pulse Oximeter Device Location: Finger  O2 Device: Nasal cannula  Oximetry Probe Site Changed: Yes  Skin Assessment: Clean, dry, & intact  Skin Protection for O2 Device: Yes  Orientation: Middle  Location: Nose  FiO2 : 35 %  O2 Flow Rate (L/min): 2 L/min  O2 Delivery Method: Nasal cannula  Vent Mode: (S) AC/VC (pt placed on sbt 5/5 at 1220)     Admission weight Weight: (!) 340 lb (154.2 kg)  Today's weight   Wt Readings from Last 1 Encounters:   01/20/23 (!) 326 lb 4.5 oz (148 kg)         EF: unknown    Drop in Urinary Output no     Rhythm Changes Normal Sinus Rhythm 90's    Pacing Wires Removed:  [] Yes  [x] NO  [] Platelets < 63,990  [] Arrhythmia  [] Bradycardia [] Valve Replacement  [] Pacing for Cardiac Index  []Physician Order    Lines/Drains  LDA Insertion Date Discontinued Date Dressing Changes   Art line 1/16 1/19    Central Line 1/16 1/20    Colin 1/16 1/20    Chest Tube P: 1/16  M: 1/16 M: 1/19  P:1/20    Wires  1/16 1/19    ETT 1/16 1/17    TYRELL Drain      VasCath      Impella        Interventions After Office Hours  Problem(Brief) Date Time Intervention Physician contacted                                               Drip rates at handoff:    sodium chloride 5 mL/hr at 01/16/23 1453    dextrose         Hospital Course:  POD# DOS  -Ca replaced  -Jesse Black given for  5.9 K -VSS  -Fem to Fem bypass with Dr. Fide Jhaveri @ [id number]  -OGT LWS    POD NOS:  -Levo weaned slightly. -Remains on levo, esmolol, insulin, fentanyl, and versed.  -Fi02 weaned on vent. -Wakes up, moves all extremities, follows commands. POD#3 01/18:   -HR in 90s, BP 110s-120s overnight   -notified on call CTS about blood sugar 74 and 23 units Lantus order. Will hold Lantus right now per order, and new order of low dose sliding scale order noted. Blood glucose recheck 118.   -Wife Issac Garcia updated. -K replaced, Ca replace. -IS practiced with patient this shift   -bathed, CTS dressing changed   -thi morning pt's weight is 148.4kg  -Total urine output: 4050ml on Lasix gtt   -total MCT output: 0ml  -total PCT output: 6ml  -Daily BMP, CBC, CXR completed    POD#2 1/19:  -V wires pulled, mediastinal CT pulled, Flotrac DC'd  -up to chair with lift- ARU consulted  -metoprolol increased    POD#$4:    - metoprolol increased   CT, F/C and Central line removed. PT/RN slings to chair. Per Dr. Fide Jhaveri wound vacs to remain through Sunday. Lab Data:  CBC:   Recent Labs     01/19/23  0527 01/20/23  0802   WBC 14.4* 17.7*   HGB 9.4* 10.9*   HCT 29.3* 34.7*   MCV 82.2 82.1    300       BMP:    Recent Labs     01/19/23  0527 01/20/23  0802    138   K 4.0 4.0   CO2 27 28   BUN 22* 25*   CREATININE 0.9 0.8*       LIVR:   Recent Labs     01/18/23  0326   *   *       PT/INR:   Recent Labs     01/18/23  0326   PROT 4.5*   INR 1.39*       APTT: No results for input(s): APTT in the last 72 hours.   ABG:   Recent Labs     01/18/23  0341 01/18/23  0815   PHART 7.448 7.416   GFV2VQV 33.5* 31.9*   PO2ART 73.6* 114.4*          Electronically signed by Rehan Valdovinos RN on 1/20/2023 at 6:23 PM

## 2023-01-20 NOTE — PROGRESS NOTES
Shift: Day    Procedure: ASCENDING AORTA VALVE REPLACEMENT WITH TUBE GRAFT USING AN EPPERSON GRAFT SIZE 28CM  ANTEGRADE FLOW TO THE LEFT LEG, RESUSPENSION OF AORTIC VALVE    Admit from OR (time and date): 1/16/23 0630    Transition (time and date):     Surgery, return to OR yes - Fem - Fem bypass with Dr. Bernardo Harvey at handoff  stable    Most recent vitals: BP (!) 94/50   Pulse (!) 103   Temp (!) 101 °F (38.3 °C) (Oral)   Resp (!) 7   Ht 6' 4\" (1.93 m)   Wt (!) 327 lb 2.6 oz (148.4 kg)   SpO2 94%   BMI 39.82 kg/m²      Increased O2 requirements: no O2 requirements: Oxygen Therapy  SpO2: 94 %  Pulse Oximetry Type: Continuous  Pulse Oximeter Device Mode: Continuous  Pulse Oximeter Device Location: Finger  O2 Device: Nasal cannula  Oximetry Probe Site Changed: Yes  Skin Assessment: Clean, dry, & intact  Skin Protection for O2 Device: Yes  Orientation: Middle  Location: Nose  FiO2 : 35 %  O2 Flow Rate (L/min): 2 L/min  Vent Mode: (S) AC/VC (pt placed on sbt 5/5 at 1220)     Admission weight Weight: (!) 340 lb (154.2 kg)  Today's weight   Wt Readings from Last 1 Encounters:   01/19/23 (!) 327 lb 2.6 oz (148.4 kg)         EF: unknown    Drop in Urinary Output no     Rhythm Changes Normal Sinus Rhythm 90's    Pacing Wires Removed:  [] Yes  [x] NO  [] Platelets < 13,182  [] Arrhythmia  [] Bradycardia [] Valve Replacement  [] Pacing for Cardiac Index  []Physician Order    Lines/Drains  LDA Insertion Date Discontinued Date Dressing Changes   Art line 1/16 1/19    Central Line 1/16     Colin 1/16     Chest Tube P: 1/16  M: 1/16 M: 1/19    Wires  1/16 1/19    ETT 1/16 1/17    TYRELL Drain      VasCath      Impella        Interventions After Office Hours  Problem(Brief) Date Time Intervention Physician contacted                                               Drip rates at handoff:    sodium chloride 5 mL/hr at 01/16/23 1453    norepinephrine Stopped (01/18/23 0515)    niCARdipine      dextrose         [facility] Course:  POD# DOS  -Ca replaced  -JAIR given for  5.9 K -VSS  -Fem to Fem bypass with Dr. Kylee Diaz @ [id number]  -OGT LWS    POD NOS:  -Levo weaned slightly. -Remains on levo, esmolol, insulin, fentanyl, and versed.  -Fi02 weaned on vent. -Wakes up, moves all extremities, follows commands. POD#3 01/18:   -HR in 90s, BP 110s-120s overnight   -notified on call CTS about blood sugar 74 and 23 units Lantus order. Will hold Lantus right now per order, and new order of low dose sliding scale order noted. Blood glucose recheck 118.   -Wife Cleveland Shafer updated. -K replaced, Ca replace. -IS practiced with patient this shift   -bathed, CTS dressing changed   -thi morning pt's weight is 148.4kg  -Total urine output: 4050ml on Lasix gtt   -total MCT output: 0ml  -total PCT output: 6ml  -Daily BMP, CBC, CXR completed    POD#2 1/19:  -V wires pulled, mediastinal CT pulled, Flotrac DC'd  -up to chair with lift- ARU consulted  -metoprolol increased     Lab Data:  CBC:   Recent Labs     01/18/23  0326 01/18/23  0815 01/19/23  0527   WBC 22.2*  --  14.4*   HGB 9.8* 9.1* 9.4*   HCT 29.6*  --  29.3*   MCV 82.0  --  82.2     --  166       BMP:    Recent Labs     01/19/23  0013 01/19/23  0527   * 138   K 3.8 4.0   CO2 26 27   BUN 21* 22*   CREATININE 0.9 0.9       LIVR:   Recent Labs     01/17/23  0405 01/18/23  0326   * 687*   * 155*       PT/INR:   Recent Labs     01/17/23  0405 01/18/23  0326   PROT 5.0* 4.5*   INR 1.42* 1.39*       APTT: No results for input(s): APTT in the last 72 hours.   ABG:   Recent Labs     01/18/23  0341 01/18/23  0815   PHART 7.448 7.416   [id number] 33.5* 31.9*   PO2ART 73.6* 114.4*          Electronically signed by Lisa Mack RN on 1/19/2023 at 10:49 PM

## 2023-01-20 NOTE — PROGRESS NOTES
CVTS Cardiothoracic Progress Note:                                Chief Complaint:  Post op follow-up    Surgeries:    1/16/23 ASCENDING AORTIC REPLACEMENT WITH TUBE GRAFT USING AN EPPERSON GRAFT SIZE 28CM  ANTEGRADE FLOW TO THE LEFT LEG, RESUSPENSION OF AORTIC VALVE (Dr. Monika Montalvo)     1/16/23:  Left to right femoral-femoral bypass using 8 mm internally  reinforced GORE Propaten graft. (Dr. Marilee Chavez)     Post Op Course:      1/16 Patient intubated, sedated. PEEP 5, Fio2 85%. On several gtts for hemodynamic support at this time. 1/17 remains intubated, vent settings of 40% FiO2 with PEEP 5.    1/18 sitting up in bed on BiPap, awake, alert, moving all extremities. 1/19 sitting up in bed, his wife is visiting at bedside. 1/20 sitting up in bed, reports feeling better today. On 2L per NC.     Past Medical History:   Diagnosis Date    Influenza A 03/12/2020    Marfan syndrome         Past Surgical History:   Procedure Laterality Date    FEMORAL-FEMORAL BYPASS GRAFT Bilateral 1/16/2023    FEMORAL FEMORAL BYPASS [facility] by Ba Bess MD at [facility] Inf N/A 1/15/2023    ASCENDING AORTA  REPLACEMENT WITH TUBE GRAFT USING AN EPPERSON GRAFT SIZE 28CM  ANTEGRADE FLOW TO THE LEFT LEG, RESUSPENSION OF AORTIC VALVE performed by Emma Dixon MD at [address] [place] [address] as of 01/15/2023    (No Known Allergies)        Patient Active Problem List   Diagnosis    Aortic dissection (HCC)    Type 1 dissection of ascending aorta    Chest wall discomfort        Vital Signs: BP (!) 114/56   Pulse (!) 106   Temp 99.4 °F (37.4 °C) (Oral)   Resp (!) 0   Ht 6' 4\" (1.93 m)   Wt (!) 326 lb 4.5 oz (148 kg)   SpO2 95%   BMI 39.72 kg/m²  O2 Flow Rate (L/min): 2 L/min     Admission Weight: Weight: (!) 340 lb (154.2 kg)    Weight on 1/15 (154.2 kg) Pre-op (stated)  1/18 144.8 kg  1/19 148.4 kg   1/20 148 kg    Intake/Output:   Intake/Output Summary (Last 24 hours) at 1/20/2023 1029  Last data filed at 1/20/2023 1001  Gross per 24 hour   Intake 1720 ml   Output 5860 ml   Net -4140 ml      Extubation Time: 1/17 at 13:40     LABORATORY DATA:     CBC:   Recent Labs     01/18/23  0326 01/18/23  0815 01/19/23  0527 01/20/23  0802   WBC 22.2*  --  14.4* 17.7*   HGB 9.8* 9.1* 9.4* 10.9*   HCT 29.6*  --  29.3* 34.7*   MCV 82.0  --  82.2 82.1     --  166 300     BMP:   Recent Labs     01/19/23  0013 01/19/23  0527 01/20/23  0802   * 138 138   K 3.8 4.0 4.0    101 98*   CO2 26 27 28   BUN 21* 22* 25*   CREATININE 0.9 0.9 0.8*     MG:    Recent Labs     01/18/23  0326   MG 2.80*        PT/INR:   Recent Labs     01/18/23  0326   PROTIME 17.0*   INR 1.39*       CXRAY: 1/16/23  FINDINGS:   Medical devices: Endotracheal tube tip projects over the midthoracic trachea. Enteric tube courses past the diaphragm with distal tip not visualized. A   surgical drain projecting over the mediastinum is present. A left IJ   approach central venous sheath catheter is present, cinched along the lower   cervical soft tissues. Interval placement of median sternotomy wires. Mediastinum/Heart: No significant interval change in the cardiomediastinal   contours compared to prior exam.       Lungs: Patchy airspace disease is present projecting over the left lung apex   as well as the right mid lung. Pleura: No findings to suggest pneumothorax or large pleural effusion. Bones/Soft tissues: Nothing acute. Impression   Patchy airspace disease at the left lung apex as well as the right mid lung,   which is nonspecific but likely represents atelectasis. Asymmetric pulmonary   edema may have a similar appearance. Medical support devices as described above.      CXR: 1/19 stable  ___________________________________________________________    Subjective:   Dietary Intake: improving  Nausea: n/a   Pain Control: adequate  Complaints: none  Bowels: have not moved, +flatus    Objective:   General appearance: awake, A&O, in nad  Lungs: Diminished bilaterally  Heart: S1S2; SR on monitor  Chest: symmetrical expansion with inspiration and expirations; no rocking of sternum noted   Abdomen: soft, nontender  Bowel sounds: normoactive  Kidneys:  Cr 0.8 ; UOP 5870 ml/shift  Wound/Incisions: Midsternal incision with dressing CDI; Pacing wires removed 1/19  Extremities: BLE pulses palpable, Right pedal +1, Left pedal +1; 1+ swelling noted in BLE  Neurological: intact, non focal exam   Chest tubes/Drains: Chest tube # 1 RP with 40 ml/shift serosanguinous drainage in 24 hours overnight; Chest tube # 2 removed 1/19; no airleak noted     Assessment:   Post-op: 4 days. Condition: In critical condition. Plan:   1. Cardiovascular: s/p ascending aorta replacement 1/16; also s/p L to Rt fem-fem bypass. ASA, statin, BB. Needs tight BP control, <140 mmHg. Sinus tach - increasing BB this morning. 2. Pulmonary: Saturating 95% on 2L per NC. Wean oxygen as tolerated. Continue expansion maneuvers- is, oobtc, pt/ot, ambulation. Pt's voice is still hoarse 2/2 intubation. Speech therapy working with him. 3. Neurology: analgesia as needed. 4. Nephrology: Cr 0.8, UOP almost 5.8L mL/24 hrs. Continue lasix 40 mg IVP TID. 5. Endocrinology: BG stable, no Hx of DM, HgbA1C 5.6. Will d/c insulin. 6. Hematology: acute blood loss anemia; H&H stable    7. Microbiology: nothing at this time    7. Nutrition: cardiac    8. Labs: labs & imaging reviewed as above    9. Post-op Drains/Wires: will remove remaining chest tube and larsen catheter today. 10. D/C Goals: dcp following, anticipate ARU at d/c. Pre-cert initiated.     11. Continue post-op care of patient in the ICU    Meds:    The patient is on a beta-blocker   The patient is NOT on an ace-i/ARB - 2/2 hypotension   The patient is on a statin   The patient is on oral antiplatelet therapy     ________________________________________________    MER[facility] Harrell CNP  1/20/2023  10:29 AM

## 2023-01-20 NOTE — PROGRESS NOTES
Vascular    S/P Fem-fem  Groin dressings intact with good seal  Palp bilateral DP pulses.      Care per CTS- ok to ARU  Incisional VAC's to be removed on Monday

## 2023-01-20 NOTE — PROGRESS NOTES
Chest tubes meet criteria to remove per open heart protocol. No  air leak. no crepitus. Pt instructed on procedure. Site cleansed and prepped per protocol. Chest tubes X 1 removed without difficulty. Dry sterile dressing applied. Bilateral breath sounds audible. O2 Sats 94 on 2L nasal cannula. Incision site within normal limits. Patient tolerated well.

## 2023-01-20 NOTE — CARE COORDINATION
Tiara/CTSurg NP updated writer, pt has tubes out, plan for ARU pending precert. Writer spoke with [facility]/ARU liaison, they have not started precert yet, but PT/OT assigned to pt today. [facility]/ARU will follow for PT/OT notes and then start precert today. Backup plan if ARU denied is home.   Antonio Grayson, ACM-RN

## 2023-01-20 NOTE — PROGRESS NOTES
Occupational Therapy  Facility/Department: [facility] C2 CARD TELEMETRY  Daily Treatment Note  NAME: Liborio Kirkland  : 1990  MRN: [id number]    Date of Service: 2023    Discharge Recommendations:  Subacute/Skilled Nursing Facility  OT Equipment Recommendations  Equipment Needed: No      Patient Diagnosis(es): The primary encounter diagnosis was Dissecting aneurysm of thoracic aorta, Leo type A. Diagnoses of Chest wall discomfort, Weakness of right lower extremity, and Weakness of left lower extremity were also pertinent to this visit. Assessment    Assessment: Pt tolerated session fairly. Use of maxi luis lift for bed chair transfers this date. Engaged pt in postural correction exercises at EOB due to pt demonstrating poor core strength to maintain upright posture. Pt initially total Ax2 for static sitting progressing to mod Ax1 with verbal cues after exercises. Pt tolerated ~30 mins up in chair and prior to requesting return to bed for BM. Pt is max Ax2 for rolling to L. Pt endorsing mod-max fatigue after therapeutic exercises. OT recommends SNF at discharge. Activity Tolerance: Patient tolerated evaluation without incident;Patient limited by fatigue;Patient limited by endurance  Discharge Recommendations: Subacute/Skilled Nursing Facility  Equipment Needed: No      Plan   Occupational Therapy Plan  Times Per Week: 4-6 x/ week  Current Treatment Recommendations: ROM;Balance training;Functional mobility training; Endurance training;Patient/Caregiver education & training; Safety education & training;Positioning;Self-Care / ADL     Restrictions  Restrictions/Precautions  Restrictions/Precautions: Fall Risk;NPO;Surgical Protocols; General Precautions;Cardiac  Position Activity Restriction  Sternal Precautions: No Pushing; No Pulling;5# Lifting Restrictions  Other position/activity restrictions: ICU monitoring, sternal wound vac    Subjective   Subjective  Subjective: Pt presented in bed, pleasant, and agreeable to OT/PT cotx  Pain: Pt denies pain at rest  Orientation  Overall Orientation Status: Within Functional Limits  Orientation Level: Oriented X4  Pain: Denies pain at rest  Cognition  Overall Cognitive Status: Exceptions  Arousal/Alertness: Delayed responses to stimuli  Following Commands: Follows one step commands with increased time  Attention Span: Attends with cues to redirect  Memory: Decreased long term memory;Decreased recall of biographical Information;Decreased short term memory  Safety Judgement: Decreased awareness of need for safety  Problem Solving: Decreased awareness of errors  Insights: Not aware of deficits  Initiation: Requires cues for some  Sequencing: Requires cues for some        Objective    Vitals:       BP: 94/61   Pulse: (!) 93   Resp:    Temp:    SpO2: 94%       Bed Mobility Training  Bed Mobility Training: Yes  Rolling: Maximum assistance;Assist X2 (to L for bedpan placement)  Scooting: Total assistance;Assist X2  Balance  Sitting: Impaired (Initially total Ax2 progressing to mod Ax1)  Sitting - Static: Poor (constant support)  Sitting - Dynamic: Poor (constant support)  Transfer Training  Transfer Training: Yes  Overall Level of Assistance: Total assistance;Assist X2 (DONALDO STS 2/2 to poor trunk strength and inability to maintain upright positioning in unsupported sitting. Use of Maxi Skylift to bed<>chair transfer)  Bed to Chair: Total assistance;Assist X2 (with maxi luis lift)     ADL  Toileting: Dependent/Total  Toileting Skilled Clinical Factors: assist to position urinal, assist for bedpan placement at bed level  Additional Comments: Pt declining further ADLs  OT Exercises  Static Sitting Balance Exercises: 2x15-30 second sitting in chair with verbal cues for anterior pelvic tilt and facilitation for cervical extension and upright posture. Postural Correction Exercises: x5 trunk flexion for lumbar stretch with 10 second hold.  Pt initially total A to initiate  progressing to mod A. Pt complete x10 scapular elevation with 5 second holds seated in chair     Safety Devices  Type of Devices: Patient at risk for falls; Left in bed;Nurse notified;Call light within reach; Heels elevated for pressure relief;Bed alarm in place; All nikolas prominences offloaded (Pt left on bed coleman, RN Aparna Mehta) notified, Pts spouse present at bedside at [address] [place] [address])     Patient Education  Education Given To: Patient  Education Provided: Role of Therapy;Precautions;Plan of Care  Education Provided Comments: disease specific: importance of use of RED/nurse call light for assist with ADL needs & positioning  Education Method: Demonstration;Verbal  Barriers to Learning: Cognition  Education Outcome: Verbalized understanding;Continued education needed    AM-PAC score  AM-PAC Inpatient Daily Activity Raw Score: 8 (01/20/23 1626)  AM-PAC Inpatient ADL T-Scale Score : 22.86 (01/20/23 1626)  ADL Inpatient CMS 0-100% Score: 85.69 (01/20/23 1626)  ADL Inpatient CMS G-Code Modifier : CM (01/20/23 1626)    Goals  Short Term Goals  Time Frame for Short Term Goals: 1 week(1-25-23)- all goals ongoing 1/20  Short Term Goal 1: mod assist of 2 with functional transfers by 1-25-23  Short Term Goal 2: min assist with UE self care seated in chair by 1-23-23  Short Term Goal 3: supervision with 15 reps BUE AROM  Short Term Goal 4: min cues for sternal precautions for transfers; Patient Goals   Patient goals : Unable to verbalize at this time       Therapy Time   Individual Concurrent Group Co-treatment   Time In 1440         Time Out 1536         Minutes 56         Timed Code Treatment Minutes: 64 Minutes     If pt is unable to be seen after this session, please let this note serve as discharge summary. Please see case management note for discharge disposition. Thank you.     Escobar Craft OT

## 2023-01-20 NOTE — PROGRESS NOTES
Speech Language Pathology    SLP acknowledged new order for FEES that was placed by CELINA Dixon. SLP perfect served CNP to clarify order. CNP concerned with pt's hoarse vocal quality and would like a visual of pt's vocal cords. Pt was seen by SLP on 01/18 recommending a soft and bite sized diet (per pt preference) with thin liquids- no dysphagia concerns from a speech standpoint. SLP suggested that CNP consult ENT for evaluation of vocal fold function first, as a FEES is not clinically recommended by SLP at this time. SLP to await results of ENT consult to determine if/when vocal therapy would be appropriate. CNP in agreement. SLP to discontinue FEES order at this time. Thank you. Consuelo TUCKER CCC-SLP [id number]  Speech-Language Pathologist

## 2023-01-20 NOTE — DISCHARGE INSTR - DIET
Good nutrition is important when healing from an illness, injury, or surgery. Follow any nutrition recommendations given to you during your hospital stay. If you were given an oral nutrition supplement while in the hospital, continue to take this supplement at home. You can take it with meals, in-between meals, and/or before bedtime. These supplements can be purchased at most local grocery stores, pharmacies, and chain super-stores. If you have any questions about your diet or nutrition, call the hospital and ask for the dietitian. Heart Healthy Nutrition Therapy    A heart-healthy diet is recommended to reduce your unhealthy blood cholesterol levels, manage high blood pressure, and lower your risk for heart disease. To follow a heart-healthy diet,  Eat a balanced diet with whole grains, fruits and vegetables, and lean protein sources. Achieve and maintain a healthy weight. Choose heart-healthy unsaturated fats. Limit saturated fats, trans fats, and cholesterol intake. Eat more plant-based or vegetarian meals using beans and soy foods for protein. Eat whole, unprocessed foods to limit the amount of sodium (salt) you eat. Limit refined carbohydrates especially sugar, sweets and sugar-sweetened beverages. If you drink alcohol, do so in moderation: one serving per day (women) and two servings per day (men). One serving is equivalent to 12 ounces beer, 5 ounces wine, or 1.5 ounces distilled spirits    Tips  Tips for Choosing Heart-Healthy Fats  Choose lean protein and low-fat dairy foods to reduce saturated fat intake. Saturated fat is usually found in animal-based protein and is associated with certain health risks. Saturated fat is the biggest contributor to raised low-density lipoprotein (LDL) cholesterol levels in the diet. Research shows that limiting saturated fat lowers unhealthy cholesterol levels. Eat no more than 5-6% of your total calories each day from saturated fat.  Ask your registered dietitian nutritionist (RDN) to help you determine how much saturated fat is right for you. There are many foods that do not contain large amounts of saturated fats. Swapping these foods to replace foods high in saturated fats will help you limit the saturated fat you eat and improve your cholesterol levels. You can also try eating more plant-based or vegetarian meals. Instead of Try:   Whole milk, cheese, yogurt, and ice cream 1%, ½%, or skim milk, low-fat cheese, non-fat yogurt, and low-fat ice cream   Fatty, marbled beef and pork Lean beef, pork, or venison   Poultry with skin Poultry without skin   Butter, stick margarine Reduced-fat, whipped, or liquid spreads   Coconut oil, palm oil Liquid vegetable oils: corn, canola, olive, soybean and safflower oils     Avoid trans fats. Trans fats increase levels of LDL-cholesterol. Hydrogenated fat in processed foods is the main source of trans fats in foods. Trans fats can be found in stick margarine, shortening, processed sweets, baked goods, some fried foods, and packaged foods made with hydrogenated oils. Avoid foods with partially hydrogenated oil on the ingredient list such as: cookies, pastries, baked goods, biscuits, crackers, microwave popcorn, and frozen dinners. Choose foods with heart healthy fats. Polyunsaturated and monounsaturated fat are unsaturated fats that may help lower your blood cholesterol level when used in place of saturated fat in your diet. Ask your RDN about taking a dietary supplement with plant sterols and stanols to help lower your cholesterol level. Research shows that substituting saturated fats with unsaturated fats is beneficial to cholesterol levels. Try these easy swaps:      Instead of Try:   Butter, stick margarine, or solid shortening Reduced-fat, whipped, or liquid spreads   Beef, pork, or poultry with skin    Fish and seafood   Chips, crackers, snack foods Raw or unsalted nuts and seeds or nut butters  Hummus with vegetables  Avocado on toast   Coconut oil, palm oil Liquid vegetable oils: corn, canola, olive, soybean and safflower oils      Limit the amount of cholesterol you eat to less than 200 milligrams per day. Cholesterol is a substance carried through the bloodstream via lipoproteins, which are known as transporters of fat. Some body functions need cholesterol to work properly, but too much cholesterol in the bloodstream can damage arteries and build up blood vessel linings (which can lead to heart attack and stroke). You should eat less than 200 milligrams cholesterol per day. People respond differently to eating cholesterol. There is no test available right now that can figure out which people will respond more to dietary cholesterol and which will respond less. For individuals with high intake of dietary cholesterol, different types of increase (none, small, moderate, large) in LDL-cholesterol levels are all possible. Food sources of cholesterol include egg yolks and organ meats such as liver, gizzards. Limit egg yolks to two to four per week and avoid organ meats like liver and gizzards to control cholesterol intake. Tips for Choosing Heart-Healthy Carbohydrates  Consume foods rich in viscous (soluble) fiber  Viscous, or soluble, fiber is found in the walls of plant cells. Viscous fiber is found only in plant-based foods--animal-based foods like meat or dairy products do not contain fiber. In the stomach, viscous fibers absorb water and swell to form a thick, jelly-like mass. This helps to lower your unhealthy cholesterol  Rich sources of viscous fiber include asparagus, [place] sprouts, sweet potatoes, turnips, apricots, mangoes, oranges, legumes, barley, oats, and oat bran. Eat at least 5 to 10 grams of viscous fiber each day. As you increase your fiber intake gradually, also increase the amount of water you drink. This will help prevent constipation.   If you have difficulty achieving this goal, ask your RDN about fiber laxatives. Choose fiber supplements made with viscous fibers such as psyllium seed husks or methylcellulose to help lower unhealthy cholesterol. Limit refined carbohydrates  There are three types of carbohydrates: starches, sugar, and fiber. Some carbohydrates occur naturally in food, like the starches in rice or corn or the sugars in fruits and milk. Refined carbohydrates--foods with high amounts of simple sugars--can raise triglyceride levels. High triglyceride levels are associated with coronary heart disease. Some examples of refined carbohydrate foods are table sugar, sweets, and beverages sweetened with added sugar. Tips for Reducing Sodium (Salt)  Although sodium is important for your body to function, too much sodium can be harmful for people with high blood pressure. As sodium and fluid buildup in your tissues and bloodstream, your blood pressure increases. High blood pressure may cause damage to other organs and increase your risk for a stroke. Keep your salt intake to 2300 milligrams or less per day. Even if you take a pill for blood pressure or a water pill (diuretic) to remove fluid, it is still important to have less salt in your diet. Ask your RDN what amount of sodium is right for you. Avoid processed foods. Eat more fresh foods. Fresh fruits and vegetables are naturally low in sodium, as well as frozen vegetables and fruits that have no added juices or sauces. Fresh meats are lower in sodium than processed meats, such as carrion, sausage, and hotdogs. Read the nutrition label or ask your  to help you find a fresh meat that is low in sodium. Eat less salt--at the table and when cooking. A single teaspoon of table salt has 2,300 mg of sodium. Leave the salt out of recipes for pasta, casseroles, and soups. Ask your RDN how to cook your favorite recipes without sodium  Be a smart . Look for food packages that say salt-free or sodium-free.  These items contain less than 5 milligrams of sodium per serving. Very low-sodium products contain less than 35 milligrams of sodium per serving. Low-sodium products contain less than 140 milligrams of sodium per serving. Beware of reduced salt or \"reduced sodium\" products. These items may still be high in sodium. Check the nutrition label. Add flavors to your food without adding sodium. Try lemon juice, lime juice, fruit juice or vinegar. Dry or fresh herbs add flavor. Try basil, bay leaf, dill, rosemary, parsley, tim, dry mustard, nutmeg, thyme, and paprika. Pepper, red pepper flakes, and cayenne pepper can add spice to your meals without adding sodium. Hot sauce contains sodium, but if you use just a drop or two, it will not add up to much. Buy a sodium-free seasoning blend or make your own at home. Additional Lifestyle Tips  Achieve and maintain a healthy weight. Talk with your RDN or your doctor about what is a healthy weight for you. Set goals to reach and maintain that weight. To lose weight, reduce your calorie intake along with increasing your physical activity. A weight loss of 10 to 15 pounds could reduce LDL-cholesterol by 5 milligrams per deciliter. Participate in physical activity. Talk with your health care team to find out what types of physical activity are best for you. Set a plan to get about 30 minutes of exercise on most days.       Foods to Choose or to Limit  Food Group Foods to Choose Foods to Limit   Grains Whole grain breads and cereals, including whole wheat, barley, rye, buckwheat, corn, teff, quinoa, millet, amaranth, brown or wild rice, sorghum, and oats  Pasta, especially whole wheat or other whole grain types  [employer], quinoa or wild rice  Whole grain crackers, bread, rolls, pitas  Home-made bread with reduced-sodium baking soda Breads or crackers topped with salt  Cereals (hot or cold) with more than 300 mg sodium per serving  Biscuits, cornbread, and other quick breads prepared with baking soda  Bread crumbs or stuffing mix from a store  High-fat bakery products, such as doughnuts, biscuits, croissants, Finnish pastries, pies, cookies  Instant cooking foods to which you add hot water and stir--potatoes, noodles, rice, etc.  Packaged starchy foods--seasoned noodle or rice dishes, stuffing mix, macaroni and cheese dinner  Snacks made with partially hydrogenated oils, including chips, cheese puffs, snack mixes, regular crackers, butter-flavored popcorn   Protein Foods Lean cuts of beef and pork (loin, leg, round, extra lean hamburger)  Skinless poultry  Fish  Venison and other wild game  Dried beans and peas  Nuts and nut butters (unsalted)  Seeds and seed butters (unsalted)  Meat alternatives made with soy or textured vegetable protein  Egg whites or egg substitute  Cold cuts made with lean meat or soy protein Higher-fat cuts of meats (ribs, t-bone steak, regular hamburger)  Adame, sausage, or hot dogs  Cold cuts, such as salami or bologna, deli meats, cured meats, corned beef  Organ meats (liver, brains, gizzards, sweetbreads)  Poultry with skin  Fried or smoked meat, poultry, and fish  Whole eggs and egg yolks (more than 2-4 per week)  Salted legumes, nuts, seeds, or nut/seed butters  Meat alternatives with high levels of sodium (>300 mg per serving) or saturated fat (>5 g per serving)   Dairy and Dairy Alternatives Nonfat (skim), low-fat, or 1%-fat milk  Nonfat or low-fat yogurt or cottage cheese  Fat-free and low-fat cheese  Fortified non-dairy milk: almond, cashew, pea, and soy Whole milk, 2% fat milk, buttermilk  Whole milk yogurt or ice cream  Cream, half-&-half  Cream cheese  Sour cream  Cheese   Vegetables Fresh, frozen, or canned vegetables without added fat or salt  Avocados Canned or frozen vegetables with salt, fresh vegetables prepared with salt, butter, cheese, or cream sauce  Fried vegetables  Pickled vegetables such as olives, pickles, or sauerkraut Fruits Fresh, frozen, canned, or dried fruit Fried fruits  Fruits served with butter or cream   Oils Unsaturated oils (corn, olive, peanut, soy, sunflower, canola)  Soft or liquid margarines and vegetable oil spreads  Salad dressings made from saturated fats Butter, stick margarine, shortening  Partially hydrogenated oils or trans fats  Tropical oils (coconut, palm, palm kernel oils)   Other Prepared or homemade foods, including soups, casseroles, and salads made from recommended ingredients and contain <600 mg sodium. Low-sodium seasonings (ketchup, barbeque sauce)  Spices, herbs, Salt-free seasoning mixes and marinades  Vinegar  Lemon or lime juice Prepared foods, including soups, casseroles, and salads made from recommended ingredients and contain >600 mg sodium.   Frozen meals and prepared sides that are >600 mg of sodium  Sugary and/or fatty desserts, candy, and other sweets  Salts:  sea salt, kosher salt, onion salt, and garlic salt, seasoning mixes containing salt  Flavorings: bouillon cubes, catsup or ketchup, barbeque sauce, Worcestershire sauce, soy sauce, salsa, relish, teriyaki sauce     Heart-Healthy Sample 1-Day Menu View Nutrient Info  Breakfast 1 cup oatmeal  1 cup fat-free milk  1 cup blueberries  1 ounce almonds, unsalted  1 cup brewed coffee   Lunch 2 slices whole-wheat bread  2 ounces lean turkey breast  1 ounce low-fat Swiss cheese  2 slices tomato  2 lettuce leaves  1 pear  1 cup skim milk   Afternoon Snack 1 ounce trail mix with unsalted nuts, seeds, and raisins   Evening Meal 3 ounces broiled salmon  2/3 cup brown rice  1 teaspoon margarine, soft tub  ½ cup broccoli, cooked  ½ cup carrots, cooked  1 cup tossed salad  1 teaspoon olive oil and vinegar dressing  1 small whole-wheat roll  1 teaspoon margarine, soft tub  1 cup tea   Evening Snack 1 small banana

## 2023-01-21 ENCOUNTER — APPOINTMENT (OUTPATIENT)
Dept: GENERAL RADIOLOGY | Age: 33
DRG: 219 | End: 2023-01-21
Payer: COMMERCIAL

## 2023-01-21 LAB
A/G RATIO: 0.7 (ref 1.1–2.2)
ALBUMIN SERPL-MCNC: 2.5 G/DL (ref 3.4–5)
ALP BLD-CCNC: 91 U/L (ref 40–129)
ALT SERPL-CCNC: 148 U/L (ref 10–40)
ANION GAP SERPL CALCULATED.3IONS-SCNC: 10 MMOL/L (ref 3–16)
AST SERPL-CCNC: 319 U/L (ref 15–37)
BANDED NEUTROPHILS RELATIVE PERCENT: 7 % (ref 0–7)
BASOPHILS ABSOLUTE: 0 K/UL (ref 0–0.2)
BASOPHILS RELATIVE PERCENT: 0 %
BILIRUB SERPL-MCNC: 0.6 MG/DL (ref 0–1)
BLOOD CULTURE, ROUTINE: NORMAL
BUN BLDV-MCNC: 31 MG/DL (ref 7–20)
CALCIUM SERPL-MCNC: 8.1 MG/DL (ref 8.3–10.6)
CHLORIDE BLD-SCNC: 99 MMOL/L (ref 99–110)
CO2: 26 MMOL/L (ref 21–32)
CREAT SERPL-MCNC: 0.9 MG/DL (ref 0.9–1.3)
CULTURE, BLOOD 2: NORMAL
EOSINOPHILS ABSOLUTE: 0.2 K/UL (ref 0–0.6)
EOSINOPHILS RELATIVE PERCENT: 1 %
GFR SERPL CREATININE-BSD FRML MDRD: >60 ML/MIN/{1.73_M2}
GLUCOSE BLD-MCNC: 116 MG/DL (ref 70–99)
HCT VFR BLD CALC: 36.2 % (ref 40.5–52.5)
HEMOGLOBIN: 11.4 G/DL (ref 13.5–17.5)
LYMPHOCYTES ABSOLUTE: 1.9 K/UL (ref 1–5.1)
LYMPHOCYTES RELATIVE PERCENT: 10 %
MCH RBC QN AUTO: 26.3 PG (ref 26–34)
MCHC RBC AUTO-ENTMCNC: 31.6 G/DL (ref 31–36)
MCV RBC AUTO: 83.1 FL (ref 80–100)
METAMYELOCYTES RELATIVE PERCENT: 2 %
MONOCYTES ABSOLUTE: 1.3 K/UL (ref 0–1.3)
MONOCYTES RELATIVE PERCENT: 7 %
NEUTROPHILS ABSOLUTE: 15.4 K/UL (ref 1.7–7.7)
NEUTROPHILS RELATIVE PERCENT: 73 %
PDW BLD-RTO: 15 % (ref 12.4–15.4)
PLATELET # BLD: 338 K/UL (ref 135–450)
PLATELET SLIDE REVIEW: ADEQUATE
PMV BLD AUTO: 7.5 FL (ref 5–10.5)
POTASSIUM REFLEX MAGNESIUM: 3.7 MMOL/L (ref 3.5–5.1)
RBC # BLD: 4.35 M/UL (ref 4.2–5.9)
RBC # BLD: NORMAL 10*6/UL
SODIUM BLD-SCNC: 135 MMOL/L (ref 136–145)
TOTAL PROTEIN: 6.1 G/DL (ref 6.4–8.2)
WBC # BLD: 18.8 K/UL (ref 4–11)

## 2023-01-21 PROCEDURE — 6370000000 HC RX 637 (ALT 250 FOR IP): Performed by: THORACIC SURGERY (CARDIOTHORACIC VASCULAR SURGERY)

## 2023-01-21 PROCEDURE — 2500000003 HC RX 250 WO HCPCS: Performed by: STUDENT IN AN ORGANIZED HEALTH CARE EDUCATION/TRAINING PROGRAM

## 2023-01-21 PROCEDURE — 6360000002 HC RX W HCPCS: Performed by: THORACIC SURGERY (CARDIOTHORACIC VASCULAR SURGERY)

## 2023-01-21 PROCEDURE — 85025 COMPLETE CBC W/AUTO DIFF WBC: CPT

## 2023-01-21 PROCEDURE — 2580000003 HC RX 258: Performed by: THORACIC SURGERY (CARDIOTHORACIC VASCULAR SURGERY)

## 2023-01-21 PROCEDURE — 36415 COLL VENOUS BLD VENIPUNCTURE: CPT

## 2023-01-21 PROCEDURE — 99024 POSTOP FOLLOW-UP VISIT: CPT | Performed by: THORACIC SURGERY (CARDIOTHORACIC VASCULAR SURGERY)

## 2023-01-21 PROCEDURE — 94640 AIRWAY INHALATION TREATMENT: CPT

## 2023-01-21 PROCEDURE — 2000000000 HC ICU R&B

## 2023-01-21 PROCEDURE — 71045 X-RAY EXAM CHEST 1 VIEW: CPT

## 2023-01-21 PROCEDURE — 6360000002 HC RX W HCPCS: Performed by: NURSE PRACTITIONER

## 2023-01-21 PROCEDURE — 80053 COMPREHEN METABOLIC PANEL: CPT

## 2023-01-21 PROCEDURE — 6370000000 HC RX 637 (ALT 250 FOR IP): Performed by: NURSE PRACTITIONER

## 2023-01-21 PROCEDURE — 94669 MECHANICAL CHEST WALL OSCILL: CPT

## 2023-01-21 RX ORDER — MECOBALAMIN 5000 MCG
5 TABLET,DISINTEGRATING ORAL NIGHTLY
Status: DISCONTINUED | OUTPATIENT
Start: 2023-01-21 | End: 2023-01-26 | Stop reason: HOSPADM

## 2023-01-21 RX ADMIN — SODIUM CHLORIDE, PRESERVATIVE FREE 10 ML: 5 INJECTION INTRAVENOUS at 11:11

## 2023-01-21 RX ADMIN — ALBUTEROL SULFATE 2.5 MG: 2.5 SOLUTION RESPIRATORY (INHALATION) at 20:07

## 2023-01-21 RX ADMIN — Medication 400 MG: at 09:05

## 2023-01-21 RX ADMIN — ACETAMINOPHEN 325MG 650 MG: 325 TABLET ORAL at 02:40

## 2023-01-21 RX ADMIN — ASPIRIN 81 MG: 81 TABLET, COATED ORAL at 09:05

## 2023-01-21 RX ADMIN — FAMOTIDINE 20 MG: 10 INJECTION, SOLUTION INTRAVENOUS at 09:46

## 2023-01-21 RX ADMIN — GABAPENTIN 100 MG: 100 CAPSULE ORAL at 20:23

## 2023-01-21 RX ADMIN — ACETAMINOPHEN 325MG 650 MG: 325 TABLET ORAL at 20:23

## 2023-01-21 RX ADMIN — GABAPENTIN 100 MG: 100 CAPSULE ORAL at 09:05

## 2023-01-21 RX ADMIN — METOPROLOL TARTRATE 50 MG: 50 TABLET, FILM COATED ORAL at 09:05

## 2023-01-21 RX ADMIN — MUPIROCIN: 20 OINTMENT TOPICAL at 11:11

## 2023-01-21 RX ADMIN — ATORVASTATIN CALCIUM 40 MG: 40 TABLET, FILM COATED ORAL at 20:22

## 2023-01-21 RX ADMIN — FUROSEMIDE 40 MG: 10 INJECTION, SOLUTION INTRAMUSCULAR; INTRAVENOUS at 14:04

## 2023-01-21 RX ADMIN — FUROSEMIDE 40 MG: 10 INJECTION, SOLUTION INTRAMUSCULAR; INTRAVENOUS at 09:46

## 2023-01-21 RX ADMIN — POTASSIUM CHLORIDE 20 MEQ: 20 TABLET, EXTENDED RELEASE ORAL at 18:53

## 2023-01-21 RX ADMIN — ACETAMINOPHEN 325MG 650 MG: 325 TABLET ORAL at 09:05

## 2023-01-21 RX ADMIN — ALBUTEROL SULFATE 2.5 MG: 2.5 SOLUTION RESPIRATORY (INHALATION) at 12:26

## 2023-01-21 RX ADMIN — BISACODYL 5 MG: 5 TABLET, COATED ORAL at 09:05

## 2023-01-21 RX ADMIN — ACETAMINOPHEN 325MG 650 MG: 325 TABLET ORAL at 13:44

## 2023-01-21 RX ADMIN — METHOCARBAMOL TABLETS 750 MG: 750 TABLET, COATED ORAL at 13:44

## 2023-01-21 RX ADMIN — POTASSIUM CHLORIDE 20 MEQ: 20 TABLET, EXTENDED RELEASE ORAL at 13:44

## 2023-01-21 RX ADMIN — GABAPENTIN 100 MG: 100 CAPSULE ORAL at 13:44

## 2023-01-21 RX ADMIN — FAMOTIDINE 20 MG: 10 INJECTION, SOLUTION INTRAVENOUS at 20:23

## 2023-01-21 RX ADMIN — METHOCARBAMOL TABLETS 750 MG: 750 TABLET, COATED ORAL at 18:53

## 2023-01-21 RX ADMIN — METOPROLOL TARTRATE 50 MG: 50 TABLET, FILM COATED ORAL at 20:22

## 2023-01-21 RX ADMIN — METHOCARBAMOL TABLETS 750 MG: 750 TABLET, COATED ORAL at 09:05

## 2023-01-21 RX ADMIN — Medication 15 ML: at 09:44

## 2023-01-21 RX ADMIN — FONDAPARINUX SODIUM 2.5 MG: 2.5 INJECTION, SOLUTION SUBCUTANEOUS at 09:05

## 2023-01-21 RX ADMIN — POTASSIUM CHLORIDE 20 MEQ: 20 TABLET, EXTENDED RELEASE ORAL at 11:12

## 2023-01-21 NOTE — PROGRESS NOTES
POD #6    NSR in low 100's, VSS  Feels weak this morning after his first walk out of the room. Not much appetite, encouraged him to eat. All of his and his wife's questions were answered. Overall good progress.   Continue current PT.

## 2023-01-21 NOTE — PROGRESS NOTES
VASCULAR    Feeling better. Some L foot tingling which is unchanged from previously. VSS Afeb  B groin incisions dressed  L foot slightly cooler than R with strong doppler signal PTA  Motor intact    A/P: S/P fem-fem for dissection related ischemia. Patent. Continue to observe with management per CVT surgery. Will follow.      Elmer Shahid

## 2023-01-21 NOTE — PROGRESS NOTES
Shift: Day    Procedure: ASCENDING AORTA VALVE REPLACEMENT WITH TUBE GRAFT USING AN EPPERSON GRAFT SIZE 28CM  ANTEGRADE FLOW TO THE LEFT LEG, RESUSPENSION OF AORTIC VALVE    Admit from OR (time and date): 1/16/23 0630    Transition (time and date):     Surgery, return to OR yes - Fem - Fem bypass with Dr. Isrrael Jain at handoff  stable    Most recent vitals: /61   Pulse (!) 117   Temp 97.4 °F (36.3 °C) (Oral)   Resp 21   Ht 6' 4\" (1.93 m)   Wt (!) 309 lb 8.4 oz (140.4 kg)   SpO2 94%   BMI 37.68 kg/m²      Increased O2 requirements: no O2 requirements: Oxygen Therapy  SpO2: 94 %  Pulse Oximetry Type: Continuous  Pulse Oximeter Device Mode: Continuous  Pulse Oximeter Device Location: Finger  O2 Device: None (Room air)  Oximetry Probe Site Changed: Yes  Skin Assessment: Clean, dry, & intact  Skin Protection for O2 Device: Yes  Orientation: Middle  Location: Nose  FiO2 : 35 %  O2 Flow Rate (L/min): 2 L/min  O2 Delivery Method: Nasal cannula  Vent Mode: (S) AC/VC (pt placed on sbt 5/5 at 1220)     Admission weight Weight: (!) 340 lb (154.2 kg)  Today's weight   Wt Readings from Last 1 Encounters:   01/21/23 (!) 309 lb 8.4 oz (140.4 kg)         EF: unknown    Drop in Urinary Output no     Rhythm Changes Normal Sinus Rhythm 90's    Pacing Wires Removed:  [] Yes  [x] NO  [] Platelets < 01,223  [] Arrhythmia  [] Bradycardia [] Valve Replacement  [] Pacing for Cardiac Index  []Physician Order    Lines/Drains  LDA Insertion Date Discontinued Date Dressing Changes   Art line 1/16 1/19    Central Line 1/16 1/20    Colin 1/16 1/20    Chest Tube P: 1/16  M: 1/16 M: 1/19  P:1/20    Wires  1/16 1/19    ETT 1/16 1/17    TYRELL Drain      VasCath      Impella        Interventions After Office Hours  Problem(Brief) Date Time Intervention Physician contacted                                               Drip rates at handoff:    sodium chloride 5 mL/hr at 01/16/23 1453    dextrose         Hospital Course:  POD# DOS  -Ca replaced  -Sarah Hawley given for  5.9 K -VSS  -Fem to Fem bypass with Dr. Gayle Taylro @ [phone]  -OGT LWS    POD NOS:  -Levo weaned slightly. -Remains on levo, esmolol, insulin, fentanyl, and versed.  -Fi02 weaned on vent. -Wakes up, moves all extremities, follows commands. POD#3 01/18:   -HR in 90s, BP 110s-120s overnight   -notified on call CTS about blood sugar 74 and 23 units Lantus order. Will hold Lantus right now per order, and new order of low dose sliding scale order noted. Blood glucose recheck 118.   -Wife Alma Albarran updated. -K replaced, Ca replace. -IS practiced with patient this shift   -bathed, CTS dressing changed   -thi morning pt's weight is 148.4kg  -Total urine output: 4050ml on Lasix gtt   -total MCT output: 0ml  -total PCT output: 6ml  -Daily BMP, CBC, CXR completed    POD#2 1/19:  -V wires pulled, mediastinal CT pulled, Flotrac DC'd  -up to chair with lift- ARU consulted  -metoprolol increased    POD#3 1/20:    - metoprolol increased   CT, F/C and Central line removed. PT/RN slings to chair. Per Dr. Gayle Taylor wound vacs to remain through Sunday. Overnight:   - small BM   - endorsement of improved pain control   - HR sustaining in low 100's despite increased lopressor dose; BP remaining stable    POD#4 1/21:  VSS. Pt had several bowel movements throughout the day. Pt stood up with attempt for bedside commode but remains very weak so was not attempted further. Pt was moved to recliner twice with sling assist. Pt and wife requested melatonin and ENT consult (hoarse and low voice post extubation) which was ordered by Dr Danial Damon.         Lab Data:  CBC:   Recent Labs     01/20/23  0802 01/21/23  0432   WBC 17.7* 18.8*   HGB 10.9* 11.4*   HCT 34.7* 36.2*   MCV 82.1 83.1    338       BMP:    Recent Labs     01/20/23  0802 01/21/23  0432    135*   K 4.0 3.7   CO2 28 26   BUN 25* 31*   CREATININE 0.8* 0.9       LIVR:   Recent Labs     01/21/23  0432   *   * PT/INR:   Recent Labs     01/21/23  0432   PROT 6.1*       APTT: No results for input(s): APTT in the last 72 hours. ABG:   No results for input(s): PHART, UFQ1QQL, PO2ART in the last 72 hours.        Electronically signed by Shirlene Mojica RN on 1/21/2023 at 6:39 PM

## 2023-01-21 NOTE — PROGRESS NOTES
Shift: Day    Procedure: ASCENDING AORTA VALVE REPLACEMENT WITH TUBE GRAFT USING AN EPPERSON GRAFT SIZE 28CM  ANTEGRADE FLOW TO THE LEFT LEG, RESUSPENSION OF AORTIC VALVE    Admit from OR (time and date): 1/16/23 0630    Transition (time and date):     Surgery, return to OR yes - Fem - Fem bypass with Dr. Mu Chapa at handoff  stable    Most recent vitals: BP (!) 126/58   Pulse (!) 108   Temp 97.4 °F (36.3 °C) (Oral)   Resp 19   Ht 6' 4\" (1.93 m)   Wt (!) 326 lb 4.5 oz (148 kg)   SpO2 93%   BMI 39.72 kg/m²      Increased O2 requirements: no O2 requirements: Oxygen Therapy  SpO2: 93 %  Pulse Oximetry Type: Continuous  Pulse Oximeter Device Mode: Continuous  Pulse Oximeter Device Location: Finger  O2 Device: None (Room air)  Oximetry Probe Site Changed: Yes  Skin Assessment: Clean, dry, & intact  Skin Protection for O2 Device: Yes  Orientation: Middle  Location: Nose  FiO2 : 35 %  O2 Flow Rate (L/min): 2 L/min  O2 Delivery Method: Nasal cannula  Vent Mode: (S) AC/VC (pt placed on sbt 5/5 at 1220)     Admission weight Weight: (!) 340 lb (154.2 kg)  Today's weight   Wt Readings from Last 1 Encounters:   01/20/23 (!) 326 lb 4.5 oz (148 kg)         EF: unknown    Drop in Urinary Output no     Rhythm Changes Normal Sinus Rhythm 90's    Pacing Wires Removed:  [] Yes  [x] NO  [] Platelets < 85,263  [] Arrhythmia  [] Bradycardia [] Valve Replacement  [] Pacing for Cardiac Index  []Physician Order    Lines/Drains  LDA Insertion Date Discontinued Date Dressing Changes   Art line 1/16 1/19    Central Line 1/16 1/20    Colin 1/16 1/20    Chest Tube P: 1/16  M: 1/16 M: 1/19  P:1/20    Wires  1/16 1/19    ETT 1/16 1/17    TYRELL Drain      VasCath      Impella        Interventions After Office Hours  Problem(Brief) Date Time Intervention Physician contacted                                               Drip rates at handoff:    sodium chloride 5 mL/hr at 01/16/23 1453    dextrose         Hospital Course:  POD# DOS  -Ca replaced  -Lokelma given for  5.9 K -VSS  -Fem to Fem bypass with Dr. Thorpe @ [phone]  -OGT LWS    POD NOS:  -Levo weaned slightly.  -Remains on levo, esmolol, insulin, fentanyl, and versed.  -Fi02 weaned on vent.  -Wakes up, moves all extremities, follows commands.    POD#3 01/18:   -HR in 90s, BP 110s-120s overnight   -notified on call CTS about blood sugar 74 and 23 units Lantus order. Will hold Lantus right now per order, and new order of low dose sliding scale order noted. Blood glucose recheck 118.   -Wife Zoraida updated.   -K replaced, Ca replace.   -IS practiced with patient this shift   -bathed, CTS dressing changed   -thi morning pt's weight is 148.4kg  -Total urine output: 4050ml on Lasix gtt   -total MCT output: 0ml  -total PCT output: 6ml  -Daily BMP, CBC, CXR completed    POD#2 1/19:  -V wires pulled, mediastinal CT pulled, Flotrac DC'd  -up to chair with lift- ARU consulted  -metoprolol increased    POD#3 1/20:    - metoprolol increased   CT, F/C and Central line removed.   PT/RN slings to chair.   Per Dr. Thorpe wound vacs to remain through Sunday.     Overnight:   - small BM   - endorsement of improved pain control   - HR sustaining in low 100's despite increased lopressor dose; BP remaining stable       Lab Data:  CBC:   Recent Labs     01/20/23  0802 01/21/23 0432   WBC 17.7* 18.8*   HGB 10.9* 11.4*   HCT 34.7* 36.2*   MCV 82.1 83.1    338     BMP:    Recent Labs     01/20/23  0802 01/21/23  0432    135*   K 4.0 3.7   CO2 28 26   BUN 25* 31*   CREATININE 0.8* 0.9     LIVR:   Recent Labs     01/21/23 0432   *   *     PT/INR:   Recent Labs     01/21/23 0432   PROT 6.1*     APTT: No results for input(s): APTT in the last 72 hours.  ABG:   Recent Labs     01/18/23  0815   PHART 7.416   KSI0FCH 31.9*   PO2ART 114.4*        Electronically signed by Eulalio Brown RN on 1/21/2023 at 6:13 AM

## 2023-01-22 ENCOUNTER — APPOINTMENT (OUTPATIENT)
Dept: GENERAL RADIOLOGY | Age: 33
DRG: 219 | End: 2023-01-22
Payer: COMMERCIAL

## 2023-01-22 ENCOUNTER — APPOINTMENT (OUTPATIENT)
Dept: CT IMAGING | Age: 33
DRG: 219 | End: 2023-01-22
Payer: COMMERCIAL

## 2023-01-22 PROBLEM — J38.01 VOCAL FOLD PARESIS, LEFT: Status: ACTIVE | Noted: 2023-01-22

## 2023-01-22 LAB
A/G RATIO: 1 (ref 1.1–2.2)
ALBUMIN SERPL-MCNC: 2.8 G/DL (ref 3.4–5)
ALP BLD-CCNC: 97 U/L (ref 40–129)
ALT SERPL-CCNC: 147 U/L (ref 10–40)
ANION GAP SERPL CALCULATED.3IONS-SCNC: 11 MMOL/L (ref 3–16)
AST SERPL-CCNC: 223 U/L (ref 15–37)
BACTERIA: ABNORMAL /HPF
BANDED NEUTROPHILS RELATIVE PERCENT: 7 % (ref 0–7)
BASOPHILS ABSOLUTE: 0 K/UL (ref 0–0.2)
BASOPHILS RELATIVE PERCENT: 0 %
BILIRUB SERPL-MCNC: 0.5 MG/DL (ref 0–1)
BILIRUBIN URINE: NEGATIVE
BLOOD, URINE: ABNORMAL
BUN BLDV-MCNC: 32 MG/DL (ref 7–20)
CALCIUM SERPL-MCNC: 7.9 MG/DL (ref 8.3–10.6)
CHLORIDE BLD-SCNC: 98 MMOL/L (ref 99–110)
CLARITY: CLEAR
CO2: 26 MMOL/L (ref 21–32)
COLOR: YELLOW
CREAT SERPL-MCNC: 0.8 MG/DL (ref 0.9–1.3)
EOSINOPHILS ABSOLUTE: 0.5 K/UL (ref 0–0.6)
EOSINOPHILS RELATIVE PERCENT: 2 %
EPITHELIAL CELLS, UA: ABNORMAL /HPF (ref 0–5)
GFR SERPL CREATININE-BSD FRML MDRD: >60 ML/MIN/{1.73_M2}
GLUCOSE BLD-MCNC: 115 MG/DL (ref 70–99)
GLUCOSE URINE: NEGATIVE MG/DL
HCT VFR BLD CALC: 35.4 % (ref 40.5–52.5)
HEMATOLOGY PATH CONSULT: YES
HEMOGLOBIN: 11.7 G/DL (ref 13.5–17.5)
KETONES, URINE: NEGATIVE MG/DL
LEUKOCYTE ESTERASE, URINE: NEGATIVE
LYMPHOCYTES ABSOLUTE: 3.8 K/UL (ref 1–5.1)
LYMPHOCYTES RELATIVE PERCENT: 16 %
MCH RBC QN AUTO: 27 PG (ref 26–34)
MCHC RBC AUTO-ENTMCNC: 33 G/DL (ref 31–36)
MCV RBC AUTO: 81.6 FL (ref 80–100)
METAMYELOCYTES RELATIVE PERCENT: 2 %
MICROSCOPIC EXAMINATION: YES
MONOCYTES ABSOLUTE: 2.6 K/UL (ref 0–1.3)
MONOCYTES RELATIVE PERCENT: 11 %
NEUTROPHILS ABSOLUTE: 16.8 K/UL (ref 1.7–7.7)
NEUTROPHILS RELATIVE PERCENT: 62 %
NITRITE, URINE: NEGATIVE
PDW BLD-RTO: 14.5 % (ref 12.4–15.4)
PH UA: 5.5 (ref 5–8)
PLATELET # BLD: 421 K/UL (ref 135–450)
PLATELET SLIDE REVIEW: ADEQUATE
PMV BLD AUTO: 7.4 FL (ref 5–10.5)
POTASSIUM REFLEX MAGNESIUM: 4.2 MMOL/L (ref 3.5–5.1)
PROTEIN UA: NEGATIVE MG/DL
RBC # BLD: 4.34 M/UL (ref 4.2–5.9)
RBC # BLD: NORMAL 10*6/UL
RBC UA: ABNORMAL /HPF (ref 0–4)
SODIUM BLD-SCNC: 135 MMOL/L (ref 136–145)
SPECIFIC GRAVITY UA: 1.01 (ref 1–1.03)
TOTAL PROTEIN: 5.5 G/DL (ref 6.4–8.2)
URINE TYPE: ABNORMAL
UROBILINOGEN, URINE: 0.2 E.U./DL
WBC # BLD: 23.6 K/UL (ref 4–11)
WBC UA: ABNORMAL /HPF (ref 0–5)

## 2023-01-22 PROCEDURE — 2580000003 HC RX 258: Performed by: THORACIC SURGERY (CARDIOTHORACIC VASCULAR SURGERY)

## 2023-01-22 PROCEDURE — 6360000002 HC RX W HCPCS: Performed by: THORACIC SURGERY (CARDIOTHORACIC VASCULAR SURGERY)

## 2023-01-22 PROCEDURE — 99024 POSTOP FOLLOW-UP VISIT: CPT | Performed by: SURGERY

## 2023-01-22 PROCEDURE — 36415 COLL VENOUS BLD VENIPUNCTURE: CPT

## 2023-01-22 PROCEDURE — 85025 COMPLETE CBC W/AUTO DIFF WBC: CPT

## 2023-01-22 PROCEDURE — 6360000004 HC RX CONTRAST MEDICATION: Performed by: SURGERY

## 2023-01-22 PROCEDURE — 94640 AIRWAY INHALATION TREATMENT: CPT

## 2023-01-22 PROCEDURE — 31575 DIAGNOSTIC LARYNGOSCOPY: CPT | Performed by: STUDENT IN AN ORGANIZED HEALTH CARE EDUCATION/TRAINING PROGRAM

## 2023-01-22 PROCEDURE — 6360000002 HC RX W HCPCS: Performed by: NURSE PRACTITIONER

## 2023-01-22 PROCEDURE — 6370000000 HC RX 637 (ALT 250 FOR IP): Performed by: STUDENT IN AN ORGANIZED HEALTH CARE EDUCATION/TRAINING PROGRAM

## 2023-01-22 PROCEDURE — 6370000000 HC RX 637 (ALT 250 FOR IP): Performed by: NURSE PRACTITIONER

## 2023-01-22 PROCEDURE — 99221 1ST HOSP IP/OBS SF/LOW 40: CPT | Performed by: STUDENT IN AN ORGANIZED HEALTH CARE EDUCATION/TRAINING PROGRAM

## 2023-01-22 PROCEDURE — 2000000000 HC ICU R&B

## 2023-01-22 PROCEDURE — 71045 X-RAY EXAM CHEST 1 VIEW: CPT

## 2023-01-22 PROCEDURE — 81001 URINALYSIS AUTO W/SCOPE: CPT

## 2023-01-22 PROCEDURE — 6370000000 HC RX 637 (ALT 250 FOR IP): Performed by: THORACIC SURGERY (CARDIOTHORACIC VASCULAR SURGERY)

## 2023-01-22 PROCEDURE — 71275 CT ANGIOGRAPHY CHEST: CPT

## 2023-01-22 PROCEDURE — 94669 MECHANICAL CHEST WALL OSCILL: CPT

## 2023-01-22 PROCEDURE — 80053 COMPREHEN METABOLIC PANEL: CPT

## 2023-01-22 RX ADMIN — METOPROLOL TARTRATE 50 MG: 50 TABLET, FILM COATED ORAL at 21:23

## 2023-01-22 RX ADMIN — SODIUM CHLORIDE, PRESERVATIVE FREE 10 ML: 5 INJECTION INTRAVENOUS at 21:26

## 2023-01-22 RX ADMIN — FUROSEMIDE 40 MG: 10 INJECTION, SOLUTION INTRAMUSCULAR; INTRAVENOUS at 21:23

## 2023-01-22 RX ADMIN — METHOCARBAMOL TABLETS 750 MG: 750 TABLET, COATED ORAL at 10:57

## 2023-01-22 RX ADMIN — FONDAPARINUX SODIUM 2.5 MG: 2.5 INJECTION, SOLUTION SUBCUTANEOUS at 11:03

## 2023-01-22 RX ADMIN — Medication 5 MG: at 21:23

## 2023-01-22 RX ADMIN — METHOCARBAMOL TABLETS 750 MG: 750 TABLET, COATED ORAL at 15:07

## 2023-01-22 RX ADMIN — GABAPENTIN 100 MG: 100 CAPSULE ORAL at 21:23

## 2023-01-22 RX ADMIN — Medication 400 MG: at 11:08

## 2023-01-22 RX ADMIN — ACETAMINOPHEN 325MG 650 MG: 325 TABLET ORAL at 21:26

## 2023-01-22 RX ADMIN — GABAPENTIN 100 MG: 100 CAPSULE ORAL at 10:58

## 2023-01-22 RX ADMIN — Medication 15 ML: at 11:06

## 2023-01-22 RX ADMIN — Medication 15 ML: at 21:26

## 2023-01-22 RX ADMIN — POTASSIUM CHLORIDE 20 MEQ: 20 TABLET, EXTENDED RELEASE ORAL at 11:01

## 2023-01-22 RX ADMIN — FAMOTIDINE 20 MG: 20 TABLET, FILM COATED ORAL at 11:02

## 2023-01-22 RX ADMIN — ALBUTEROL SULFATE 2.5 MG: 2.5 SOLUTION RESPIRATORY (INHALATION) at 19:19

## 2023-01-22 RX ADMIN — GABAPENTIN 100 MG: 100 CAPSULE ORAL at 15:08

## 2023-01-22 RX ADMIN — ACETAMINOPHEN 325MG 650 MG: 325 TABLET ORAL at 00:45

## 2023-01-22 RX ADMIN — ALBUTEROL SULFATE 2.5 MG: 2.5 SOLUTION RESPIRATORY (INHALATION) at 08:18

## 2023-01-22 RX ADMIN — ACETAMINOPHEN 325MG 650 MG: 325 TABLET ORAL at 10:57

## 2023-01-22 RX ADMIN — FUROSEMIDE 40 MG: 10 INJECTION, SOLUTION INTRAMUSCULAR; INTRAVENOUS at 11:02

## 2023-01-22 RX ADMIN — Medication 5 MG: at 00:39

## 2023-01-22 RX ADMIN — FUROSEMIDE 40 MG: 10 INJECTION, SOLUTION INTRAMUSCULAR; INTRAVENOUS at 15:08

## 2023-01-22 RX ADMIN — METHOCARBAMOL TABLETS 750 MG: 750 TABLET, COATED ORAL at 21:23

## 2023-01-22 RX ADMIN — IOPAMIDOL 75 ML: 755 INJECTION, SOLUTION INTRAVENOUS at 10:37

## 2023-01-22 RX ADMIN — ASPIRIN 81 MG: 81 TABLET, COATED ORAL at 10:57

## 2023-01-22 RX ADMIN — ATORVASTATIN CALCIUM 40 MG: 40 TABLET, FILM COATED ORAL at 21:23

## 2023-01-22 RX ADMIN — METOPROLOL TARTRATE 50 MG: 50 TABLET, FILM COATED ORAL at 10:58

## 2023-01-22 RX ADMIN — SODIUM CHLORIDE, PRESERVATIVE FREE 10 ML: 5 INJECTION INTRAVENOUS at 11:06

## 2023-01-22 RX ADMIN — ACETAMINOPHEN 325MG 650 MG: 325 TABLET ORAL at 15:08

## 2023-01-22 RX ADMIN — FAMOTIDINE 20 MG: 20 TABLET, FILM COATED ORAL at 21:23

## 2023-01-22 RX ADMIN — Medication 400 MG: at 21:23

## 2023-01-22 RX ADMIN — POTASSIUM CHLORIDE 20 MEQ: 20 TABLET, EXTENDED RELEASE ORAL at 15:08

## 2023-01-22 ASSESSMENT — PAIN DESCRIPTION - ORIENTATION: ORIENTATION: MID

## 2023-01-22 ASSESSMENT — PAIN SCALES - GENERAL
PAINLEVEL_OUTOF10: 0
PAINLEVEL_OUTOF10: 0
PAINLEVEL_OUTOF10: 1
PAINLEVEL_OUTOF10: 0

## 2023-01-22 ASSESSMENT — PULMONARY FUNCTION TESTS: PIF_VALUE: 18

## 2023-01-22 ASSESSMENT — PAIN DESCRIPTION - LOCATION: LOCATION: CHEST

## 2023-01-22 ASSESSMENT — ENCOUNTER SYMPTOMS: VOICE CHANGE: 1

## 2023-01-22 NOTE — PROGRESS NOTES
Looks good. Voice is quite aspirate. 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 Await ENT evaluation. Spoke with him about feeling down in the dumps/humiliated. He's more capable of caring for himself than he realizes. I encouraged his to start taking more control of himself and resume managing himself the way someone his age should. CTA from today is pending. WBC up with no fever or obvious source. Asymptomatic aspiration is possible. CT should help show this if it's happening. Will also check a u/a.

## 2023-01-22 NOTE — PROGRESS NOTES
[facility] called with CT results. Wants results reported to physician. Gisselle Madrid was ordering physician. Called and spoke to him. Impression results read to Gisselle Madrid. No new orders received.

## 2023-01-22 NOTE — PROGRESS NOTES
VASCULAR     Overall no complaints except some L foot tingling - no weakness or steven numbness    VSS Afeb  B groin incisions without hematomas  L foot cooler than R with 2-3 sec cap refill  Gross sensory and motor intact (5/5)  Unable to appreciate previously documented L DP pulse - no palpable PT pulse but easily heard doppler signal. No palpable L popliteal pulse. Creat 0.8    A/P: S/P fem-fem following aortic dissection and repair. Graft may be occluded but L foot perfusion maintained with threatening ischemia. Will obtain CTA chest/ab/pelvis to assess status today. No plans for emergent intervention currently as foot without threatening ischemia.      Aaron Valero

## 2023-01-22 NOTE — CONSULTS
[facility]      Patient Name: Nicky Estrada  Medical Record Number:  [id number]  Primary Care Physician:  No primary care provider on file. Date of Consultation: 1/22/2023    Chief Complaint:   Chief Complaint   Patient presents with    Numbness     BLE numbness and tingling since 1900. Chest Pain     L pain, 9/10, non radiating. Resolved before arrival        HISTORY OF PRESENT ILLNESS  Leopold Searle is a(n) 34 y.o. male who underwent ascending aortic valve replacement on 1/16/2023. He has had a hoarse voice quality since the time of surgery. He denies any difficulty with swallowing. He does state that he feels his voice is getting better. Patient Active Problem List   Diagnosis    Aortic dissection (HCC)    Type 1 dissection of ascending aorta    Chest wall discomfort     Past Surgical History:   Procedure Laterality Date    FEMORAL-FEMORAL BYPASS GRAFT Bilateral 1/16/2023    FEMORAL FEMORAL BYPASS performed by Heather Head MD at [address] N/A 1/15/2023    ASCENDING AORTA  REPLACEMENT WITH TUBE GRAFT USING AN EPPERSON GRAFT SIZE 28CM  ANTEGRADE FLOW TO THE LEFT LEG, RESUSPENSION OF AORTIC VALVE performed by Belkis Holley MD at [address] [place] [address] reviewed. No pertinent family history.   Social History     Socioeconomic History    Marital status: Single     Spouse name: Not on file    Number of children: Not on file    Years of education: Not on file    Highest education level: Not on file   Occupational History    Not on file   Tobacco Use    Smoking status: Never    Smokeless tobacco: Never   Vaping Use    Vaping Use: Never used   Substance and Sexual Activity    Alcohol use: Not Currently     Comment: socially    Drug use: Never    Sexual activity: Not on file   Other Topics Concern    Not on file   Social History Narrative    Not on file     Social Determinants of Health     Financial Resource Strain: Not on file Food Insecurity: Not on file   Transportation Needs: Not on file   Physical Activity: Not on file   Stress: Not on file   Social Connections: Not on file   Intimate Partner Violence: Not on file   Housing Stability: Not on file     DRUG/FOOD ALLERGIES: Patient has no known allergies. CURRENT MEDICATIONS  Prior to Admission medications    Not on File     REVIEW OF SYSTEMS  The following systems were reviewed and revealed the following in addition to any already discussed in the HPI:    Review of Systems   HENT:  Positive for voice change. PHYSICAL EXAM  /64   Pulse (!) 115   Temp 98.1 °F (36.7 °C) (Oral)   Resp (!) 6   Ht 6' 4\" (1.93 m)   Wt (!) 309 lb 15.5 oz (140.6 kg)   SpO2 92%   BMI 37.73 kg/m²     GENERAL: No Acute Distress, Alert and Oriented, no hoarseness  EYES: EOMI, Anti-icteric  NOSE: No epistaxis, nasal mucosa within normal limits, no purulent drainage  EARS: Normal external canal appearance, EAC patent bilaterally  FACE: 1/6 House-Brackmann Scale, symmetric, sensation equal bilaterally  ORAL CAVITY: No masses or lesions palpated, uvula is midline, moist mucous membranes  NECK: Normal range of motion, no thyromegaly, trachea is midline, no lymphadenopathy, no neck masses, no crepitus  CHEST: Normal respiratory effort, no retractions, breathing comfortably  SKIN: No rashes, normal appearing skin, no evidence of skin lesions/tumors    RADIOLOGY  Summary of findings:    PROCEDURE  Flexible Laryngoscopy    Procedure : Flexible Laryngoscopy  Surgeon: Donavon Banks DO  Anesthesia: Afrin with 4% lidocaine  Indication: Laryngeal mirror examination was not tolerated due to gag reflex  Description:  After verbal consent was obtained, the scope was passed along the floor of the right naris to the level of the larynx.  There was no evidence of concerning masses or lesions of the base of tongue, vallecula, epiglottis, aryepiglottic folds, arytenoids, false vocal folds, true vocal folds, or pyriform sinuses. True vocal folds exhibited paresis on the left true vocal fold. The scope was removed. The patient tolerated the procedure without difficulty. There were no complications. Pertinent findings: Left true vocal fold is paretic and not contacting the right vocal fold when trying to voice. ASSESSMENT/PLAN  Carlene Weinstein is a very pleasant 34 y.o. male with left true vocal fold paresis. He states he feels that his voice is getting better. We discussed options of continued therapy versus injection medialization in the OR. Risk benefits and alternatives were discussed with the patient. He will think about his options and speak with his wife. If we were to move forward with surgery in the OR he would need to be cleared from a medical standpoint and there is no need to stop any anticoagulation for this procedure. Medical Decision Making:   The following items were considered in medical decision making:  Independent review of images  Review / order clinical lab tests  Review / order radiology tests  Decision to obtain old records

## 2023-01-22 NOTE — CARE COORDINATION
[facility] - Acute Rehab Unit   ARU following patient for improvement. PT/OT with continued recommendations for SNF. ENT consult and CTA pending. WBC elevated. Pt will need pre-cert if he becomes more appropriate for ARU. Will continue to follow for medical readiness. Thank you.    Brant Humphrey M.A, CCC-SLP/ CBIS   Clinical Liaison

## 2023-01-22 NOTE — PLAN OF CARE
Problem: Discharge Planning  Goal: Discharge to home or other facility with appropriate resources  Outcome: Progressing

## 2023-01-22 NOTE — PROGRESS NOTES
Shift: Day    Procedure: ASCENDING AORTA VALVE REPLACEMENT WITH TUBE GRAFT USING AN EPPERSON GRAFT SIZE 28CM  ANTEGRADE FLOW TO THE LEFT LEG, RESUSPENSION OF AORTIC VALVE    Admit from OR (time and date): 1/16/23 0630    Transition (time and date):     Surgery, return to OR yes - Fem - Fem bypass with Dr. Rivera Cable at handoff  stable    Most recent vitals: /69   Pulse (!) 111   Temp 98.1 °F (36.7 °C) (Oral)   Resp 24   Ht 6' 4\" (1.93 m)   Wt (!) 309 lb 8.4 oz (140.4 kg)   SpO2 92%   BMI 37.68 kg/m²      Increased O2 requirements: no O2 requirements: Oxygen Therapy  SpO2: 92 %  Pulse Oximetry Type: Continuous  Pulse Oximeter Device Mode: Continuous  Pulse Oximeter Device Location: Finger  O2 Device: None (Room air)  Oximetry Probe Site Changed: Yes  Skin Assessment: Clean, dry, & intact  Skin Protection for O2 Device: Yes  Orientation: Middle  Location: Nose  FiO2 : 35 %  O2 Flow Rate (L/min): 2 L/min  O2 Delivery Method: Nasal cannula  Vent Mode: (S) AC/VC (pt placed on sbt 5/5 at 1220)     Admission weight Weight: (!) 340 lb (154.2 kg)  Today's weight   Wt Readings from Last 1 Encounters:   01/21/23 (!) 309 lb 8.4 oz (140.4 kg)         EF: unknown    Drop in Urinary Output no     Rhythm Changes Normal Sinus Rhythm 90's    Pacing Wires Removed:  [] Yes  [x] NO  [] Platelets < 02,924  [] Arrhythmia  [] Bradycardia [] Valve Replacement  [] Pacing for Cardiac Index  []Physician Order    Lines/Drains  LDA Insertion Date Discontinued Date Dressing Changes   Art line 1/16 1/19    Central Line 1/16 1/20    Colin 1/16 1/20    Chest Tube P: 1/16  M: 1/16 M: 1/19  P:1/20    Wires  1/16 1/19    ETT 1/16 1/17    TYRELL Drain      VasCath      Impella        Interventions After Office Hours  Problem(Brief) Date Time Intervention Physician contacted                                               Drip rates at handoff:    sodium chloride 5 mL/hr at 01/16/23 1453    dextrose         Hospital Course:  POD# DOS  -Ca replaced  -Ed Sprang given for  5.9 K -VSS  -Fem to Fem bypass with Dr. Eli Hay @ [phone]  -OGT LWS    POD NOS:  -Levo weaned slightly. -Remains on levo, esmolol, insulin, fentanyl, and versed.  -Fi02 weaned on vent. -Wakes up, moves all extremities, follows commands. POD#3 01/18:   -HR in 90s, BP 110s-120s overnight   -notified on call CTS about blood sugar 74 and 23 units Lantus order. Will hold Lantus right now per order, and new order of low dose sliding scale order noted. Blood glucose recheck 118.   -Wife Amisha Montelongo updated. -K replaced, Ca replace. -IS practiced with patient this shift   -bathed, CTS dressing changed   -thi morning pt's weight is 148.4kg  -Total urine output: 4050ml on Lasix gtt   -total MCT output: 0ml  -total PCT output: 6ml  -Daily BMP, CBC, CXR completed    POD#2 1/19:  -V wires pulled, mediastinal CT pulled, Flotrac DC'd  -up to chair with lift- ARU consulted  -metoprolol increased    POD#3 1/20:    - metoprolol increased   CT, F/C and Central line removed. PT/RN slings to chair. Per Dr. Eli Hay wound vacs to remain through Sunday. Overnight:   - small BM   - endorsement of improved pain control   - HR sustaining in low 100's despite increased lopressor dose; BP remaining stable    POD#4 1/21:  VSS. Pt had several bowel movements throughout the day. Pt stood up with attempt for bedside commode but remains very weak so was not attempted further. Pt was moved to recliner twice with sling assist. Pt and wife requested melatonin and ENT consult (hoarse and low voice post extubation) which was ordered by Dr Winter Blair.      Overnight:   - uneventful course   - HR remaining in low 100's despite nightly lopressor       Lab Data:  CBC:   Recent Labs     01/21/23  0432 01/22/23  0414   WBC 18.8* 23.6*   HGB 11.4* 11.7*   HCT 36.2* 35.4*   MCV 83.1 81.6    421     BMP:    Recent Labs     01/21/23  0432 01/22/23  0414   * 135*   K 3.7 4.2   CO2 26 26   BUN 31* 32* CREATININE 0.9 0.8*     LIVR:   Recent Labs     01/21/23  0432 01/22/23 0414   * 223*   * 147*     PT/INR:   Recent Labs     01/21/23  0432 01/22/23 0414   PROT 6.1* 5.5*     APTT: No results for input(s): APTT in the last 72 hours. ABG:   No results for input(s): PHART, DKF2QYU, PO2ART in the last 72 hours.        Electronically signed by Mena Rodriguez RN on 1/22/2023 at 5:18 AM

## 2023-01-23 ENCOUNTER — APPOINTMENT (OUTPATIENT)
Dept: GENERAL RADIOLOGY | Age: 33
DRG: 219 | End: 2023-01-23
Payer: COMMERCIAL

## 2023-01-23 LAB
A/G RATIO: 0.7 (ref 1.1–2.2)
ALBUMIN SERPL-MCNC: 2.5 G/DL (ref 3.4–5)
ALP BLD-CCNC: 89 U/L (ref 40–129)
ALT SERPL-CCNC: 135 U/L (ref 10–40)
ANION GAP SERPL CALCULATED.3IONS-SCNC: 7 MMOL/L (ref 3–16)
AST SERPL-CCNC: 154 U/L (ref 15–37)
BANDED NEUTROPHILS RELATIVE PERCENT: 8 % (ref 0–7)
BASOPHILS ABSOLUTE: 0 K/UL (ref 0–0.2)
BASOPHILS RELATIVE PERCENT: 0 %
BILIRUB SERPL-MCNC: 0.4 MG/DL (ref 0–1)
BUN BLDV-MCNC: 33 MG/DL (ref 7–20)
CALCIUM SERPL-MCNC: 7.9 MG/DL (ref 8.3–10.6)
CHLORIDE BLD-SCNC: 97 MMOL/L (ref 99–110)
CO2: 26 MMOL/L (ref 21–32)
CREAT SERPL-MCNC: 0.8 MG/DL (ref 0.9–1.3)
EOSINOPHILS ABSOLUTE: 0.7 K/UL (ref 0–0.6)
EOSINOPHILS RELATIVE PERCENT: 3 %
GFR SERPL CREATININE-BSD FRML MDRD: >60 ML/MIN/{1.73_M2}
GLUCOSE BLD-MCNC: 109 MG/DL (ref 70–99)
HCT VFR BLD CALC: 35 % (ref 40.5–52.5)
HEMATOLOGY PATH CONSULT: NO
HEMATOLOGY PATH CONSULT: NORMAL
HEMOGLOBIN: 11.3 G/DL (ref 13.5–17.5)
LYMPHOCYTES ABSOLUTE: 2.8 K/UL (ref 1–5.1)
LYMPHOCYTES RELATIVE PERCENT: 12 %
MCH RBC QN AUTO: 26.7 PG (ref 26–34)
MCHC RBC AUTO-ENTMCNC: 32.4 G/DL (ref 31–36)
MCV RBC AUTO: 82.6 FL (ref 80–100)
METAMYELOCYTES RELATIVE PERCENT: 1 %
MONOCYTES ABSOLUTE: 0.7 K/UL (ref 0–1.3)
MONOCYTES RELATIVE PERCENT: 3 %
NEUTROPHILS ABSOLUTE: 18.9 K/UL (ref 1.7–7.7)
NEUTROPHILS RELATIVE PERCENT: 73 %
PDW BLD-RTO: 15.1 % (ref 12.4–15.4)
PLATELET # BLD: 480 K/UL (ref 135–450)
PLATELET SLIDE REVIEW: ABNORMAL
PMV BLD AUTO: 7.1 FL (ref 5–10.5)
POLYCHROMASIA: ABNORMAL
POTASSIUM REFLEX MAGNESIUM: 3.8 MMOL/L (ref 3.5–5.1)
RBC # BLD: 4.24 M/UL (ref 4.2–5.9)
SLIDE REVIEW: ABNORMAL
SODIUM BLD-SCNC: 130 MMOL/L (ref 136–145)
TOTAL PROTEIN: 6.1 G/DL (ref 6.4–8.2)
WBC # BLD: 23 K/UL (ref 4–11)

## 2023-01-23 PROCEDURE — 94640 AIRWAY INHALATION TREATMENT: CPT

## 2023-01-23 PROCEDURE — 2500000003 HC RX 250 WO HCPCS: Performed by: STUDENT IN AN ORGANIZED HEALTH CARE EDUCATION/TRAINING PROGRAM

## 2023-01-23 PROCEDURE — 97530 THERAPEUTIC ACTIVITIES: CPT

## 2023-01-23 PROCEDURE — 99024 POSTOP FOLLOW-UP VISIT: CPT | Performed by: NURSE PRACTITIONER

## 2023-01-23 PROCEDURE — 6370000000 HC RX 637 (ALT 250 FOR IP): Performed by: NURSE PRACTITIONER

## 2023-01-23 PROCEDURE — 85025 COMPLETE CBC W/AUTO DIFF WBC: CPT

## 2023-01-23 PROCEDURE — 97110 THERAPEUTIC EXERCISES: CPT

## 2023-01-23 PROCEDURE — 71045 X-RAY EXAM CHEST 1 VIEW: CPT

## 2023-01-23 PROCEDURE — 36415 COLL VENOUS BLD VENIPUNCTURE: CPT

## 2023-01-23 PROCEDURE — 99221 1ST HOSP IP/OBS SF/LOW 40: CPT | Performed by: STUDENT IN AN ORGANIZED HEALTH CARE EDUCATION/TRAINING PROGRAM

## 2023-01-23 PROCEDURE — 94669 MECHANICAL CHEST WALL OSCILL: CPT

## 2023-01-23 PROCEDURE — 6360000002 HC RX W HCPCS: Performed by: THORACIC SURGERY (CARDIOTHORACIC VASCULAR SURGERY)

## 2023-01-23 PROCEDURE — 6370000000 HC RX 637 (ALT 250 FOR IP): Performed by: THORACIC SURGERY (CARDIOTHORACIC VASCULAR SURGERY)

## 2023-01-23 PROCEDURE — 2000000000 HC ICU R&B

## 2023-01-23 PROCEDURE — 2580000003 HC RX 258: Performed by: THORACIC SURGERY (CARDIOTHORACIC VASCULAR SURGERY)

## 2023-01-23 PROCEDURE — 80053 COMPREHEN METABOLIC PANEL: CPT

## 2023-01-23 PROCEDURE — 92611 MOTION FLUOROSCOPY/SWALLOW: CPT

## 2023-01-23 PROCEDURE — 74230 X-RAY XM SWLNG FUNCJ C+: CPT

## 2023-01-23 PROCEDURE — 6360000002 HC RX W HCPCS: Performed by: NURSE PRACTITIONER

## 2023-01-23 PROCEDURE — 6370000000 HC RX 637 (ALT 250 FOR IP): Performed by: STUDENT IN AN ORGANIZED HEALTH CARE EDUCATION/TRAINING PROGRAM

## 2023-01-23 RX ORDER — AMOXICILLIN AND CLAVULANATE POTASSIUM 875; 125 MG/1; MG/1
1 TABLET, FILM COATED ORAL EVERY 12 HOURS SCHEDULED
Status: DISCONTINUED | OUTPATIENT
Start: 2023-01-23 | End: 2023-01-26 | Stop reason: HOSPADM

## 2023-01-23 RX ADMIN — Medication 15 ML: at 20:23

## 2023-01-23 RX ADMIN — ALBUTEROL SULFATE 2.5 MG: 2.5 SOLUTION RESPIRATORY (INHALATION) at 15:33

## 2023-01-23 RX ADMIN — METOPROLOL TARTRATE 50 MG: 50 TABLET, FILM COATED ORAL at 09:17

## 2023-01-23 RX ADMIN — POTASSIUM CHLORIDE 20 MEQ: 20 TABLET, EXTENDED RELEASE ORAL at 13:42

## 2023-01-23 RX ADMIN — Medication 15 ML: at 09:19

## 2023-01-23 RX ADMIN — METHOCARBAMOL TABLETS 750 MG: 750 TABLET, COATED ORAL at 13:43

## 2023-01-23 RX ADMIN — ACETAMINOPHEN 325MG 650 MG: 325 TABLET ORAL at 13:43

## 2023-01-23 RX ADMIN — METHOCARBAMOL TABLETS 750 MG: 750 TABLET, COATED ORAL at 17:17

## 2023-01-23 RX ADMIN — Medication 400 MG: at 20:26

## 2023-01-23 RX ADMIN — GABAPENTIN 100 MG: 100 CAPSULE ORAL at 09:17

## 2023-01-23 RX ADMIN — SODIUM CHLORIDE, PRESERVATIVE FREE 10 ML: 5 INJECTION INTRAVENOUS at 09:20

## 2023-01-23 RX ADMIN — FONDAPARINUX SODIUM 2.5 MG: 2.5 INJECTION, SOLUTION SUBCUTANEOUS at 09:19

## 2023-01-23 RX ADMIN — ACETAMINOPHEN 325MG 650 MG: 325 TABLET ORAL at 02:00

## 2023-01-23 RX ADMIN — METOPROLOL TARTRATE 50 MG: 50 TABLET, FILM COATED ORAL at 20:21

## 2023-01-23 RX ADMIN — FAMOTIDINE 20 MG: 20 TABLET, FILM COATED ORAL at 20:21

## 2023-01-23 RX ADMIN — ATORVASTATIN CALCIUM 40 MG: 40 TABLET, FILM COATED ORAL at 20:21

## 2023-01-23 RX ADMIN — FUROSEMIDE 40 MG: 10 INJECTION, SOLUTION INTRAMUSCULAR; INTRAVENOUS at 09:18

## 2023-01-23 RX ADMIN — POTASSIUM CHLORIDE 20 MEQ: 20 TABLET, EXTENDED RELEASE ORAL at 17:18

## 2023-01-23 RX ADMIN — METHOCARBAMOL TABLETS 750 MG: 750 TABLET, COATED ORAL at 09:17

## 2023-01-23 RX ADMIN — Medication 5 MG: at 20:21

## 2023-01-23 RX ADMIN — AMOXICILLIN AND CLAVULANATE POTASSIUM 1 TABLET: 875; 125 TABLET, FILM COATED ORAL at 20:21

## 2023-01-23 RX ADMIN — ASPIRIN 81 MG: 81 TABLET, COATED ORAL at 09:17

## 2023-01-23 RX ADMIN — FUROSEMIDE 40 MG: 10 INJECTION, SOLUTION INTRAMUSCULAR; INTRAVENOUS at 20:21

## 2023-01-23 RX ADMIN — ALBUTEROL SULFATE 2.5 MG: 2.5 SOLUTION RESPIRATORY (INHALATION) at 08:25

## 2023-01-23 RX ADMIN — ACETAMINOPHEN 325MG 650 MG: 325 TABLET ORAL at 20:21

## 2023-01-23 RX ADMIN — METHOCARBAMOL TABLETS 750 MG: 750 TABLET, COATED ORAL at 20:21

## 2023-01-23 RX ADMIN — OXYCODONE 5 MG: 5 TABLET ORAL at 17:17

## 2023-01-23 RX ADMIN — POTASSIUM CHLORIDE 20 MEQ: 400 INJECTION, SOLUTION INTRAVENOUS at 05:45

## 2023-01-23 RX ADMIN — GABAPENTIN 100 MG: 100 CAPSULE ORAL at 20:21

## 2023-01-23 RX ADMIN — FUROSEMIDE 40 MG: 10 INJECTION, SOLUTION INTRAMUSCULAR; INTRAVENOUS at 15:06

## 2023-01-23 RX ADMIN — GABAPENTIN 100 MG: 100 CAPSULE ORAL at 13:43

## 2023-01-23 RX ADMIN — AMOXICILLIN AND CLAVULANATE POTASSIUM 1 TABLET: 875; 125 TABLET, FILM COATED ORAL at 09:17

## 2023-01-23 RX ADMIN — SODIUM CHLORIDE, PRESERVATIVE FREE 10 ML: 5 INJECTION INTRAVENOUS at 20:22

## 2023-01-23 RX ADMIN — FAMOTIDINE 20 MG: 10 INJECTION, SOLUTION INTRAVENOUS at 09:18

## 2023-01-23 ASSESSMENT — PAIN SCALES - GENERAL
PAINLEVEL_OUTOF10: 0
PAINLEVEL_OUTOF10: 6
PAINLEVEL_OUTOF10: 0

## 2023-01-23 ASSESSMENT — PAIN DESCRIPTION - ORIENTATION: ORIENTATION: OUTER

## 2023-01-23 ASSESSMENT — PAIN DESCRIPTION - LOCATION: LOCATION: CHEST

## 2023-01-23 ASSESSMENT — PAIN DESCRIPTION - DESCRIPTORS: DESCRIPTORS: THROBBING

## 2023-01-23 NOTE — PROGRESS NOTES
Pt to CT scan and back. Pt tolerated fairly well. Scan was difficult due to patients body habitus, and inability to put arms above head due to surgery precautions. IV in 20 [facility] unable to be used for contrast. New IV inserted in CT. Now #20 RAC-great blood return, flushes easily.

## 2023-01-23 NOTE — PROCEDURES
Speech Language Pathology  Modified Barium Swallow Study  Facility/Department: [facility] C2 CARD TELEMETRY        Recommendations:  Diet recommendation: IDDSI 6 Soft and Bite Sized Solids; IDDSI 0 Thin Liquids; Meds whole in puree  Risk management: upright for all intake, stay upright for at least 30 mins after intake, oral care 2-3x/day to reduce adverse affects in the event of aspiration, general GERD precautions, and general aspiration precautions      NAME:Eddie Moya  : 1990 (34 y.o.)   MRN: [id number]  ROOM: [address] [place] [address]  ADMISSION DATE: 1/15/2023  PATIENT DIAGNOSIS(ES): Aortic dissection (HCC) [I71.00]  Chest wall discomfort [R07.89]  Type 1 dissection of ascending aorta [I71.010]  Chief Complaint   Patient presents with    Numbness     BLE numbness and tingling since 1900. Chest Pain     L pain, 9/10, non radiating. Resolved before arrival     Patient Active Problem List    Diagnosis Date Noted    Vocal fold paresis, left 2023    Type 1 dissection of ascending aorta 2023    Chest wall discomfort 2023    Aortic dissection ([address] 01/15/2023     Past Medical History:   Diagnosis Date    Influenza A 2020    Marfan syndrome      Past Surgical History:   Procedure Laterality Date    FEMORAL-FEMORAL BYPASS GRAFT Bilateral 2023    FEMORAL FEMORAL BYPASS performed by Lauryn Savage MD at [address] Inf N/A 1/15/2023    ASCENDING AORTA  REPLACEMENT WITH TUBE GRAFT USING AN EPPERSON GRAFT SIZE 28CM  ANTEGRADE FLOW TO THE LEFT LEG, RESUSPENSION OF AORTIC VALVE performed by Evette Olmos MD at [address]     No Known Allergies    Current Diet Solid Consistency: NPO pending MBS  Current Diet Liquid Consistency: NPO pending MBS    Date of Prior Study: none    Recent CXR/CT of Chest: CTA chest 22  1. Extensive thoracoabdominal aortic dissection appears similar in extent   with aneurysmal dilatation of the ascending thoracic aorta.   Slight interval increase in extent of intraluminal thrombus. 2.  Suspected pneumatosis within the right colon, concerning for mesenteric   ischemia. Clinical correlation recommended. 3.  Median sternotomy with trace right pneumothorax. Small new pericardial   effusion, possibly hemopericardium. 4.  Patent femoral-femoral bypass graft. 5.  Additional findings, as above. ENT evaluation 1/22: endoscopy revealed Left TVF paresis: \"Left true vocal fold is paretic and not contacting the right vocal fold when trying to voice\"      Patient Complaints/Reason for Referral:  Paula Lopez was referred for a MBS to assess the efficiency of his/her swallow function, assess for aspiration, and to make recommendations regarding safe dietary consistencies, effective compensatory strategies, and safe eating environment. BSE completed 1/18/23 with no overt s/s aspiration appreciated, started on soft & Bite-sized diet and thin liquids, pills given whole in puree. Per RN, pt was slow and appeared to use extra effort to swallow his pills. Made NPO 1/23/22 per Dr Alan Cox with cardiothoracic surgery due to [facility] up with no fever or obvious source. Asymptomatic aspiration is possible\"    Pain   Patient Currently in Pain: No    General Comments: Pt body habitus prevented use of MBS imaging (shoulders too broad to fit between the fluoroscopy towers). C-Arm used to view the oropharynx in lateral view and recorded in PACS system.       Medical record review/interview:   Predisposing dysphagia risk factors: N/A  Clinical signs of possible chronic dysphagia: N/A  Precipitating dysphagia risk factors: reduced physical mobility, increased O2 demands, and recent intubation, recent aortic dissection surgery and post-op Left vocal fold paresis    Impressions:  Treatment Dx and ICD 10: R13.12 (working dx prior to [facility] completion)  Radiologist: Dr Maria T Rosas  Referring MD: Dr Luciano Ramirez      Oral Preparation / Oral Phase  Oral phase comment: Mildly prolonged oral collection and propulsion time with soft solid. Trace BOT residue is observed post swallow of soft solid, clearing with liquid swallow. Functional oral collection and control with purees and liquids. Pharyngeal Phase  Pharyngeal phase comment: Trace penetration is observed during the swallow of thin liquid X1 which completely clears with completion of the swallow. Reduced tongue base retraction results in small amount of BOT residue with soft solid, clearing with liquid. No aspiration is appreciated across all trials. Esophageal Phase  Note trace retropulsion of thin liquid to the UES X1 a few seconds after the swallow, remaining subglottic and clearing with repeat swallows. Aspiration Scale   2   1 Material does not enter the airway     X 2 Material enters the airway, remains above the vocal folds, and is ejected from the airway    3 Material enters the airway, remains above the vocal folds, and is not ejected from the airway    4 Material enters the airway, contacts the vocal folds, an is ejected from the airway    5 Material enters the airway, contacts the vocal folds, and is not ejected from the airway    6 Material enters the airway, passes below the vocal folds and is ejected into the larynx or out of the airway    7 Material enters the airway, passes below the vocal folds, and is not ejected from the trachea despite effort    8 Material enters the airway, passes below the vocal folds, and no effort is made to eject. Compensatory Swallowing Strategies Attempted: smaller bolus, larger bolus, dry swallows  Postural Changes and/or Swallow Maneuvers Trialed: none  Patient Position: Lateral, Patient Degrees: 90 degrees, Seated upright in [facility] chair  Consistencies Administered: thin liquid by cup and straw, puree, and soft solid coated with puree. Recommendations:  Diet recommendation: IDDSI 6 Soft and Bite Sized Solids; IDDSI 0 Thin Liquids;  Meds whole in puree  Risk management: upright for all intake, stay upright for at least 30 mins after intake, oral care 2-3x/day to reduce adverse affects in the event of aspiration, general GERD precautions, and general aspiration precautions    Safe Swallow Protocol:  Supervision: Set up for positioning, independent with feeding  Compensatory Swallowing Strategies: HOB 90* and 30\" after meals; swallow twice, alternate bites with sips, toothbrushing before meals and before bedtime, rinse mouth after meals      Behavior/Cognition/Vision/Hearing:  Behavior/Cognition:  WFL for completion of this examination    Recommendations/Treatment  Requires SLP Intervention: Yes  D/C Recommendations: plan ARU  Postural Changes and/or Swallow Maneuvers: swallow twice, with solids, alternate bites with sips    Recommended Exercises: BOT strengthening, vocal fold adduction if approved by ENT  Therapeutic Interventions: diet tolerance monitoring, patient/family education, oral care     Education: Images and recommendations were reviewed with pt following this exam.   Patient Education Response: pt repeated recommendations with aphonic vocal quality    Prognosis for safe diet advancement: good  Barriers to reach goals: none for swallow  Duration/Frequency of Treatment: 2-3x/wk  Safety Devices in place: Yes  Type of devices: Pt left MBSS in no distress; left with transport      Goals:    Long Term:   Timeframe for Long-term Goals: 7 days 1/30/23  The patient will tolerate least restrictive diet with no clinical s/s of aspiration or worsening respiratory/pulmonary status    Short Term:   Timeframe for Short-term Goals: 5 days 1/27/23  The patient will tolerate recommended diet with no clinical s/s of aspiration 5/5  The patient will tolerate therapeutic diet upgrade trials with no clinical s/s of aspiration 5/5 (Solids)  The patient/caregiver will demonstrate understanding of compensatory swallow strategies, for improved swallow safety (swallow twice, upright position, alternate bites with sips)    Goal Added per MBS:   Pt will complete tongue base retraction exercises with min cues    Therapy Time:   Individual Concurrent Group Co-treatment   Time In 1110         Time Out 1130         Minutes 20 min             Signature: Blake Felipe MS CCC-SLP  Speech Language Pathologist

## 2023-01-23 NOTE — OP NOTE
Operative Note      Patient: Nicolas Alpers  YOB: 1990  MRN: [id number]    Date of Procedure: 1/15/2023    Pre-Op Diagnosis: THORASCIC AORTIC DISECTION    Post-Op Diagnosis: Same       Procedure(s):  ASCENDING AORTA  REPLACEMENT WITH TUBE GRAFT USING AN EPPERSON GRAFT SIZE 28CM  ANTEGRADE FLOW TO THE LEFT LEG, RESUSPENSION OF AORTIC VALVE    Surgeon(s):  Nuvia Holley MD    Assistant:   Surgical Assistant: Stephanie Todd    Anesthesia: General    Estimated Blood Loss (mL): 781    Complications: None    Specimens:   ID Type Source Tests Collected by Time Destination   A : AORTA  Tissue Tissue SURGICAL PATHOLOGY (Canceled) Nuvia Holley MD 1/16/2023 0428        Implants:  Implant Name Type Inv. Item Serial No.  Lot No. LRB No. Used Action   GRAFT VASC L30CM [id number] THOR CLLGN SFT FLX WVN DBL DARSHAN - [id number] Vascular grafts GRAFT VASC L30CM [id number] THOR CLLGN SFT FLX WVN DBL DARSHAN [id number] [facility] [id number] N/A 1 Implanted      Findings: Artificial bypass by using an antegrade catheter to the left femoral artery connected to the side arm from the perfusion catheter arterial to the left leg was performed. Dissection was in the ascending aorta about 1 cm from the sinotubular junction. Aortic valve was intact. Detailed Description of Procedure:     Taken to the operating room after informative consent was obtained lying supine under general anesthesia emergently prepped and draped in standard fashion post for the course was done in standard manner we started by cutdown and the groin fortunately patient is morbidly obese and that took significant amount of time to identify his femoral artery and the right and especially the left side where there is no actual pulse. Once we identified both side strength suture was placed at the right femoral artery. Right femoral vein. And antegrade wire through a pursestring was placed in the left femoral artery.   Midline incision was made Bovie was used to cut through the subcutaneous tissue and fat sternotomy was performed pericardial was opened side of initial tear was identified by echo   After heparinization patient was placed on cardiopulmonary bypass through peripheral common femoral artery and common femoral vein right side we used the side door of the arterial cannula to connect to the antegrade catheter was placed in the left femoral to improve the blood flow through the left leg as it was ischemic through the case lactic acid was watched and shows significant trend down.  Patient was cooled down to 30 degree clear bradycardia was seen cross-clamp was applied as close as possible to the brachiocephalic takeoff aorta was opened at the site of the tear ostial cardioplegia was administered to the left and right 600 cc to the left and 400 cc to the right good diastolic arrest was achieved aorta was dissected off and transected at the level of the sinotubular junction and distally as 1 cm from the cross-clamp.  Tube graft size 28 was sewn distally by using 4-0 Prolene in a running fashion and proximally to the sinotubular junction by using 4-0 Prolene aortic valve was resuspended in 3 positions all commissures with 4-0 Prolene.  De-airing cannula was placed at the middle of the graft de-airing was performed patient was warmed to normal temperature cross-clamp was removed 2 repair stitches was placed distally hemostasis was secured patient was packed and protamine was administered tolerated well ACT returned back to normal numbers.  Chest was left packing at this point decannulation's of common femoral artery common femoral vein on the right side was performed pursestring was closed hemostasis was secured good dopplerable pulse distal to the cannulation site was heard at the right side left side antegrade cannula was removed the pursestring was closed in an interrupted fashion we could see dopplerable pulse improve significant pulsatile flow into  the left although it is subnormal.  Compare to the right side. Hemostasis was secured no active bleeding was seen packing from the chest was removed good hemostasis was seen pericardium was approximated 1 mediastinal tube point chest tube was placed sternotomy was closed in 4 positions soft tissue and skin was closed counts were told was correct patient was dispositioned to recovery room in stable condition without no complication.       Electronically signed by Ebony Pearson MD on 1/22/2023 at 9:03 PM

## 2023-01-23 NOTE — PROGRESS NOTES
Shift: [id number]    Procedure: ASCENDING AORTA VALVE REPLACEMENT WITH TUBE GRAFT USING AN EPPERSON GRAFT SIZE 28CM  ANTEGRADE FLOW TO THE LEFT LEG, RESUSPENSION OF AORTIC VALVE    Admit from OR (time and date): 1/16/23 0630    Transition (time and date):     Surgery, return to OR yes - Fem - Fem bypass with Dr. Danielle Saunders at handoff  stable    Most recent vitals: BP (!) 101/58   Pulse (!) 110   Temp 98.3 °F (36.8 °C) (Oral)   Resp 20   Ht 6' 4\" (1.93 m)   Wt 270 lb 15.1 oz (122.9 kg)   SpO2 93%   BMI 32.98 kg/m²      Increased O2 requirements: no O2 requirements: Oxygen Therapy  SpO2: 93 %  Pulse Oximetry Type: Continuous  SPO2 High Alarm Limit: 100  SPO2 Low Alarm Limit POX: 90  Pulse Oximeter Device Mode: Continuous  Pulse Oximeter Device Location: Left, Finger  O2 Device: None (Room air)  Oximetry Probe Site Changed: Yes  Skin Assessment: Clean, dry, & intact  Skin Protection for O2 Device: Yes  Orientation: Middle  Location: Nose  FiO2 : 60 %  O2 Flow Rate (L/min): 2 L/min  O2 Delivery Method: Nasal cannula  Vent Mode: (S) AC/VC (pt placed on sbt 5/5 at 1220)     Admission weight Weight: (!) 340 lb (154.2 kg)  Today's weight   Wt Readings from Last 1 Encounters:   01/23/23 270 lb 15.1 oz (122.9 kg)         EF: unknown    Drop in Urinary Output no     Rhythm Changes Normal Sinus Rhythm 90's    Pacing Wires Removed:  [] Yes  [x] NO  [] Platelets < 43,530  [] Arrhythmia  [] Bradycardia [] Valve Replacement  [] Pacing for Cardiac Index  []Physician Order    Lines/Drains  LDA Insertion Date Discontinued Date Dressing Changes   Art line 1/16 1/19    Central Line 1/16 1/20    Colin 1/16 1/20    Chest Tube P: 1/16  M: 1/16 M: 1/19  P:1/20    Wires  1/16 1/19    ETT 1/16 1/17    TYRELL Drain      VasCath      Impella        Interventions After Office Hours  Problem(Brief) Date Time Intervention Physician contacted                                               Drip rates at handoff:    sodium chloride 5 mL/hr at 01/16/23 1453    dextrose         Hospital Course:  POD# DOS  -Ca replaced  -Maretta Siskin given for  5.9 K -VSS  -Fem to Fem bypass with Dr. Burgess Lassiter @ [id number]  -OGT LWS    POD NOS:  -Levo weaned slightly. -Remains on levo, esmolol, insulin, fentanyl, and versed.  -Fi02 weaned on vent. -Wakes up, moves all extremities, follows commands. POD#3 01/18:   -HR in 90s, BP 110s-120s overnight   -notified on call CTS about blood sugar 74 and 23 units Lantus order. Will hold Lantus right now per order, and new order of low dose sliding scale order noted. Blood glucose recheck 118.   -Wife Bairon Valencia updated. -K replaced, Ca replace. -IS practiced with patient this shift   -bathed, CTS dressing changed   -thi morning pt's weight is 148.4kg  -Total urine output: 4050ml on Lasix gtt   -total MCT output: 0ml  -total PCT output: 6ml  -Daily BMP, CBC, CXR completed    POD#2 1/19:  -V wires pulled, mediastinal CT pulled, Flotrac DC'd  -up to chair with lift- ARU consulted  -metoprolol increased    POD#3 1/20:    - metoprolol increased   CT, F/C and Central line removed. PT/RN slings to chair. Per Dr. Burgess Lassiter wound vacs to remain through Sunday. Overnight:   - small BM   - endorsement of improved pain control   - HR sustaining in low 100's despite increased lopressor dose; BP remaining stable    POD#4 1/21:  VSS. Pt had several bowel movements throughout the day. Pt stood up with attempt for bedside commode but remains very weak so was not attempted further. Pt was moved to recliner twice with sling assist. Pt and wife requested melatonin and ENT consult (hoarse and low voice post extubation) which was ordered by Dr Chang Willis. Overnight:   - uneventful course   - HR remaining in low 100's despite nightly lopressor    POD#5 1/22 night  - still tachycardic.  Rate 100-110bpm  - K-replacement given       Lab Data:  CBC:   Recent Labs     01/22/23  0414 01/23/23  0420   WBC 23.6* 23.0*   HGB 11.7* 11.3*   HCT 35.4* 35.0*   MCV 81.6 82.6    480*     BMP:    Recent Labs     01/22/23  0414 01/23/23 0420   * 130*   K 4.2 3.8   CO2 26 26   BUN 32* 33*   CREATININE 0.8* 0.8*     LIVR:   Recent Labs     01/22/23  0414 01/23/23 0420   * 154*   * 135*     PT/INR:   Recent Labs     01/22/23 0414 01/23/23 0420   PROT 5.5* 6.1*     APTT: No results for input(s): APTT in the last 72 hours. ABG:   No results for input(s): PHART, FZR7IVM, PO2ART in the last 72 hours.        Electronically signed by Cherelle Beck RN on 1/23/2023 at 6:26 AM

## 2023-01-23 NOTE — PROGRESS NOTES
[facility]  HEAD AND NECK - ENT  PROGRESS  NOTE    Date of Service: 2023    ASSESSMENT: Ramona Reyna is a 34 y.o. male with left vocal fold paresis after ascending aortic valve replacement on 2023    PLAN/RECOMMENDATIONS:   Esophagram has been ordered by primary team to evaluate for aspiration  Discussed vocal fold injection with wife and patient    -Modified barium swallow did not reveal any signs of aspiration or penetration. They would like to continue to work with speech therapy before moving forward with any surgery. ENT will be available as needed to perform direct laryngoscopy with injection if so desired. SUBJECTIVE:   Significant overnight events: No   Maxx Pearl or his caregiver reports have no significant change in voice    OBJECTIVE:  Physical Exam:   Temp (24hrs), Av.3 °F (36.8 °C), Min:97.6 °F (36.4 °C), Max:98.9 °F (37.2 °C)    Vitals:    23 0000 23 0200 23 0400 23 0600   BP: (!) 98/59  101/60 (!) 101/58   Pulse: (!) 102  (!) 108 (!) 110   Resp: 24  22 20   Temp: 98.2 °F (36.8 °C) 98.2 °F (36.8 °C) 98.3 °F (36.8 °C) 98.3 °F (36.8 °C)   TempSrc: Oral Oral Oral Oral   SpO2: 92%  92% 93%   Weight:   270 lb 15.1 oz (122.9 kg)    Height:         I/O last 3 completed shifts:   In: 2550 [P.O.:2550]  Out: 3400 [Urine:3400]    General: in no apparent distress, well developed and well nourished, non-toxic, in no respiratory distress and acyanotic, alert and oriented times 3  Voice: hoarse in nature    Lab Studies:  Lab Results   Component Value Date    WBC 23.0 (H) 2023    HGB 11.3 (L) 2023    HCT 35.0 (L) 2023    MCV 82.6 2023     (H) 2023     Lab Results   Component Value Date    GLUCOSE 109 (H) 2023    BUN 33 (H) 2023    CREATININE 0.8 (L) 2023    K 3.8 2023     (L) 2023    CL 97 (L) 2023    CALCIUM 7.9 (L) 2023     Lab Results   Component Value Date    MG 2.80 (H) 2023     No results found for: PHOS  Lab Results   Component Value Date    ALKPHOS 89 01/23/2023     (H) 01/23/2023     (H) 01/23/2023    BILITOT 0.4 01/23/2023    PROT 6.1 (L) 01/23/2023       Michael Morris DO

## 2023-01-23 NOTE — PLAN OF CARE
Problem: Discharge Planning  Goal: Discharge to home or other facility with appropriate resources  Outcome: Progressing   Pt not ready for discharge at this time. Will need PT/OT eval. Will need SNF or rehab. Pt not ambulating well. Only able to stand, having trouble walking due to being so weak, and trouble moving left lower extremity. Problem: Skin/Tissue Integrity  Goal: Absence of new skin breakdown  Description: 1. Monitor for areas of redness and/or skin breakdown  2. Assess vascular access sites hourly  3. Every 4-6 hours minimum:  Change oxygen saturation probe site  4. Every 4-6 hours:  If on nasal continuous positive airway pressure, respiratory therapy assess nares and determine need for appliance change or resting period. Outcome: Progressing   Pt is at risk for skin breakdown. Pt will have skin assessments every shift, Q2 turns, heels elevated off of the bed, and friction and shear prevented when possible. Will continue to monitor for signs of skin breakdown and enforce prevention measures. Problem: Nutrition Deficit:  Goal: Optimize nutritional status  Outcome: Progressing   Provided pt with education and really reinforced importance of eating and protein. Problem: Pain  Goal: Verbalizes/displays adequate comfort level or baseline comfort level  Outcome: Progressing   Pt will be satisfied with pain control. Pt uses numeric pain rating scale with reassessments after pain med administration. Will continue to monitor progression throughout shift. Problem: Safety - Adult  Goal: Free from fall injury  Outcome: Progressing   Pt high fall risk. Instructed to use call light before getting out of bed. Call light within reach. Bed in low position. Bed/Chair alarm on. Will continue to monitor.

## 2023-01-23 NOTE — PLAN OF CARE
Problem: Discharge Planning  Goal: Discharge to home or other facility with appropriate resources  1/22/2023 2344 by Chad Young RN  Outcome: Progressing  1/22/2023 2144 by Dianne Begum RN  Outcome: Progressing  Flowsheets (Taken 1/22/2023 2000 by Chad Young RN)  Discharge to home or other facility with appropriate resources:   Identify barriers to discharge with patient and caregiver   Arrange for needed discharge resources and transportation as appropriate   Identify discharge learning needs (meds, wound care, etc)   Refer to discharge planning if patient needs post-hospital services based on physician order or complex needs related to functional status, cognitive ability or social support system     Problem: Skin/Tissue Integrity  Goal: Absence of new skin breakdown  Description: 1. Monitor for areas of redness and/or skin breakdown  2. Assess vascular access sites hourly  3. Every 4-6 hours minimum:  Change oxygen saturation probe site  4. Every 4-6 hours:  If on nasal continuous positive airway pressure, respiratory therapy assess nares and determine need for appliance change or resting period.   1/22/2023 2344 by Chad Young RN  Outcome: Progressing  1/22/2023 2144 by Dianne Begum RN  Outcome: Progressing     Problem: Pain  Goal: Verbalizes/displays adequate comfort level or baseline comfort level  1/22/2023 2344 by Chad Young RN  Outcome: Progressing  1/22/2023 2144 by Dianne Begum RN  Outcome: Progressing  Flowsheets (Taken 1/22/2023 2000 by Chad Young RN)  Verbalizes/displays adequate comfort level or baseline comfort level:   Encourage patient to monitor pain and request assistance   Assess pain using appropriate pain scale   Administer analgesics based on type and severity of pain and evaluate response   Implement non-pharmacological measures as appropriate and evaluate response   Consider cultural and social influences on pain and pain management   Notify Licensed Independent Practitioner if interventions unsuccessful or patient reports new pain     Problem: Safety - Adult  Goal: Free from fall injury  1/22/2023 2344 by Renetta Underwood RN  Outcome: Progressing  1/22/2023 2144 by Patricia Guerrero RN  Outcome: Progressing

## 2023-01-23 NOTE — PROGRESS NOTES
CVTS Cardiothoracic Progress Note:                                Chief Complaint:  Post op follow-up    Surgeries:    1/16/23 ASCENDING AORTIC REPLACEMENT WITH TUBE GRAFT USING AN EPPERSON GRAFT SIZE 28CM  ANTEGRADE FLOW TO THE LEFT LEG, RESUSPENSION OF AORTIC VALVE (Dr. Purnima Fox)     1/16/23:  Left to right femoral-femoral bypass using 8 mm internally  reinforced GORE Propaten graft. (Dr. Colette Estrada)     Post Op Course:      1/16 Patient intubated, sedated. PEEP 5, Fio2 85%. On several gtts for hemodynamic support at this time. 1/17 remains intubated, vent settings of 40% FiO2 with PEEP 5.    1/18 sitting up in bed on BiPap, awake, alert, moving all extremities. 1/19 sitting up in bed, his wife is visiting at bedside. 1/20 sitting up in bed, reports feeling better today. On 2L per NC.  1/21, HR in the low 100's, NSR, not much appetite. 1/22 Leukocytosis with no obvious source, CTA ordered, ENT consulted. 1/23 Appears that he has some degree of vocal cord paralysis. Concerned about aspiration. Will get a MBS this morning.     Past Medical History:   Diagnosis Date    Influenza A 03/12/2020    Marfan syndrome         Past Surgical History:   Procedure Laterality Date    FEMORAL-FEMORAL BYPASS GRAFT Bilateral 1/16/2023    FEMORAL FEMORAL BYPASS performed by Denisse Carballo MD at [facility] Inf N/A 1/15/2023    ASCENDING AORTA  REPLACEMENT WITH TUBE GRAFT USING AN EPPERSON GRAFT SIZE 28CM  ANTEGRADE FLOW TO THE LEFT LEG, RESUSPENSION OF AORTIC VALVE performed by Nadine Berrios MD at [address] [place] [address] as of 01/15/2023    (No Known Allergies)        Patient Active Problem List   Diagnosis    Aortic dissection (HCC)    Type 1 dissection of ascending aorta    Chest wall discomfort    Vocal fold paresis, left        Vital Signs: BP (!) 101/58   Pulse (!) 110   Temp 98.3 °F (36.8 °C) (Oral)   Resp 20   Ht 6' 4\" (1.93 m)   Wt 270 lb 15.1 oz (122.9 kg)   SpO2 93%   BMI 32.98 kg/m²  O2 Flow Rate (L/min): 2 L/min     Admission Weight: Weight: (!) 340 lb (154.2 kg)    Weight on 1/15 (154.2 kg) Pre-op (stated)  1/18 144.8 kg  1/19 148.4 kg   1/20 148 kg  1/21 140.4 kg  1/22 140.6 kg  1/23 122 kg - will ask RN to reweigh    Intake/Output:   Intake/Output Summary (Last 24 hours) at 1/23/2023 0956  Last data filed at 1/23/2023 0743  Gross per 24 hour   Intake 1800 ml   Output 2950 ml   Net -1150 ml      Extubation Time: 1/17 at 13:40     LABORATORY DATA:     CBC:   Recent Labs     01/21/23  0432 01/22/23  0414 01/23/23  0420   WBC 18.8* 23.6* 23.0*   HGB 11.4* 11.7* 11.3*   HCT 36.2* 35.4* 35.0*   MCV 83.1 81.6 82.6    421 480*     BMP:   Recent Labs     01/21/23  0432 01/22/23  0414 01/23/23  0420   * 135* 130*   K 3.7 4.2 3.8   CL 99 98* 97*   CO2 26 26 26   BUN 31* 32* 33*   CREATININE 0.9 0.8* 0.8*       CXRAY: 1/16/23  FINDINGS:   Medical devices: Endotracheal tube tip projects over the midthoracic trachea. Enteric tube courses past the diaphragm with distal tip not visualized. A   surgical drain projecting over the mediastinum is present. A left IJ   approach central venous sheath catheter is present, cinched along the lower   cervical soft tissues. Interval placement of median sternotomy wires. Mediastinum/Heart: No significant interval change in the cardiomediastinal   contours compared to prior exam.       Lungs: Patchy airspace disease is present projecting over the left lung apex   as well as the right mid lung. Pleura: No findings to suggest pneumothorax or large pleural effusion. Bones/Soft tissues: Nothing acute. Impression   Patchy airspace disease at the left lung apex as well as the right mid lung,   which is nonspecific but likely represents atelectasis. Asymmetric pulmonary   edema may have a similar appearance. Medical support devices as described above. CXR: 1/23   Impression   No acute finding or significant change. ___________________________________________________________    Subjective:   Dietary Intake: improving  Nausea: n/a   Pain Control: adequate  Complaints: none  Bowels: have moved, 1/22 most recently     Objective:   General appearance: awake, A&O, in nad  Lungs: Diminished bilaterally  Heart: S1S2; SR on monitor  Chest: symmetrical expansion with inspiration and expirations; no rocking of sternum noted   Abdomen: soft, nontender  Bowel sounds: normoactive  Kidneys:  Cr 0.8 ; UOP 2200 ml/shift  Wound/Incisions: Midsternal incision with dressing CDI; Pacing wires removed 1/19  Extremities: BLE pulses palpable, minimal swelling noted in BLE  Neurological: intact, non focal exam   Chest tubes/Drains: Chest tubes all out     Assessment:   Post-op: 7 days. Condition: In critical condition. Plan:   1. Cardiovascular: s/p ascending aorta replacement 1/16; also s/p L to Rt fem-fem bypass. ASA, statin, BB. Needs tight BP control, <140 mmHg. 2. Pulmonary: Saturating 93% on RA. Continue expansion maneuvers- is, oobtc, pt/ot, ambulation. Pt's voice is still hoarse 2/2 intubation. ENT following, appreciate assistance. Will get MBS. Still may need vocal cord injection. 3. Neurology: analgesia as needed. 4. Nephrology: Cr 0.8, UOP almost 2.2L mL/24 hrs. Continue lasix 40 mg IVP TID. Depending on his weight, may decrease his dose. 5. Endocrinology: BG stable, no Hx of DM, HgbA1C 5.6. 6. Hematology: acute blood loss anemia; H&H stable  Leukocytosis: WBC up to 23 this morning. Suspect aspiration, will await results of MBS. 7. Microbiology: nothing at this time    7. Nutrition: cardiac    8. Labs: labs & imaging reviewed as above    9. Post-op Drains/Wires: all out     10. D/C Goals: Anticipate ARU at d/c. Pre-cert initiated 0/25. Discussed with DCP.      11. Continue post-op care of patient in the ICU    Meds:    The patient is on a beta-blocker   The patient is NOT on an ace-i/ARB - 2/2 hypotension   The patient is on a statin   The patient is on oral antiplatelet therapy     ________________________________________________    MERCY Pruitt CNP  1/23/2023  9:56 AM

## 2023-01-23 NOTE — PROGRESS NOTES
Vascular    Palp L DP pulse with strong doppler signal.   Fem-fem graft patent by CTA  Groin Provena dressings to be removed this am.  Care pre CTS.

## 2023-01-23 NOTE — PROGRESS NOTES
Shift: [id number]    Procedure: ASCENDING AORTA VALVE REPLACEMENT WITH TUBE GRAFT USING AN EPPERSON GRAFT SIZE 28CM  ANTEGRADE FLOW TO THE LEFT LEG, RESUSPENSION OF AORTIC VALVE    Admit from OR (time and date): 1/16/23 0630    Transition (time and date):     Surgery, return to OR yes - Fem - Fem bypass with Dr. Master Tijerina at handoff  stable    Most recent vitals: /73   Pulse (!) 118   Temp 98.5 °F (36.9 °C) (Oral)   Resp 21   Ht 6' 4\" (1.93 m)   Wt 289 lb (131.1 kg)   SpO2 94%   BMI 35.18 kg/m²      Increased O2 requirements: no O2 requirements: Oxygen Therapy  SpO2: 94 %  Pulse Oximetry Type: Continuous  SPO2 High Alarm Limit: 100  SPO2 Low Alarm Limit POX: 90  Pulse Oximeter Device Mode: Continuous  Pulse Oximeter Device Location: Finger  O2 Device: None (Room air)  Oximetry Probe Site Changed: Yes  Skin Assessment: Clean, dry, & intact  Skin Protection for O2 Device: Yes  Orientation: Middle  Location: Nose  FiO2 : 60 %  O2 Flow Rate (L/min): 2 L/min  O2 Delivery Method: Nasal cannula  Vent Mode: (S) AC/VC (pt placed on sbt 5/5 at 1220)     Admission weight Weight: (!) 340 lb (154.2 kg)  Today's weight   Wt Readings from Last 1 Encounters:   01/23/23 289 lb (131.1 kg)         EF: unknown    Drop in Urinary Output no     Rhythm Changes Normal Sinus Rhythm 90's    Pacing Wires Removed:  [] Yes  [x] NO  [] Platelets < 74,514  [] Arrhythmia  [] Bradycardia [] Valve Replacement  [] Pacing for Cardiac Index  []Physician Order    Lines/Drains  LDA Insertion Date Discontinued Date Dressing Changes   Art line 1/16 1/19    Central Line 1/16 1/20    Colin 1/16 1/20    Chest Tube P: 1/16  M: 1/16 M: 1/19  P:1/20    Wires  1/16 1/19    ETT 1/16 1/17    TYRELL Drain      VasCath      Impella        Interventions After Office Hours  Problem(Brief) Date Time Intervention Physician contacted                                               Drip rates at handoff:    sodium chloride 5 mL/hr at 01/16/23 9803 dextrose         Hospital Course:  POD# DOS  -Ca replaced  -Bacilio Lopez given for  5.9 K -VSS  -Fem to Fem bypass with Dr. Kendrick Holder @ [phone]  -OGT LWS    POD NOS:  -Levo weaned slightly. -Remains on levo, esmolol, insulin, fentanyl, and versed.  -Fi02 weaned on vent. -Wakes up, moves all extremities, follows commands. POD#3 01/18:   -HR in 90s, BP 110s-120s overnight   -notified on call CTS about blood sugar 74 and 23 units Lantus order. Will hold Lantus right now per order, and new order of low dose sliding scale order noted. Blood glucose recheck 118.   -Wife Hany Patton updated. -K replaced, Ca replace. -IS practiced with patient this shift   -bathed, CTS dressing changed   -thi morning pt's weight is 148.4kg  -Total urine output: 4050ml on Lasix gtt   -total MCT output: 0ml  -total PCT output: 6ml  -Daily BMP, CBC, CXR completed    POD#2 1/19:  -V wires pulled, mediastinal CT pulled, Flotrac DC'd  -up to chair with lift- ARU consulted  -metoprolol increased    POD#3 1/20:    - metoprolol increased   CT, F/C and Central line removed. PT/RN slings to chair. Per Dr. Kendrick Holder wound vacs to remain through Sunday. Overnight:   - small BM   - endorsement of improved pain control   - HR sustaining in low 100's despite increased lopressor dose; BP remaining stable    POD#4 1/21:  VSS. Pt had several bowel movements throughout the day. Pt stood up with attempt for bedside commode but remains very weak so was not attempted further. Pt was moved to recliner twice with sling assist. Pt and wife requested melatonin and ENT consult (hoarse and low voice post extubation) which was ordered by Dr Myron Resendez. Overnight:   - uneventful course   - HR remaining in low 100's despite nightly lopressor    POD#6 1/23: Days  - Modified Barium Swallow eval completed. No restrictions to eating. CTS and ENT updated.   - ARU precert completed, awaiting insurance approval  - Remains weak (especially lower extremities) only taking a few steps assisted. Lab Data:  CBC:   Recent Labs     01/22/23 0414 01/23/23 0420   WBC 23.6* 23.0*   HGB 11.7* 11.3*   HCT 35.4* 35.0*   MCV 81.6 82.6    480*       BMP:    Recent Labs     01/22/23 0414 01/23/23 0420   * 130*   K 4.2 3.8   CO2 26 26   BUN 32* 33*   CREATININE 0.8* 0.8*       LIVR:   Recent Labs     01/22/23 0414 01/23/23 0420   * 154*   * 135*       PT/INR:   Recent Labs     01/22/23 0414 01/23/23 0420   PROT 5.5* 6.1*       APTT: No results for input(s): APTT in the last 72 hours. ABG:   No results for input(s): PHART, GIC0HBQ, PO2ART in the last 72 hours.        Electronically signed by Erik Lewis RN on 1/23/2023 at 6:15 PM

## 2023-01-23 NOTE — PROGRESS NOTES
Speech Language Pathology    MBS completed. No penetration or aspiration appreciated across multiple and successive swallows of thin liquid, puree and soft solids. From a swallow standpoint, recommend resuming soft and bite sized foods as per clinical swallow evaluation, Thin liquids with normal aspiration precautions (upright and alert for all PO, stop if alertness respiratory status declines). Full report to follow. Michaela Felipe MS CCC-SLP  Speech Language Pathologist

## 2023-01-23 NOTE — CARE COORDINATION
[facility] Acute Rehab Unit   After review, this patient is felt to be:       []  Appropriate for Acute Inpatient Rehab    [x]  Appropriate for Acute Inpatient Rehab Pending Insurance Authorization    []  Not appropriate for Acute Inpatient Rehab    []  Referral received and ARU reviewing patient; Evaluation ongoing. Pre-cert initiated with [employer]. Belinda BENITEZ notified. Family in agreement with plan. Will notify DCP with further updates.  Thank you for the referral.    James Aguirre, CCC-SLP/ CBIS   Clinical Liaison

## 2023-01-23 NOTE — CARE COORDINATION
ARU can accept and have initiated pre cert with [employer]. Wife at bedside and was updated. Vance Dennis APRN/CTS also updated. Following.     Allen Yost RN

## 2023-01-23 NOTE — PROGRESS NOTES
Occupational Therapy  Facility/Department: [facility] C2 CARD TELEMETRY  Daily Treatment Note  NAME: Charleen Messina  : 1990  MRN: [id number]    Date of Service: 2023    Discharge Recommendations:  IP Rehab  OT Equipment Recommendations  Equipment Needed: No      Patient Diagnosis(es): The primary encounter diagnosis was Dissecting aneurysm of thoracic aorta, Leo type A. Diagnoses of Chest wall discomfort, Weakness of right lower extremity, and Weakness of left lower extremity were also pertinent to this visit. Assessment    Assessment: Pt tolerated treatment session fairly. Pt tolerated ~10 min seated at EOB with intital max A progressing to min A/CGA during UB/LB exercises. Engaged pt in standing trial this date with use of thuan plus. Pt able to perform full stance for ~2 mins while in thuan plus with mod Ax2. Pt demonstrates improved trunk strength and ability to tolerate therapy session this date. OT rec changing to IPR to further address ADL and mobility deficits. Activity Tolerance: Patient tolerated treatment well;Patient limited by fatigue;Patient limited by endurance  Discharge Recommendations: IP Rehab  Equipment Needed: No      Plan   Occupational Therapy Plan  Times Per Week: 4-6 x/ week  Current Treatment Recommendations: ROM;Balance training;Functional mobility training; Endurance training;Patient/Caregiver education & training; Safety education & training;Positioning;Self-Care / ADL     Restrictions  Restrictions/Precautions  Restrictions/Precautions: Fall Risk;NPO;Surgical Protocols; General Precautions;Cardiac  Position Activity Restriction  Sternal Precautions: No Pushing; No Pulling;5# Lifting Restrictions  Other position/activity restrictions: ICU monitoring, sternal wound vac    Subjective   Subjective  Subjective: Pt presented in bed, pleasant, and agreeable to OT/PT cotx  Pain: Pt denies pain  Orientation  Overall Orientation Status: Within Functional Limits  Orientation Level: Oriented X4  Pain: Pt denies pain at rest.  Cognition  Overall Cognitive Status: Exceptions  Arousal/Alertness: Delayed responses to stimuli  Following Commands: Follows one step commands with increased time  Attention Span: Attends with cues to redirect  Memory: Decreased long term memory;Decreased recall of biographical Information;Decreased short term memory  Safety Judgement: Decreased awareness of need for safety  Problem Solving: Decreased awareness of errors  Insights: Not aware of deficits  Initiation: Requires cues for some  Sequencing: Requires cues for some        Objective    Vitals  Vitals  Heart Rate: (!) 107  Heart Rate Source: Monitor  BP: (!) 100/47  MAP (Calculated): 65  SpO2: 92 %  O2 Device: None (Room air)  Bed Mobility Training  Supine to Sit: Maximum assistance;Assist X2;Additional time (moving toward L side, HOB elevated ~45 degrees, increased effort during transfer)  Scooting: Maximum assistance;Assist X2;Additional time  Balance  Sitting: Impaired  Sitting - Static: Good (unsupported); Unsupported (seated at EOB for ~10 mins, CGA unsupported sitting)  Sitting - Dynamic: Poor (constant support); Unsupported (at EOB, up to mod A when completing LB exercises)  Standing: Impaired (~2 mins in thuan plus)  Standing - Static: Poor;Constant support  Standing - Dynamic: Poor;Constant support  Transfer Training  Transfer Training: Yes  Overall Level of Assistance: Total assistance;Assist X2;Adaptive equipment Génesis Pillow Plus for STS from EOB. mod Ax2 with lift device.)  Sit to Stand: Total assistance;Assist X2;Adaptive equipment Génesis Pillow Plus for STS from EOB. mod Ax2 with lift device.)  Stand to Sit: Assist X2;Total assistance; Adaptive equipment Génesis Pillow Plus for STS from EOB. mod Ax2 with lift device.)  Bed to Chair: Total assistance;Assist X2;Adaptive equipment Génesis Pillow Plus for STS from EOB.  mod Ax2 with lift device.)  Gait Training  Gait Training: No (pt too weak to safely perform at this time)     ADL  Feeding: Stand by assistance; Beverage management  Grooming: Stand by assistance  LE Dressing: Dependent/Total  LE Dressing Skilled Clinical Factors: sock managemetn  Additional Comments: Pt declining further ADLs  OT Exercises  A/AROM Exercises: while seated at EOB, x10 wrist flexion, gross , bicep curls. While seated in chair x10 scapular elevation  Static Sitting Balance Exercises: ~10 mins seated at EOB with initial max A up progressing to min-CGA  Static Standing Balance Exercises: ~2 min stance in thuan plus with mod Ax2     Safety Devices  Type of Devices: Patient at risk for falls;Nurse notified;Call light within reach; Heels elevated for pressure relief; All nikolas prominences offloaded;Left in chair;Chair alarm in place;Gait belt; All fall risk precautions in place     Patient Education  Education Given To: Patient  Education Provided: Role of Therapy;Precautions;Plan of Care  Education Provided Comments: Disease Specific Education: Pt educated on importance of OOB mobility, prevention of complications of bedrest, and general safety during hospitalization. Pt verbalized understanding  Education Method: Demonstration;Verbal  Barriers to Learning: Cognition  Education Outcome: Verbalized understanding;Continued education needed    AM-PAC score  AM-[facility] Inpatient Daily Activity Raw Score: 8 (01/20/23 1626)  AM-PAC Inpatient ADL T-Scale Score : 22.86 (01/20/23 1626)  ADL Inpatient CMS 0-100% Score: 85.69 (01/20/23 1626)  ADL Inpatient CMS G-Code Modifier : CM (01/20/23 1626)    Goals  Short Term Goals  Time Frame for Short Term Goals: 1 week(1-25-23)- all goals ongoing 1/20  Short Term Goal 1: mod assist of 2 with functional transfers by 1-25-23  Short Term Goal 2: min assist with UE self care seated in chair by 1-23-23- GOAL MET 1/23  Short Term Goal 3: supervision with 15 reps BUE AROM  Short Term Goal 4: min cues for sternal precautions for transfers;   Patient Goals   Patient goals : Unable to verbalize at this time     Therapy Time   Individual Concurrent Group Co-treatment   Time In 1134         Time Out 1212         Minutes 38         Timed Code Treatment Minutes: 38 Minutes     If pt is unable to be seen after this session, please let this note serve as discharge summary.  Please see case management note for discharge disposition.  Thank you.    Edith Beavers OT

## 2023-01-23 NOTE — PROGRESS NOTES
Physical Therapy  Facility/Department: [facility] C2 CARD TELEMETRY  Daily Treatment Note  NAME: Sergey Mcdonough  : 1990  MRN: [id number]    Date of Service: 2023    Discharge Recommendations:  IP Rehab  PT Equipment Recommendations  Equipment Needed: No    [facility] 6 Clicks Inpatient Mobility:  AM-PAC Mobility Inpatient   How much difficulty turning over in bed?: A Lot  How much difficulty sitting down on / standing up from a chair with arms?: A Lot  How much difficulty moving from lying on back to sitting on side of bed?: A Lot  How much help from another person moving to and from a bed to a chair?: A Lot  How much help from another person needed to walk in hospital room?: Total  How much help from another person for climbing 3-5 steps with a railing?: Total  AM-PAC Inpatient Mobility Raw Score : 10  AM-PAC Inpatient T-Scale Score : 32.29  Mobility Inpatient CMS 0-100% Score: 76.75  Mobility Inpatient CMS G-Code Modifier : CL      Patient Diagnosis(es): The primary encounter diagnosis was Dissecting aneurysm of thoracic aorta, Leo type A. Diagnoses of Chest wall discomfort, Weakness of right lower extremity, and Weakness of left lower extremity were also pertinent to this visit. Assessment   Assessment: Pt with improved participation with PT today; seen in conjunction with OT for co-treat session in light of assist requirements and decreased stamina, in order to safely progress functional mobility. Pt still quite weak and deconditioned compared to baseline, requiring maxA x 2 for bed mobility and modA x 2 for sit<>stand and bed>chair transfers with Yehuda Russel Plus lift. Pt requires cues for sternal precautions during transfers. Given pt's young age, current deficits, and slow but steady progress with therapy over the last few sessions, recommend IP rehab at D/C. Pt seems motivated to work with therapy and return to baseline LOF.   Activity Tolerance: Patient tolerated treatment well;Patient limited by fatigue;Patient limited by endurance  Equipment Needed: No     Plan    General Plan: 5-7 times per week  Specific Instructions for Next Treatment: Progress ther ex and mobility as tolerated, build standing tolerance  Current Treatment Recommendations: Strengthening;ROM;Balance training;Gait training;Home exercise program;Safety education & training;Stair training;Functional mobility training;Neuromuscular re-education;Patient/Caregiver education & training;Transfer training; Therapeutic activities; Endurance training;Equipment evaluation, education, & procurement;Positioning     Restrictions  Restrictions/Precautions  Restrictions/Precautions: Fall Risk, NPO, Surgical Protocols, General Precautions, Cardiac  Position Activity Restriction  Sternal Precautions: No Pushing, No Pulling, 5# Lifting Restrictions  Other position/activity restrictions: ICU monitoring, sternal wound vac     Subjective    Subjective: Pt agreeable to work with PT this morning, pleasant and cooperative. Voices significant c/o fatigue this morning but willing to try and stand with [employer] Plus lift. Pain: Pt denies pain at rest.  Overall Orientation Status: Within Functional Limits  Orientation Level: Oriented X4    Objective   Vitals  /47,  bpm, SpO2 92% on room air. Vitals taken with pt seated EOB. Bed Mobility Training  Supine to Sit: Maximum assistance;Assist X2;Additional time (moving toward L side, HOB elevated ~45 degrees, increased effort during transfer)  Scooting: Maximum assistance;Assist X2;Additional time    Balance  Sitting: Impaired  Sitting - Static: Good (unsupported); Unsupported (seated at EOB for ~10 mins, CGA unsupported sitting)  Sitting - Dynamic: Poor (constant support); Unsupported (at EOB, up to mod A when completing LB exercises)  Standing: Impaired (~2 mins in thuan plus)  Standing - Static: Poor;Constant support  Standing - Dynamic: Poor;Constant support    Transfer Training  Overall Level of Assistance: Total assistance;Assist X2;Adaptive equipment Jeffrey Last Plus for STS from EOB. mod Ax2 with lift device.)  Sit to Stand: Total assistance;Assist X2;Adaptive equipment Jeffrey Last Plus for STS from EOB. mod Ax2 with lift device.)  Stand to Sit: Assist X2;Total assistance; Adaptive equipment Jeffrey Last Plus for STS from EOB. mod Ax2 with lift device.)  Bed to Chair: Total assistance;Assist X2;Adaptive equipment Jeffrey Last Plus for STS from EOB. mod Ax2 with lift device.)    Gait Training  Gait Training: No (pt too weak to safely perform at this time)    PT Exercises  Exercise Treatment: x 12 BLE: Ankle pumps, LAQ, seated hip flexion    Safety Devices  Type of Devices: Patient at risk for falls;Nurse notified;Call light within reach; Heels elevated for pressure relief; All nikolas prominences offloaded;Left in chair;Chair alarm in place;Gait belt; All fall risk precautions in place     Goals  Short Term Goals  Time Frame for Short Term Goals: 1/27/23  Short Term Goal 1: pt will complete bed mobility max Ax2 - MET 1/23/23. Goal upgraded: Pt will transfer supine <-> sit with modA x 2. Short Term Goal 2: pt will sit EOB for 5 min with mod A - MET 1/23/23. Goal upgraded: Pt will tolerate sitting EOB x 10 minutes with Kostas x 1 or less to maintain upright trunk. Short Term Goal 3: pt will complete transfers with max Ax2 - MET 1/23/23. Goal upgraded: Pt will transfer sit <-> stand and bed>chair using RW with modA x 2. Short Term Goal 4: Assess gait when safely able and set appropriate goal  Short Term Goal 5: pt will participate in 12-15 reps of BLE exercises by 1/23/22 -- MET 1/20/23  Additional Goals?: No  Patient Goals   Patient Goals : \"To get stronger\"    Education  Patient Education  Education Given To: Patient; Family (pt & wife)  Education Provided: Role of Therapy;Plan of Care;Home Exercise Program;Transfer Training;Equipment; Family Education;Precautions; Energy Conservation  Education Method: Demonstration;Verbal  Barriers to Learning: None  Education Outcome: Verbalized understanding;Demonstrated understanding;Continued education needed    Therapy Time   Individual Concurrent Group Co-treatment   Time In 2809         Time Out 1216         Minutes 40         Timed Code Treatment Minutes: Tami [place], [place] #[id number]    If pt is unable to be seen after this session, please let this note serve as discharge summary. Please see case management note for discharge disposition. Thank you.

## 2023-01-24 LAB
A/G RATIO: 0.9 (ref 1.1–2.2)
ALBUMIN SERPL-MCNC: 2.8 G/DL (ref 3.4–5)
ALP BLD-CCNC: 94 U/L (ref 40–129)
ALT SERPL-CCNC: 143 U/L (ref 10–40)
ANION GAP SERPL CALCULATED.3IONS-SCNC: 12 MMOL/L (ref 3–16)
AST SERPL-CCNC: 133 U/L (ref 15–37)
ATYPICAL LYMPHOCYTE RELATIVE PERCENT: 4 % (ref 0–6)
BANDED NEUTROPHILS RELATIVE PERCENT: 3 % (ref 0–7)
BASOPHILS ABSOLUTE: 0 K/UL (ref 0–0.2)
BASOPHILS RELATIVE PERCENT: 0 %
BILIRUB SERPL-MCNC: 0.5 MG/DL (ref 0–1)
BUN BLDV-MCNC: 38 MG/DL (ref 7–20)
CALCIUM SERPL-MCNC: 8 MG/DL (ref 8.3–10.6)
CHLORIDE BLD-SCNC: 98 MMOL/L (ref 99–110)
CO2: 26 MMOL/L (ref 21–32)
CREAT SERPL-MCNC: 0.9 MG/DL (ref 0.9–1.3)
EOSINOPHILS ABSOLUTE: 0.5 K/UL (ref 0–0.6)
EOSINOPHILS RELATIVE PERCENT: 2 %
GFR SERPL CREATININE-BSD FRML MDRD: >60 ML/MIN/{1.73_M2}
GLUCOSE BLD-MCNC: 104 MG/DL (ref 70–99)
HCT VFR BLD CALC: 34.4 % (ref 40.5–52.5)
HEMATOLOGY PATH CONSULT: NO
HEMOGLOBIN: 10.9 G/DL (ref 13.5–17.5)
LYMPHOCYTES ABSOLUTE: 3.4 K/UL (ref 1–5.1)
LYMPHOCYTES RELATIVE PERCENT: 11 %
MCH RBC QN AUTO: 26.2 PG (ref 26–34)
MCHC RBC AUTO-ENTMCNC: 31.8 G/DL (ref 31–36)
MCV RBC AUTO: 82.4 FL (ref 80–100)
METAMYELOCYTES RELATIVE PERCENT: 3 %
MONOCYTES ABSOLUTE: 2.7 K/UL (ref 0–1.3)
MONOCYTES RELATIVE PERCENT: 12 %
MYELOCYTE PERCENT: 1 %
NEUTROPHILS ABSOLUTE: 16 K/UL (ref 1.7–7.7)
NEUTROPHILS RELATIVE PERCENT: 64 %
PDW BLD-RTO: 14.6 % (ref 12.4–15.4)
PLATELET # BLD: 509 K/UL (ref 135–450)
PLATELET SLIDE REVIEW: ADEQUATE
PMV BLD AUTO: 7.1 FL (ref 5–10.5)
POIKILOCYTES: ABNORMAL
POLYCHROMASIA: ABNORMAL
POTASSIUM REFLEX MAGNESIUM: 4.4 MMOL/L (ref 3.5–5.1)
RBC # BLD: 4.18 M/UL (ref 4.2–5.9)
SLIDE REVIEW: ABNORMAL
SODIUM BLD-SCNC: 136 MMOL/L (ref 136–145)
TOTAL PROTEIN: 5.8 G/DL (ref 6.4–8.2)
WBC # BLD: 22.5 K/UL (ref 4–11)

## 2023-01-24 PROCEDURE — 36415 COLL VENOUS BLD VENIPUNCTURE: CPT

## 2023-01-24 PROCEDURE — 6360000002 HC RX W HCPCS: Performed by: THORACIC SURGERY (CARDIOTHORACIC VASCULAR SURGERY)

## 2023-01-24 PROCEDURE — 6370000000 HC RX 637 (ALT 250 FOR IP): Performed by: STUDENT IN AN ORGANIZED HEALTH CARE EDUCATION/TRAINING PROGRAM

## 2023-01-24 PROCEDURE — 80053 COMPREHEN METABOLIC PANEL: CPT

## 2023-01-24 PROCEDURE — 51798 US URINE CAPACITY MEASURE: CPT

## 2023-01-24 PROCEDURE — 2580000003 HC RX 258: Performed by: THORACIC SURGERY (CARDIOTHORACIC VASCULAR SURGERY)

## 2023-01-24 PROCEDURE — 6370000000 HC RX 637 (ALT 250 FOR IP): Performed by: THORACIC SURGERY (CARDIOTHORACIC VASCULAR SURGERY)

## 2023-01-24 PROCEDURE — 97608 NEG PRS WND THER NDME>50SQCM: CPT

## 2023-01-24 PROCEDURE — 6360000002 HC RX W HCPCS: Performed by: NURSE PRACTITIONER

## 2023-01-24 PROCEDURE — 6370000000 HC RX 637 (ALT 250 FOR IP): Performed by: NURSE PRACTITIONER

## 2023-01-24 PROCEDURE — 97530 THERAPEUTIC ACTIVITIES: CPT

## 2023-01-24 PROCEDURE — 85025 COMPLETE CBC W/AUTO DIFF WBC: CPT

## 2023-01-24 PROCEDURE — 94669 MECHANICAL CHEST WALL OSCILL: CPT

## 2023-01-24 PROCEDURE — 99024 POSTOP FOLLOW-UP VISIT: CPT | Performed by: NURSE PRACTITIONER

## 2023-01-24 PROCEDURE — 97110 THERAPEUTIC EXERCISES: CPT

## 2023-01-24 PROCEDURE — 94640 AIRWAY INHALATION TREATMENT: CPT

## 2023-01-24 PROCEDURE — 2000000000 HC ICU R&B

## 2023-01-24 PROCEDURE — 92526 ORAL FUNCTION THERAPY: CPT

## 2023-01-24 RX ORDER — FUROSEMIDE 40 MG/1
40 TABLET ORAL 2 TIMES DAILY
Status: DISCONTINUED | OUTPATIENT
Start: 2023-01-24 | End: 2023-01-26 | Stop reason: HOSPADM

## 2023-01-24 RX ORDER — ALBUMIN (HUMAN) 12.5 G/50ML
SOLUTION INTRAVENOUS
Status: COMPLETED
Start: 2023-01-24 | End: 2023-01-24

## 2023-01-24 RX ADMIN — GABAPENTIN 100 MG: 100 CAPSULE ORAL at 20:04

## 2023-01-24 RX ADMIN — POTASSIUM CHLORIDE 20 MEQ: 20 TABLET, EXTENDED RELEASE ORAL at 17:08

## 2023-01-24 RX ADMIN — FAMOTIDINE 20 MG: 20 TABLET, FILM COATED ORAL at 20:04

## 2023-01-24 RX ADMIN — SODIUM CHLORIDE, PRESERVATIVE FREE 10 ML: 5 INJECTION INTRAVENOUS at 09:53

## 2023-01-24 RX ADMIN — METHOCARBAMOL TABLETS 750 MG: 750 TABLET, COATED ORAL at 13:44

## 2023-01-24 RX ADMIN — FUROSEMIDE 40 MG: 10 INJECTION, SOLUTION INTRAMUSCULAR; INTRAVENOUS at 09:53

## 2023-01-24 RX ADMIN — METHOCARBAMOL TABLETS 750 MG: 750 TABLET, COATED ORAL at 09:42

## 2023-01-24 RX ADMIN — Medication 400 MG: at 20:04

## 2023-01-24 RX ADMIN — ACETAMINOPHEN 325MG 650 MG: 325 TABLET ORAL at 20:04

## 2023-01-24 RX ADMIN — POTASSIUM CHLORIDE 20 MEQ: 20 TABLET, EXTENDED RELEASE ORAL at 09:47

## 2023-01-24 RX ADMIN — FAMOTIDINE 20 MG: 20 TABLET, FILM COATED ORAL at 09:42

## 2023-01-24 RX ADMIN — ACETAMINOPHEN 325MG 650 MG: 325 TABLET ORAL at 13:44

## 2023-01-24 RX ADMIN — ALBUTEROL SULFATE 2.5 MG: 2.5 SOLUTION RESPIRATORY (INHALATION) at 19:16

## 2023-01-24 RX ADMIN — POTASSIUM CHLORIDE 20 MEQ: 20 TABLET, EXTENDED RELEASE ORAL at 13:44

## 2023-01-24 RX ADMIN — ALBUTEROL SULFATE 2.5 MG: 2.5 SOLUTION RESPIRATORY (INHALATION) at 11:44

## 2023-01-24 RX ADMIN — AMOXICILLIN AND CLAVULANATE POTASSIUM 1 TABLET: 875; 125 TABLET, FILM COATED ORAL at 20:04

## 2023-01-24 RX ADMIN — BISACODYL 5 MG: 5 TABLET, COATED ORAL at 09:43

## 2023-01-24 RX ADMIN — ACETAMINOPHEN 325MG 650 MG: 325 TABLET ORAL at 09:47

## 2023-01-24 RX ADMIN — ALBUTEROL SULFATE 2.5 MG: 2.5 SOLUTION RESPIRATORY (INHALATION) at 15:45

## 2023-01-24 RX ADMIN — GABAPENTIN 100 MG: 100 CAPSULE ORAL at 13:44

## 2023-01-24 RX ADMIN — AMOXICILLIN AND CLAVULANATE POTASSIUM 1 TABLET: 875; 125 TABLET, FILM COATED ORAL at 09:42

## 2023-01-24 RX ADMIN — ALBUTEROL SULFATE 2.5 MG: 2.5 SOLUTION RESPIRATORY (INHALATION) at 08:23

## 2023-01-24 RX ADMIN — ACETAMINOPHEN 325MG 650 MG: 325 TABLET ORAL at 04:59

## 2023-01-24 RX ADMIN — Medication 5 MG: at 20:04

## 2023-01-24 RX ADMIN — FUROSEMIDE 40 MG: 40 TABLET ORAL at 17:08

## 2023-01-24 RX ADMIN — GABAPENTIN 100 MG: 100 CAPSULE ORAL at 09:42

## 2023-01-24 RX ADMIN — METOPROLOL TARTRATE 50 MG: 50 TABLET, FILM COATED ORAL at 09:42

## 2023-01-24 RX ADMIN — FONDAPARINUX SODIUM 2.5 MG: 2.5 INJECTION, SOLUTION SUBCUTANEOUS at 09:42

## 2023-01-24 RX ADMIN — ASPIRIN 81 MG: 81 TABLET, COATED ORAL at 09:42

## 2023-01-24 RX ADMIN — ATORVASTATIN CALCIUM 40 MG: 40 TABLET, FILM COATED ORAL at 20:04

## 2023-01-24 RX ADMIN — Medication 15 ML: at 20:05

## 2023-01-24 RX ADMIN — METHOCARBAMOL TABLETS 750 MG: 750 TABLET, COATED ORAL at 20:04

## 2023-01-24 RX ADMIN — METHOCARBAMOL TABLETS 750 MG: 750 TABLET, COATED ORAL at 17:08

## 2023-01-24 NOTE — CARE COORDINATION
Discussed with patient, wife, Teresa Harris and Dr. Maria T De Dios re: possibility of vocal fold injection. This procedure would need to be completed prior to admitting to ARU. Pros and cons discussed. Patient would like to go the non-invasive route and trial voice therapy while in ARU. If voice does not improve will consult OP ENT for possible injection. [employer] approved ARU. Pt medically appropriate for d/c per Teresa Harris. Pt to admit to ARU pending bed availability. Will continue to update the team.     Thank you.    Brant Humphrey M.A, CCC-SLP/ CBIS   [facility] Liaison

## 2023-01-24 NOTE — PLAN OF CARE

## 2023-01-24 NOTE — PROGRESS NOTES
CVTS Cardiothoracic Progress Note:                                Chief Complaint:  Post op follow-up    Surgeries:    1/16/23 ASCENDING AORTIC REPLACEMENT WITH TUBE GRAFT USING AN EPPERSON GRAFT SIZE 28CM  ANTEGRADE FLOW TO THE LEFT LEG, RESUSPENSION OF AORTIC VALVE (Dr. Ann)     1/16/23:  Left to right femoral-femoral bypass using 8 mm internally  reinforced GORE Propaten graft. (Dr. Thorpe)     Post Op Course:      1/16 Patient intubated, sedated. PEEP 5, Fio2 85%. On several gtts for hemodynamic support at this time.   1/17 remains intubated, vent settings of 40% FiO2 with PEEP 5.    1/18 sitting up in bed on BiPap, awake, alert, moving all extremities.  1/19 sitting up in bed, his wife is visiting at bedside.   1/20 sitting up in bed, reports feeling better today. On 2L per NC.  1/21, HR in the low 100's, NSR, not much appetite.  1/22 Leukocytosis with no obvious source, CTA ordered, ENT consulted.  1/23 Appears that he has some degree of vocal cord paralysis. Concerned about aspiration. Will get a MBS this morning.   1/24 sitting up in bed, wife visiting at bedside. MBS without any signs of aspiration.     Past Medical History:   Diagnosis Date    Influenza A 03/12/2020    Marfan syndrome         Past Surgical History:   Procedure Laterality Date    FEMORAL-FEMORAL BYPASS GRAFT Bilateral 1/16/2023    FEMORAL FEMORAL BYPASS performed by Elvis Thorpe MD at [facility] OR    THORACIC AORTIC ANEURYSM REPAIR N/A 1/15/2023    ASCENDING AORTA  REPLACEMENT WITH TUBE GRAFT USING AN EPPERSON GRAFT SIZE 28CM  ANTEGRADE FLOW TO THE LEFT LEG, RESUSPENSION OF AORTIC VALVE performed by Kia Ann MD at [facility] CVOR        Allergies as of 01/15/2023    (No Known Allergies)        Patient Active Problem List   Diagnosis    Aortic dissection (HCC)    Type 1 dissection of ascending aorta    Chest wall discomfort    Vocal fold paresis, left        Vital Signs: /61   Pulse 98   Temp 99.2 °F (37.3 °C) (Oral)   Resp 16   Ht 6'  4\" (1.93 m)   Wt 283 lb 4.7 oz (128.5 kg)   SpO2 93%   BMI 34.48 kg/m²  O2 Flow Rate (L/min): 2 L/min     Admission Weight: Weight: (!) 340 lb (154.2 kg)    Weight on 1/15 (154.2 kg) Pre-op (stated)  1/18 144.8 kg  1/19 148.4 kg   1/20 148 kg  1/21 140.4 kg  1/22 140.6 kg  1/23 122 kg - will ask RN to reweigh  1/24 128.5 kg    Intake/Output:   Intake/Output Summary (Last 24 hours) at 1/24/2023 1153  Last data filed at 1/24/2023 0600  Gross per 24 hour   Intake --   Output 2000 ml   Net -2000 ml      Extubation Time: 1/17 at 13:40     LABORATORY DATA:     CBC:   Recent Labs     01/22/23  0414 01/23/23  0420 01/24/23  0402   WBC 23.6* 23.0* 22.5*   HGB 11.7* 11.3* 10.9*   HCT 35.4* 35.0* 34.4*   MCV 81.6 82.6 82.4    480* 509*     BMP:   Recent Labs     01/22/23  0414 01/23/23  0420 01/24/23  0402   * 130* 136   K 4.2 3.8 4.4   CL 98* 97* 98*   CO2 26 26 26   BUN 32* 33* 38*   CREATININE 0.8* 0.8* 0.9       CXRAY: 1/16/23  FINDINGS:   Medical devices: Endotracheal tube tip projects over the midthoracic trachea. Enteric tube courses past the diaphragm with distal tip not visualized. A   surgical drain projecting over the mediastinum is present. A left IJ   approach central venous sheath catheter is present, cinched along the lower   cervical soft tissues. Interval placement of median sternotomy wires. Mediastinum/Heart: No significant interval change in the cardiomediastinal   contours compared to prior exam.       Lungs: Patchy airspace disease is present projecting over the left lung apex   as well as the right mid lung. Pleura: No findings to suggest pneumothorax or large pleural effusion. Bones/Soft tissues: Nothing acute. Impression   Patchy airspace disease at the left lung apex as well as the right mid lung,   which is nonspecific but likely represents atelectasis. Asymmetric pulmonary   edema may have a similar appearance.        Medical support devices as described above. CXR: 1/23   Impression   No acute finding or significant change.      ___________________________________________________________    Subjective:   Dietary Intake: improving  Nausea: n/a   Pain Control: adequate  Complaints: none  Bowels: have moved, 1/22 most recently     Objective:   General appearance: awake, A&O, in nad  Lungs: Diminished bilaterally  Heart: S1S2; SR on monitor  Chest: symmetrical expansion with inspiration and expirations; no rocking of sternum noted   Abdomen: soft, nontender  Bowel sounds: normoactive  Kidneys:  Cr 0.9 ; UOP 3250 ml/shift  Wound/Incisions: Midsternal incision with dressing CDI; Pacing wires removed 1/19  Extremities: BLE pulses palpable, minimal swelling noted in BLE  Neurological: intact, non focal exam   Chest tubes/Drains: Chest tubes all out     Assessment:   Post-op: 8 days. Condition: In critical condition. Plan:   1. Cardiovascular: s/p ascending aorta replacement 1/16; also s/p L to Rt fem-fem bypass. ASA, statin, BB. Needs tight BP control, <140 mmHg. 2. Pulmonary: Saturating 93% on RA. Continue expansion maneuvers- is, oobtc, pt/ot, ambulation. Pt's voice is still hoarse 2/2 intubation. ENT following, appreciate assistance. MBS showed no sign of aspiration. Will plan for therapy prior to trying the injection. 3. Neurology: analgesia as needed. 4. Nephrology: Cr 0.9, UOP almost 3.2L mL/24 hrs. Change lasix to 40 mg BID. 5. Endocrinology: BG stable, no Hx of DM, HgbA1C 5.6. 6. Hematology: acute blood loss anemia; H&H stable  Leukocytosis: WBC 22.5, will d/c on augmentin, remains afebrile, CXR stable. 7. Microbiology: nothing at this time    7. Nutrition: cardiac    8. Labs: labs & imaging reviewed as above    9. Post-op Drains/Wires: all out     10. D/C Goals: will d/c to ARU tomorrow.      11. Continue post-op care of patient in the ICU    Meds:    The patient is on a beta-blocker   The patient is NOT on an ace-i/ARB - 2/2 hypotension   The patient is on a statin   The patient is on oral antiplatelet therapy     ________________________________________________    MERCY Shepherd CNP  1/24/2023  11:53 AM

## 2023-01-24 NOTE — PROGRESS NOTES
Second Session Therapy Time:   Individual Concurrent Group Co-treatment   Time In 1500         Time Out 1520         Minutes 20           Timed Code Treatment Minutes:20Minutes    Total Treatment Minutes:   20  Pt seen for 2nd OT session for sit<-->stand transfers for use of bed pan in chair per RN request; pt requiring max assist of 2 for sit<-->stand x 2 with LEATHA Plus from chair to place bed pan;  No results seated on bedpan;     Ray Cost

## 2023-01-24 NOTE — CARE COORDINATION
Writer aware precert was approved for ARU, they do not have bed today but can accept tomorrow or Thursday pending ENT plan for vocal cords and when medically stable.   MARCELO Shetty-RN

## 2023-01-24 NOTE — PROGRESS NOTES
DBE:[id number]    Admitting diagnosis: ***    Presentation to hospital: ***    Surgery: {YES/NO:60}     Nursing assessment at handoff  {stable/unstable:70463}    Emergency Contact/POA:***  Family updated: {YES/NO:60}    Most recent vitals: /80   Pulse (!) 108   Temp 99.2 °F (37.3 °C) (Oral)   Resp 21   Ht 6' 4\" (1.93 m)   Wt 283 lb 4.7 oz (128.5 kg)   SpO2 93%   BMI 34.48 kg/m²      Rhythm: {Rhythm including paccardio:56660}     NC/HFNC- *** lpm  Respiratory support: { CC Vent ZQNV:[id number]::\"- No ventilator support\"}    Vent days: Day ***    Increased O2 requirements: {YES ***/NO:60}    Admission weight Weight: (!) 340 lb (154.2 kg)  Today's weight   Wt Readings from Last 1 Encounters:   01/24/23 283 lb 4.7 oz (128.5 kg)         UOP >30ml/hr: {YES***/NO:60}     Colin need assessed each shift: {YES/NO:19670}    Restraints: {YES/NO:19670}  Order current and documentation up to date? Lines/Drains  LDA Insertion Date Discontinued Date Dressing Changes   PIV       TLC       Arterial       Colin       Vas Cath      ETT       Surgical drains        Night Shift Hospitalist Interventions    Problem(Brief) Date Time Intervention Physician contacted                                               Drip rates at handoff:    sodium chloride 5 mL/hr at 01/16/23 1453    dextrose         Hospital Course Daily Updates:  Admit Day# ***      Lab Data:   CBC:   Recent Labs     01/23/23  0420 01/24/23  0402   WBC 23.0* 22.5*   HGB 11.3* 10.9*   HCT 35.0* 34.4*   MCV 82.6 82.4   * 509*     BMP:    Recent Labs     01/23/23  0420 01/24/23  0402   * 136   K 3.8 4.4   CO2 26 26   BUN 33* 38*   CREATININE 0.8* 0.9     LIVR:   Recent Labs     01/23/23  0420 01/24/23  0402   * 133*   * 143*     PT/INR:   Recent Labs     01/23/23  0420 01/24/23  0402   PROT 6.1* 5.8*     APTT: No results for input(s): APTT in the last 72 hours.   ABG: No results for input(s): PHART, CAX7SUR, PO2ART in the last 72 hours.   Consults (if GI or Nephrology- which group?)-  ***

## 2023-01-24 NOTE — PROGRESS NOTES
Speech Language Pathology  Facility/Department: [facility] C2 CARD TELEMETRY  Dysphagia Daily Treatment Note      Recommendations:  Diet recommendation: IDDSI 6 Soft and Bite Sized Solids; IDDSI 0 Thin Liquids; Meds whole in puree  Risk management: upright for all intake, stay upright for at least 30 mins after intake, oral care 2-3x/day to reduce adverse affects in the event of aspiration, general GERD precautions, and general aspiration precautions    NAME: Ирина Peña  : 1990  MRN: [id number]    Subjective: The pt was seen while accompanied by his wife. Both were in good spirits    Pain: Denied during the session    Current Diet: ADULT ORAL NUTRITION SUPPLEMENT; Breakfast, Dinner; Standard High Calorie/High Protein Oral Supplement  ADULT DIET; Regular; No Added Salt (3-4 gm)    Diet Tolerance:  Patient tolerating current diet level without signs/symptoms of penetration / aspiration. P.O. Trials: Thin   x Thin water via cup and straw x 8 sips   Puree       Solid   x 1/4th package of wm crackers     Dysphagia Treatment and Impressions: The original order is for dysphagia intervention. Full voice evaluation orders are recommended if ENT is in agreement. The pt's voice remains severely rough and dysphonic due to his left vocal fold paralysis. The pt tolerated PO trials of thin water and a wm cracker. Mildly prolonged mastication persists. X 1 throat clear followed a sip of water via straw but otherwise there were no possible s/s of aspiration. Swallowing precautions were reviewed at length.     Therapeutic exercises:  - SOVT blowing through straw in water with phonation exercises: Prolongations, Pitch Raises, Pitch Lowering, [place] (quiet-loud-quiet) and singing \"Happy Birthday\"  - Hard glottal attacks were trained with vowels in isolation and vowel initial 1 syllable words with given precautions  - Half-swallow Boom technique trained with single words with given precuations    Dysphagia Goals: Timeframe for Long-term Goals: 7 days-Extended to 01/31/2023  Goal 1: The patient will tolerate least restrictive diet with no clinical s/s of aspiration or worsening respiratory/pulmonary status:1/24/23: Ongoing; See above    Short-term Goals  Timeframe for Short-term Goals: 5 days-Extended to 01/29/2023  Goal 1: The patient will tolerate recommended diet with no clinical s/s of aspiration 5/5:1/24/23: Ongoing; See above  Goal 2: The patient will tolerate therapeutic diet upgrade trials with no clinical s/s of aspiration 5/5:1/24/23: Ongoing; See above  Goal 3: The patient/caregiver will demonstrate understanding of compensatory swallow strategies, for improved swallow safety:1/24/23: Ongoing; See above  Goal 4: Added per MBS 1/23: Pt will complete tongue base retraction exercises with min cues::1/24/23: Did not target today    Recommendations:  Diet recommendation: IDDSI 6 Soft and Bite Sized Solids; IDDSI 0 Thin Liquids; Meds whole in puree  Risk management: upright for all intake, stay upright for at least 30 mins after intake, oral care 2-3x/day to reduce adverse affects in the event of aspiration, general GERD precautions, and general aspiration precautions    Patient/Family/Caregiver Education:  Education was given at length re: new exercises given and swallowing precautions    Compensatory Strategies:   HOB 90* and 30\" after meals; swallow twice, alternate bites with sips, toothbrushing before meals and before bedtime, rinse mouth after meals       Plan:    Continued Dysphagia treatment with goals per plan of care. Discharge Recommendations: inpatient rehab    If pt discharges from hospital prior to Speech/Swallowing discharge, this note serves as tx and discharge summary. Total Treatment Time / Charges     Time in Time out Total Time / units   Cognitive Tx         Speech Tx      Dysphagia Tx 1423 1426 23 min / 1 unit     Signature:    Tyra Morris, [address] [place], CCC-SLP  [id number]  Speech-Language Pathologist  Phone: [phone]  Speech Desk: [phone]

## 2023-01-24 NOTE — PROGRESS NOTES
Shift: [id number]    Procedure: ASCENDING AORTA VALVE REPLACEMENT WITH TUBE GRAFT USING AN EPPERSON GRAFT SIZE 28CM  ANTEGRADE FLOW TO THE LEFT LEG, RESUSPENSION OF AORTIC VALVE    Admit from OR (time and date): 1/16/23 0630    Transition (time and date):     Surgery, return to OR yes - Fem - Fem bypass with Dr. Vania Dennis at handoff  stable    Most recent vitals: /80   Pulse (!) 108   Temp 99.2 °F (37.3 °C) (Oral)   Resp 21   Ht 6' 4\" (1.93 m)   Wt 283 lb 4.7 oz (128.5 kg)   SpO2 93%   BMI 34.48 kg/m²      Increased O2 requirements: no O2 requirements: Oxygen Therapy  SpO2: 93 %  Pulse Oximetry Type: Continuous  SPO2 High Alarm Limit: 100  SPO2 Low Alarm Limit POX: 90  Pulse Oximeter Device Mode: Continuous  Pulse Oximeter Device Location: Finger  O2 Device: None (Room air)  Oximetry Probe Site Changed: Yes  Skin Assessment: Clean, dry, & intact  Skin Protection for O2 Device: Yes  Orientation: Middle  Location: Nose  FiO2 : 60 %  O2 Flow Rate (L/min): 2 L/min  O2 Delivery Method: Nasal cannula  Vent Mode: (S) AC/VC (pt placed on sbt 5/5 at 1220)     Admission weight Weight: (!) 340 lb (154.2 kg)  Today's weight   Wt Readings from Last 1 Encounters:   01/24/23 283 lb 4.7 oz (128.5 kg)         EF: unknown    Drop in Urinary Output no     Rhythm Changes Normal Sinus Rhythm 90's    Pacing Wires Removed:  [] Yes  [x] NO  [] Platelets < 49,169  [] Arrhythmia  [] Bradycardia [] Valve Replacement  [] Pacing for Cardiac Index  []Physician Order    Lines/Drains  LDA Insertion Date Discontinued Date Dressing Changes   Art line 1/16 1/19    Central Line 1/16 1/20    Colin 1/16 1/20    Chest Tube P: 1/16  M: 1/16 M: 1/19  P:1/20    Wires  1/16 1/19    ETT 1/16 1/17    TYRELL Drain      VasCath      Impella        Interventions After Office Hours  Problem(Brief) Date Time Intervention Physician contacted                                               Drip rates at handoff:    sodium chloride 5 mL/hr at 01/16/23 1453    dextrose         Hospital Course:  POD# DOS  -Ca replaced  -Milton Camarena given for  5.9 K -VSS  -Fem to Fem bypass with Dr. Aissatou Wiseman @ [id number]  -OG LWS    POD NOS:  -Levo weaned slightly. -Remains on levo, esmolol, insulin, fentanyl, and versed.  -Fi02 weaned on vent. -Wakes up, moves all extremities, follows commands. POD#3 01/18:   -HR in 90s, BP 110s-120s overnight   -notified on call CTS about blood sugar 74 and 23 units Lantus order. Will hold Lantus right now per order, and new order of low dose sliding scale order noted. Blood glucose recheck 118.   -Wife Karly Pepe updated. -K replaced, Ca replace. -IS practiced with patient this shift   -bathed, CTS dressing changed   -thi morning pt's weight is 148.4kg  -Total urine output: 4050ml on Lasix gtt   -total MCT output: 0ml  -total PCT output: 6ml  -Daily BMP, CBC, CXR completed    POD#2 1/19:  -V wires pulled, mediastinal CT pulled, Flotrac DC'd  -up to chair with lift- ARU consulted  -metoprolol increased    POD#3 1/20:    - metoprolol increased   CT, F/C and Central line removed. PT/RN slings to chair. Per Dr. Aissatou Wiseman wound vacs to remain through Sunday. Overnight:   - small BM   - endorsement of improved pain control   - HR sustaining in low 100's despite increased lopressor dose; BP remaining stable    POD#4 1/21:  VSS. Pt had several bowel movements throughout the day. Pt stood up with attempt for bedside commode but remains very weak so was not attempted further. Pt was moved to recliner twice with sling assist. Pt and wife requested melatonin and ENT consult (hoarse and low voice post extubation) which was ordered by Dr Theo Reynoso. Overnight:   - uneventful course   - HR remaining in low 100's despite nightly lopressor    POD#6 1/23: Days  - Modified Barium Swallow eval completed. No restrictions to eating. CTS and ENT updated.   - ARU precert completed, awaiting insurance approval  - Remains weak (especially lower extremities) only taking a few steps assisted. POD #6 Nights   - Urine output >1400.   - Patient sling to chair- unable to walk to chair   - Able to eat with no problems. Lab Data:  CBC:   Recent Labs     01/23/23 0420 01/24/23  0402   WBC 23.0* 22.5*   HGB 11.3* 10.9*   HCT 35.0* 34.4*   MCV 82.6 82.4   * 509*       BMP:    Recent Labs     01/23/23 0420 01/24/23  0402   * 136   K 3.8 4.4   CO2 26 26   BUN 33* 38*   CREATININE 0.8* 0.9       LIVR:   Recent Labs     01/23/23 0420 01/24/23  0402   * 133*   * 143*       PT/INR:   Recent Labs     01/23/23 0420 01/24/23  0402   PROT 6.1* 5.8*       APTT: No results for input(s): APTT in the last 72 hours. ABG:   No results for input(s): PHART, DEE5RNO, PO2ART in the last 72 hours.        Electronically signed by Dominguez Umana RN on 1/24/2023 at 7:05 AM

## 2023-01-24 NOTE — PROGRESS NOTES
Occupational Therapy  Facility/Department: [facility] CARD TELEMETRY  Treatment Note    Name: Yas Frias  : 1990  MRN: [id number]  Date of Service: 2023    Discharge Recommendations:  IP Rehab  Barriers to home discharge:   [x] Steps to access home entry or bed/bath:     [x] Reported available assist at home upon discharge limited   [x] Patient or family requests DC to other than home          Patient Diagnosis(es): The primary encounter diagnosis was Dissecting aneurysm of thoracic aorta, [place] type A. Diagnoses of Chest wall discomfort, Weakness of right lower extremity, and Weakness of left lower extremity were also pertinent to this visit. Past Medical History:  has a past medical history of Influenza A and Marfan syndrome. Past Surgical History:  has a past surgical history that includes Femoral-femoral Bypass Graft (Bilateral, 2023) and Thoracic aortic aneurysm repair (N/A, 1/15/2023). Assessment   Performance deficits / Impairments: Decreased functional mobility ; Decreased safe awareness;Decreased ADL status; Decreased cognition;Decreased strength;Decreased endurance;Decreased high-level IADLs;Decreased coordination  Assessment: pt from home normally independent with IADL's & functional mobility without AD, working; pt admitted with Dissecting aneurysm of thoracic aorta, s/p AVR 1-15-23 & Fem-Fem Bypass 22, pt progressing to max assist of 2 bed mobility & sit<-->stand with LEATHA PLus(Assisted Standing Lift) from EOB; feeding self after set up & more alert with supervision with UE AROM; max assist for maintaining static unsupported sitting balance & forward reaching for core strengthening; pt continues to benefit from skilled OT services  REQUIRES OT FOLLOW-UP: Yes  Activity Tolerance  Activity Tolerance: Patient Tolerated treatment well  Activity Tolerance Comments: vitals stable on RA:  high mix's: BP = 96/58, HR = 105; sitting upright in recliner chair with feet on floor: BP = 116/65, RN notified;        Plan   Occupational Therapy Plan  Times Per Week: 4-6 x/ week  Current Treatment Recommendations: ROM, Balance training, Functional mobility training, Endurance training, Patient/Caregiver education & training, Safety education & training, Positioning, Self-Care / ADL     Restrictions  Restrictions/Precautions  Restrictions/Precautions: Fall Risk, Surgical Protocols, General Precautions, Cardiac, Modified Diet  Position Activity Restriction  Sternal Precautions: No Pushing, No Pulling, 5# Lifting Restrictions  Other position/activity restrictions: ICU monitoring, sternal wound vac    Subjective   General  Chart Reviewed: Yes  Patient assessed for rehabilitation services?: Yes  Family / Caregiver Present: Yes (wife)  Referring Practitioner: Whit Gayle MD 1/16/23  Diagnosis: Aortic dissection with ischemic left lower  extremity, s/p Left to right femoral-femoral bypass reinforced GORE Propaten graft; thorascic aortic disection, s/p ascending aorta valve replacement 1/16  Subjective  Subjective: denies pain at rest  General Comment  Comments: RN cleared pt for OT; pt awake in bed, pt & wife agreeable to therapy         Objective                Safety Devices  Type of Devices: Call light within reach; Chair alarm in place;Nurse notified; Left in chair;Patient at risk for falls        AROM: Generally decreased, functional  Strength: Grossly decreased, non-functional  Coordination: Generally decreased, functional  Tone: Normal    ADL  Feeding: Setup (to feed self & manage drinking from cup)        Bed mobility  Supine to Sit: 2 Person assistance (max assist of 2 log roll to Left)  Sit to Supine: Unable to assess (Left up in chair on Maxi-Addison sling; pt & RN agreeable)  Scooting: Maximal assistance  Bed Mobility Comments: max assist initially to maintain static sitting balance EOB, progressed to CGA  Transfers  Sit to stand: 2 Person assistance (max assist of 2 from EOB with bed elevated with use of LEATHA Plus)  Stand to sit: 2 Person assistance (mod/max assist of 2 from LEATHA Plus)     Cognition  Overall Cognitive Status: Exceptions  Arousal/Alertness: Delayed responses to stimuli  Following Commands:  Follows one step commands with increased time  Attention Span: Attends with cues to redirect  Memory: Decreased recall of precautions;Decreased recall of recent events  Safety Judgement: Decreased awareness of need for safety;Decreased awareness of need for assistance  Insights: Decreased awareness of deficits  Initiation: Requires cues for some  Sequencing: Requires cues for some  Orientation  Overall Orientation Status: Within Functional Limits  Orientation Level: Oriented X4               Exercise Treatment: BUE AROM in high mix's & from chair  Hand flex/ext: x   15 Reps  Wrist flex/ext:  X  15 Reps  Elbow flex/ext:  x   15 Reps  Forearm sup/pron:  x    15 Reps  Shld flex/ext:  x  10  Reps to 75-90* (forward/lateral reaching for core strengthening)              Education Given To: Patient  Education Provided: Role of Therapy;Precautions;Plan of Care  Education Provided Comments: disease specific: importance of sternal precautions, trunk control, BUE AROM exercises; use of RED/nurse call light for assist with positioning, ADL needs;  Education Method: Demonstration;Verbal  Barriers to Learning: Cognition  Education Outcome: Demonstrated understanding;Continued education needed         AM-PAC Score        AM-[facility] Inpatient Daily Activity Raw Score: 9 (01/24/23 1434)  AM-PAC Inpatient ADL T-Scale Score : 25.33 (01/24/23 1434)  ADL Inpatient CMS 0-100% Score: 79.59 (01/24/23 1434)  ADL Inpatient CMS G-Code Modifier : CL (01/24/23 1434)    Tinneti Score       Goals  Short Term Goals  Time Frame for Short Term Goals: 1 week(1-25-23)- all goals ongoing 1/20  Short Term Goal 1: mod assist of 2 with functional transfers by 1-25-23; 1/24 Max assist of 2 with LEATHA Plus  Short Term Goal 2: min assist with UE self care seated in chair by 1-23-23- GOAL MET 1/23; 1/24 New STG: set up only for UE self care/grooming  Short Term Goal 3: supervision with 15 reps MARTHA AROM; 1/24 STG met; New STG: supervision with forward reach for core strengthening in supported sitting by 1-31-23  Short Term Goal 4: min cues for sternal precautions for transfers;1/24 requires min assist to maintain grasp of \"heart pillow\" for sit<-->stand transfers  Patient Goals   Patient goals : Unable to verbalize at this time       Therapy Time   Individual Concurrent Group Co-treatment   Time In [address] [facility]         Time Out 1423         Minutes [address] [place]

## 2023-01-24 NOTE — PROGRESS NOTES
Physical Therapy  Facility/Department: [facility] C2 CARD TELEMETRY  Daily Treatment Note  NAME: Paula Lopez  : 1990  MRN: [id number]    Date of Service: 2023    Discharge Recommendations:  IP Rehab   PT Equipment Recommendations  Equipment Needed: No  Other: TBD at rehab facility    Patient Diagnosis(es): The primary encounter diagnosis was Dissecting aneurysm of thoracic aorta, [place] type A. Diagnoses of Chest wall discomfort, Weakness of right lower extremity, and Weakness of left lower extremity were also pertinent to this visit. Assessment   Assessment: Pt with improved participation with PT today; seen in conjunction with OT for co-treat session in light of assist requirements and decreased stamina, in order to safely progress functional mobility. Pt still quite weak and deconditioned compared to baseline, requiring maxA x 2 for bed mobility and modA x 2 for sit<>stand and bed>chair transfers with [employer] Plus lift. Pt requires cues for sternal precautions during transfers. Participated well in a variety of UE/LE and core exercises today for strengthening and balance. Given pt's young age, current deficits, and steady progress with therapy over the last few sessions, recommend IP rehab at D/C. Pt & family seem very motivated for pt to work with therapy and return to baseline LOF. Activity Tolerance: Patient tolerated treatment well;Patient limited by fatigue;Patient limited by endurance  Equipment Needed: No  Other: TBD at rehab facility     Plan    General Plan: 5-7 times per week  Specific Instructions for Next Treatment: Progress ther ex and mobility as tolerated, build standing tolerance  Current Treatment Recommendations: Strengthening;ROM;Balance training;Gait training;Home exercise program;Safety education & training;Stair training;Functional mobility training;Neuromuscular re-education;Patient/Caregiver education & training;Transfer training; Therapeutic activities; Endurance training;Equipment evaluation, education, & procurement;Positioning     Restrictions  Restrictions/Precautions  Restrictions/Precautions: Fall Risk, Surgical Protocols, General Precautions, Cardiac, Modified Diet  Position Activity Restriction  Sternal Precautions: No Pushing, No Pulling, 5# Lifting Restrictions  Other position/activity restrictions: ICU monitoring, sternal wound vac     Subjective    Subjective: Pt agreeable to work with PT this afternoon, pleasant and cooperative. Voices c/o fatigue but willing to try and stand with [employer] Plus lift and transfer into chair. Pain: Pt denies pain at rest.  Overall Orientation Status: Within Functional Limits  Orientation Level: Oriented X4    Objective   Vitals  Heart Rate: (!) 113  Heart Rate Source: Monitor  BP: (!) 96/56 (in bed (BP increased to 116/65 after bed>chair transfer))  BP Location: Left upper arm  BP Method: Automatic  Patient Position: Semi fowlers  MAP (Calculated): 69  SpO2: 94 %  O2 Device: None (Room air)    Bed Mobility Training  Interventions: Verbal cues; Visual cues; Tactile cues  Supine to Sit: Maximum assistance;Assist X2;Additional time (increased time & effort needed to complete transfer, HOB elevated ~45 degrees, pt moving toward L side)  Scooting: Maximum assistance;Assist X2;Additional time (to scoot forward to EOB)    Balance  Sitting: Impaired  Sitting - Static: Fair (occasional) (pt sat EOB x 6-7 minutes, initially requiring modA for sitting balance, decreasing to supervision after first 1-2 minutes; VC's given for pt to use BUE support to keep trunk in midline at EOB)  Sitting - Dynamic: Poor (constant support)  Standing: Impaired  Standing - Static: Poor;Constant support  Standing - Dynamic: Poor;Constant support    Transfer Training  Interventions: Safety awareness training;Verbal cues; Visual cues; Tactile cues  Sit to Stand:  Moderate assistance;Assist X2;Adaptive equipment (modA x 2 using Maria Victoria-Plus lift)  Stand to Sit: Moderate assistance;Assist X2;Adaptive equipment (modA x 2 using Maria Victoria-Plus lift)  Bed to Chair: Moderate assistance;Assist X2;Adaptive equipment (modA x 2 using Maria Victoria-Plus lift)    Gait Training  Gait Training: No (pt much too weak to safely attempt)    PT Exercises  Exercise Treatment: x 12 BLE: Ankle pumps, glut sets, quad sets, supine hip ABD/ADD (with min-modA), heel slides (with min-modA), LAQ (while up in chair)  Dynamic Sitting Balance Exercises: Performed dynamic reaching (forward and cross-body) while in chair x 10 reps (PT assisted with sitting balance and trunk movement while OT provided target for UE reaching, pt usually required modA from PT for trunk)    Safety Devices  Type of Devices: Call light within reach; Chair alarm in place;Nurse notified; Left in chair;Patient at risk for falls; All fall risk precautions in place;Gait belt     Goals  Short Term Goals  Time Frame for Short Term Goals: 1/27/23  Short Term Goal 1: pt will complete bed mobility max Ax2 - MET 1/23/23. Goal upgraded: Pt will transfer supine <-> sit with modA x 2. Short Term Goal 2: pt will sit EOB for 5 min with mod A - MET 1/23/23. Goal upgraded: Pt will tolerate sitting EOB x 10 minutes with Kostas x 1 or less to maintain upright trunk. Short Term Goal 3: pt will complete transfers with max Ax2 - MET 1/23/23. Goal upgraded: Pt will transfer sit <-> stand and bed>chair using RW with modA x 2. Short Term Goal 4: Assess gait when safely able and set appropriate goal  Short Term Goal 5: pt will participate in 12-15 reps of BLE exercises by 1/23/22 -- MET 1/20/23  Additional Goals?: No  Patient Goals   Patient Goals : \"To get stronger\"    Education  Patient Education  Education Given To: Patient; Family (pt & wife)  Education Provided: Role of Therapy;Plan of Care;Home Exercise Program;Transfer Training;Equipment; Family Education;Precautions; Energy Conservation  Education Method: Demonstration;Verbal  Barriers to Learning: None  Education Outcome: Verbalized understanding;Demonstrated understanding;Continued education needed    Therapy Time   Individual Concurrent Group Co-treatment   Time In 1340         Time Out 1420         Minutes 40         Timed Code Treatment Minutes: Ashley [address] [place], [place] #[id number]    If pt is unable to be seen after this session, please let this note serve as discharge summary. Please see case management note for discharge disposition. Thank you.

## 2023-01-25 PROBLEM — I71.00 AORTIC DISSECTION (HCC): Status: RESOLVED | Noted: 2023-01-15 | Resolved: 2023-01-25

## 2023-01-25 PROBLEM — R07.89 CHEST WALL DISCOMFORT: Status: RESOLVED | Noted: 2023-01-16 | Resolved: 2023-01-25

## 2023-01-25 PROBLEM — I71.010 TYPE 1 DISSECTION OF ASCENDING AORTA: Status: RESOLVED | Noted: 2023-01-16 | Resolved: 2023-01-25

## 2023-01-25 LAB
A/G RATIO: 0.9 (ref 1.1–2.2)
ALBUMIN SERPL-MCNC: 2.7 G/DL (ref 3.4–5)
ALP BLD-CCNC: 93 U/L (ref 40–129)
ALT SERPL-CCNC: 153 U/L (ref 10–40)
ANION GAP SERPL CALCULATED.3IONS-SCNC: 10 MMOL/L (ref 3–16)
AST SERPL-CCNC: 128 U/L (ref 15–37)
ATYPICAL LYMPHOCYTE RELATIVE PERCENT: 1 % (ref 0–6)
BANDED NEUTROPHILS RELATIVE PERCENT: 13 % (ref 0–7)
BASOPHILS ABSOLUTE: 0 K/UL (ref 0–0.2)
BASOPHILS RELATIVE PERCENT: 0 %
BILIRUB SERPL-MCNC: 0.5 MG/DL (ref 0–1)
BUN BLDV-MCNC: 35 MG/DL (ref 7–20)
CALCIUM SERPL-MCNC: 8 MG/DL (ref 8.3–10.6)
CHLORIDE BLD-SCNC: 99 MMOL/L (ref 99–110)
CO2: 25 MMOL/L (ref 21–32)
CREAT SERPL-MCNC: 0.8 MG/DL (ref 0.9–1.3)
EOSINOPHILS ABSOLUTE: 0.2 K/UL (ref 0–0.6)
EOSINOPHILS RELATIVE PERCENT: 1 %
GFR SERPL CREATININE-BSD FRML MDRD: >60 ML/MIN/{1.73_M2}
GLUCOSE BLD-MCNC: 111 MG/DL (ref 70–99)
HCT VFR BLD CALC: 33.8 % (ref 40.5–52.5)
HEMOGLOBIN: 11.1 G/DL (ref 13.5–17.5)
LYMPHOCYTES ABSOLUTE: 3.6 K/UL (ref 1–5.1)
LYMPHOCYTES RELATIVE PERCENT: 16 %
MCH RBC QN AUTO: 26.9 PG (ref 26–34)
MCHC RBC AUTO-ENTMCNC: 32.8 G/DL (ref 31–36)
MCV RBC AUTO: 82.1 FL (ref 80–100)
METAMYELOCYTES RELATIVE PERCENT: 2 %
MONOCYTES ABSOLUTE: 1.5 K/UL (ref 0–1.3)
MONOCYTES RELATIVE PERCENT: 7 %
MYELOCYTE PERCENT: 2 %
NEUTROPHILS ABSOLUTE: 16 K/UL (ref 1.7–7.7)
NEUTROPHILS RELATIVE PERCENT: 58 %
PDW BLD-RTO: 15 % (ref 12.4–15.4)
PLATELET # BLD: 491 K/UL (ref 135–450)
PLATELET SLIDE REVIEW: ADEQUATE
PMV BLD AUTO: 6.8 FL (ref 5–10.5)
POLYCHROMASIA: ABNORMAL
POTASSIUM REFLEX MAGNESIUM: 4.6 MMOL/L (ref 3.5–5.1)
RBC # BLD: 4.11 M/UL (ref 4.2–5.9)
SARS-COV-2, NAAT: NOT DETECTED
SLIDE REVIEW: ABNORMAL
SODIUM BLD-SCNC: 134 MMOL/L (ref 136–145)
TOTAL PROTEIN: 5.7 G/DL (ref 6.4–8.2)
WBC # BLD: 21.3 K/UL (ref 4–11)

## 2023-01-25 PROCEDURE — 97110 THERAPEUTIC EXERCISES: CPT

## 2023-01-25 PROCEDURE — 97530 THERAPEUTIC ACTIVITIES: CPT

## 2023-01-25 PROCEDURE — 2000000000 HC ICU R&B

## 2023-01-25 PROCEDURE — 6370000000 HC RX 637 (ALT 250 FOR IP): Performed by: STUDENT IN AN ORGANIZED HEALTH CARE EDUCATION/TRAINING PROGRAM

## 2023-01-25 PROCEDURE — 85025 COMPLETE CBC W/AUTO DIFF WBC: CPT

## 2023-01-25 PROCEDURE — 6370000000 HC RX 637 (ALT 250 FOR IP): Performed by: THORACIC SURGERY (CARDIOTHORACIC VASCULAR SURGERY)

## 2023-01-25 PROCEDURE — 80053 COMPREHEN METABOLIC PANEL: CPT

## 2023-01-25 PROCEDURE — 87635 SARS-COV-2 COVID-19 AMP PRB: CPT

## 2023-01-25 PROCEDURE — 94669 MECHANICAL CHEST WALL OSCILL: CPT

## 2023-01-25 PROCEDURE — 6370000000 HC RX 637 (ALT 250 FOR IP): Performed by: NURSE PRACTITIONER

## 2023-01-25 PROCEDURE — 6360000002 HC RX W HCPCS: Performed by: THORACIC SURGERY (CARDIOTHORACIC VASCULAR SURGERY)

## 2023-01-25 PROCEDURE — 36415 COLL VENOUS BLD VENIPUNCTURE: CPT

## 2023-01-25 PROCEDURE — 2580000003 HC RX 258: Performed by: THORACIC SURGERY (CARDIOTHORACIC VASCULAR SURGERY)

## 2023-01-25 PROCEDURE — 99024 POSTOP FOLLOW-UP VISIT: CPT | Performed by: NURSE PRACTITIONER

## 2023-01-25 PROCEDURE — 94640 AIRWAY INHALATION TREATMENT: CPT

## 2023-01-25 RX ORDER — AMOXICILLIN AND CLAVULANATE POTASSIUM 875; 125 MG/1; MG/1
1 TABLET, FILM COATED ORAL EVERY 12 HOURS SCHEDULED
Qty: 10 TABLET | Refills: 0 | Status: ON HOLD | DISCHARGE
Start: 2023-01-25 | End: 2023-01-30

## 2023-01-25 RX ORDER — ASPIRIN 81 MG/1
81 TABLET ORAL DAILY
Qty: 30 TABLET | Refills: 0 | Status: ON HOLD | DISCHARGE
Start: 2023-01-25

## 2023-01-25 RX ORDER — METHOCARBAMOL 750 MG/1
750 TABLET, FILM COATED ORAL 4 TIMES DAILY
Qty: 40 TABLET | Refills: 0 | DISCHARGE
Start: 2023-01-25 | End: 2023-01-26 | Stop reason: HOSPADM

## 2023-01-25 RX ORDER — GABAPENTIN 100 MG/1
100 CAPSULE ORAL 3 TIMES DAILY
Qty: 21 CAPSULE | Refills: 0 | Status: ON HOLD | DISCHARGE
Start: 2023-01-25 | End: 2023-02-01

## 2023-01-25 RX ORDER — ATORVASTATIN CALCIUM 40 MG/1
40 TABLET, FILM COATED ORAL NIGHTLY
Qty: 30 TABLET | Refills: 0 | Status: ON HOLD | DISCHARGE
Start: 2023-01-25

## 2023-01-25 RX ORDER — POLYETHYLENE GLYCOL 3350 17 G/17G
17 POWDER, FOR SOLUTION ORAL DAILY
Qty: 7 EACH | Refills: 0 | Status: ON HOLD | DISCHARGE
Start: 2023-01-25 | End: 2023-02-01

## 2023-01-25 RX ORDER — POTASSIUM CHLORIDE 20 MEQ/1
20 TABLET, EXTENDED RELEASE ORAL 2 TIMES DAILY WITH MEALS
Qty: 14 TABLET | Refills: 0 | Status: ON HOLD | DISCHARGE
Start: 2023-01-25 | End: 2023-02-01

## 2023-01-25 RX ORDER — FUROSEMIDE 40 MG/1
40 TABLET ORAL 2 TIMES DAILY
Qty: 14 TABLET | Refills: 0 | Status: ON HOLD | DISCHARGE
Start: 2023-01-25 | End: 2023-02-01

## 2023-01-25 RX ORDER — METOPROLOL TARTRATE 50 MG/1
50 TABLET, FILM COATED ORAL 2 TIMES DAILY
Qty: 60 TABLET | Refills: 0 | DISCHARGE
Start: 2023-01-25 | End: 2023-01-26 | Stop reason: HOSPADM

## 2023-01-25 RX ORDER — OXYCODONE HYDROCHLORIDE 5 MG/1
5 TABLET ORAL EVERY 6 HOURS PRN
Qty: 28 TABLET | Refills: 0 | Status: ON HOLD | OUTPATIENT
Start: 2023-01-25 | End: 2023-02-01

## 2023-01-25 RX ORDER — LANOLIN ALCOHOL/MO/W.PET/CERES
400 CREAM (GRAM) TOPICAL DAILY
Qty: 7 TABLET | Refills: 0 | Status: ON HOLD | DISCHARGE
Start: 2023-01-25 | End: 2023-02-01

## 2023-01-25 RX ORDER — FAMOTIDINE 20 MG/1
20 TABLET, FILM COATED ORAL DAILY
Qty: 30 TABLET | Refills: 0 | Status: ON HOLD | DISCHARGE
Start: 2023-01-25 | End: 2023-02-24

## 2023-01-25 RX ADMIN — GABAPENTIN 100 MG: 100 CAPSULE ORAL at 09:09

## 2023-01-25 RX ADMIN — METOPROLOL TARTRATE 25 MG: 25 TABLET, FILM COATED ORAL at 13:31

## 2023-01-25 RX ADMIN — Medication 15 ML: at 09:11

## 2023-01-25 RX ADMIN — ACETAMINOPHEN 325MG 650 MG: 325 TABLET ORAL at 13:31

## 2023-01-25 RX ADMIN — SODIUM CHLORIDE, PRESERVATIVE FREE 10 ML: 5 INJECTION INTRAVENOUS at 09:11

## 2023-01-25 RX ADMIN — ASPIRIN 81 MG: 81 TABLET, COATED ORAL at 09:09

## 2023-01-25 RX ADMIN — GABAPENTIN 100 MG: 100 CAPSULE ORAL at 20:12

## 2023-01-25 RX ADMIN — FAMOTIDINE 20 MG: 20 TABLET, FILM COATED ORAL at 09:09

## 2023-01-25 RX ADMIN — ACETAMINOPHEN 325MG 650 MG: 325 TABLET ORAL at 20:11

## 2023-01-25 RX ADMIN — Medication 5 MG: at 20:23

## 2023-01-25 RX ADMIN — POTASSIUM CHLORIDE 20 MEQ: 20 TABLET, EXTENDED RELEASE ORAL at 09:09

## 2023-01-25 RX ADMIN — ALBUTEROL SULFATE 2.5 MG: 2.5 SOLUTION RESPIRATORY (INHALATION) at 15:59

## 2023-01-25 RX ADMIN — ACETAMINOPHEN 325MG 650 MG: 325 TABLET ORAL at 09:09

## 2023-01-25 RX ADMIN — METOPROLOL TARTRATE 50 MG: 50 TABLET, FILM COATED ORAL at 09:09

## 2023-01-25 RX ADMIN — ALBUTEROL SULFATE 2.5 MG: 2.5 SOLUTION RESPIRATORY (INHALATION) at 21:01

## 2023-01-25 RX ADMIN — AMOXICILLIN AND CLAVULANATE POTASSIUM 1 TABLET: 875; 125 TABLET, FILM COATED ORAL at 20:11

## 2023-01-25 RX ADMIN — AMOXICILLIN AND CLAVULANATE POTASSIUM 1 TABLET: 875; 125 TABLET, FILM COATED ORAL at 09:09

## 2023-01-25 RX ADMIN — FUROSEMIDE 40 MG: 40 TABLET ORAL at 17:57

## 2023-01-25 RX ADMIN — FAMOTIDINE 20 MG: 20 TABLET, FILM COATED ORAL at 20:16

## 2023-01-25 RX ADMIN — METHOCARBAMOL TABLETS 750 MG: 750 TABLET, COATED ORAL at 09:09

## 2023-01-25 RX ADMIN — POTASSIUM CHLORIDE 20 MEQ: 20 TABLET, EXTENDED RELEASE ORAL at 17:57

## 2023-01-25 RX ADMIN — FONDAPARINUX SODIUM 2.5 MG: 2.5 INJECTION, SOLUTION SUBCUTANEOUS at 09:08

## 2023-01-25 RX ADMIN — Medication 400 MG: at 20:11

## 2023-01-25 RX ADMIN — ATORVASTATIN CALCIUM 40 MG: 40 TABLET, FILM COATED ORAL at 20:12

## 2023-01-25 RX ADMIN — METOPROLOL TARTRATE 75 MG: 50 TABLET, FILM COATED ORAL at 20:16

## 2023-01-25 RX ADMIN — ALBUTEROL SULFATE 2.5 MG: 2.5 SOLUTION RESPIRATORY (INHALATION) at 07:43

## 2023-01-25 RX ADMIN — Medication 400 MG: at 09:09

## 2023-01-25 RX ADMIN — FUROSEMIDE 40 MG: 40 TABLET ORAL at 09:09

## 2023-01-25 RX ADMIN — POTASSIUM CHLORIDE 20 MEQ: 20 TABLET, EXTENDED RELEASE ORAL at 14:16

## 2023-01-25 RX ADMIN — GABAPENTIN 100 MG: 100 CAPSULE ORAL at 13:31

## 2023-01-25 RX ADMIN — Medication 15 ML: at 20:12

## 2023-01-25 ASSESSMENT — PAIN SCALES - GENERAL: PAINLEVEL_OUTOF10: 0

## 2023-01-25 NOTE — PROGRESS NOTES
Shift: [id number]    Procedure: ASCENDING AORTA VALVE REPLACEMENT WITH TUBE GRAFT USING AN EPPERSON GRAFT SIZE 28CM  ANTEGRADE FLOW TO THE LEFT LEG, RESUSPENSION OF AORTIC VALVE    Admit from OR (time and date): 1/16/23 0630    Transition (time and date):     Surgery, return to OR yes - Fem - Fem bypass with Dr. Elena Muñoz at handoff  stable    Most recent vitals: /77   Pulse (!) 119   Temp 97.6 °F (36.4 °C) (Oral)   Resp 30   Ht 6' 4\" (1.93 m)   Wt 283 lb 4.7 oz (128.5 kg)   SpO2 97%   BMI 34.48 kg/m²      Increased O2 requirements: no O2 requirements: Oxygen Therapy  SpO2: 97 %  Pulse Oximetry Type: Continuous  SPO2 High Alarm Limit: 100  SPO2 Low Alarm Limit POX: 90  Pulse Oximeter Device Mode: Continuous  Pulse Oximeter Device Location: Finger  O2 Device: None (Room air)  Oximetry Probe Site Changed: Yes  Skin Assessment: Clean, dry, & intact  Skin Protection for O2 Device: Yes  Orientation: Middle  Location: Nose  FiO2 : 60 %  O2 Flow Rate (L/min): 2 L/min  O2 Delivery Method: Nasal cannula  Vent Mode: (S) AC/VC (pt placed on sbt 5/5 at 1220)     Admission weight Weight: (!) 340 lb (154.2 kg)  Today's weight   Wt Readings from Last 1 Encounters:   01/24/23 283 lb 4.7 oz (128.5 kg)         EF: unknown    Drop in Urinary Output no     Rhythm Changes Normal Sinus Rhythm 90's    Pacing Wires Removed:  [] Yes  [x] NO  [] Platelets < 91,813  [] Arrhythmia  [] Bradycardia [] Valve Replacement  [] Pacing for Cardiac Index  []Physician Order    Lines/Drains  LDA Insertion Date Discontinued Date Dressing Changes   Art line 1/16 1/19    Central Line 1/16 1/20    Colin 1/16 1/20    Chest Tube P: 1/16  M: 1/16 M: 1/19  P:1/20    Wires  1/16 1/19    ETT 1/16 1/17    TYRELL Drain      VasCath      Impella        Interventions After Office Hours  Problem(Brief) Date Time Intervention Physician contacted                                               Drip rates at handoff:    sodium chloride 5 mL/hr at 01/16/23 1453    dextrose         Hospital Course:  POD# DOS  -Ca replaced  -Bernadene Pila given for  5.9 K -VSS  -Fem to Fem bypass with Dr. Dominick Graham @ [id number]  -OGT LWS    POD NOS:  -Levo weaned slightly. -Remains on levo, esmolol, insulin, fentanyl, and versed.  -Fi02 weaned on vent. -Wakes up, moves all extremities, follows commands. POD#3 01/18:   -HR in 90s, BP 110s-120s overnight   -notified on call CTS about blood sugar 74 and 23 units Lantus order. Will hold Lantus right now per order, and new order of low dose sliding scale order noted. Blood glucose recheck 118.   -Wife Brendan Hearing updated. -K replaced, Ca replace. -IS practiced with patient this shift   -bathed, CTS dressing changed   -thi morning pt's weight is 148.4kg  -Total urine output: 4050ml on Lasix gtt   -total MCT output: 0ml  -total PCT output: 6ml  -Daily BMP, CBC, CXR completed    POD#2 1/19:  -V wires pulled, mediastinal CT pulled, Flotrac DC'd  -up to chair with lift- ARU consulted  -metoprolol increased    POD#3 1/20:    - metoprolol increased   CT, F/C and Central line removed. PT/RN slings to chair. Per Dr. Dominick Graham wound vacs to remain through Sunday. Overnight:   - small BM   - endorsement of improved pain control   - HR sustaining in low 100's despite increased lopressor dose; BP remaining stable    POD#4 1/21:  VSS. Pt had several bowel movements throughout the day. Pt stood up with attempt for bedside commode but remains very weak so was not attempted further. Pt was moved to recliner twice with sling assist. Pt and wife requested melatonin and ENT consult (hoarse and low voice post extubation) which was ordered by Dr Rylan Arroyo. Overnight:   - uneventful course   - HR remaining in low 100's despite nightly lopressor    POD#6 1/23: Days  - Modified Barium Swallow eval completed. No restrictions to eating. CTS and ENT updated.   - ARU precert completed, awaiting insurance approval  - Remains weak (especially lower extremities) only taking a few steps assisted. POD #6 Nights   - Urine output >1400.   - Patient sling to chair- unable to walk to chair   - Able to eat with no problems. POD 1/23 Days:  -OOB with thuan plus, sling to bed x 2   -accepted to ARU, no bed until tomorrow  -uneventful shift     Lab Data:  CBC:   Recent Labs     01/23/23  0420 01/24/23  0402   WBC 23.0* 22.5*   HGB 11.3* 10.9*   HCT 35.0* 34.4*   MCV 82.6 82.4   * 509*       BMP:    Recent Labs     01/23/23  0420 01/24/23  0402   * 136   K 3.8 4.4   CO2 26 26   BUN 33* 38*   CREATININE 0.8* 0.9       LIVR:   Recent Labs     01/23/23  0420 01/24/23  0402   * 133*   * 143*       PT/INR:   Recent Labs     01/23/23 0420 01/24/23  0402   PROT 6.1* 5.8*       APTT: No results for input(s): APTT in the last 72 hours. ABG:   No results for input(s): PHART, KWP9OHA, PO2ART in the last 72 hours.        Electronically signed by Ramírez Woodall RN on 1/24/2023 at 7:35 PM

## 2023-01-25 NOTE — DISCHARGE INSTR - COC
Continuity of Care Form    Patient Name: Eddie Moya   :  1990  MRN:  [id number]    Admit date:  1/15/2023  Discharge date:  ***    Code Status Order: Full Code   Advance Directives:     Admitting Physician:  Kia Ann MD  PCP: No primary care provider on file.    Discharging Nurse: ***  Discharging Hospital Unit/Room#: 0236/0236-01  Discharging Unit Phone Number: ***    Emergency Contact:   Extended Emergency Contact Information  Primary Emergency Contact: Alexa Moya  Home Phone: [phone]  Relation: Spouse    Past Surgical History:  Past Surgical History:   Procedure Laterality Date    FEMORAL-FEMORAL BYPASS GRAFT Bilateral 2023    FEMORAL FEMORAL BYPASS performed by Elvis Thorpe MD at [facility] OR    THORACIC AORTIC ANEURYSM REPAIR N/A 1/15/2023    ASCENDING AORTA  REPLACEMENT WITH TUBE GRAFT USING AN EPPERSON GRAFT SIZE 28CM  ANTEGRADE FLOW TO THE LEFT LEG, RESUSPENSION OF AORTIC VALVE performed by Kia Ann MD at [facility] CVOR       Immunization History:   Immunization History   Administered Date(s) Administered    COVID-19, PFIZER PURPLE top, DILUTE for use, (age 12 y+), 30mcg/0.3mL 2021, 2021       Active Problems:  Patient Active Problem List   Diagnosis Code    Vocal fold paresis, 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       Isolation/Infection:   Isolation            No Isolation          Patient Infection Status       Infection Onset Added Last Indicated Last Indicated By Review Planned Expiration Resolved Resolved By    None active    Resolved    Influenza 20 Rapid influenza A/B antigens   20             Nurse Assessment:  Last Vital Signs: BP 94/66   Pulse (!) 110   Temp 97.9 °F (36.6 °C) (Oral)   Resp 22   Ht 6' 4\" (1.93 m)   Wt 279 lb 9 oz (126.8 kg)   SpO2 91%   BMI 34.03 kg/m²     Last documented pain score (0-10 scale): Pain Level: 0  Last Weight:   Wt Readings from Last 1 Encounters:   23 279 lb 9 oz (126.8 kg)     Mental Status:  {IP PT  MENTAL STATUS:04354}    IV Access:  { SHIRLEY IV ACCESS:919116812}    Nursing Mobility/ADLs:  Walking   {P DME FNDF:953356906}  Transfer  {[facility] DME HPPP:316492776}  Bathing  {[facility]P DME OFFN:316724265}  Dressing  {P DME SVJI:918044177}  Toileting  {P DME GCTZ:668001944}  Feeding  {[facility] DME ZGYR:922418609}  Med Admin  {P DME FIMW:027545169}  Med Delivery   {[facility] MED Delivery:970024726}    Wound Care Documentation and Therapy:  Negative Pressure Wound Therapy Chest Mid (Active)   $ Disposable NPWT >50 sq cm PER TX $ Yes 01/24/23 0900   Wound Type Surgical 01/24/23 2000   Unit Type Prevena 01/24/23 2000   Dressing Type Black Foam 01/24/23 2000   Number of pieces used 1 01/24/23 2000   Cycle Continuous 01/24/23 2000   Target Pressure (mmHg) 125 01/24/23 2000   Dressing Status Clean, dry & intact 01/24/23 2000   Drainage Amount None 01/24/23 2000   Number of days: 9       Incision 01/16/23 Sternum Anterior (Active)   Dressing Status Clean;Dry; Intact 01/24/23 2000   Incision Cleansed Not Cleansed 01/24/23 2000   Dressing/Treatment Negative pressure wound therapy 01/24/23 2000   Incision Assessment Other (Comment) 01/19/23 0000   Drainage Amount None 01/19/23 2000   Number of days: 9       Incision Femoral Anterior;Proximal;Right (Active)   Dressing Status Clean;Dry; Intact 01/24/23 2000   Incision Cleansed Not Cleansed 01/18/23 0800   Dressing/Treatment Tegaderm/transparent film dressing 01/24/23 2000   Closure Sutures; [place] 01/24/23 2000   Incision Assessment Other (Comment) 01/19/23 2000   Number of days:        Incision Groin Anterior;Left;Proximal (Active)   Dressing Status Clean;Dry; Intact 01/24/23 2000   Incision Cleansed Not Cleansed 01/24/23 2000   Dressing/Treatment Tegaderm/transparent film dressing 01/24/23 2000   Closure Sutures; [place] 01/24/23 2000   Incision Assessment Other (Comment) 01/19/23 2000   Number of days:         Elimination:  Continence:    Bowel: {YES / RO:24364}  Bladder: {YES / QW:74667}  Urinary Catheter: {Urinary Catheter:988842822}   Colostomy/Ileostomy/Ileal Conduit: {YES / XF:16158}       Date of Last BM: ***    Intake/Output Summary (Last 24 hours) at 2023 0916  Last data filed at 2023 0500  Gross per 24 hour   Intake 1200 ml   Output 2350 ml   Net -1150 ml     I/O last 3 completed shifts:   In: 1200 [P.O.:1200]  Out: 3800 [Urine:3800]    Safety Concerns:     [address] Safety Concerns:439324002}    Impairments/Disabilities:      [address] Impairments/Disabilities:952589210}    Nutrition Therapy:  Current Nutrition Therapy:   [address] Diet List:710394023}    Routes of Feeding: {CHP DME Other Feedings:976640079}  Liquids: {Slp liquid thickness:31283}  Daily Fluid Restriction: {CHP DME Yes amt example:679126786}  Last Modified Barium Swallow with Video (Video Swallowing Test): {[facility]:588839134}    Treatments at the Time of Hospital Discharge:   Respiratory Treatments: ***  Oxygen Therapy:  {Therapy; copd oxygen:31811}  Ventilator:    { CC Vent SPGA:343407646}    Rehab Therapies: {THERAPEUTIC INTERVENTION:2354245578}  Weight Bearing Status/Restrictions: [address] [place]  Weight Bearin}  Other Medical Equipment (for information only, NOT a DME order):  {EQUIPMENT:446239422}  Other Treatments: ***    Patient's personal belongings (please select all that are sent with patient):  {CHP DME Belongings:790742529}    RN SIGNATURE:  {Esignature:968585501}    CASE MANAGEMENT/SOCIAL WORK SECTION    Inpatient Status Date: 1/15/2023    Readmission Risk Assessment Score:  Readmission Risk              Risk of Unplanned Readmission:  18           Discharging to Facility/ Agency   [facility] [address] [place] [address]       / signature: Electronically signed by Edy Wen RN on 23 at 8:16 AM EST    PHYSICIAN SECTION    Prognosis: Good    Condition at Discharge: Stable    Rehab Potential (if transferring to Rehab): Good    Recommended Labs or Other Treatments After Discharge: daily labs    Physician Certification: I certify the above information and transfer of Eddie Moya  is necessary for the continuing treatment of the diagnosis listed and that he requires Acute Rehab for less 30 days.     Update Admission H&P: No change in H&P    PHYSICIAN SIGNATURE:  Electronically signed by MERCY Figueroa CNP on 1/25/23 at 9:17 AM EST

## 2023-01-25 NOTE — PROGRESS NOTES
Physical Therapy  Facility/Department: [facility] C2 CARD TELEMETRY  Daily Treatment Note  NAME: Ирина Peña  : 1990  MRN: [id number]    Date of Service: 2023    Discharge Recommendations:  IP Rehab   PT Equipment Recommendations  Equipment Needed: No  Other: TBD at rehab facility    [facility] 6 Clicks Inpatient Mobility:  [address]   How much difficulty turning over in bed?: A Lot  How much difficulty sitting down on / standing up from a chair with arms?: A Lot  How much difficulty moving from lying on back to sitting on side of bed?: A Lot  How much help from another person moving to and from a bed to a chair?: A Lot  How much help from another person needed to walk in hospital room?: Total  How much help from another person for climbing 3-5 steps with a railing?: Total  AM-PAC Inpatient Mobility Raw Score : 10  AM-PAC Inpatient T-Scale Score : 32.29  Mobility Inpatient CMS 0-100% Score: 76.75  Mobility Inpatient CMS G-Code Modifier : CL    Patient Diagnosis(es): The primary encounter diagnosis was Dissecting aneurysm of thoracic aorta, [place] type A. Diagnoses of Chest wall discomfort, Weakness of right lower extremity, Weakness of left lower extremity, and Acute post-operative pain were also pertinent to this visit. Assessment   Assessment: Pt participated well with PT today; seen in conjunction with OT for co-treat session in light of assist requirements and decreased stamina, in order to safely progress functional mobility. Pt still quite weak and deconditioned compared to baseline, requiring maxA x 2 for bed mobility and modA x 2 for sit<>stand and bed>chair transfers with Maykel Brought Plus lift. Despite still requiring maxA x 2 for supine>sit today, pt demonstrated improving sequencing and ease of movement during transfer. No cues needed to maintain sternal precautions with sit<>stand. Participated well in a variety of UE/LE exercises for ROM/strengthening.   Given pt's young age, current deficits, and steady progress with therapy over the last few sessions, recommend IP rehab at D/C. Pt & family seem very motivated for pt to work with therapy and return to baseline LOF. Activity Tolerance: Patient tolerated treatment well;Patient limited by endurance  Equipment Needed: No  Other: TBD at rehab facility     Plan    General Plan: 5-7 times per week  Specific Instructions for Next Treatment: Progress ther ex and mobility as tolerated, build standing tolerance  Current Treatment Recommendations: Strengthening;ROM;Balance training;Gait training;Home exercise program;Safety education & training;Stair training;Functional mobility training;Neuromuscular re-education;Patient/Caregiver education & training;Transfer training; Therapeutic activities; Endurance training;Equipment evaluation, education, & procurement;Positioning     Restrictions  Restrictions/Precautions  Restrictions/Precautions: Fall Risk, Surgical Protocols, General Precautions, Cardiac, Modified Diet  Position Activity Restriction  Sternal Precautions: No Pushing, No Pulling, 5# Lifting Restrictions  Other position/activity restrictions: ICU monitoring, sternal wound vac     Subjective    Subjective: Pt agreeable to work with PT this morning, pleasant and cooperative. Voices frustration about not being able to D/C to the ARU until tomorrow. Pain: Pt denies pain at rest.  Overall Orientation Status: Within Functional Limits    Objective   Vitals  Heart Rate: (!) 107  Heart Rate Source: Monitor  BP: 124/81  BP Location: Left upper arm  BP Method: Automatic  Patient Position: Semi fowlers  MAP (Calculated): 95  SpO2: 94 %  O2 Device: None (Room air)    Bed Mobility Training  Interventions: Verbal cues; Visual cues; Tactile cues;Manual cues  Supine to Sit: Maximum assistance;Assist X2;Additional time (moving toward L side, HOB elevated ~45 degrees, cues for technique & sternal precautions, slight improvement in ease of transfer today vs. therapy session yesterday)  Scooting: Maximum assistance;Assist X2;Additional time (to scoot forward to EOB)    Balance  Sitting: Impaired  Sitting - Static: Fair (occasional)  Sitting - Dynamic: Fair (occasional)  Standing: Impaired  Standing - Static: Poor;Constant support (pt stood twice in Maria Victoria-Plus lift with modA x 2, first trial x 1.5 minutes, second trial x 3 minutes (total standing time limited by BLE weakness & decr'd endurance))  Standing - Dynamic: Poor;Constant support    Transfer Training  Interventions: Safety awareness training;Verbal cues; Visual cues; Tactile cues;Manual cues  Sit to Stand: Moderate assistance;Assist X2;Adaptive equipment (modA x 2 using Maria Victoria-Plus lift)  Stand to Sit: Moderate assistance;Assist X2;Adaptive equipment (modA x 2 using Maria Victoria-Plus lift)  Bed to Chair: Moderate assistance;Assist X2;Adaptive equipment (modA x 2 using Maria Victoria-Plus lift, moving from bed<>BSC)  Toilet Transfer: Moderate assistance;Assist X2;Adaptive equipment (modA x 2 using Maria Victoria-Plus lift to/from [facility])    Gait Training  Gait Training: No (pt still too weak in BLE to safely attempt gait)    PT Exercises  Exercise Treatment: x 12 BLE: Ankle pumps, glut sets, quad sets, supine hip ABD/ADD (SBA on RLE, min-modA on LLE), heel slides (SBA on RLE, min-modA on LLE)    Safety Devices  Type of Devices: Patient at risk for falls; All fall risk precautions in place; Left in bed; All nikolas prominences offloaded;Call light within reach;Nurse notified;Gait belt; Heels elevated for pressure relief     Goals  Short Term Goals  Time Frame for Short Term Goals: 1/27/23  Short Term Goal 1: pt will complete bed mobility max Ax2 - MET 1/23/23. Goal upgraded: Pt will transfer supine <-> sit with modA x 2. Short Term Goal 2: pt will sit EOB for 5 min with mod A - MET 1/23/23. Goal upgraded: Pt will tolerate sitting EOB x 10 minutes with Kostas x 1 or less to maintain upright trunk - MET 1/25/23.   Goal upgraded: Pt will tolerate sitting EOB x 10 minutes with SBA for upright trunk. Short Term Goal 3: pt will complete transfers with max Ax2 - MET 1/23/23. Goal upgraded: Pt will transfer sit <-> stand and bed>chair using RW with modA x 2 - MET 1/25/23. Goal upgraded: Pt will transfer sit <-> stand and bed>chair using RW with modA x 2. Short Term Goal 4: Assess gait when safely able and set appropriate goal  Short Term Goal 5: pt will participate in 12-15 reps of BLE exercises by 1/23/22 -- MET 1/20/23  Additional Goals?: No  Patient Goals   Patient Goals : \"To go to rehab and get stronger\"    Education  Patient Education  Education Given To: Patient  Education Provided: Role of Therapy;Plan of Care;Home Exercise Program;Transfer Training;Equipment;Precautions; Energy Conservation  Education Method: Demonstration;Verbal  Barriers to Learning: None  Education Outcome: Verbalized understanding;Demonstrated understanding;Continued education needed    Therapy Time   Individual Concurrent Group Co-treatment   Time In 1011         Time Out 1105         Minutes 54         Timed Code Treatment Minutes: [address], [address], [place] #[id number]    If pt is unable to be seen after this session, please let this note serve as discharge summary. Please see case management note for discharge disposition. Thank you.

## 2023-01-25 NOTE — PROGRESS NOTES
Shift: [id number]    Procedure: ASCENDING AORTA VALVE REPLACEMENT WITH TUBE GRAFT USING AN EPPERSON GRAFT SIZE 28CM  ANTEGRADE FLOW TO THE LEFT LEG, RESUSPENSION OF AORTIC VALVE    Admit from OR (time and date): 1/16/23 0630    Transition (time and date):     Surgery, return to OR yes - Fem - Fem bypass with Dr. Isrrael Jain at handoff  stable    Most recent vitals: /63   Pulse (!) 110   Temp 97.9 °F (36.6 °C) (Oral)   Resp 22   Ht 6' 4\" (1.93 m)   Wt 279 lb 9 oz (126.8 kg)   SpO2 92%   BMI 34.03 kg/m²      Increased O2 requirements: no O2 requirements: Oxygen Therapy  SpO2: 92 %  Pulse Oximetry Type: Continuous  SPO2 High Alarm Limit: 100  SPO2 Low Alarm Limit POX: 90  Pulse Oximeter Device Mode: Continuous  Pulse Oximeter Device Location: Finger  O2 Device: None (Room air)  Oximetry Probe Site Changed: Yes  Skin Assessment: Clean, dry, & intact  Skin Protection for O2 Device: Yes  Orientation: Middle  Location: Nose  FiO2 : 60 %  O2 Flow Rate (L/min): 2 L/min  O2 Delivery Method: Nasal cannula  Vent Mode: (S) AC/VC (pt placed on sbt 5/5 at 1220)     Admission weight Weight: (!) 340 lb (154.2 kg)  Today's weight   Wt Readings from Last 1 Encounters:   01/25/23 279 lb 9 oz (126.8 kg)         EF: unknown    Drop in Urinary Output no     Rhythm Changes Normal Sinus Rhythm 90's    Pacing Wires Removed:  [] Yes  [x] NO  [] Platelets < 53,603  [] Arrhythmia  [] Bradycardia [] Valve Replacement  [] Pacing for Cardiac Index  []Physician Order    Lines/Drains  LDA Insertion Date Discontinued Date Dressing Changes   Art line 1/16 1/19    Central Line 1/16 1/20    Colin 1/16 1/20    Chest Tube P: 1/16  M: 1/16 M: 1/19  P:1/20    Wires  1/16 1/19    ETT 1/16 1/17    TYRELL Drain      VasCath      Impella        Interventions After Office Hours  Problem(Brief) Date Time Intervention Physician contacted                                               Drip rates at handoff:    sodium chloride 5 mL/hr at 01/16/23 1453    dextrose         Hospital Course:  POD# DOS  -Ca replaced  -Anali Maffucci given for  5.9 K -VSS  -Fem to Fem bypass with Dr. Tarik Rogers @ [id number]  -OGT LWS    POD NOS:  -Levo weaned slightly. -Remains on levo, esmolol, insulin, fentanyl, and versed.  -Fi02 weaned on vent. -Wakes up, moves all extremities, follows commands. POD#3 01/18:   -HR in 90s, BP 110s-120s overnight   -notified on call CTS about blood sugar 74 and 23 units Lantus order. Will hold Lantus right now per order, and new order of low dose sliding scale order noted. Blood glucose recheck 118.   -Wife Katy Hernandez updated. -K replaced, Ca replace. -IS practiced with patient this shift   -bathed, CTS dressing changed   -thi morning pt's weight is 148.4kg  -Total urine output: 4050ml on Lasix gtt   -total MCT output: 0ml  -total PCT output: 6ml  -Daily BMP, CBC, CXR completed    POD#2 1/19:  -V wires pulled, mediastinal CT pulled, Flotrac DC'd  -up to chair with lift- ARU consulted  -metoprolol increased    POD#3 1/20:    - metoprolol increased   CT, F/C and Central line removed. PT/RN slings to chair. Per Dr. Tarik Rogers wound vacs to remain through Sunday. Overnight:   - small BM   - endorsement of improved pain control   - HR sustaining in low 100's despite increased lopressor dose; BP remaining stable    POD#4 1/21:  VSS. Pt had several bowel movements throughout the day. Pt stood up with attempt for bedside commode but remains very weak so was not attempted further. Pt was moved to recliner twice with sling assist. Pt and wife requested melatonin and ENT consult (hoarse and low voice post extubation) which was ordered by Dr Tanya Olivares. Overnight:   - uneventful course   - HR remaining in low 100's despite nightly lopressor    POD#6 1/23: Days  - Modified Barium Swallow eval completed. No restrictions to eating. CTS and ENT updated.   - ARU precert completed, awaiting insurance approval  - Remains weak (especially lower extremities) only taking a few steps assisted. POD #6 Nights   - Urine output >1400.   - Patient sling to chair- unable to walk to chair   - Able to eat with no problems. POD 1/23 Days:  -OOB with thuan plus, sling to bed x 2   -accepted to ARU, no bed until tomorrow  -uneventful shift    POD 1/24 Nights  - No issues overnight. - Up to chair. Lab Data:  CBC:   Recent Labs     01/24/23 0402 01/25/23 0413   WBC 22.5* 21.3*   HGB 10.9* 11.1*   HCT 34.4* 33.8*   MCV 82.4 82.1   * 491*       BMP:    Recent Labs     01/24/23 0402 01/25/23 0413    134*   K 4.4 4.6   CO2 26 25   BUN 38* 35*   CREATININE 0.9 0.8*       LIVR:   Recent Labs     01/24/23  0402 01/25/23 0413   * 128*   * 153*       PT/INR:   Recent Labs     01/24/23 0402 01/25/23 0413   PROT 5.8* 5.7*       APTT: No results for input(s): APTT in the last 72 hours. ABG:   No results for input(s): PHART, FWG1FUL, PO2ART in the last 72 hours.        Electronically signed by Keri Deras RN on 1/25/2023 at 7:09 AM

## 2023-01-25 NOTE — PROGRESS NOTES
Occupational Therapy  Facility/Department: [facility] C2 CARD TELEMETRY  Treatment Note    Name: Alphonse Leavitt  : 1990  MRN: [id number]  Date of Service: 2023    Discharge Recommendations:  IP Rehab  Barriers to home discharge:   [x] Steps to access home entry or bed/bath:     [x] Reported available assist at home upon discharge limited   [x] Patient or family requests DC to other than home          Patient Diagnosis(es): The primary encounter diagnosis was Dissecting aneurysm of thoracic aorta, [place] type A. Diagnoses of Chest wall discomfort, Weakness of right lower extremity, Weakness of left lower extremity, and Acute post-operative pain were also pertinent to this visit. Past Medical History:  has a past medical history of Influenza A and Marfan syndrome. Past Surgical History:  has a past surgical history that includes Femoral-femoral Bypass Graft (Bilateral, 2023) and Thoracic aortic aneurysm repair (N/A, 1/15/2023). Assessment   Performance deficits / Impairments: Decreased functional mobility ; Decreased safe awareness;Decreased ADL status; Decreased cognition;Decreased strength;Decreased endurance;Decreased high-level IADLs;Decreased coordination  Assessment: pt from home normally independent with IADL's & functional mobility without AD, working; pt admitted with Dissecting aneurysm of thoracic aorta, s/p AVR 1-15-23 & Fem-Fem Bypass 22, pt progressing to max assist of 2 bed mobility & sit<-->stand with LEATHA PLus(Assisted Standing Lift) from EOB; feeding self after set up & more alert with supervision with UE AROM; max assist for maintaining static unsupported sitting balance & forward reaching for core strengthening; pt continues to benefit from skilled OT services  REQUIRES OT FOLLOW-UP: Yes  Activity Tolerance  Activity Tolerance: Patient Tolerated treatment well  Activity Tolerance Comments: vitals stable on RA;        Plan   Occupational Therapy Plan  Times Per Week: 4-6 x/ week  Current Treatment Recommendations: ROM, Balance training, Functional mobility training, Endurance training, Patient/Caregiver education & training, Safety education & training, Positioning, Self-Care / ADL     Restrictions  Restrictions/Precautions  Restrictions/Precautions: Fall Risk, Surgical Protocols, General Precautions, Cardiac, Modified Diet  Position Activity Restriction  Sternal Precautions: No Pushing, No Pulling, 5# Lifting Restrictions  Other position/activity restrictions: ICU monitoring, sternal wound vac    Subjective   General  Chart Reviewed: Yes  Patient assessed for rehabilitation services?: Yes  Family / Caregiver Present: No  Referring Practitioner: Raquel Edmonds MD 1/16/23  Diagnosis: Aortic dissection with ischemic left lower  extremity, s/p Left to right femoral-femoral bypass reinforced GORE Propaten graft; thorascic aortic disection, s/p ascending aorta valve replacement 1/16  Subjective  Subjective: pt with multiple negative comments about his weakness & body weight; denies pain  General Comment  Comments: RN cleared pt for OT; pt awake in bed, agreeable to therapy            Objective   Heart Rate: 98  Heart Rate Source: Monitor  BP: 91/61  BP Location: Left upper arm  BP Method: Automatic  Patient Position: Semi fowlers  MAP (Calculated): 71  Resp: 21  SpO2: 94 %  O2 Device: None (Room air)             Safety Devices  Type of Devices: Patient at risk for falls; All fall risk precautions in place; Left in bed; All nikolas prominences offloaded;Call light within reach     Toilet Transfers  Toilet - Technique:  (dependent with LEATHA Plus)  Equipment Used: Extra wide bedside commode  Toilet Transfer: 2 Person assistance (max assist of 2)  AROM: Generally decreased, functional  Strength: Grossly decreased, non-functional  Coordination: Generally decreased, functional  Tone: Normal  ADL  Toileting: Dependent/Total (pericare/toileting hygeine)        Bed mobility  Supine to Sit: 2 Person assistance (max assist of 2 log roll to Left)  Sit to Supine: 2 Person assistance (mod assist of 2 log roll)  Bed Mobility Comments: max assist with static sitting balance initially EOB, with Lean to Left, progresses to CGA  Transfers  Sit to stand: 2 Person assistance (max assist of 2 with LEATHA PLUS)  Stand to sit: 2 Person assistance (max assist of 2 from LEATHA Plus)     Cognition  Overall Cognitive Status: Exceptions  Arousal/Alertness: Appropriate responses to stimuli  Following Commands:  Follows one step commands consistently  Attention Span: Attends with cues to redirect  Memory: Decreased recall of precautions  Safety Judgement: Decreased awareness of need for assistance  Insights: Decreased awareness of deficits  Initiation: Requires cues for some  Sequencing: Requires cues for some  Orientation  Overall Orientation Status: Within Functional Limits               Exercise Treatment: BUE AROM in semi-mix's  Hand flex/ext: x    15 Reps  Wrist flex/ext:  X 15 Reps  Elbow flex/ext:  x   15 Reps  Forearm sup/pron:  x  15  Reps           Education Given To: Patient  Education Provided: Role of Therapy;Precautions;Plan of Care  Education Provided Comments: disease specific: importance of sternal precautions, trunk control, BUE AROM exercises; use of RED/nurse call light for assist with positioning, ADL needs;  Education Method: Demonstration;Verbal  Barriers to Learning: Cognition  Education Outcome: Demonstrated understanding;Continued education needed      AM-PAC Score        AM-[facility] Inpatient Daily Activity Raw Score: 9 (01/25/23 1226)  AM-PAC Inpatient ADL T-Scale Score : 25.33 (01/25/23 1226)  ADL Inpatient CMS 0-100% Score: 79.59 (01/25/23 1226)  ADL Inpatient CMS G-Code Modifier : CL (01/25/23 1226)    Tinneti Score       Goals  Short Term Goals  Time Frame for Short Term Goals: 1 week(1-25-23)- all goals ongoing 1/20; extend 2 weeks to 2-08-23  Short Term Goal 1: mod assist of 2 with functional transfers by 1-25-23; 1/25 Max assist of 2 with LEATHA Plus  Short Term Goal 2: set up only for UE self care/grooming; 1/25 declined  Short Term Goal 3: supervision with forward reach for core strengthening in supported sitting by 1-31-23; 1/25 CGA supported sitting on BSC;   Short Term Goal 4: min cues for sternal precautions for transfers;1/25 requires min assist to maintain grasp of \"heart pillow\" for sit<-->stand transfers  Patient Goals   Patient goals : Unable to verbalize at this time       Therapy Time   Individual Concurrent Group Co-treatment   Time In 1011         Time Out 1100         Minutes 9062 Rishi Maciel [place]

## 2023-01-25 NOTE — CARE COORDINATION
Call from [facility] (liaison) [facility]. No bed available until tomorrow. Patient and CTS Ananda Santos) notified.       Yuri Carr RN

## 2023-01-25 NOTE — DISCHARGE SUMMARY
Cardiac, Vascular & Thoracic Surgery  Discharge Summary    Patient:  Josefina Henderson 1990 [id number]   Admission Date:  1/15/2023  9:08 PM  Discharge Date:  1/26/23    Principle Diagnosis:  Aortic dissection (HCC)    Secondary Diagnosis:  Principal Problem (Resolved): Aortic dissection (HCC)  Active Problems:    Vocal fold paresis, left  Resolved Problems:    Type 1 dissection of ascending aorta    Chest wall discomfort      CT Chest/abd/pelvis: 1/15/23   Impression   Aortic dissection that starts in the ascending thoracic aorta near the aortic   root and extends to the aortic hiatus. The dissection flap appears to extend   into the proximal aspect of the brachiocephalic artery and left common   carotid artery. Periaortic stranding and inflammation involving the distal abdominal aorta   extending along the common iliac arteries bilaterally that is worse on the   left compared to the right. There is associated loss of flow in the left   common iliac artery extending into the proximal left external and internal   iliac arteries with subsequent reconstitution. This is of uncertain etiology. Hepatic steatosis. Uncomplicated colonic diverticulosis. Procedure:  1/16/23 ASCENDING AORTIC REPLACEMENT WITH TUBE GRAFT USING AN EPPERSON GRAFT SIZE 28CM  ANTEGRADE FLOW TO THE LEFT LEG, RESUSPENSION OF AORTIC VALVE (Dr. Kiran Soto)      1/16/23:  Left to right femoral-femoral bypass using 8 mm internally  reinforced GORE Propaten graft. (Dr. Gregor Rankin)     History:  The patient is a 34 y.o. male who significant chest pain came to the emergency room CT scan showed type A dissection acute severe enough to come to the emergency room improved with blood pressure control    Hospital Course: The post operative period for this patient was complicated and is as follows:   1/16 Patient intubated, sedated. PEEP 5, Fio2 85%. On several gtts for hemodynamic support at this time.    1/17 remains intubated, vent settings of 40% FiO2 with PEEP 5. Extubated at 13:40   1/18 sitting up in bed on BiPap, awake, alert, moving all extremities. 1/19 sitting up in bed, his wife is visiting at bedside. 1/20 sitting up in bed, reports feeling better today. On 2L per NC.  1/21, HR in the low 100's, NSR, not much appetite. BP soft. 1/22 Leukocytosis with no obvious source, CTA ordered, ENT consulted. 1/23 Appears that he has some degree of vocal cord paralysis. Concerned about aspiration. Will get a MBS this morning. 1/24 sitting up in bed, wife visiting at bedside. MBS without any signs of aspiration   1/25 increased BB as he was sinus tach. The patient was discharged on 1/26/23    Discharged Condition: stable    Disposition:  ARU at [facility]    Medications:  Aspirin:start  ACE/ARB:contraindicated 2/2 hypotension  Betablocker:start  Statin:start    Discharge Medications:     Medication List        START taking these medications      amoxicillin-clavulanate 875-125 MG per tablet  Commonly known as: AUGMENTIN  Take 1 tablet by mouth every 12 hours for 5 days     aspirin 81 MG EC tablet  Take 1 tablet by mouth daily     atorvastatin 40 MG tablet  Commonly known as: LIPITOR  Take 1 tablet by mouth nightly     bisacodyl 5 MG EC tablet  Commonly known as: DULCOLAX  Take 1 tablet by mouth daily for 7 days     famotidine 20 MG tablet  Commonly known as: PEPCID  Take 1 tablet by mouth daily     furosemide 40 MG tablet  Commonly known as: LASIX  Take 1 tablet by mouth in the morning and 1 tablet in the evening. Do all this for 7 days. gabapentin 100 MG capsule  Commonly known as: NEURONTIN  Take 1 capsule by mouth 3 times daily for 7 days.      magnesium oxide 400 (240 Mg) MG tablet  Commonly known as: MAG-OX  Take 1 tablet by mouth daily for 7 days     methocarbamol 750 MG tablet  Commonly known as: ROBAXIN  Take 1 tablet by mouth 4 times daily for 10 days     metoprolol tartrate 50 MG tablet  Commonly known as: LOPRESSOR  Take 1 tablet by mouth 2 times daily Hold for pulse < 65, SBP < 100     oxyCODONE 5 MG immediate release tablet  Commonly known as: ROXICODONE  Take 1 tablet by mouth every 6 hours as needed for Pain (acute post op pain) for up to 7 days. Max Daily Amount: 20 mg     polyethylene glycol 17 g packet  Commonly known as: GLYCOLAX  Take 17 g by mouth daily for 7 days     potassium chloride 20 MEQ extended release tablet  Commonly known as: KLOR-CON M  Take 1 tablet by mouth 2 times daily (with meals) for 7 days            STOP taking these medications      ibuprofen 800 MG tablet  Commonly known as: ADVIL;MOTRIN               Where to Get Your Medications        You can get these medications from any pharmacy    Bring a paper prescription for each of these medications  oxyCODONE 5 MG immediate release tablet       Information about where to get these medications is not yet available    Ask your nurse or doctor about these medications  amoxicillin-clavulanate 875-125 MG per tablet  aspirin 81 MG EC tablet  atorvastatin 40 MG tablet  bisacodyl 5 MG EC tablet  famotidine 20 MG tablet  furosemide 40 MG tablet  gabapentin 100 MG capsule  magnesium oxide 400 (240 Mg) MG tablet  methocarbamol 750 MG tablet  metoprolol tartrate 50 MG tablet  polyethylene glycol 17 g packet  potassium chloride 20 MEQ extended release tablet          Patient Instructions: Activity: DO NOT LIFT, PUSH, OR PULL ANYTHING OVER 5 POUNDS FOR 6 WEEK from the day of surgery  Diet:  cardiac diet  Wound Care:  [facility] YOUR INCISIONS DAILY WITH A CLEAN WASHCLOTH AND ANTIBACTERIAL SOAP. Do not wash your incisions after you have cleansed other parts of your body    Follow up with Cardiothoracic Surgeon, Dr. Wendi Sutton in one week. Follow up with Vascular Surgeon, Dr. Dominick Graham in 2 weeks.       Julissa Dias, APRN - CNP

## 2023-01-26 ENCOUNTER — HOSPITAL ENCOUNTER (INPATIENT)
Age: 33
DRG: 949 | End: 2023-01-26
Attending: STUDENT IN AN ORGANIZED HEALTH CARE EDUCATION/TRAINING PROGRAM | Admitting: STUDENT IN AN ORGANIZED HEALTH CARE EDUCATION/TRAINING PROGRAM
Payer: COMMERCIAL

## 2023-01-26 VITALS
BODY MASS INDEX: 34.04 KG/M2 | HEIGHT: 76 IN | HEART RATE: 97 BPM | DIASTOLIC BLOOD PRESSURE: 62 MMHG | OXYGEN SATURATION: 98 % | TEMPERATURE: 97.5 F | RESPIRATION RATE: 24 BRPM | WEIGHT: 279.56 LBS | SYSTOLIC BLOOD PRESSURE: 108 MMHG

## 2023-01-26 PROBLEM — I71.00 AORTIC DISSECTION (HCC): Status: ACTIVE | Noted: 2023-01-26

## 2023-01-26 LAB
A/G RATIO: 0.6 (ref 1.1–2.2)
ALBUMIN SERPL-MCNC: 2.3 G/DL (ref 3.4–5)
ALP BLD-CCNC: 83 U/L (ref 40–129)
ALT SERPL-CCNC: 160 U/L (ref 10–40)
ANION GAP SERPL CALCULATED.3IONS-SCNC: 9 MMOL/L (ref 3–16)
AST SERPL-CCNC: 120 U/L (ref 15–37)
BANDED NEUTROPHILS RELATIVE PERCENT: 3 % (ref 0–7)
BASOPHILS ABSOLUTE: 0 K/UL (ref 0–0.2)
BASOPHILS RELATIVE PERCENT: 0 %
BILIRUB SERPL-MCNC: 0.4 MG/DL (ref 0–1)
BUN BLDV-MCNC: 27 MG/DL (ref 7–20)
CALCIUM SERPL-MCNC: 8.2 MG/DL (ref 8.3–10.6)
CHLORIDE BLD-SCNC: 100 MMOL/L (ref 99–110)
CO2: 25 MMOL/L (ref 21–32)
CREAT SERPL-MCNC: 0.6 MG/DL (ref 0.9–1.3)
EOSINOPHILS ABSOLUTE: 0.2 K/UL (ref 0–0.6)
EOSINOPHILS RELATIVE PERCENT: 1 %
GFR SERPL CREATININE-BSD FRML MDRD: >60 ML/MIN/{1.73_M2}
GLUCOSE BLD-MCNC: 107 MG/DL (ref 70–99)
HCT VFR BLD CALC: 32.1 % (ref 40.5–52.5)
HEMATOLOGY PATH CONSULT: NO
HEMOGLOBIN: 10.7 G/DL (ref 13.5–17.5)
LYMPHOCYTES ABSOLUTE: 1.7 K/UL (ref 1–5.1)
LYMPHOCYTES RELATIVE PERCENT: 9 %
MCH RBC QN AUTO: 26.9 PG (ref 26–34)
MCHC RBC AUTO-ENTMCNC: 33.2 G/DL (ref 31–36)
MCV RBC AUTO: 81 FL (ref 80–100)
METAMYELOCYTES RELATIVE PERCENT: 1 %
MONOCYTES ABSOLUTE: 1.5 K/UL (ref 0–1.3)
MONOCYTES RELATIVE PERCENT: 8 %
NEUTROPHILS ABSOLUTE: 15.5 K/UL (ref 1.7–7.7)
NEUTROPHILS RELATIVE PERCENT: 78 %
PDW BLD-RTO: 14.5 % (ref 12.4–15.4)
PLATELET # BLD: 482 K/UL (ref 135–450)
PLATELET SLIDE REVIEW: ABNORMAL
PMV BLD AUTO: 6.9 FL (ref 5–10.5)
POTASSIUM REFLEX MAGNESIUM: 4.2 MMOL/L (ref 3.5–5.1)
RBC # BLD: 3.96 M/UL (ref 4.2–5.9)
RBC # BLD: NORMAL 10*6/UL
SODIUM BLD-SCNC: 134 MMOL/L (ref 136–145)
TOTAL PROTEIN: 5.9 G/DL (ref 6.4–8.2)
WBC # BLD: 18.9 K/UL (ref 4–11)

## 2023-01-26 PROCEDURE — 6360000002 HC RX W HCPCS: Performed by: THORACIC SURGERY (CARDIOTHORACIC VASCULAR SURGERY)

## 2023-01-26 PROCEDURE — 2580000003 HC RX 258: Performed by: THORACIC SURGERY (CARDIOTHORACIC VASCULAR SURGERY)

## 2023-01-26 PROCEDURE — 6370000000 HC RX 637 (ALT 250 FOR IP): Performed by: THORACIC SURGERY (CARDIOTHORACIC VASCULAR SURGERY)

## 2023-01-26 PROCEDURE — 36415 COLL VENOUS BLD VENIPUNCTURE: CPT

## 2023-01-26 PROCEDURE — 97530 THERAPEUTIC ACTIVITIES: CPT

## 2023-01-26 PROCEDURE — 94669 MECHANICAL CHEST WALL OSCILL: CPT

## 2023-01-26 PROCEDURE — 6370000000 HC RX 637 (ALT 250 FOR IP): Performed by: STUDENT IN AN ORGANIZED HEALTH CARE EDUCATION/TRAINING PROGRAM

## 2023-01-26 PROCEDURE — 97535 SELF CARE MNGMENT TRAINING: CPT

## 2023-01-26 PROCEDURE — 1280000000 HC REHAB R&B

## 2023-01-26 PROCEDURE — 80053 COMPREHEN METABOLIC PANEL: CPT

## 2023-01-26 PROCEDURE — 97110 THERAPEUTIC EXERCISES: CPT

## 2023-01-26 PROCEDURE — 6370000000 HC RX 637 (ALT 250 FOR IP): Performed by: NURSE PRACTITIONER

## 2023-01-26 PROCEDURE — 85025 COMPLETE CBC W/AUTO DIFF WBC: CPT

## 2023-01-26 PROCEDURE — 94640 AIRWAY INHALATION TREATMENT: CPT

## 2023-01-26 RX ORDER — ONDANSETRON 2 MG/ML
4 INJECTION INTRAMUSCULAR; INTRAVENOUS EVERY 6 HOURS PRN
Status: CANCELLED | OUTPATIENT
Start: 2023-01-26

## 2023-01-26 RX ORDER — POLYETHYLENE GLYCOL 3350 17 G/17G
17 POWDER, FOR SOLUTION ORAL DAILY
Status: DISCONTINUED | OUTPATIENT
Start: 2023-01-27 | End: 2023-02-11 | Stop reason: HOSPADM

## 2023-01-26 RX ORDER — TRAZODONE HYDROCHLORIDE 50 MG/1
50 TABLET ORAL NIGHTLY PRN
Status: DISCONTINUED | OUTPATIENT
Start: 2023-01-26 | End: 2023-02-02

## 2023-01-26 RX ORDER — LANOLIN ALCOHOL/MO/W.PET/CERES
400 CREAM (GRAM) TOPICAL 2 TIMES DAILY
Status: CANCELLED | OUTPATIENT
Start: 2023-01-26

## 2023-01-26 RX ORDER — POLYETHYLENE GLYCOL 3350 17 G/17G
17 POWDER, FOR SOLUTION ORAL DAILY
Status: CANCELLED | OUTPATIENT
Start: 2023-01-27

## 2023-01-26 RX ORDER — AMOXICILLIN AND CLAVULANATE POTASSIUM 875; 125 MG/1; MG/1
1 TABLET, FILM COATED ORAL EVERY 12 HOURS SCHEDULED
Status: CANCELLED | OUTPATIENT
Start: 2023-01-26 | End: 2023-01-28

## 2023-01-26 RX ORDER — OXYCODONE HYDROCHLORIDE 5 MG/1
5 TABLET ORAL EVERY 4 HOURS PRN
Status: DISCONTINUED | OUTPATIENT
Start: 2023-01-26 | End: 2023-02-11 | Stop reason: HOSPADM

## 2023-01-26 RX ORDER — OXYCODONE HYDROCHLORIDE 5 MG/1
5 TABLET ORAL EVERY 4 HOURS PRN
Status: CANCELLED | OUTPATIENT
Start: 2023-01-26

## 2023-01-26 RX ORDER — ATORVASTATIN CALCIUM 40 MG/1
40 TABLET, FILM COATED ORAL NIGHTLY
Status: DISCONTINUED | OUTPATIENT
Start: 2023-01-26 | End: 2023-02-11 | Stop reason: HOSPADM

## 2023-01-26 RX ORDER — POTASSIUM CHLORIDE 20 MEQ/1
20 TABLET, EXTENDED RELEASE ORAL
Status: CANCELLED | OUTPATIENT
Start: 2023-01-26

## 2023-01-26 RX ORDER — ONDANSETRON 2 MG/ML
4 INJECTION INTRAMUSCULAR; INTRAVENOUS EVERY 6 HOURS PRN
Status: DISCONTINUED | OUTPATIENT
Start: 2023-01-26 | End: 2023-02-11 | Stop reason: HOSPADM

## 2023-01-26 RX ORDER — ENOXAPARIN SODIUM 100 MG/ML
40 INJECTION SUBCUTANEOUS DAILY
Status: DISCONTINUED | OUTPATIENT
Start: 2023-01-27 | End: 2023-02-09

## 2023-01-26 RX ORDER — MECOBALAMIN 5000 MCG
5 TABLET,DISINTEGRATING ORAL NIGHTLY
Status: CANCELLED | OUTPATIENT
Start: 2023-01-26

## 2023-01-26 RX ORDER — LANOLIN ALCOHOL/MO/W.PET/CERES
400 CREAM (GRAM) TOPICAL 2 TIMES DAILY
Status: DISCONTINUED | OUTPATIENT
Start: 2023-01-26 | End: 2023-01-30

## 2023-01-26 RX ORDER — METOPROLOL TARTRATE 50 MG/1
100 TABLET, FILM COATED ORAL 2 TIMES DAILY
Status: CANCELLED | OUTPATIENT
Start: 2023-01-26

## 2023-01-26 RX ORDER — METOPROLOL TARTRATE 50 MG/1
100 TABLET, FILM COATED ORAL 2 TIMES DAILY
Status: DISCONTINUED | OUTPATIENT
Start: 2023-01-26 | End: 2023-01-27

## 2023-01-26 RX ORDER — ALBUTEROL SULFATE 2.5 MG/3ML
2.5 SOLUTION RESPIRATORY (INHALATION)
Status: CANCELLED | OUTPATIENT
Start: 2023-01-26

## 2023-01-26 RX ORDER — GABAPENTIN 100 MG/1
100 CAPSULE ORAL 3 TIMES DAILY
Status: CANCELLED | OUTPATIENT
Start: 2023-01-26

## 2023-01-26 RX ORDER — ALBUTEROL SULFATE 2.5 MG/3ML
2.5 SOLUTION RESPIRATORY (INHALATION) EVERY 4 HOURS PRN
Status: DISCONTINUED | OUTPATIENT
Start: 2023-01-26 | End: 2023-01-26 | Stop reason: HOSPADM

## 2023-01-26 RX ORDER — DEXTROSE MONOHYDRATE 100 MG/ML
INJECTION, SOLUTION INTRAVENOUS CONTINUOUS PRN
Status: CANCELLED | OUTPATIENT
Start: 2023-01-26

## 2023-01-26 RX ORDER — ASPIRIN 81 MG/1
81 TABLET ORAL DAILY
Status: CANCELLED | OUTPATIENT
Start: 2023-01-27

## 2023-01-26 RX ORDER — FAMOTIDINE 20 MG/1
20 TABLET, FILM COATED ORAL 2 TIMES DAILY
Status: DISCONTINUED | OUTPATIENT
Start: 2023-01-26 | End: 2023-02-11 | Stop reason: HOSPADM

## 2023-01-26 RX ORDER — ATORVASTATIN CALCIUM 40 MG/1
40 TABLET, FILM COATED ORAL NIGHTLY
Status: CANCELLED | OUTPATIENT
Start: 2023-01-26

## 2023-01-26 RX ORDER — METOPROLOL TARTRATE 100 MG/1
100 TABLET ORAL 2 TIMES DAILY
Qty: 60 TABLET | Refills: 0 | Status: ON HOLD | DISCHARGE
Start: 2023-01-26

## 2023-01-26 RX ORDER — FONDAPARINUX SODIUM 2.5 MG/.5ML
2.5 INJECTION SUBCUTANEOUS DAILY
Status: DISCONTINUED | OUTPATIENT
Start: 2023-01-27 | End: 2023-01-26

## 2023-01-26 RX ORDER — AMOXICILLIN AND CLAVULANATE POTASSIUM 875; 125 MG/1; MG/1
1 TABLET, FILM COATED ORAL EVERY 12 HOURS SCHEDULED
Status: COMPLETED | OUTPATIENT
Start: 2023-01-26 | End: 2023-01-27

## 2023-01-26 RX ORDER — ASPIRIN 81 MG/1
81 TABLET ORAL DAILY
Status: DISCONTINUED | OUTPATIENT
Start: 2023-01-27 | End: 2023-02-11 | Stop reason: HOSPADM

## 2023-01-26 RX ORDER — ONDANSETRON 4 MG/1
4 TABLET, ORALLY DISINTEGRATING ORAL EVERY 8 HOURS PRN
Status: CANCELLED | OUTPATIENT
Start: 2023-01-26

## 2023-01-26 RX ORDER — FONDAPARINUX SODIUM 2.5 MG/.5ML
2.5 INJECTION SUBCUTANEOUS DAILY
Status: CANCELLED | OUTPATIENT
Start: 2023-01-27

## 2023-01-26 RX ORDER — DEXTROSE MONOHYDRATE 100 MG/ML
INJECTION, SOLUTION INTRAVENOUS CONTINUOUS PRN
Status: DISCONTINUED | OUTPATIENT
Start: 2023-01-26 | End: 2023-02-11 | Stop reason: HOSPADM

## 2023-01-26 RX ORDER — GABAPENTIN 100 MG/1
100 CAPSULE ORAL 3 TIMES DAILY
Status: DISCONTINUED | OUTPATIENT
Start: 2023-01-26 | End: 2023-02-11 | Stop reason: HOSPADM

## 2023-01-26 RX ORDER — ACETAMINOPHEN 500 MG
1000 TABLET ORAL EVERY 8 HOURS SCHEDULED
Status: CANCELLED | OUTPATIENT
Start: 2023-01-26

## 2023-01-26 RX ORDER — ONDANSETRON 4 MG/1
4 TABLET, ORALLY DISINTEGRATING ORAL EVERY 8 HOURS PRN
Status: DISCONTINUED | OUTPATIENT
Start: 2023-01-26 | End: 2023-02-11 | Stop reason: HOSPADM

## 2023-01-26 RX ORDER — ACETAMINOPHEN 500 MG
1000 TABLET ORAL EVERY 8 HOURS SCHEDULED
Status: DISCONTINUED | OUTPATIENT
Start: 2023-01-26 | End: 2023-02-11 | Stop reason: HOSPADM

## 2023-01-26 RX ORDER — METOPROLOL TARTRATE 50 MG/1
100 TABLET, FILM COATED ORAL 2 TIMES DAILY
Status: DISCONTINUED | OUTPATIENT
Start: 2023-01-26 | End: 2023-01-26 | Stop reason: HOSPADM

## 2023-01-26 RX ORDER — FAMOTIDINE 20 MG/1
20 TABLET, FILM COATED ORAL 2 TIMES DAILY
Status: CANCELLED | OUTPATIENT
Start: 2023-01-26

## 2023-01-26 RX ORDER — MECOBALAMIN 5000 MCG
5 TABLET,DISINTEGRATING ORAL NIGHTLY
Status: DISCONTINUED | OUTPATIENT
Start: 2023-01-26 | End: 2023-02-11 | Stop reason: HOSPADM

## 2023-01-26 RX ORDER — FUROSEMIDE 40 MG/1
40 TABLET ORAL 2 TIMES DAILY
Status: DISPENSED | OUTPATIENT
Start: 2023-01-26 | End: 2023-02-02

## 2023-01-26 RX ORDER — HYDRALAZINE HYDROCHLORIDE 10 MG/1
10 TABLET, FILM COATED ORAL
Status: DISCONTINUED | OUTPATIENT
Start: 2023-01-26 | End: 2023-02-11 | Stop reason: HOSPADM

## 2023-01-26 RX ORDER — FUROSEMIDE 40 MG/1
40 TABLET ORAL 2 TIMES DAILY
Status: CANCELLED | OUTPATIENT
Start: 2023-01-26

## 2023-01-26 RX ORDER — POTASSIUM CHLORIDE 20 MEQ/1
20 TABLET, EXTENDED RELEASE ORAL
Status: DISCONTINUED | OUTPATIENT
Start: 2023-01-26 | End: 2023-01-30

## 2023-01-26 RX ADMIN — POTASSIUM CHLORIDE 20 MEQ: 20 TABLET, EXTENDED RELEASE ORAL at 09:01

## 2023-01-26 RX ADMIN — METOPROLOL TARTRATE 100 MG: 50 TABLET, FILM COATED ORAL at 21:19

## 2023-01-26 RX ADMIN — FUROSEMIDE 40 MG: 40 TABLET ORAL at 17:42

## 2023-01-26 RX ADMIN — ACETAMINOPHEN 325MG 650 MG: 325 TABLET ORAL at 09:01

## 2023-01-26 RX ADMIN — POLYETHYLENE GLYCOL 3350 17 G: 17 POWDER, FOR SOLUTION ORAL at 09:02

## 2023-01-26 RX ADMIN — GABAPENTIN 100 MG: 100 CAPSULE ORAL at 21:19

## 2023-01-26 RX ADMIN — GABAPENTIN 100 MG: 100 CAPSULE ORAL at 13:05

## 2023-01-26 RX ADMIN — ACETAMINOPHEN 1000 MG: 500 TABLET ORAL at 21:19

## 2023-01-26 RX ADMIN — AMOXICILLIN AND CLAVULANATE POTASSIUM 1 TABLET: 875; 125 TABLET, FILM COATED ORAL at 21:19

## 2023-01-26 RX ADMIN — FAMOTIDINE 20 MG: 20 TABLET, FILM COATED ORAL at 21:19

## 2023-01-26 RX ADMIN — ACETAMINOPHEN 325MG 650 MG: 325 TABLET ORAL at 02:00

## 2023-01-26 RX ADMIN — Medication 5 MG: at 21:19

## 2023-01-26 RX ADMIN — Medication 400 MG: at 09:00

## 2023-01-26 RX ADMIN — SODIUM CHLORIDE, PRESERVATIVE FREE 10 ML: 5 INJECTION INTRAVENOUS at 09:03

## 2023-01-26 RX ADMIN — FUROSEMIDE 40 MG: 40 TABLET ORAL at 09:01

## 2023-01-26 RX ADMIN — GABAPENTIN 100 MG: 100 CAPSULE ORAL at 09:01

## 2023-01-26 RX ADMIN — OXYCODONE HYDROCHLORIDE 5 MG: 5 TABLET ORAL at 21:19

## 2023-01-26 RX ADMIN — ACETAMINOPHEN 325MG 650 MG: 325 TABLET ORAL at 13:05

## 2023-01-26 RX ADMIN — Medication 15 ML: at 09:02

## 2023-01-26 RX ADMIN — MAGNESIUM OXIDE TAB 400 MG (240 MG ELEMENTAL MG) 400 MG: 400 (240 MG) TAB at 21:19

## 2023-01-26 RX ADMIN — FAMOTIDINE 20 MG: 20 TABLET, FILM COATED ORAL at 09:03

## 2023-01-26 RX ADMIN — AMOXICILLIN AND CLAVULANATE POTASSIUM 1 TABLET: 875; 125 TABLET, FILM COATED ORAL at 09:01

## 2023-01-26 RX ADMIN — ATORVASTATIN CALCIUM 40 MG: 40 TABLET, FILM COATED ORAL at 21:19

## 2023-01-26 RX ADMIN — POTASSIUM CHLORIDE 20 MEQ: 20 TABLET, EXTENDED RELEASE ORAL at 11:53

## 2023-01-26 RX ADMIN — METOPROLOL TARTRATE 100 MG: 50 TABLET, FILM COATED ORAL at 09:00

## 2023-01-26 RX ADMIN — ASPIRIN 81 MG: 81 TABLET, COATED ORAL at 09:01

## 2023-01-26 RX ADMIN — BISACODYL 5 MG: 5 TABLET, COATED ORAL at 09:01

## 2023-01-26 RX ADMIN — ALBUTEROL SULFATE 2.5 MG: 2.5 SOLUTION RESPIRATORY (INHALATION) at 08:08

## 2023-01-26 RX ADMIN — FONDAPARINUX SODIUM 2.5 MG: 2.5 INJECTION, SOLUTION SUBCUTANEOUS at 09:02

## 2023-01-26 RX ADMIN — POTASSIUM CHLORIDE 20 MEQ: 1500 TABLET, EXTENDED RELEASE ORAL at 17:42

## 2023-01-26 ASSESSMENT — PAIN SCALES - WONG BAKER: WONGBAKER_NUMERICALRESPONSE: 0

## 2023-01-26 ASSESSMENT — PAIN DESCRIPTION - LOCATION: LOCATION: LEG

## 2023-01-26 ASSESSMENT — PAIN SCALES - GENERAL
PAINLEVEL_OUTOF10: 0
PAINLEVEL_OUTOF10: 3

## 2023-01-26 ASSESSMENT — PAIN DESCRIPTION - ORIENTATION: ORIENTATION: LEFT

## 2023-01-26 NOTE — CARE COORDINATION
DC to ARU today- bed available at 1400 or sooner if bed is ready- ARU will advise.  Ghulam Almaguer RN

## 2023-01-26 NOTE — PROGRESS NOTES
IRF NELLI Admission Assessment  [facility] Acute Rehab Unit    Patient:Eddie Moya     Rehab Dx/Hx: Aortic dissection ([facility] [address] [I71.00]   CHT:4/22/4475  [facility]:[id number]  Date of Admit: 1/26/2023     Pain Effect on Sleep:  Over the past 5 days, how much of the time has pain made it hard for you to sleep at night?  []  0. Does not apply - I have not had any pain or hurting in the past 5 days  [x]  1. Rarely or not at all  []  2. Occasionally  []  3. Frequently  []  4. Almost constantly  []  8. Unable to answer    Over the past 5 days, how often have you limited your participation in rehabilitation therapy sessions due to pain?  []  0. Does not apply - I have not received rehabilitation therapy in the past 5 days  [x]  1. Rarely or not at all  []  2. Occasionally  []  3. Frequently  []  4. Almost constantly  []  8. Unable to answer    Pain Interference with Day-to-Day Activities:  Over the past 5 days, how often have you limited your day-to-day activities (excluding rehabilitation therapy session)? [x]  1. Rarely or not at all  []  2. Occasionally  []  3. Frequently  []  4. Almost constantly  []  8. Unable to answer      High-Risk Drug Classes: Use and Indication   Is taking: Check if the pt is taking any medications by pharmacological classification  Indication noted: If column 1 is checked, check if there is an indication noted for all meds in the drug class Is taking  (check all that apply) Indication noted (check all that apply)   Antipsychotic [] []   Anticoagulant [] []   Antibiotic [x] [x]   Opioid [] []   Antiplatelet [x] [x]   Hypoglycemic (including insulin) [] []   None of the above [] []     Special Treatments, Procedures, and Programs   Check all of the following treatments, procedures, and programs that apply on admission. On admission (check all that apply)   Cancer Treatments    A1. Chemotherapy []   A2. IV []   A3. Oral []   A10. Other []   B1. Radiation []   Respiratory Therapies    C1.  Oxygen Therapy []   C2. Continuous []   C3. Intermittent []   C4. High-concentration []   D1. Suctioning []   D2. Scheduled []   D3. As needed []   E1. Tracheostomy Care []   F1. Invasive Mechanical Ventilator (ventilator or respirator) []   G1. Non-invasive Mechanical Ventilator []   G2. BiPAP []   G3. CPAP []   Other    H1. IV Medications []   H2. Vasoactive medications []   H3. Antibiotics []   H4. Anticoagulation []   H10. Other []   I1. Transfusions []   J1. Dialysis []   J2. Hemodialysis []   J3. Peritoneal dialysis []   O1. IV access []   O2. Peripheral [x]   O3. Midline []   O4.  Central (PICC, tunneled, port) []   None of the above []     Signature:  Caity Franco RN

## 2023-01-26 NOTE — PROGRESS NOTES
Physical Therapy  Facility/Department: [facility] C2 CARD TELEMETRY  Daily Treatment Note  NAME: Malissa Lemus  : 1990  MRN: [id number]    Date of Service: 2023    Discharge Recommendations:  IP Rehab   PT Equipment Recommendations  Equipment Needed: No  Other: TBD at rehab facility    [facility] 6 Clicks Inpatient Mobility:  [address]   How much difficulty turning over in bed?: A Lot  How much difficulty sitting down on / standing up from a chair with arms?: A Lot  How much difficulty moving from lying on back to sitting on side of bed?: A Lot  How much help from another person moving to and from a bed to a chair?: A Lot  How much help from another person needed to walk in hospital room?: Total  How much help from another person for climbing 3-5 steps with a railing?: Total  AM-PAC Inpatient Mobility Raw Score : 10  AM-PAC Inpatient T-Scale Score : 32.29  Mobility Inpatient CMS 0-100% Score: 76.75  Mobility Inpatient CMS G-Code Modifier : CL    Patient Diagnosis(es): The primary encounter diagnosis was Dissecting aneurysm of thoracic aorta, [place] type A. Diagnoses of Chest wall discomfort, Weakness of right lower extremity, Weakness of left lower extremity, and Acute post-operative pain were also pertinent to this visit. Assessment   Assessment: Pt participated well with PT today; seen in conjunction with OT for co-treat session in light of assist requirements and decreased stamina, in order to safely progress functional mobility. Pt still quite weak and deconditioned compared to baseline, requiring modA x 2 for bed mobility and sit<>stand / bed<>chair transfers with Lemus Sermon Plus lift. Pt stood for 3-4 consecutive minutes in [place]-Plus with standing time limited by decreased strength/endurance. Pt demonstrating improving sequencing and ease of movement during transfers today. No cues needed to maintain sternal precautions with sit<>stand.   Participated well in a variety of LE exercises for ROM/strengthening. Given pt's young age, current deficits, and steady progress with therapy over the last few sessions, recommend IP rehab at D/C. Pt & family seem very motivated for pt to work with therapy and return to baseline LOF. Activity Tolerance: Patient tolerated treatment well;Patient limited by endurance  Equipment Needed: No  Other: TBD at rehab facility     Plan    General Plan: 5-7 times per week  Specific Instructions for Next Treatment: Progress ther ex and mobility as tolerated, build standing tolerance  Current Treatment Recommendations: Strengthening;ROM;Balance training;Gait training;Home exercise program;Safety education & training;Stair training;Functional mobility training;Neuromuscular re-education;Patient/Caregiver education & training;Transfer training; Therapeutic activities; Endurance training;Equipment evaluation, education, & procurement;Positioning     Restrictions  Restrictions/Precautions  Restrictions/Precautions: Fall Risk, Surgical Protocols, General Precautions, Cardiac, Modified Diet  Position Activity Restriction  Sternal Precautions: No Pushing, No Pulling, 5# Lifting Restrictions  Other position/activity restrictions: ICU monitoring     Subjective    Subjective: Pt agreeable to work with PT this morning, pleasant and cooperative. Voices excitement about being able to D/C to ARU this afternoon. Pain: Pt c/o generalized weakness and fatigue from activity but denies pain. Overall Orientation Status: Within Functional Limits     Objective   Vitals  Heart Rate: (!) 111  Heart Rate Source: Monitor  BP: 118/77  BP Location: Left upper arm  BP Method: Automatic  Patient Position: Semi fowlers  MAP (Calculated): 91  SpO2: 92 %  O2 Device: None (Room air)    Bed Mobility Training  Bed Mobility Training: Yes  Supine to Sit: Other (comment) (up in chair at start of session)  Sit to Supine:  Moderate assistance;Assist X2  Scooting: Maximum assistance;Assist X2 (to [facility])    Transfer Training  Interventions: Safety awareness training;Verbal cues;Visual cues;Tactile cues;Manual cues  Sit to Stand: Moderate assistance;Assist X2;Adaptive equipment (using Maria Victoria Plus Lift)  Stand to Sit: Moderate assistance;Assist X2;Adaptive equipment (using Maria [place] Plus lift)  Bed to Chair: Moderate assistance;Assist X2;Adaptive equipment (chair to BSC to bed with Maria [place] Plus lift)  Toilet Transfer: Moderate assistance;Assist X2;Adaptive equipment (using Maria [place] Plus lift and BSC)    PT Exercises  Exercise Treatment: x 15 BLE: Ankle pumps, glut sets, quad sets, SAQ, supine hip ABD/ADD, heel slides (CGA/SBA on RLE, modA on LLE)    Other Specialty Interventions  Other Treatments/Modalities: Pt assisted onto/off of BSC using Maria Victoria-Plus lift with modA x 2.  See OT note for details.    Safety Devices  Type of Devices: Patient at risk for falls;All fall risk precautions in place;Left in bed;All nikolas prominences offloaded;Call light within reach;Nurse notified;Gait belt;Heels elevated for pressure relief     Goals  Short Term Goals  Time Frame for Short Term Goals: 1/27/23  Short Term Goal 1: pt will complete bed mobility max Ax2 - MET 1/23/23.  Goal upgraded: Pt will transfer supine <-> sit with modA x 2.  Short Term Goal 2: pt will sit EOB for 5 min with mod A - MET 1/23/23.  Goal upgraded: Pt will tolerate sitting EOB x 10 minutes with Kostas x 1 or less to maintain upright trunk - MET 1/25/23.  Goal upgraded: Pt will tolerate sitting EOB x 10 minutes with SBA for upright trunk.  Short Term Goal 3: pt will complete transfers with max Ax2 - MET 1/23/23.  Goal upgraded: Pt will transfer sit <-> stand and bed>chair using RW with modA x 2 - MET 1/25/23.  Goal upgraded: Pt will transfer sit <-> stand and bed>chair using RW with modA x 2.  Short Term Goal 4: Assess gait when safely able and set appropriate goal  Short Term Goal 5: pt will participate in 12-15 reps of BLE exercises by 1/23/22 -- MET 1/20/23  Additional Goals?:  No  Patient Goals   Patient Goals : \"To go to rehab and get stronger\"    Education  Patient Education  Education Given To: Patient; Family (pt and wife)  Education Provided: Role of Therapy;Plan of Care;Home Exercise Program;Transfer Training;Equipment;Precautions; Energy Conservation  Education Method: Demonstration;Verbal  Barriers to Learning: None  Education Outcome: Verbalized understanding;Demonstrated understanding    Therapy Time   Individual Concurrent Group Co-treatment   Time In 0925         Time Out 1020         Minutes 55         Timed Code Treatment Minutes: [facility] [place], [place] #[id number]    If pt is unable to be seen after this session, please let this note serve as discharge summary. Please see case management note for discharge disposition. Thank you.

## 2023-01-26 NOTE — PROGRESS NOTES
Occupational Therapy  Facility/Department: [facility] C2 CARD TELEMETRY  Daily Treatment Note  NAME: John Kohler  : 1990  MRN: [id number]    Date of Service: 2023    Discharge Recommendations:  IP Rehab     AM-PAC score  AM-PAC Inpatient Daily Activity Raw Score: 10 (23 1017)  AM-PAC Inpatient ADL T-Scale Score : 27.31 (23 1017)  ADL Inpatient CMS 0-100% Score: 74.7 (23 101)  ADL Inpatient CMS G-Code Modifier : CL (23)      Patient Diagnosis(es): The primary encounter diagnosis was Dissecting aneurysm of thoracic aorta, Leo type A. Diagnoses of Chest wall discomfort, Weakness of right lower extremity, Weakness of left lower extremity, and Acute post-operative pain were also pertinent to this visit. Assessment    Activity Tolerance: Patient tolerated treatment well;Patient limited by endurance   Patient seen as Co-tx collaboration this date to safely meet goals and will have better occupational performance outcomes with in a co-treatment than 1:1 session. Patient mod of 2 plus Mejia Beams Plus lift for functional transfers including BSC, able to tolerate 3 min stand for tierney-care. Pt was able to complete BUE AROM following sternal precautions, educated patient on family on clothing options and toiletries for IP rehab setting. Pt continues to function below baseline and would benefit from skilled OT services in IP rehab setting. Plan   Occupational Therapy Plan  Times Per Week: 4-6 x/ week     Restrictions  Restrictions/Precautions  Restrictions/Precautions: Fall Risk;Surgical Protocols; General Precautions;Cardiac;Modified Diet  Position Activity Restriction  Sternal Precautions: No Pushing; No Pulling;5# Lifting Restrictions  Other position/activity restrictions: ICU monitoring    Subjective   Orientation  Overall Orientation Status: Within Functional Limits  Pain: Pt c/o generalized weakness and fatigue from activity but denies pain.    Subjective \"I just want to have pablo, I don't need to wash my hair\" Pt anxious about showers because he doesn't want to see his wounds/scar. Objective    Vitals  Vitals  Heart Rate: (!) 111  Heart Rate Source: Monitor  BP: 118/77  BP Location: Left upper arm  BP Method: Automatic  Patient Position: Semi fowlers  MAP (Calculated): 91  SpO2: 92 %  O2 Device: None (Room air)    Pain: Pt c/o generalized weakness and fatigue from activity but denies pain. Bed Mobility Training  Bed Mobility Training: Yes  Supine to Sit: Other (comment) (up in chair at start of session)  Sit to Supine: Moderate assistance;Assist X2  Scooting: Maximum assistance;Assist X2 (to [facility])    Standing tolerance limited d/t fatigue and endurance up for 3 mins for toileting hygiene. Transfer Training  Transfer Training: Yes  Interventions: Safety awareness training;Verbal cues; Visual cues; Tactile cues;Manual cues  Sit to Stand: Moderate assistance;Assist X2;Adaptive equipment (using [employer])  Stand to Sit: Moderate assistance;Assist X2;Adaptive equipment (using  lift)  Bed to Chair: Moderate assistance;Assist X2;Adaptive equipment (chair to [facility] to bed with  lift)  Toilet Transfer: Moderate assistance;Assist X2;Adaptive equipment (using Maria Victoria Plus lift and BSC)     ADL  UE Dressing:  Moderate assistance (changing of gown)  LE Dressing: Dependent/Total  Toileting: Dependent/Total (tierney-anal care and holding urinal for urination)  Patient Education  Education Given To: Patient  Education Provided: Role of Therapy;Precautions;Plan of Care  Education Provided Comments: disease specific: importance of sternal precautions, trunk control, BUE AROM exercises; use of RED/nurse call light for assist with positioning, ADL needs;  Education Method: Demonstration;Verbal  Barriers to Learning: Cognition  Education Outcome: Demonstrated understanding;Continued education needed;Verbalized understanding  BUE Exercise    AROM [x]         Reps: x10[x]   Shoulder:    Flexion/ext [x] To 90* following sternal   Elbow:   Flexion/ext [x]     Wrist:  Pronation/Supination [x]  Digits:  Flexion/ext [x]        Goals  Short Term Goals  Time Frame for Short Term Goals: 1 week(1-25-23)- all goals ongoing 1/20; extend 2 weeks to 2-08-23  Short Term Goal 1: mod assist of 2 with functional transfers by 1-25-23;not met currently mod of 2 with Jhonathane Jeet Plus extend goal to 2/03  Short Term Goal 2: set up only for UE self care/grooming-ongoing  Short Term Goal 3: supervision with forward reach for core strengthening in supported sitting by 1-31-23 not met, extend goal to 2/01  Short Term Goal 4: min cues for sternal precautions for transfers;1/26 requires min assist to maintain grasp of \"heart pillow\" for sit<-->stand transfers  Patient Goals   Patient goals : Unable to verbalize at this time       Therapy Time   Individual Concurrent Group Co-treatment   Time In 0925         Time Out 1020         Minutes 55         Timed Code Treatment Minutes: 7086 Bart Shine, OTR/L  If pt is unable to be seen after this session, please let this note serve as discharge summary. Please see case management note for discharge disposition. Thank you.

## 2023-01-26 NOTE — PROGRESS NOTES
Shift: [id number]    Procedure: ASCENDING AORTA VALVE REPLACEMENT WITH TUBE GRAFT USING AN EPPERSON GRAFT SIZE 28CM  ANTEGRADE FLOW TO THE LEFT LEG, RESUSPENSION OF AORTIC VALVE    Admit from OR (time and date): 1/16/23 0630    Transition (time and date):     Surgery, return to OR yes - Fem - Fem bypass with Dr. Noé Echeverria at handoff  stable    Most recent vitals: BP 90/62   Pulse (!) 107   Temp 97.9 °F (36.6 °C) (Oral)   Resp 17   Ht 6' 4\" (1.93 m)   Wt 279 lb 9 oz (126.8 kg)   SpO2 95%   BMI 34.03 kg/m²      Increased O2 requirements: no O2 requirements: Oxygen Therapy  SpO2: 95 %  Pulse Oximetry Type: Continuous  SPO2 High Alarm Limit: 100  SPO2 Low Alarm Limit POX: 90  Pulse Oximeter Device Mode: Continuous  Pulse Oximeter Device Location: Finger  O2 Device: None (Room air)  Oximetry Probe Site Changed: Yes  Skin Assessment: Clean, dry, & intact  Skin Protection for O2 Device: Yes  Orientation: Middle  Location: Nose  FiO2 : 60 %  O2 Flow Rate (L/min): 2 L/min  O2 Delivery Method: Nasal cannula  Vent Mode: (S) AC/VC (pt placed on sbt 5/5 at 1220)     Admission weight Weight: (!) 340 lb (154.2 kg)  Today's weight   Wt Readings from Last 1 Encounters:   01/25/23 279 lb 9 oz (126.8 kg)         EF: unknown    Drop in Urinary Output no     Rhythm Changes Normal Sinus Rhythm 90's    Pacing Wires Removed:  [] Yes  [x] NO  [] Platelets < 95,866  [] Arrhythmia  [] Bradycardia [] Valve Replacement  [] Pacing for Cardiac Index  []Physician Order    Lines/Drains  LDA Insertion Date Discontinued Date Dressing Changes   Art line 1/16 1/19    Central Line 1/16 1/20    Colin 1/16 1/20    Chest Tube P: 1/16  M: 1/16 M: 1/19  P:1/20    Wires  1/16 1/19    ETT 1/16 1/17    TYRELL Drain      VasCath      Impella        Interventions After Office Hours  Problem(Brief) Date Time Intervention Physician contacted                                               Drip rates at handoff:    sodium chloride 5 mL/hr at 01/16/23 1453    dextrose         Hospital Course:  POD# DOS  -Ca replaced  -[place] Bare given for  5.9 K -VSS  -Fem to Fem bypass with Dr. Radha Pineda @ [id number]  -OGT LWS    POD NOS:  -Levo weaned slightly. -Remains on levo, esmolol, insulin, fentanyl, and versed.  -Fi02 weaned on vent. -Wakes up, moves all extremities, follows commands. POD#3 01/18:   -HR in 90s, BP 110s-120s overnight   -notified on call CTS about blood sugar 74 and 23 units Lantus order. Will hold Lantus right now per order, and new order of low dose sliding scale order noted. Blood glucose recheck 118.   -Wife Mehul Langston updated. -K replaced, Ca replace. -IS practiced with patient this shift   -bathed, CTS dressing changed   -thi morning pt's weight is 148.4kg  -Total urine output: 4050ml on Lasix gtt   -total MCT output: 0ml  -total PCT output: 6ml  -Daily BMP, CBC, CXR completed    POD#2 1/19:  -V wires pulled, mediastinal CT pulled, Flotrac DC'd  -up to chair with lift- ARU consulted  -metoprolol increased    POD#3 1/20:    - metoprolol increased   CT, F/C and Central line removed. PT/RN slings to chair. Per Dr. Radha Pineda wound vacs to remain through Sunday. Overnight:   - small BM   - endorsement of improved pain control   - HR sustaining in low 100's despite increased lopressor dose; BP remaining stable    POD#4 1/21:  VSS. Pt had several bowel movements throughout the day. Pt stood up with attempt for bedside commode but remains very weak so was not attempted further. Pt was moved to recliner twice with sling assist. Pt and wife requested melatonin and ENT consult (hoarse and low voice post extubation) which was ordered by Dr Lizz Guy. Overnight:   - uneventful course   - HR remaining in low 100's despite nightly lopressor    POD#6 1/23: Days  - Modified Barium Swallow eval completed. No restrictions to eating. CTS and ENT updated.   - ARU precert completed, awaiting insurance approval  - Remains weak (especially lower extremities) only taking a few steps assisted. POD #6 Nights   - Urine output >1400.   - Patient sling to chair- unable to walk to chair   - Able to eat with no problems. POD 1/23 Days:  -OOB with thuan plus, sling to bed x 2   -accepted to ARU, no bed until tomorrow  -uneventful shift    POD 1/24 Nights  - No issues overnight. - Up to chair. 1/25/23  - No issues ovenight  - Up to chair. Lab Data:  CBC:   Recent Labs     01/25/23 0413 01/26/23 0425   WBC 21.3* 18.9*   HGB 11.1* 10.7*   HCT 33.8* 32.1*   MCV 82.1 81.0   * 482*       BMP:    Recent Labs     01/25/23 0413 01/26/23 0425   * 134*   K 4.6 4.2   CO2 25 25   BUN 35* 27*   CREATININE 0.8* 0.6*       LIVR:   Recent Labs     01/25/23 0413 01/26/23 0425   * 120*   * 160*       PT/INR:   Recent Labs     01/25/23 0413 01/26/23 0425   PROT 5.7* 5.9*       APTT: No results for input(s): APTT in the last 72 hours. ABG:   No results for input(s): PHART, JOP2MOQ, PO2ART in the last 72 hours.        Electronically signed by Dominguez Umana RN on 1/26/2023 at 6:35 AM

## 2023-01-26 NOTE — PROGRESS NOTES
NURSING ASSESSMENT: [address]    Patient:Eddie Moya     Rehab Dx/Hx: Aortic dissection ([facility] [address] [I71.00]   RSI:2/29/4790  VUY:[id number]  Date of Admit: 1/26/2023  Room #: 3347/1434-90    Subjective:   Patient admitted to room 164  from ICU via stretcher   Alert and oriented x4. Oriented to room and call light system. Oriented to rehab routine and therapy schedules. Informed about care conferences and ordering of meals with PCA. Drug / Medication Review:   Medications were reviewed by RN at time of admission  [x]  No potential or actual clinically significant medication issues were noted. []  Potential or actual clinically significant medication issues were found and MD was notified. (Specified below if applicable)   []  Allergy to medication   []  Drug interactions (drug/drug, drug/food, drug/disease interactions)   []  Duplicate drug   []  Omission (drug missing from prescribed regimen)   []  Non adherence   []  Adverse reaction   []  Wrong patient, drug, dose, route, time error   []  Ineffective drug therapy    Patient Mood Interview    Symptom Presence  0. No (enter 0 in column 2)  1. Yes (enter 0-3 in column 2)  9. No response (leave column 2 blank  Symptom Frequency  0. Never or 1 day  1. 2-6 days (several days)  2. 7-11 days (half or more days)  3. 12-14 days (nearly everyday) 1. Symptom Presence 2. Symptom Frequency    Enter scores in boxes   Little interest or pleasure in doing things   0 0   Feeling down, depressed, or hopeless   0 0   If either A or B is coded 2 or 3, CONTINUE asking the questions below. If not, END the interview.      Trouble falling or staying asleep, or sleeping too much       Feeling tired or having little energy       Poor appetite or overeating       Feeling bad about yourself - or that you are a failure or have let yourself or your family down       Trouble concentrating on things, such as reading the newspaper or watching television Moving or speaking so slowly that other people could have noticed. Or the opposite- being so fidgety or restless that you have been moving around a lot more than usual.       Thoughts that you would be better off dead, or of hurting yourself in some way. Total Severity: Add scores for all frequency responses in column 2       Social isolation (from [employer])  How often do you feel lonely or isolated from those around you?   [] 0. Never  [x] 1. Rarely  [] 2. Sometimes  [] 3. Often  [] 4. Always  [] 8. Patient unable to respond. Admission BIMS:  Number of word repeated after first attempt:  []  0. None []  1. One []  2. Two [x]  3. Three    Able to report correct year:  []  0. Missed by >5 years, or no answer  []  1. Missed by 2-5 years  []  2. Missed by 1 year  [x]  3. Correct    Able to report correct month:   []  0. Missed by >1 month, or no answer  []  1. Missed by 6 days to 1 month  [x]  2. Accurate within 5 days    Able to report correct day of the week:  []  0. Incorrect or no answer  [x]  1. Correct    Recall:   Able to recall \"sock\":  []  0. No could not recall  []  1. Yes, after cueing  [x]  2. Yes, no cue required  Able to recall \"blue\":  []  0. No could not recall  []  1. Yes, after cueing  [x]  2. Yes, no cue required  Able to recall \"bed\":  []  0. No could not recall  []  1. Yes, after cueing  [x]  2. Yes, no cue required      4 Eyes Skin Assessment   The patient is being assessed for: Admission     I agree that 2 RN's have performed a thorough Head to Toe Skin Assessment on the patient. ALL assessment sites listed below have been assessed.        Areas assessed by both nurses:   [x]   Head, Face, and Ears   [x]   Shoulders, Back, and Chest, Abdomen  [x]   Arms, Elbows, and Hands   [x]   Coccyx, Sacrum, and Ischium  [x]   Legs, Feet, and Heel     Does the Patient have Skin Breakdown?  / No         [place] Prevention initiated:  Yes   Wound Care Orders initiated:        [id number]  nurse consulted for Pressure Injury (Stage 3,4, Unstageable, DTI, NWPT, Complex wounds)and New or Established Ostomies:  Yes   Right and left groin incisions with staples and sutures intact. Chest tube site.  Buttocks pink and intact  Primary Nurse eSignature: Xochitl Zazueta RN  Co-signer eSignature:   Wendy Forrester RN

## 2023-01-26 NOTE — CARE COORDINATION
CASE MANAGEMENT DISCHARGE SUMMARY      Discharge to: ARU    Precertification completed: yes  Hospital Exemption Notification (HENS) completed: na    IMM given: (date) na    New Durable Medical Equipment ordered/agency:     Transportation: Staff     Confirmed discharge plan with:     Patient: yes     Family:  yes spouse at bedside     Facility/Agency, name:  [facility] faxed   Phone number for report to facility: [phone]     RN, name: Matilde Mcburney    Note: Discharging nurse to complete [facility], reconcile [facility], and place final copy with patient's discharge packet. RN to ensure that written prescriptions for  Level II medications are sent with patient to the facility as per protocol.      Umesh Rodriges RN

## 2023-01-27 LAB
A/G RATIO: 0.6 (ref 1.1–2.2)
ALBUMIN SERPL-MCNC: 2.4 G/DL (ref 3.4–5)
ALP BLD-CCNC: 82 U/L (ref 40–129)
ALT SERPL-CCNC: 153 U/L (ref 10–40)
ANION GAP SERPL CALCULATED.3IONS-SCNC: 9 MMOL/L (ref 3–16)
AST SERPL-CCNC: 94 U/L (ref 15–37)
BANDED NEUTROPHILS RELATIVE PERCENT: 4 % (ref 0–7)
BASOPHILS ABSOLUTE: 0 K/UL (ref 0–0.2)
BASOPHILS RELATIVE PERCENT: 0 %
BILIRUB SERPL-MCNC: 0.4 MG/DL (ref 0–1)
BUN BLDV-MCNC: 28 MG/DL (ref 7–20)
CALCIUM SERPL-MCNC: 8.2 MG/DL (ref 8.3–10.6)
CHLORIDE BLD-SCNC: 102 MMOL/L (ref 99–110)
CO2: 24 MMOL/L (ref 21–32)
CREAT SERPL-MCNC: 0.7 MG/DL (ref 0.9–1.3)
EOSINOPHILS ABSOLUTE: 1.5 K/UL (ref 0–0.6)
EOSINOPHILS RELATIVE PERCENT: 8 %
GFR SERPL CREATININE-BSD FRML MDRD: >60 ML/MIN/{1.73_M2}
GLUCOSE BLD-MCNC: 107 MG/DL (ref 70–99)
HCT VFR BLD CALC: 30.1 % (ref 40.5–52.5)
HEMATOLOGY PATH CONSULT: NO
HEMOGLOBIN: 9.6 G/DL (ref 13.5–17.5)
LYMPHOCYTES ABSOLUTE: 3.5 K/UL (ref 1–5.1)
LYMPHOCYTES RELATIVE PERCENT: 19 %
MCH RBC QN AUTO: 26.1 PG (ref 26–34)
MCHC RBC AUTO-ENTMCNC: 31.9 G/DL (ref 31–36)
MCV RBC AUTO: 81.8 FL (ref 80–100)
MONOCYTES ABSOLUTE: 1.1 K/UL (ref 0–1.3)
MONOCYTES RELATIVE PERCENT: 6 %
NEUTROPHILS ABSOLUTE: 12.4 K/UL (ref 1.7–7.7)
NEUTROPHILS RELATIVE PERCENT: 63 %
PDW BLD-RTO: 14.7 % (ref 12.4–15.4)
PLATELET # BLD: 553 K/UL (ref 135–450)
PLATELET SLIDE REVIEW: ABNORMAL
PMV BLD AUTO: 7.1 FL (ref 5–10.5)
POTASSIUM REFLEX MAGNESIUM: 4.3 MMOL/L (ref 3.5–5.1)
RBC # BLD: 3.68 M/UL (ref 4.2–5.9)
SLIDE REVIEW: ABNORMAL
SODIUM BLD-SCNC: 135 MMOL/L (ref 136–145)
TOTAL PROTEIN: 6.1 G/DL (ref 6.4–8.2)
WBC # BLD: 18.5 K/UL (ref 4–11)

## 2023-01-27 PROCEDURE — 97162 PT EVAL MOD COMPLEX 30 MIN: CPT

## 2023-01-27 PROCEDURE — 36415 COLL VENOUS BLD VENIPUNCTURE: CPT

## 2023-01-27 PROCEDURE — 80053 COMPREHEN METABOLIC PANEL: CPT

## 2023-01-27 PROCEDURE — 6370000000 HC RX 637 (ALT 250 FOR IP): Performed by: STUDENT IN AN ORGANIZED HEALTH CARE EDUCATION/TRAINING PROGRAM

## 2023-01-27 PROCEDURE — 97167 OT EVAL HIGH COMPLEX 60 MIN: CPT

## 2023-01-27 PROCEDURE — 97542 WHEELCHAIR MNGMENT TRAINING: CPT

## 2023-01-27 PROCEDURE — 97530 THERAPEUTIC ACTIVITIES: CPT

## 2023-01-27 PROCEDURE — 6360000002 HC RX W HCPCS: Performed by: STUDENT IN AN ORGANIZED HEALTH CARE EDUCATION/TRAINING PROGRAM

## 2023-01-27 PROCEDURE — 97535 SELF CARE MNGMENT TRAINING: CPT

## 2023-01-27 PROCEDURE — 92610 EVALUATE SWALLOWING FUNCTION: CPT

## 2023-01-27 PROCEDURE — 92523 SPEECH SOUND LANG COMPREHEN: CPT

## 2023-01-27 PROCEDURE — 85025 COMPLETE CBC W/AUTO DIFF WBC: CPT

## 2023-01-27 PROCEDURE — 1280000000 HC REHAB R&B

## 2023-01-27 RX ORDER — METOPROLOL TARTRATE 50 MG/1
50 TABLET, FILM COATED ORAL 2 TIMES DAILY
Status: DISCONTINUED | OUTPATIENT
Start: 2023-01-27 | End: 2023-02-11 | Stop reason: HOSPADM

## 2023-01-27 RX ADMIN — METOPROLOL TARTRATE 50 MG: 50 TABLET, FILM COATED ORAL at 20:19

## 2023-01-27 RX ADMIN — AMOXICILLIN AND CLAVULANATE POTASSIUM 1 TABLET: 875; 125 TABLET, FILM COATED ORAL at 09:25

## 2023-01-27 RX ADMIN — ASPIRIN 81 MG: 81 TABLET, COATED ORAL at 09:25

## 2023-01-27 RX ADMIN — OXYCODONE HYDROCHLORIDE 5 MG: 5 TABLET ORAL at 21:47

## 2023-01-27 RX ADMIN — Medication 5 MG: at 20:19

## 2023-01-27 RX ADMIN — POTASSIUM CHLORIDE 20 MEQ: 1500 TABLET, EXTENDED RELEASE ORAL at 12:33

## 2023-01-27 RX ADMIN — GABAPENTIN 100 MG: 100 CAPSULE ORAL at 12:33

## 2023-01-27 RX ADMIN — ACETAMINOPHEN 1000 MG: 500 TABLET ORAL at 12:33

## 2023-01-27 RX ADMIN — ENOXAPARIN SODIUM 40 MG: 100 INJECTION SUBCUTANEOUS at 09:31

## 2023-01-27 RX ADMIN — MAGNESIUM OXIDE TAB 400 MG (240 MG ELEMENTAL MG) 400 MG: 400 (240 MG) TAB at 20:19

## 2023-01-27 RX ADMIN — ACETAMINOPHEN 1000 MG: 500 TABLET ORAL at 21:47

## 2023-01-27 RX ADMIN — GABAPENTIN 100 MG: 100 CAPSULE ORAL at 09:25

## 2023-01-27 RX ADMIN — ACETAMINOPHEN 1000 MG: 500 TABLET ORAL at 05:54

## 2023-01-27 RX ADMIN — POTASSIUM CHLORIDE 20 MEQ: 1500 TABLET, EXTENDED RELEASE ORAL at 09:32

## 2023-01-27 RX ADMIN — MAGNESIUM OXIDE TAB 400 MG (240 MG ELEMENTAL MG) 400 MG: 400 (240 MG) TAB at 09:25

## 2023-01-27 RX ADMIN — POTASSIUM CHLORIDE 20 MEQ: 1500 TABLET, EXTENDED RELEASE ORAL at 17:04

## 2023-01-27 RX ADMIN — AMOXICILLIN AND CLAVULANATE POTASSIUM 1 TABLET: 875; 125 TABLET, FILM COATED ORAL at 20:19

## 2023-01-27 RX ADMIN — FAMOTIDINE 20 MG: 20 TABLET, FILM COATED ORAL at 09:25

## 2023-01-27 RX ADMIN — FUROSEMIDE 40 MG: 40 TABLET ORAL at 09:25

## 2023-01-27 RX ADMIN — TRAZODONE HYDROCHLORIDE 50 MG: 50 TABLET ORAL at 21:47

## 2023-01-27 RX ADMIN — BISACODYL 5 MG: 5 TABLET, COATED ORAL at 09:25

## 2023-01-27 RX ADMIN — ATORVASTATIN CALCIUM 40 MG: 40 TABLET, FILM COATED ORAL at 20:19

## 2023-01-27 RX ADMIN — FAMOTIDINE 20 MG: 20 TABLET, FILM COATED ORAL at 20:19

## 2023-01-27 RX ADMIN — GABAPENTIN 100 MG: 100 CAPSULE ORAL at 20:19

## 2023-01-27 RX ADMIN — METOPROLOL TARTRATE 100 MG: 50 TABLET, FILM COATED ORAL at 09:25

## 2023-01-27 RX ADMIN — POLYETHYLENE GLYCOL 3350 17 G: 17 POWDER, FOR SOLUTION ORAL at 10:22

## 2023-01-27 ASSESSMENT — PAIN DESCRIPTION - FREQUENCY: FREQUENCY: CONTINUOUS

## 2023-01-27 ASSESSMENT — PAIN DESCRIPTION - ORIENTATION: ORIENTATION: LEFT

## 2023-01-27 ASSESSMENT — PAIN SCALES - GENERAL
PAINLEVEL_OUTOF10: 4
PAINLEVEL_OUTOF10: 3

## 2023-01-27 ASSESSMENT — PAIN DESCRIPTION - PAIN TYPE: TYPE: SURGICAL PAIN

## 2023-01-27 ASSESSMENT — PAIN DESCRIPTION - LOCATION: LOCATION: LEG

## 2023-01-27 ASSESSMENT — PAIN DESCRIPTION - DESCRIPTORS: DESCRIPTORS: ACHING

## 2023-01-27 NOTE — PROGRESS NOTES
Pamela Reyes  1/27/2023  [id number]    Chief Complaint: Aortic dissection (HCC)    Subjective:   No acute events overnight. Today Kalyani Preciado is seen in his room. He report feeling fatigued. Therapy and patient note that his left leg is swollen compared to right. Leg feels warmed and well perfused. ROS: denies f/c, n/v, cp, sob    Objective:  Patient Vitals for the past 24 hrs:   BP Temp Temp src Pulse Resp SpO2 Weight   01/27/23 1410 95/64 -- -- 100 -- 95 % --   01/27/23 1000 120/68 97.7 °F (36.5 °C) Oral (!) 118 20 -- --   01/27/23 0545 -- -- -- -- -- -- (!) 339 lb 11.7 oz (154.1 kg)   01/26/23 1947 (!) 107/55 98.3 °F (36.8 °C) Oral (!) 115 22 93 % --     Gen: No distress, pleasant. HEENT: Normocephalic, atraumatic. CV: Regular rate and rhythm. Resp: No respiratory distress. Abd: Soft, nondistended  Ext: No edema. Neuro: Alert, oriented, appropriately interactive. Wt Readings from Last 3 Encounters:   01/27/23 (!) 339 lb 11.7 oz (154.1 kg)   01/25/23 279 lb 9 oz (126.8 kg)   03/12/20 (!) 350 lb (158.8 kg)       Laboratory data:   Lab Results   Component Value Date    WBC 18.5 (H) 01/27/2023    HGB 9.6 (L) 01/27/2023    HCT 30.1 (L) 01/27/2023    MCV 81.8 01/27/2023     (H) 01/27/2023       Lab Results   Component Value Date/Time     01/27/2023 06:52 AM    K 4.3 01/27/2023 06:52 AM     01/27/2023 06:52 AM    CO2 24 01/27/2023 06:52 AM    BUN 28 01/27/2023 06:52 AM    CREATININE 0.7 01/27/2023 06:52 AM    GLUCOSE 107 01/27/2023 06:52 AM    CALCIUM 8.2 01/27/2023 06:52 AM        Therapy progress:  PT  Position Activity Restriction  Sternal Precautions: No Pushing, No Pulling, 5# Lifting Restrictions  Other position/activity restrictions: R IV, [address] YOUR INCISIONS DAILY WITH A CLEAN WASHCLOTH AND ANTIBACTERIAL SOAP.  Do not wash your incisions after you have cleansed other parts of your body; up with assist  Objective     Sit to Stand: Dependent/Total, 2 Person Assistance  Stand to Sit: Dependent/Total, 2 Person Assistance  Bed to Chair: Dependent/Total, 2 Person Assistance     OT  PT Equipment Recommendations  Equipment Needed: Yes  Mobility Devices: Ronak Finical: Rolling (Bariatric)  Other: CTA, likely bariatric RW, possible manual w/c  Equipment Used: Drop-arm commode  Toilet Transfers Comments: use of Shadi Ruiz to transfer on/off BSC over toilet  Assessment        SLP                Body mass index is 41.35 kg/m². Assessment and Plan:  Millie Hahn is a 34year old male with a past medical history significant for Marfan syndrome and morbid obesity who presented to [facility] on 1/15/23 with severe chest pain and lower extremity numbness, found to have aortic dissection s/p replacement. He was admitted to [facility] on 1/26/23 due to functional deficits below his baseline. Type A Aortic Dissection s/p replacement  - sternal precautions  - asa, statin BB  - goal SBP<140  - PT, OT     Right to left fem-fem bypass  - asa, statin, BB     Vocal cord paralysis  - ENT evaluated during acute stay  - ST     Aspiration pneumonia  - continue augmentin     Fluid overload  - lasix  - daily weights, I/Os     Acute Blood Loss Anemia  - monitor and transfuse for Hb<7     Marfan Syndrome  - noted     Obesity  - BMI 34  - encourage lifestyle modifications     Bowels: adjust medications as needed for regular bowel movements     Bladder: Check PVR x 3. [facility] if PVR > 200ml or if any volume is > 500 ml. Sleep: Trazodone provided prn. PPX  DVT: lovenox  GI: pepcid     Follow up appointments: CT surgery, PCP     Rupinder Pretty.  Arsenio Crigler, MD 1/27/2023, 4:26 PM

## 2023-01-27 NOTE — PROGRESS NOTES
Physical Therapy  Facility/Department: [facility]  Rehabilitation Physical Therapy Initial Assessment/Treatment    NAME: Neil Womack  : 1990 (34 y.o.)  MRN: [id number]  CODE STATUS: Full Code    Date of Service: 23      Past Medical History:   Diagnosis Date    Influenza A 2020    Marfan syndrome      Past Surgical History:   Procedure Laterality Date    FEMORAL-FEMORAL BYPASS GRAFT Bilateral 2023    FEMORAL FEMORAL BYPASS performed by Byron Hahn MD at [facility] [facility] N/A 1/15/2023    ASCENDING AORTA  REPLACEMENT WITH TUBE GRAFT USING AN EPPERSON GRAFT SIZE 28CM  ANTEGRADE FLOW TO THE LEFT LEG, RESUSPENSION OF AORTIC VALVE performed by Jose R Elizalde MD at [facility]       Chart Reviewed: Yes  Patient assessed for rehabilitation services?: Yes  Additional Pertinent Hx: Per Dr. Shun Walsh H&P \"33 year old male with a past medical history significant for Marfan syndrome and morbid obesity who presented to [facility] with severe chest pain and lower extremity numbness. CT revealed type A aortic dissection. CT surgery was consulted and on  he underwent ascending aorta replacement. Vascular surgery was consulted and on  he underwent left to right femoral-femoral bypass. Course notable for left true vocal cord paralysis\"  Family / Caregiver Present: Yes (Father present partway through session)  Referring Practitioner: Ten Steiner MD  Referral Date : 23  Diagnosis: Aortic dissection s/p aorta replacement    Restrictions:  Restrictions/Precautions: Fall Risk;Surgical Protocols; General Precautions;Cardiac;Modified Diet  Position Activity Restriction  Sternal Precautions: No Pushing; No Pulling;5# Lifting Restrictions  Other position/activity restrictions: R IV, [address] YOUR INCISIONS DAILY WITH A CLEAN WASHCLOTH AND ANTIBACTERIAL SOAP.  Do not wash your incisions after you have cleansed other parts of your body; up with assist SUBJECTIVE  Subjective: Pt supine in bed on approach, agreeable to PT evaluation  Pain: Pt reports 3/10 pain in chest, MD aware, vitals WFL         Prior Level of Function:  Social/Functional History  Lives With: Family (wife and 3 kids (8, 8, 8 weeks))  Type of Home: [facility] ([facility])  Home Layout: Two level, Bed/Bath upstairs  Home Access: Level entry  Bathroom Shower/Tub: Tub/Shower unit  Bathroom Toilet: Standard  Bathroom Accessibility: Walker accessible  Home Equipment: Crutches  Has the patient had two or more falls in the past year or any fall with injury in the past year?: No  ADL Assistance: [facility] [facility] [address]: Independent  Homemaking Responsibilities: Yes  Meal Prep Responsibility: Primary  Ambulation Assistance: Independent (Without AD)  Transfer Assistance: Independent  Occupation: Full time employment  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  Additional Comments: Pt states that spouse would be able to provide 24HR assistance if needed and unsure of full amount of physical assistance able to provide      OBJECTIVE  Vision  Vision: Within Functional Limits    Hearing  Hearing: Within functional limits         ROM       Strength  Strength RLE  Strength RLE: Exception  R Hip Flexion: 3+/5  R Hip ABduction: 3+/5  R Hip ADduction: 3+/5  R Knee Flexion: 4-/5  R Knee Extension: 4-/5  R Ankle Dorsiflexion: 4-/5  R Ankle Plantar flexion: 4-/5  Strength LLE  Strength LLE: Exception  Comment: Limited by pain in L calf  L Hip Flexion: 3+/5  L Hip ABduction: 3+/5  L Hip ADduction: 3+/5  L Knee Flexion: 3+/5  L Knee Extension: 3-/5  L Ankle Dorsiflexion: 3+/5  L Ankle Plantar Flexion: 3+/5    Quality of Movement       Sensation  Overall Sensation Status: Impaired  Light Touch: Partial deficits in the LLE    Functional Mobility  Bed mobility  Rolling to Left: Dependent/Total  Rolling to Right: Dependent/Total  Supine to Sit: Dependent/Total  Sit to Supine: Dependent/Total  Bed Mobility Comments: Completed without BR, HOB elevated to 30deg per orders, increased time to complete, cues for sequence and sternal px    Transfers  Sit to Stand: Dependent/Total;2 Person Assistance  Stand to Sit: Dependent/Total;2 Person Assistance  Bed to Chair: Dependent/Total;2 Person Assistance  Comment: Pt completes bed to chair with STEDY (without pulling with UE) initial attempt with maxA x 1 from elevated EOB but discontinued as pt reaching out to grab bar and attempt to pull, then completed with maxA x 2 for safety, cues for anterior WS; maxi-move used for t/f w/c to bed for safety d/t fatigue  Pt completes x 3 set x 3 reps each partial STS from w/c (completes in // bars for safety without using UE) PT anterior to pt blocking knees with cues for anterior WS and use of LE, pt is able to get improved clearance with each set and maintain for 1-2 seconds each bout. Completed to promote WB, improve strength and promote improved performance with t/f. Balance  Posture: Fair  Sitting - Static: Fair  Sitting - Dynamic: Fair  Standing - Static: Poor  Standing - Dynamic: Poor  Comments: Pt requiring modA initially sitting EOB d/t poor positioning with posterior and lateral LOB, with improved positioning able to maintain seated balance with SBA. Standing at stedy with modA x 2 once standing without UE support, cues for hip and trunk extension, no overt LOB.     Environmental Mobility  Wheelchair Activities  Wheelchair Type: Standard  Wheelchair Cushion: Standard  Pressure Relief Type: Self Adjusts  Level of Assistance for pressure relief activities: Maximum assistance  Wheelchair Parts Management: Yes  Left Armrest Level of Assistance: Dependent/Total  Right Armrest Level of Assistance: Dependent/Total  Left Leg Rest Level of Assistance: Dependent/Total  Right Leg Rest Level of Assistance: Dependent/Total  Left Brakes Level of Assistance: Dependent/Total  Right Brakes Level of Assistance: Dependent/Total  Propulsion: Yes  Propulsion 1  Propulsion: Manual  Level: Level Tile  Method: RLE;LLE  Level of Assistance: Minimal assistance  Description/ Details: Increased time to complete, cues for sequence and technique, Kostas to maintain forward path and complete turns. Limited by fatigue and weakness  Distance: 200'         ASSESSMENT  Vitals  Heart Rate: 100  Heart Rate Source: Monitor  BP: 95/64  BP Location: Left upper arm  BP Method: Automatic  Patient Position: Semi fowlers  MAP (Calculated): 74  SpO2: 95 %  O2 Device: None (Room air)    Activity Tolerance  Activity Tolerance: Patient tolerated evaluation without incident;Patient limited by fatigue;Patient limited by endurance    Assessment  Assessment: Pt is a 34 y.o. male admitted to [facility] secondary to aortic dissection s/p aortic replacement 1/15 and fem-fem bypass 1/16. Pt lives in [place] [facility] with wife and kids and is typically I with functional mobility and gait without an AD and working full time. Pt is currently functioning well below his baseline requiring maxA to TD for bed mobility, maxA x 2 to TD for t/f with STEDY (without pulling up to bar to maintain px), grossly modA progressing to SBA for sitting balance and modA x 2 for standing balance in STEDY. Pt also presents with gross deconditioning, decreased strength, balance, activity tolerance and increased pain. Pt will benefit from continued skilled PT In ARU to address above deficits. Recommend pt d/c home with 24hrA and HHPT, will CTA for DME needs, anticipate bariatric RW, possible bariatric manual w/c.   Treatment Diagnosis: Impaired balance and gait  Therapy Prognosis: Good  Decision Making: Medium Complexity  Barriers to Learning: fear  Discharge Recommendations: 24 hour supervision or assist;Home with Home health PT  PT D/C Equipment  Walker: Rolling (Bariatric)  Other: CTA, likely bariatric RW, possible manual w/c  PT Equipment Recommendations  Equipment Needed: Yes  Mobility Devices: Delicia Fam: Rolling (Bariatric)  Other: CTA, likely bariatric RW, possible manual w/c    CLINICAL IMPRESSION   Pt is a 34 y.o. male admitted to [facility] secondary to aortic dissection s/p aortic replacement 1/15 and fem-fem bypass 1/16. Pt lives in [place] with wife and kids and is typically I with functional mobility and gait without an AD and working full time. Pt is currently functioning well below his baseline requiring maxA to TD for bed mobility, maxA x 2 to TD for t/f with STEDY (without pulling up to bar to maintain px), grossly modA progressing to SBA for sitting balance and modA x 2 for standing balance in STEDY. Pt also presents with gross deconditioning, decreased strength, balance, activity tolerance and increased pain. Pt will benefit from continued skilled PT In ARU to address above deficits. Recommend pt d/c home with 24hrA and HHPT, will CTA for DME needs, anticipate bariatric RW, possible bariatric manual w/c. GOALS  Patient Goals   Patient Goals :  \"Be able to walk\"  Short Term Goals  Time Frame for Short Term Goals: 10 days 2/05/23  Short Term Goal 1: Pt will complete bed mobility with modA  Short Term Goal 2: Pt will complete sit to/from stand with modA  Short Term Goal 3: Pt will complete bed <> chair with LRAD with modA  Short Term Goal 4: Pt will demonstrate gait x 10' in // bars with modA without LOB  Short Term Goal 5: Pt will tolerate assessment of steps and appropriate goal to be set  Long Term Goals  Time Frame for Long Term Goals : 3 weeks 2/16/23  Long Term Goal 1: Pt will demonstrate bed mobility with MI  Long Term Goal 2: Pt will complete functional t/f with LRAD with SBA  Long Term Goal 3: Pt will ambulate >50' with LRAD with SBA without LOB  Long Term Goal 4: Pt will demonstrate car t/f with LRAD with modA    PLAN OF CARE  Frequency: 1-2 treatment sessions per day, 5-7 days per week  [address]: 5-7 times per week  Therapy Duration: 3 Weeks  Specific Instructions for Next Treatment: Progress mobility as tolerated  Current Treatment Recommendations: Strengthening;ROM;Balance training;Gait training;Home exercise program;Safety education & training;Stair training;Functional mobility training;Neuromuscular re-education;Patient/Caregiver education & training;Transfer training; Therapeutic activities; Endurance training;Equipment evaluation, education, & procurement;Positioning; Wheelchair mobility training;Vestibular rehab  Safety Devices  Type of Devices: Patient at risk for falls; All fall risk precautions in place; Left in bed; All nikolas prominences offloaded;Call light within reach;Nurse notified;Gait belt; Heels elevated for pressure relief;Bed alarm in place    EDUCATION  Education  Education Given To: Patient  Education Provided: Role of Therapy;Plan of Care;Precautions; Mobility Training; Fall Prevention Strategies;Transfer Training; Safety;ADL Function;DME/Home Modifications; Equipment; Energy Conservation  Education Provided Comments: Educated on role of PT, schedule and care conferences on ARU, goals and d/c planning and progression of mobility.  Reviewed sternal px and cued to maintain with all mobility  Education Method: Demonstration;Verbal  Barriers to Learning: Cognition (fear)  Education Outcome: Verbalized understanding;Continued education needed    ELOS:    3 weeks      Therapy Time   Individual Concurrent Group Co-treatment   Time In 1400         Time Out 1500         Minutes 60           Timed Code Treatment Minutes: 45 Minutes (15 minutes for eval)       Asaf Hernandez PT, DPT 01/27/23 at 4:19 PM

## 2023-01-27 NOTE — H&P
Patient: Alka Cost  [id number]  Date: 1/26/2023      Chief Complaint: chest pain, lower extremities numbness    History of Present Illness/Hospital Course:  Sheryle Finger is a 34year old male with a past medical history significant for Marfan syndrome and morbid obesity who [facility] with severe chest pain and lower extremity numbness. CT revealed type A aortic dissection. CT surgery was consulted and on 1/16 he underwent ascending aorta replacement. Vascular surgery was consulted and on 1/16 he underwent left to right femoral-femoral bypass. Course notable for left true vocal cord paralysis. ENT was consulted. Patient and family elected to continue with voice therapy and not proceed with injection. He was admitted to [facility] on 1/26/23 due to functional deficits below his baseline. Today he is seen with his wife present. He reports continued weakness, worse on the left. He reports continued numbness in the bottoms of his feet. Prior Level of Function:  Independent in self care, indoor mobility, stairs, functional cognition     Current Level of Function:  Max assist to roll left and right  Dependent for lying to sitting on side of bed, sit to stand, chair/bed transfers     has a past medical history of Influenza A and Marfan syndrome. has a past surgical history that includes Femoral-femoral Bypass Graft (Bilateral, 1/16/2023) and Thoracic aortic aneurysm repair (N/A, 1/15/2023). reports that he has never smoked. He has never used smokeless tobacco. He reports that he does not currently use alcohol. He reports that he does not use drugs. family history is not on file.     Current Facility-Administered Medications   Medication Dose Route Frequency Provider Last Rate Last Admin    acetaminophen (TYLENOL) tablet 1,000 mg  1,000 mg Oral 3 times per day Rayna Miles MD        amoxicillin-clavulanate (AUGMENTIN) 875-125 MG per tablet 1 tablet  1 tablet Oral 2 times per day Patricia Donovan MD        [START ON 1/27/2023] aspirin EC tablet 81 mg  81 mg Oral Daily Patricia Donovan MD        atorvastatin (LIPITOR) tablet 40 mg  40 mg Oral Nightly Patricia Donovan MD        [START ON 1/27/2023] bisacodyl (DULCOLAX) EC tablet 5 mg  5 mg Oral Daily Patricia Donovan MD        dextrose 10 % infusion   IntraVENous Continuous PRN Patricia Donovan MD        dextrose bolus 10% 125 mL  125 mL IntraVENous PRN Patricia Donovan MD        Or    dextrose bolus 10% 250 mL  250 mL IntraVENous PRN Patricia Donovan MD        famotidine (PEPCID) tablet 20 mg  20 mg Oral BID Patricia Donovan MD        [START ON 1/27/2023] fondaparinux (ARIXTRA) injection 2.5 mg  2.5 mg SubCUTAneous Daily Patricia Donovan MD        furosemide (LASIX) tablet 40 mg  40 mg Oral BID Patricia Donovan MD   40 mg at 01/26/23 1742    gabapentin (NEURONTIN) capsule 100 mg  100 mg Oral TID Patricia Donovan MD        glucagon (rDNA) injection 1 mg  1 mg IntraMUSCular PRN Patricia Donovan MD        glucose chewable tablet 16 g  4 tablet Oral PRN Patricia Donovan MD        magnesium hydroxide (MILK OF MAGNESIA) 400 MG/5ML suspension 30 mL  30 mL Oral Daily PRN Patricia Donovan MD        magnesium oxide (MAG-OX) tablet 400 mg  400 mg Oral BID Patricia Donovan MD        melatonin disintegrating tablet 5 mg  5 mg Oral Nightly Patricia Donovan MD        metoprolol tartrate (LOPRESSOR) tablet 100 mg  100 mg Oral BID Patricia Donovan MD        ondansetron (ZOFRAN-ODT) disintegrating tablet 4 mg  4 mg Oral Q8H PRN Patricia Donovan MD        Or    ondansetron [facility]) injection 4 mg  4 mg IntraVENous Q6H PRN Patricia Donovan MD        oxyCODONE (ROXICODONE) immediate release tablet 5 mg  5 mg Oral Q4H PRKACY Donovan MD        [START ON 1/27/2023] polyethylene glycol (GLYCOLAX) packet 17 g  17 g Oral Daily Patricia Donovan MD        potassium chloride Parag HOFFMAN) extended release tablet 20 mEq  20 mEq Oral TID  Caprice Guy MD   20 mEq at 01/26/23 1742       No Known Allergies    Current Facility-Administered Medications   Medication Dose Route Frequency Provider Last Rate Last Admin    acetaminophen (TYLENOL) tablet 1,000 mg  1,000 mg Oral 3 times per day Caprice Guy MD        amoxicillin-clavulanate (AUGMENTIN) 875-125 MG per tablet 1 tablet  1 tablet Oral 2 times per day Caprice Guy MD        [START ON 1/27/2023] aspirin EC tablet 81 mg  81 mg Oral Daily Caprice Guy MD        atorvastatin (LIPITOR) tablet 40 mg  40 mg Oral Nightly Caprice Guy MD        [START ON 1/27/2023] bisacodyl (DULCOLAX) EC tablet 5 mg  5 mg Oral Daily Caprice Guy MD        dextrose 10 % infusion   IntraVENous Continuous PRN Caprice Guy MD        dextrose bolus 10% 125 mL  125 mL IntraVENous PRN Caprice Guy MD        Or    dextrose bolus 10% 250 mL  250 mL IntraVENous PRN Caprice Guy MD        famotidine (PEPCID) tablet 20 mg  20 mg Oral BID Caprice Guy MD        [START ON 1/27/2023] fondaparinux (ARIXTRA) injection 2.5 mg  2.5 mg SubCUTAneous Daily Caprice Guy MD        furosemide (LASIX) tablet 40 mg  40 mg Oral BID Caprice Guy MD   40 mg at 01/26/23 1742    gabapentin (NEURONTIN) capsule 100 mg  100 mg Oral TID Caprice Guy MD        glucagon (rDNA) injection 1 mg  1 mg IntraMUSCular PRN Caprice Guy MD        glucose chewable tablet 16 g  4 tablet Oral PRN Caprice Guy MD        magnesium hydroxide (MILK OF MAGNESIA) 400 MG/5ML suspension 30 mL  30 mL Oral Daily PRN Caprice Guy MD        magnesium oxide (MAG-OX) tablet 400 mg  400 mg Oral BID Caprice Guy MD        melatonin disintegrating tablet 5 mg  5 mg Oral Nightly Caprice Guy MD        metoprolol tartrate (LOPRESSOR) tablet 100 mg  100 mg Oral BID Caprice Guy MD ondansetron (ZOFRAN-ODT) disintegrating tablet 4 mg  4 mg Oral Q8H PRN Olivia Brito MD        Or    ondansetron [facility]) injection 4 mg  4 mg IntraVENous Q6H PRN Olivia Brito MD        oxyCODONE (ROXICODONE) immediate release tablet 5 mg  5 mg Oral Q4H PRN Olivia Brito MD        [START ON 1/27/2023] polyethylene glycol (GLYCOLAX) packet 17 g  17 g Oral Daily Olivia Brito MD        potassium chloride (KLOR-CON M) extended release tablet 20 mEq  20 mEq Oral TID WC Olivia Brito MD   20 mEq at 01/26/23 1742         REVIEW OF SYSTEMS:   CONSTITUTIONAL: negative for fevers, chills, diaphoresis, activity change, appetite change, fatigue, night sweats and unexpected weight change. EYES: negative for blurred vision, eye discharge, visual disturbance and icterus. HEENT: negative for hearing loss, tinnitus, ear drainage, sinus pressure, nasal congestion, epistaxis and snoring. RESPIRATORY: Negative for hemoptysis, cough, sputum production. CARDIOVASCULAR: negative for chest pain, palpitations, exertional chest pressure/discomfort, edema, syncope. GASTROINTESTINAL: negative for nausea, vomiting, diarrhea, constipation, blood in stool and abdominal pain. GENITOURINARY: negative for frequency, dysuria, urinary incontinence, decreased urine volume, and hematuria. HEMATOLOGIC/LYMPHATIC: negative for easy bruising, bleeding and lymphadenopathy. ALLERGIC/IMMUNOLOGIC: negative for recurrent infections, angioedema, anaphylaxis and drug reactions. ENDOCRINE: negative for weight changes and diabetic symptoms including polyuria, polydipsia and polyphagia. MUSCULOSKELETAL: negative for pain, joint swelling, decreased range of motion and muscle weakness. NEUROLOGICAL: negative for headaches, slurred speech, unilateral weakness. PSYCHIATRIC/BEHAVIORAL: negative for hallucinations, behavioral problems, confusion and agitation.      All pertinent positives are noted in the HPI.    Physical Examination:  Vitals: Patient Vitals for the past 24 hrs:   BP Temp Temp src Pulse Resp Height   01/26/23 1550 (!) 105/52 98.6 °F (37 °C) Oral (!) 102 18 6' 4\" (1.93 m)   01/26/23 1531 -- -- -- -- -- 6' 4\" (1.93 m)     Psych: Stable mood, normal judgement, normal affect   Const: No distress  Eyes: Conjunctiva noninjected, no icterus noted; pupils equal, round, and reactive to light. HENT: Atraumatic, normocephalic; Oral mucosa moist  Neck: Trachea midline, neck supple. No thyromegaly noted. CV: Regular rate and rhythm, no murmur rub or gallop noted  Resp: Lungs clear to auscultation bilaterally, no rales wheezes or ronchi, no retractions. Respirations unlabored. GI: Soft, nontender, nondistended. Normal bowel sounds. No palpable masses. Neuro: Alert, oriented, appropriate. No cranial nerve deficits appreciated. Sensation intact to light touch. Motor examination reveals normal strength in all four limbs diffusely, except 4/5 strength with left HE  Skin: Normal temperature and turgor  MSK: No joint abnormalities noted. Ext: No significant edema appreciated. No varicosities. Lab Results   Component Value Date    WBC 18.9 (H) 01/26/2023    HGB 10.7 (L) 01/26/2023    HCT 32.1 (L) 01/26/2023    MCV 81.0 01/26/2023     (H) 01/26/2023     Lab Results   Component Value Date    INR 1.39 (H) 01/18/2023    INR 1.42 (H) 01/17/2023    INR 1.47 (H) 01/16/2023    PROTIME 17.0 (H) 01/18/2023    PROTIME 17.3 (H) 01/17/2023    PROTIME 17.8 (H) 01/16/2023     Lab Results   Component Value Date    CREATININE 0.6 (L) 01/26/2023    BUN 27 (H) 01/26/2023     (L) 01/26/2023    K 4.2 01/26/2023     01/26/2023    CO2 25 01/26/2023     Lab Results   Component Value Date     (H) 01/26/2023     (H) 01/26/2023    ALKPHOS 83 01/26/2023    BILITOT 0.4 01/26/2023     Most recent EKG revealed NSR.       IMAGING     CTA Chest Abdomen Pelvis 1/15/23  Aortic dissection that starts in the ascending thoracic aorta near the aortic   root and extends to the aortic hiatus. The dissection flap appears to extend   into the proximal aspect of the brachiocephalic artery and left common   carotid artery. Periaortic stranding and inflammation involving the distal abdominal aorta   extending along the common iliac arteries bilaterally that is worse on the   left compared to the right. There is associated loss of flow in the left   common iliac artery extending into the proximal left external and internal   iliac arteries with subsequent reconstitution. This is of uncertain etiology. Hepatic steatosis. Uncomplicated colonic diverticulosis. CT Lumbar Spine WO contrast 1/15/23  No acute fracture or traumatic malalignment identified within the lumbar   spine. Please refer to the abdomen and pelvis CT for additional details regarding   the edema in the retroperitoneum. CT Chest Abdomen Pelvis 1/22/23  1. Extensive thoracoabdominal aortic dissection appears similar in extent   with aneurysmal dilatation of the ascending thoracic aorta. Slight interval   increase in extent of intraluminal thrombus. 2.  Suspected pneumatosis within the right colon, concerning for mesenteric   ischemia. Clinical correlation recommended. 3.  Median sternotomy with trace right pneumothorax. Small new pericardial   effusion, possibly hemopericardium. 4.  Patent femoral-femoral bypass graft. 5.  Additional findings, as above. The above laboratory data have been reviewed. The above imaging data have been reviewed. The above medical testing have been reviewed. Body mass index is 34.03 kg/m².     Barriers to Discharge: level of assistance, endurance, comorbidities    POST ADMISSION PHYSICIAN EVALUATION  The patient has agreed to being admitted to our comprehensive inpatient rehabilitation facility consisting of at least 180 minutes of therapy a day, 5 out of 7 days a week.     The patient/family has a good understanding of our discharge process. The patient has potential to make improvement and is in need of at least two of the following multidisciplinary therapies including but not limited to physical, occupational, respiratory, and speech, nutritional services, wound care, and prosthetics and orthotics. Given the patients complex condition and risk of further medical complications, rehabilitation services cannot be safely provided at a lower level of care such as a skilled nursing facility. I have compared the patients medical and functional status at the time of the preadmission screening and the same on this date, and there are no significant changes. By signing this document, I acknowledge that I have personally performed a full physical examination on this patient within 24 hours of admission to this inpatient rehabilitation facility and have determined the patient to be able to tolerate the above course of treatment at an intensive level for a reasonable period of time. I will be completing a detailed individualized Plan of Care for this patient by day four of the patients stay based upon the Preadmission Screen, this Post-Admission Evaluation, and the therapy evaluations. Rehabilitation Diagnosis:  Cardiac, 9.0, Cardiac    Assessment and Plan:  Magy Betancourt is a 34year old male with a past medical history significant for Marfan syndrome and morbid obesity who presented to [facility] on 1/15/23 with severe chest pain and lower extremity numbness, found to have aortic dissection s/p replacement. He was admitted to [facility] on 1/26/23 due to functional deficits below his baseline.      Type A Aortic Dissection s/p replacement  - sternal precautions  - asa, statin BB  - goal SBP<140  - PT, OT    Right to left fem-fem bypass  - asa, statin, BB    Vocal cord paralysis  - ENT evaluated during acute stay  - ST    Aspiration pneumonia  - continue augmentin    Fluid overload  - lasix  - daily weights, I/Os    Acute Blood Loss Anemia  - monitor and transfuse for Hb<7    Marfan Syndrome  - noted    Obesity  - BMI 34  - encourage lifestyle modifications    Bowels: adjust medications as needed for regular bowel movements    Bladder: Check PVR x 3. [address] if PVR > 200ml or if any volume is > 500 ml. Sleep: Trazodone provided prn. PPX  DVT: lovenox  GI: pepcid    Follow up appointments: CT surgery, PCP    Meka Easton.  Viral Gilbert MD 1/26/2023, 7:47 PM

## 2023-01-27 NOTE — CARE COORDINATION
Case Management Assessment  Initial Evaluation    Date/Time of Evaluation: 1/27/2023 3:09 PM  Assessment Completed by: Alessio Arreguin RN    If patient is discharged prior to next notation, then this note serves as note for discharge by case management. Patient Name: Georgie Sadler                   YOB: 1990  Diagnosis: Aortic dissection ([facility] [address] [I71.00]                   Date / Time: 1/26/2023  3:20 PM    Patient Admission Status: REHAB IP   Readmission Risk (Low < 19, Mod (19-27), High > 27): Readmission Risk Score: 20.5    Current PCP: Antoinette Covarrubias, DO  PCP verified by CM? Yes    Chart Reviewed: Yes      History Provided by: Patient  Patient Orientation: Alert and Oriented    Patient Cognition: Alert    Hospitalization in the last 30 days (Readmission):  No    If yes, Readmission Assessment in CM Navigator will be completed. Advance Directives:      Code Status: Full Code   Patient's Primary Decision Maker is: Legal Next of Kin    Primary Decision Maker: Simona Méndez  [phone]    Discharge Planning:    Patient lives with: Spouse/Significant Other Type of Home: House  Primary Care Giver: Self  Patient Support Systems include: Spouse/Significant Other   Current Financial resources: None  Current community resources: None  Current services prior to admission: None            Current DME:  Crutches            Type of Home Care services:  PT, OT    ADLS  Prior functional level: Independent in ADLs/IADLs, Bathing, Dressing, Toileting, Feeding, Cooking, Mobility  Current functional level: Assistance with the following:, Mobility    PT AM-PAC:   /24  OT AM-PAC:   /24    Family can provide assistance at DC: Yes  Would you like Case Management to discuss the discharge plan with any other family members/significant others, and if so, who?  Yes Agnieszka Gill)  Plans to Return to Present Housing: Yes  Other Identified Issues/Barriers to RETURNING to current housing:   Potential Assistance needed at discharge: 1 Shahnaz Karla, Outpatient PT/OT            Potential DME:  CTA  Patient expects to discharge to: House  Plan for transportation at discharge:  private    Financial    Payor: Frankie Weinberg / Plan: [place] Agent PPO / Product Type: *No Product type* /     Does insurance require precert for SNF: Yes    Potential assistance Purchasing Medications: No  Meds-to-Beds request:        [id number] Arabella Blanco, [place] - [address] [facility] [phone] - F [phone]  [address] [address]  Phone: [phone] Fax: [phone]    Notes:    Factors facilitating achievement of predicted outcomes: Family support, Motivated, Cooperative, and Pleasant    Barriers to discharge: Pain and Limited safety awareness    Additional Case Management Notes: Patient independent PLOF w/o AD. Lives with spouse and 3 young children in a 2 story townhouse with a level entry. Wife does not work so she would be there 24 hrs. [facility] following. The Plan for Transition of Care is related to the following treatment goals of Aortic dissection ([address] [I71.00]      The Patient and/or Patient Representative Agree with the Discharge Plan? Yes        Transportation needs:    Has lack of transportation kept you from medical appointments, meetings, work, or ADL's? [] Yes, it has kept me from medical appointments or from getting my meds  [] Yes, It has kept me from non-medical meetings, appointments, work, or getting things I need  [x] No  [] Pt unable to respond  [] Pt declines to respond     How often do you need to have someone help you when you read instructions, pamphlets, or other written material from your doctor or pharmacy? [] Never                             [] Always  [x] Rarely                            [] Patient unable to respond  [] Sometimes                     [] Pt declines to respond  [] Often         Ethnicity: Are you of , /a, or Citizen of [place] origin?   [x] No, not of , /a, or [place] origin  [] Yes, [place], [address], Chicano/a  [] Yes, [address] [facility]  [] Yes, [place]  [] Yes, another , , or Russian origin  [] Pt unable to respond  [] Pt declines to respond     Race: [x] White                                                [] [place]              [] [address] [facility]  [] [facility] or [place] American                [] [place]          [] [place] or [place]  [] [address] or [place] Native     [] [place]             [] Citizen of [place]  []  [place] [facility]                                      [] [place]      [] Other [place]  [] [place]                                             [] Other        [] Pt unable to respond                      [] Pt declines to respond                  [] None of the above   Preferred Language: 24 Mary Lou Naidu RN  Case Management Department  Ph: [phone] Fax: [phone]

## 2023-01-27 NOTE — PROGRESS NOTES
Speech Language Pathology  Facility/Department: [facility]  Initial Speech/Language/Cognitive Assessment       NAME:Eddie Moya  : 1990 (34 y.o.)   MRN: [id number]  ROOM: [facility]/4498-02  ADMISSION DATE: 2023  PATIENT DIAGNOSIS(ES): Aortic dissection (HCC) [I71.00]  Patient Active Problem List    Diagnosis Date Noted    Aortic dissection ([facility] [address] 2023    Vocal fold paresis, left 2023     Past Medical History:   Diagnosis Date    Influenza A 2020    Marfan syndrome      Past Surgical History:   Procedure Laterality Date    FEMORAL-FEMORAL BYPASS GRAFT Bilateral 2023    FEMORAL FEMORAL BYPASS performed by David Alexis MD at [address] Inf N/A 1/15/2023    ASCENDING AORTA  REPLACEMENT WITH TUBE GRAFT USING AN EPPERSON GRAFT SIZE 28CM  ANTEGRADE FLOW TO THE LEFT LEG, RESUSPENSION OF AORTIC VALVE performed by Johanny Beard MD at [address]     No Known Allergies    Date of Evaluation: 2023   Evaluating Therapist: LIZ Garza    Subjective:   Chart Reviewed: : [x] Yes [] No    Onset Date: Pt admitted to [facility] 01/15/23    Recent Results of CT of Head / MRI: None recent     Medical record review/interview: Per MD H&P: \"\"Eddie Iverson is a 34year old male with a past medical history significant for Marfan syndrome and morbid obesity who presented to [facility] on 1/15/23 with severe chest pain and lower extremity numbness. CT revealed type A aortic dissection. CT surgery was consulted and on  he underwent ascending aorta replacement. Vascular surgery was consulted and on  he underwent left to right femoral-femoral bypass. Course notable for left true vocal cord paralysis. ENT was consulted. Patient and family elected to continue with voice therapy and not proceed with injection. He was admitted to [facility] on 23 due to functional deficits below his baseline. Today he is seen with his wife present.  He reports continued weakness, worse on the left. He reports continued numbness in the bottoms of his feet. \"    Behavior / Cognition: alert, cooperative, and pleasant mood    Primary Complaint: Pt with complaints of a hoarse, weak voice. 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, cleaning)   Highest Level of Education: 12th grade   Homemaking Responsibilities:   Meal Prep Responsibility: Primary  Laundry Responsibility: No  Cleaning Responsibility: Secondary  Bill Paying / Finance Responsibility: Secondary  Shopping Responsibility: Primary  Health Care Management: pt not taking any meds prior to admission   Active : yes  Mode of Transportation: [employer]  Leisure and Hobbies: playing with his kids, playing video games, watching movies      Assessment   Cognitive Diagnosis: Mild cognitive-linguistic abilities   Communication Diagnosis: Hoarse, weak, dysphonic 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     Impressions: Pt alert and cooperative, agreeable to tx. Pt upright in bed. Pt denies changes in cognition - just reports feeling overwhelmed. Pt being seen during IP admission for dysphagia and voice tx. Hospital course complicated by left true vocal fold paralysis - pt denied injection. Today, pt continues with a hoarse, dysphonic, and weak vocal quality - but pt and wife report improvement. SLP administered the MoCA for assessment of cognitive-linguistic abilities. Pt scored 24/30 points (1 point added for 12 yr edu), which is judged to be a mild cognitive deficit when considering prior level of independence and high level of function. Areas of deficit include executive function, calculations, recall, and thought organization.  Areas of strength include naming, attention, abstraction, orientation, and following commands. Pt will benefit from continued speech therapy to promote improvement in these areas and achieve safe and independent return home at [facility] if safe and able. Discussed areas of deficit, goals, POC for ST with pt who is in agreement to participate. Formal Assessments:    Jamey Cognitive Assessment (MoCA)    Section Subtest Score Comments   Visuospatial/ Executive [place] making 0/1 Only connected numbers    Copy Cube 1/1     Clock 2/3 Difficulty with numbers    Naming Picture naming 3/3    Attention Number repetition 1/2 Incorrect backwards repetition    Selective attention 1/1     Serial 7s 2/3 3x correct, 2x errors   Language Repetition 2/2     Fluency 0/1 Named 6 words in 1 min (criterion >11)   Abstraction Comparisons 2/2    Delayed Recall Recall 5 novel words 3/5 +1 given MC cues   Orientation Spatial and Temporal 6/6     Total: 24/30 1 point added for 12 yr edu       Goals:  Long Term Goals:   Time Frame for Long Term Goals: 21 Days (02/16/23)  Goal 1: Pt will improve overall cognitive-linguistic abilities to promote safe and independent return home PLOF    Short Term Goals:  Time Frame for Short Term Goals: 18 Days (02/13/23)  Goal 1: Pt will complete vocal functioning exercises / resonant voice therapy exercises 10/10 given min cues  Goal 2: Pt will complete respiratory retraining exercises 10/10 given min cues  Goal 3: Pt will complete graded recall tasks using compensatory strategies with 90% acc given min cues  Goal 4: Pt will complete executive function tasks (e.g. meds, time, money, etc) with 90% acc given min cues  Goal 5: Pt will complete higher level critical thinking tasks with 90% acc given min cues     Objective:   Cranial nerve / Oral motor exam:   CN V (trigeminal): ophthalmic, maxillary, and mandibular facial sensation- WNL  CN VII (facial):  WNL  CN IX/X (glossopharyngeal/vagus): MPT: Reduced; pitch range: Reduced and hoarse; vocal quality: Reduced, hoarse, and weak; cough: Weak- perceptually  CN XII (hypoglossal): WNL     Oral motor exam   WFL    Dentition: natural    Motor Speech: WFL      Comprehension:   Auditory Comprehension: WFL    Reading Comprehension: WFL    Expression:   Primary Mode of Expression: Verbal  Verbal Expression: WFL    Written Expression: WFL; Dominant Hand: Right     Pragmatics/Social Functioning: WFL      Cognition:  Overall Orientation : .WFL    Attention: WFL    Memory: Mild   Impairments: Short-term Memory and Immediate/Working Memory    Problem Solving: Mild   Impairments: Complex Functional Tasks, Managing Finances, and Managing Medications    Numeric Reasoning: Mild   Impairments: Calculations, Money Management, and Time    Abstract Reasoning: WFL    Safety/Judgement: WFL    Voice:   Voice: Moderate   Impairments: Dysphonia, Hoarse, Weak, Impaired vocal intensity, and Inadequate breath support  Maximum Phonation Time: sustained /ah/ = 1.6 seconds, sustained \"ee\" = 1.2 seconds  S/Z Ratio: 3.4      Plan  Prognosis:  Speech Therapy Prognosis  Prognosis: Good  Prognosis Considerations: Previous Level of Function  and Severity of impairments   Individuals consulted and agree with results and recommendations: Patient , RN, and Family Member  Safety Devices in place: Yes  Type of Devices:  All fall risk precautions in place , Call light within reach, Bed alarm in place, Left in bed, and Other (comment) wife present     Recommendations:   Requires SLP Intervention: yes  Duration / Frequency of Treatment: 60 min; 5 days per week for 21 days    Therapeutic Interventions:  Compensatory strategy training and carryover, recall/STM, problem solving, reasoning, exec function, thought organization, attention, voice therapy     Education:  Patient education: SLP edu pt and wife re: role of SLP, rationale of cog eval, goals, POC  Patient Education Responses: verbalized understanding       Total Treatment Time / Charges       Time in Time out Total Time / units   Speech / Lenora Bio / Cog Eval:  0945 1030 45 min  / 1 unit      Signature:  Iban Jerez MA [id number] [facility]  Speech Language Pathologist

## 2023-01-27 NOTE — CONSULTS
Nutrition Note    RD received consult for oral nutrition supplements. Pt seen on 1/26 by Dick Chang. Dietitians following with nutrition assessment and recommendations in RD progress notes. Will continue to monitor per nutrition standards of care.      Nayely Nina MS, RD, LD on 1/27/2023 at 8:34 AM  Office: [phone]

## 2023-01-27 NOTE — PROGRESS NOTES
Occupational Therapy  Facility/Department: [facility] [address] [place]  Rehabilitation Occupational Therapy Evaluation/Treatment       Date: 23  Patient Name: Yaakov Lopez       Room: 5629/4259-01  MRN: [id number]  Account: [de-identified]   : 1990  (34 y.o.) Gender: male     Referring Practitioner: Meka Moreno MD  Diagnosis: Aortic dissection with Left to right fem-fem bypass, ascending aorta valve replacement   Additional Pertinent Hx: Marfan Syndrome    Restrictions  Restrictions/Precautions: Fall Risk;Surgical Protocols; General Precautions;Cardiac;Modified Diet  Other position/activity restrictions: R IV, [address] YOUR INCISIONS DAILY WITH A CLEAN WASHCLOTH AND ANTIBACTERIAL SOAP. Do not wash your incisions after you have cleansed other parts of your body  Equipment Used: Bed;Other    Subjective  Subjective: Pt in bed, pleasant no pain, agreeable to OT evaluation. Vitals  Temp: 97.7 °F (36.5 °C)  Heart Rate: (!) 118  Resp: 20  BP: 120/68  Weight: (!) 339 lb 11.7 oz (154.1 kg)  BMI (Calculated): 41.4  Level of Consciousness: Alert (0)    Objective  Vision - Basic Assessment  Prior Vision: No visual deficits  Visual History: No significant visual history  Patient Visual Report: No visual complaint reported. Visual Field Cut: No  Oculo Motor Control: WNL  Cognition  Overall Cognitive Status: Exceptions  Arousal/Alertness: Appropriate responses to stimuli  Following Commands: Follows multistep commands with repitition; Follows multistep commands with increased time  Attention Span: Appears intact  Memory: Decreased recall of precautions;Decreased short term memory  Safety Judgement: Decreased awareness of need for assistance;Decreased awareness of need for safety  Problem Solving: Assistance required to correct errors made;Assistance required to implement solutions;Assistance required to identify errors made  Insights: Decreased awareness of deficits  Initiation: Requires cues for some  Sequencing: Requires cues for some  Orientation  Overall Orientation Status: Within Functional Limits   Perception  Overall Perceptual Status: Impaired  Unilateral Attention: Appears intact  Initiation: Cues to initiate tasks  Motor Planning: Cues to use objects appropriately  Perseveration: Not present  Sensation  Overall Sensation Status: Impaired  Light Touch: Partial deficits in the LLE (n/t in L toes)   ROM  LUE AROM (degrees)  LUE AROM : WFL  LUE General AROM: within sternal precautions  Left Hand AROM (degrees)  Left Hand AROM: WFL  RUE AROM (degrees)  RUE AROM : WFL  RUE General AROM: within sternal precautions  Right Hand AROM (degrees)  Right Hand AROM: WFL  LUE Strength  Gross LUE Strength: Ex[facility]  L Hand General: 4/5  LUE Strength Comment: not fully tested due to sternal precautions  RUE Strength  Gross RUE Strength: Exceptions to [facility]  R Hand General: 4/5  RUE Strength Comment: not fully tested due to sternal precautions  Fine Motor Skills  Coordination  Movements Are Fluid And Coordinated: No  Coordination and Movement Description: Fine motor impairments;Right UE;Left UE;Decreased accuracy       Hand Assessment  Hand Dominance  Hand Dominance: Right  Functional Mobility  Balance  Sitting Balance: Supervision  Standing Balance: Dependent/Total  Standing Balance  Time: 20-30 seconds x2, 5-10 seconds x6  Activity: sit<>stands in Helen Balboa, standing for tierney care/bathing  Transfers  Stand Step Transfers: Dependent/Total (max A with use of Helen Balboa)  Sit to stand: Maximum assistance  Stand to sit: Maximum assistance  Transfer Comments: sit<>stands in [employer] Used: Drop-arm commode  Toilet Transfer: Maximum assistance  Toilet Transfers Comments: use of Helen Balboa to transfer on/off BSC over toilet  Shower Transfers  Shower - Transfer From: Other  Shower - Transfer Type: To and From  Shower - Transfer To:  Transfer tub bench  Shower Transfers: Dependent  Shower Transfers Comments: kellen TUCKER with Thana Greenhouse on/off TTB  Wheelchair Bed Transfers  Equipment Used: Bed;Other  Level of Asssistance: Dependent/Total  Wheelchair Transfers Comments: max A with use of Micromidas  Social/Functional History  Lives With: Family (spouse, 36year olds and 5 week old)  Type of Home:  ([facility])  Home Layout: Two level;Bed/Bath upstairs (handrails on both sides up to 2nd floor)  Home Access: Level entry  Bathroom Shower/Tub: Tub/Shower unit  Bathroom Toilet: Standard  Bathroom Accessibility: Eladio Show accessible  Has the patient had two or more falls in the past year or any fall with injury in the past year?: No (1 fall right before admission)  ADL Assistance: Independent  Homemaking Assistance: Independent  Homemaking Responsibilities: Yes  Meal Prep Responsibility: Primary  Laundry Responsibility: No  Cleaning Responsibility: Secondary  Bill Paying/Finance Responsibility: Secondary  Shopping Responsibility: Primary  Ambulation Assistance: Independent  Transfer Assistance: Independent  Active : Yes  Occupation: Full time employment  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  Additional Comments: Pt states that spouse would be able to provide 24HR assistance if needed and unsure of full amount of physical assistance able to provide  ADL  Feeding: Independent  Feeding Skilled Clinical Factors: sitting at EOB to get breakfast  Grooming: Stand by assistance  Grooming Skilled Clinical Factors: to sit on paddles in [employer] to brush teeth  UE Bathing:  Moderate assistance  UE Bathing Skilled Clinical Factors: mod VCs for bathing incision, assist to rinse entire torso, pt unable to look at own incision  LE Bathing: Dependent/Total  LE Bathing Skilled Clinical Factors: max A to stand to Micromidas to wash buttocks in stance, assist to wash lower legs and feet while seated  UE Dressing: Maximum assistance  UE Dressing Skilled Clinical Factors: to doff/don gown  LE Dressing: Dependent/Total  LE Dressing Skilled Clinical Factors: assist to thread BLEs into pants/brief, assist to manage pants over hips in Kyara Shields, total assist for socks  Toileting: Dependent/Total  Toileting Skilled Clinical Factors: to complete hygiene in stance in Kyara Shields after [facility]    Goals  Patient Goals   Patient goals : \"I want to walk and do things on my own\"  Short Term Goals  Time Frame for Short Term Goals: 1 week- 2/1/23  Short Term Goal 1: Pt will complete functional transfers with max A. Short Term Goal 2: Pt will perform full body bathing with max A. Short Term Goal 3: Pt will complete toileting with max A. Short Term Goal 4: Pt will perform grooming with supervision. Short Term Goal 5: Pt will complete full body dressing with max A. Long Term Goals  Time Frame for Long Term Goals : 3 weeks- 2/15/23  Long Term Goal 1: Pt will perform functional transfers with CGA. Long Term Goal 2: Pt will complete toileting with CGA. Long Term Goal 3: Pt will complete full body dressing with CGA. Long Term Goal 4: Pt will perform full body bathing with CGA. Long Term Goal 5: Pt will complete IADL task with min A. Assessment  Performance deficits / Impairments: Decreased functional mobility ; Decreased safe awareness;Decreased ADL status; Decreased cognition;Decreased strength;Decreased endurance;Decreased high-level IADLs;Decreased coordination;Decreased posture;Decreased balance;Decreased fine motor control  Assessment: Per MD H&P: Juan Jose Mckeon is a 34year old male with a past medical history significant for Marfan syndrome and morbid obesity who presented to [facility] on 1/15/23 with severe chest pain and lower extremity numbness. CT revealed type A aortic dissection. CT surgery was consulted and on 1/16 he underwent ascending aorta replacement. Vascular surgery was consulted and on 1/16 he underwent left to right femoral-femoral bypass. Course notable for left true vocal cord paralysis. ENT was consulted.  Patient and family elected to continue with voice therapy and not proceed with injection. He was admitted to [facility] on 1/26/23 due to functional deficits below his baseline. \" Pt presents with the above deficits requiring skilled OT services to return to [facility]. Pt demonstrated max to total assist for all ADLs and transfers with use of Pily Cheers for all sit<>stands due to weakness and anxiety related to standing and movement. Pt unable to look at wound/incision on chest, stating \"I will pass out if I look at it. \" Pt HR elevated with activity but decreasing with rest and minimal s/s of distress. Prognosis: Good  Decision Making: High Complexity  Discharge Recommendations: 24 hour supervision or assist;Home with Home health OT;S Level 1  Occupational Therapy Plan  Times Per Week: 5/7 days/week  Current Treatment Recommendations: ROM;Balance training;Functional mobility training; Endurance training;Patient/Caregiver education & training; Safety education & training;Positioning;Self-Care / ADL; Strengthening;Home management training;Equipment evaluation, education, & procurement       Therapy Time   Individual Concurrent Group Co-treatment   Time In 0730         Time Out 0900         Minutes 90         Timed Code Treatment Minutes: 75 Minutes (15 minute evaluation)       Elissa Gómez, OT

## 2023-01-27 NOTE — PROGRESS NOTES
Speech Language Pathology  Clinical Bedside Swallow Assessment  Facility/Department: [facility]        Recommendations:  Diet recommendation: IDDSI 7 Regular Solids; IDDSI 0 Thin Liquids; Meds crushed in puree as able (pt preference)   Instrumentation: Not clinically indicated. MBSS completed 23. Risk management: upright for all intake, stay upright for at least 30 mins after intake, small bites/sips, oral care 2-3x/day to reduce adverse affects in the event of aspiration, increase physical mobility as able, alternate bites/sips, slow rate of intake, general GERD precautions, and general aspiration precautions      NAME:Eddie Moya  : 1990 (34 y.o.)   MRN: [id number]  ROOM: [address] [place] [address]  ADMISSION DATE: 2023  PATIENT DIAGNOSIS(ES): Aortic dissection ([facility] [address] [I71.00]  No chief complaint on file. Patient Active Problem List    Diagnosis Date Noted    Aortic dissection ([facility] [address] 2023    Vocal fold paresis, left 2023     Past Medical History:   Diagnosis Date    Influenza A 2020    Marfan syndrome      Past Surgical History:   Procedure Laterality Date    FEMORAL-FEMORAL BYPASS GRAFT Bilateral 2023    FEMORAL FEMORAL BYPASS performed by David Alexis MD at [address] Inf N/A 1/15/2023    ASCENDING AORTA  REPLACEMENT WITH TUBE GRAFT USING AN EPPERSON GRAFT SIZE 28CM  ANTEGRADE FLOW TO THE LEFT LEG, RESUSPENSION OF AORTIC VALVE performed by Johanny Beard MD at [address]     No Known Allergies    DATE ONSET: Pt admitted to [facility] 01/15/23    Date of Evaluation: 2023   Evaluating Therapist: Brenton Lopez, SLP    Chart Reviewed: : [x] Yes [] No    Current Diet: ADULT ORAL NUTRITION SUPPLEMENT; Breakfast, Dinner; Standard High Calorie/High Protein Oral Supplement  ADULT DIET; Regular; No Added Salt (3-4 gm)    Recent Chest Radiography: [x] Chest XR   [] CT of Chest  Date: 23  Impression   No acute finding or significant change. Pain: leg pain; repositioned     Reason for Referral  Karyn Blake was referred for a bedside swallow evaluation to assess the efficiency of their swallow function, identify signs and symptoms of aspiration and make recommendations regarding safe dietary consistencies, effective compensatory strategies, and safe eating environment. Assessment    Medical record review/interview: Per MD H&P: Mich Heath is a 34year old male with a past medical history significant for Marfan syndrome and morbid obesity who presented to [facility] on 1/15/23 with severe chest pain and lower extremity numbness. CT revealed type A aortic dissection. CT surgery was consulted and on 1/16 he underwent ascending aorta replacement. Vascular surgery was consulted and on 1/16 he underwent left to right femoral-femoral bypass. Course notable for left true vocal cord paralysis. ENT was consulted. Patient and family elected to continue with voice therapy and not proceed with injection. He was admitted to [facility] on 1/26/23 due to functional deficits below his baseline. Today he is seen with his wife present. He reports continued weakness, worse on the left. He reports continued numbness in the bottoms of his feet. \"      Predisposing dysphagia risk factors: N/A  Clinical signs of possible chronic dysphagia: reduced PO intake  Precipitating dysphagia risk factors: reduced physical mobility and recent intubation    Patient Complaints:  Odynophagia: [] Yes [x] No  Globus Sensation: [] Yes [x] No  SOB with PO intake: [] Yes [x] No  Increased WOB with PO intake: [] Yes [x] No  Reflux Sx's: [] Yes [x] No  Weight loss: [] Yes [x] No  Coughing/Choking with PO intake: [] Yes [x] No  Reduced Appetite: [] Yes [x] No    Additional Reported Symptoms/Complaints/Hospital Course:   [facility] admitted due [facility] due to severe chest pain. S/p aortic dissection / ascending aorta replacement. Following surgery / intubation, pt with left true vocal fold paralysis.  Pt declined vocal fold injection. MBSS completed 01/23/23 with the following impressions:   \"Oral Preparation / Oral Phase  Oral phase comment: Mildly prolonged oral collection and propulsion time with soft solid. Trace BOT residue is observed post swallow of soft solid, clearing with liquid swallow. Functional oral collection and control with purees and liquids. Pharyngeal Phase  Pharyngeal phase comment: Trace penetration is observed during the swallow of thin liquid X1 which completely clears with completion of the swallow. Reduced tongue base retraction results in small amount of BOT residue with soft solid, clearing with liquid. No aspiration is appreciated across all trials. Esophageal Phase  Note trace retropulsion of thin liquid to the UES X1 a few seconds after the swallow, remaining subglottic and clearing with repeat swallows. \"    Pt's goals: \"I want to walk\"    Vitals/labs:   Temp: n/a  SpO2: 93%  RR: 20  BP: 120/68  HR: 118  O2 device: room air     CBC:   Recent Labs     01/27/23  0652   WBC 18.5*   HGB 9.6*   *      BMP:  Recent Labs     01/27/23  0652   *   K 4.3      CO2 24   BUN 28*   CREATININE 0.7*   GLUCOSE 107*        Cranial nerve exam:   CN V (trigeminal): ophthalmic, maxillary, and mandibular facial sensation- WNL  CN VII (facial): WNL  CN IX/X (glossopharyngeal/vagus): MPT: Reduced; pitch range: Reduced and hoarse; vocal quality: Reduced, hoarse, and weak; cough: Weak- perceptually  CN XII (hypoglossal):  WNL    Laryngeal function exam:   Secretions: WFL  Vocal quality: See CN exam above  MPT: See CN exam above  S/Z ratio: DNT  Pitch range: See CN exam above  Cough: See CN exam above    Oral Care Status:    [x] Oral Care [facility]  [] Poor oral care status  [] Edentulous  [] Upper Dentures  [] Lower Dentures  [] Missing/Broken Teeth  [] Evidence of dental cavities/carries    PO trials:   IDDSI 0 (thin): via cup     IDDSI 7 (regular): mw cracker     3 oz water: PASS    Impressions:  Pt alert and cooperative, agreeable to tx. Pt upright in bed. Pt denies difficulty swallowing, but \"hates\" taking pills - thus prefers them crushed in puree. Pt being seen during IP admission for dysphagia and voice tx. Hospital course complicated by left true vocal fold paralysis - pt denied injection. Oral [facility] exam WFL. Adequate dentition. Hoarse and weak vocal quality (pt reports it has gotten stronger the past few days). Thin liquids and regular solid PO trials. Mildly prolonged and slow mastication, but adequate given increased time. Suspect timely swallow and adequate laryngeal elevation. No overt s/s of aspiration - no coughing, throat clearing, wet vocal quality. Edu re: safe swallow strategies; pt verbalized understanding. SLP recommends regular solids, thin liquids, meds crushed in puree (pt preference). Overall swallowing function appears WFL. Further ST services for dysphagia are not warranted. Pt to be seen for cog tx and voice tx only. Recommendations:  Diet recommendation: IDDSI 7 Regular Solids; IDDSI 0 Thin Liquids; Meds crushed in puree as able (pt preference)   Instrumentation: Not clinically indicated. MBSS completed 01/23/23.   Risk management: upright for all intake, stay upright for at least 30 mins after intake, small bites/sips, oral care 2-3x/day to reduce adverse affects in the event of aspiration, increase physical mobility as able, alternate bites/sips, slow rate of intake, general GERD precautions, and general aspiration precautions    Prognosis: Good    Recommended Intervention:   N/A - further ST for dysphagia if not warranted     Dysphagia Therapeutic Intervention:   N/A - further ST for dysphagia if not warranted     Referrals:  N/A    Goals:  Short Term Goals:  N/A - further ST for dysphagia if not warranted     Treatment:  Skilled instruction completed with patient re: evidenced based practice regarding recommendations and POC, importance of oral care to reduce adverse affects in the event of aspiration, and instruction of recommended compensatory strategies developed based upon clinical exam. Pt able to recall/demonstrate compensatory strategies with min cues. Pt Education: SLP educated the patient re: Role of SLP, rationale for completion of assessment, results of assessment, recommendations, and POC  Pt Education Response: verbalized understanding    Duration/Frequency of Tx: further ST for dysphagia if not warranted. Pt to be seen for cog tx and voice tx only.      Individuals Consulted:   [x]  Patient     []  NP         [x]  RN   []  RD                   []  MD      [x]  Family Member                        []  PA    []  Other:      Safety Devices / Report:  [x]  All fall risk precautions in place []  Safety handoff completed with RN  [x]  Bed alarm in place  []  Left in bed     []  Chair alarm in place  []  Left in chair   [x]  Call light in reach   [x]  Other: wife present            Total Treatment Time / Charges       Time in Time out Total Time / units   Swallow Eval/Tx Time  2390 0989 15 min / 1 unit (DE)     Signature:  oJse R Means [place] [address] [place] [address]  Speech Language Pathologist

## 2023-01-27 NOTE — PLAN OF CARE
SLP completed evaluation. Please refer to notes in EMR.       Adrian Rojas MA [address] [facility]  Speech Language Pathologist

## 2023-01-28 PROCEDURE — 97110 THERAPEUTIC EXERCISES: CPT

## 2023-01-28 PROCEDURE — 6360000002 HC RX W HCPCS: Performed by: STUDENT IN AN ORGANIZED HEALTH CARE EDUCATION/TRAINING PROGRAM

## 2023-01-28 PROCEDURE — 1280000000 HC REHAB R&B

## 2023-01-28 PROCEDURE — 92507 TX SP LANG VOICE COMM INDIV: CPT

## 2023-01-28 PROCEDURE — 97530 THERAPEUTIC ACTIVITIES: CPT

## 2023-01-28 PROCEDURE — 6370000000 HC RX 637 (ALT 250 FOR IP): Performed by: STUDENT IN AN ORGANIZED HEALTH CARE EDUCATION/TRAINING PROGRAM

## 2023-01-28 RX ADMIN — GABAPENTIN 100 MG: 100 CAPSULE ORAL at 09:48

## 2023-01-28 RX ADMIN — Medication 5 MG: at 21:36

## 2023-01-28 RX ADMIN — MAGNESIUM OXIDE TAB 400 MG (240 MG ELEMENTAL MG) 400 MG: 400 (240 MG) TAB at 09:48

## 2023-01-28 RX ADMIN — POTASSIUM CHLORIDE 20 MEQ: 1500 TABLET, EXTENDED RELEASE ORAL at 16:45

## 2023-01-28 RX ADMIN — FUROSEMIDE 40 MG: 40 TABLET ORAL at 09:47

## 2023-01-28 RX ADMIN — ENOXAPARIN SODIUM 40 MG: 100 INJECTION SUBCUTANEOUS at 09:48

## 2023-01-28 RX ADMIN — BISACODYL 5 MG: 5 TABLET, COATED ORAL at 09:48

## 2023-01-28 RX ADMIN — ACETAMINOPHEN 1000 MG: 500 TABLET ORAL at 21:36

## 2023-01-28 RX ADMIN — POLYETHYLENE GLYCOL 3350 17 G: 17 POWDER, FOR SOLUTION ORAL at 09:49

## 2023-01-28 RX ADMIN — METOPROLOL TARTRATE 50 MG: 50 TABLET, FILM COATED ORAL at 21:35

## 2023-01-28 RX ADMIN — METOPROLOL TARTRATE 50 MG: 50 TABLET, FILM COATED ORAL at 09:48

## 2023-01-28 RX ADMIN — ATORVASTATIN CALCIUM 40 MG: 40 TABLET, FILM COATED ORAL at 21:36

## 2023-01-28 RX ADMIN — ASPIRIN 81 MG: 81 TABLET, COATED ORAL at 09:47

## 2023-01-28 RX ADMIN — GABAPENTIN 100 MG: 100 CAPSULE ORAL at 21:36

## 2023-01-28 RX ADMIN — FAMOTIDINE 20 MG: 20 TABLET, FILM COATED ORAL at 09:48

## 2023-01-28 RX ADMIN — POTASSIUM CHLORIDE 20 MEQ: 1500 TABLET, EXTENDED RELEASE ORAL at 13:14

## 2023-01-28 RX ADMIN — MAGNESIUM OXIDE TAB 400 MG (240 MG ELEMENTAL MG) 400 MG: 400 (240 MG) TAB at 21:36

## 2023-01-28 RX ADMIN — FAMOTIDINE 20 MG: 20 TABLET, FILM COATED ORAL at 21:35

## 2023-01-28 RX ADMIN — GABAPENTIN 100 MG: 100 CAPSULE ORAL at 13:15

## 2023-01-28 RX ADMIN — FUROSEMIDE 40 MG: 40 TABLET ORAL at 16:45

## 2023-01-28 RX ADMIN — ACETAMINOPHEN 1000 MG: 500 TABLET ORAL at 13:14

## 2023-01-28 RX ADMIN — POTASSIUM CHLORIDE 20 MEQ: 1500 TABLET, EXTENDED RELEASE ORAL at 09:48

## 2023-01-28 RX ADMIN — ACETAMINOPHEN 1000 MG: 500 TABLET ORAL at 05:35

## 2023-01-28 ASSESSMENT — PAIN SCALES - GENERAL
PAINLEVEL_OUTOF10: 0

## 2023-01-28 NOTE — PLAN OF CARE
Problem: Discharge Planning  Goal: Discharge to home or other facility with appropriate resources  1/28/2023 1142 by Fifi Ocampo RN  Flowsheets (Taken 1/28/2023 1142)  Discharge to home or other facility with appropriate resources:   Identify discharge learning needs (meds, wound care, etc)   Identify barriers to discharge with patient and caregiver  1/28/2023 0647 by Marietta Hercules RN  Outcome: Not Progressing     Problem: Safety - Medical Restraint  Goal: Remains free of injury from restraints (Restraint for Interference with Medical Device)  Description: INTERVENTIONS:  1. Determine that other, less restrictive measures have been tried or would not be effective before applying the restraint  2. Evaluate the patient's condition at the time of restraint application  3. Inform patient/family regarding the reason for restraint  4.  Q2H: Monitor safety, psychosocial status, comfort, nutrition and hydration  1/28/2023 1142 by Fifi Ocampo RN  Flowsheets (Taken 1/28/2023 1142)  Remains free of injury from restraints (restraint for interference with medical device):   Determine that other, less restrictive measures have been tried or would not be effective before applying the restraint   Evaluate the patient's condition at the time of restraint application  3/26/5142 7229 by Marietta Hercules RN  Outcome: Progressing     Problem: Safety - Adult  Goal: Free from fall injury  Flowsheets (Taken 1/28/2023 1142)  Free From Fall Injury:   Instruct family/caregiver on patient safety   Based on caregiver fall risk screen, instruct family/caregiver to ask for assistance with transferring infant if caregiver noted to have fall risk factors     Problem: Discharge Planning  Goal: Discharge to home or other facility with appropriate resources  1/28/2023 1142 by Fifi Ocampo RN  Flowsheets (Taken 1/28/2023 1142)  Discharge to home or other facility with appropriate resources:   Identify discharge learning needs (meds, wound care, etc) Identify barriers to discharge with patient and caregiver  1/28/2023 0647 by Cesar Rajan, RN  Outcome: Not Progressing

## 2023-01-28 NOTE — PROGRESS NOTES
Physical Therapy  Facility/Department: [facility]  Rehabilitation Physical Therapy Treatment Note    NAME: Harpreet Monson  : 1990 (34 y.o.)  MRN: [id number]  CODE STATUS: Full Code    Date of Service: 23       Restrictions:  Restrictions/Precautions: Fall Risk;Surgical Protocols; General Precautions;Cardiac;Modified Diet  Position Activity Restriction  Sternal Precautions: No Pushing; No Pulling;5# Lifting Restrictions  Other position/activity restrictions: R IV, [address] YOUR INCISIONS DAILY WITH A CLEAN WASHCLOTH AND ANTIBACTERIAL SOAP. Do not wash your incisions after you have cleansed other parts of your body; up with assist     SUBJECTIVE  Subjective  Subjective: pt agreeable to PT treatment. Pain: pt denies pain at this time. Post Treatment Pain Screenin-3/10, pt denies need for medication or ice. OBJECTIVE  Cognition  Overall Cognitive Status: Exceptions  Arousal/Alertness: Appropriate responses to stimuli  Following Commands: Follows multistep commands with repitition; Follows multistep commands with increased time  Attention Span: Appears intact  Memory: Decreased recall of precautions;Decreased short term memory  Safety Judgement: Decreased awareness of need for assistance;Decreased awareness of need for safety  Problem Solving: Assistance required to correct errors made;Assistance required to implement solutions;Assistance required to identify errors made  Insights: Decreased awareness of deficits  Initiation: Requires cues for some  Sequencing: Requires cues for some  Cognition Comment: pt equired MAX VC's to maintain sternal precautions. Orientation  Overall Orientation Status: Within Functional Limits  Orientation Level: Oriented X4    Functional Mobility  Bed Mobility  Overall Assistance Level: Maximum Assistance  Additional Factors: Increased time to complete;Verbal cues (HOB 30* per physician orders.   pt requires some VCs to complete bed mobility safely and maintain his sternal precautions.)  Roll Left  Assistance Level: Maximum assistance  Skilled Clinical Factors: pt rolled R/L at beggining of PT treatment to don pants with MAX A and VC's to maintain sternal precautions. Roll Right  Assistance Level: Maximum assistance  Skilled Clinical Factors: pt rolled R/L at beggining of PT treatment to don pants with MAX A and VC's to maintain sternal precautions. Sit to Supine  Assistance Level: Maximum assistance  Skilled Clinical Factors: with max VCs to maintain sternal precautions. Supine to Sit  Skilled Clinical Factors: DONALDO, pt up in chair at end of session. Scooting  Assistance Level: Maximum assistance  Balance  Sitting Balance: Stand by assistance (pt required initial MOD A to maintain sitting balance, but quickly acclimated and demonstrated SBA for seated balance after ~1 minute.)  Standing Balance: Maximum assistance  Transfers  Surface: To chair with arms;From bed  Additional Factors: Increased time to complete;Hand placement cues; Verbal cues; Mat raised  Device:  (STEDY)  Sit to Stand  Assistance Level: Requires x 2 assistance; Moderate assistance;Maximum assistance  Skilled Clinical Factors: MAX A X2 initially, progressing to MOD A X2 with VCs for weight shift, pt requiring VCs for hand placement to maintain sternal precautions and hug heart pillow, requires decreasing assistance with repetition, performed X3 sit<>stand transfers with ~1 minute of standing with each bout. pt notably fatigued after sit<>stand transfers and required seated rest breaks in between bouts, bed raised ~3 inches from lowest position as pt is very tall and bed was very low nad hard to stand up from at its lowest position. Stand to Sit  Assistance Level: Requires x 2 assistance; Moderate assistance;Maximum assistance  Bed To/From Chair  Technique:  (with STEDY.)  Assistance Level: Dependent; Requires x 2 assistance  Skilled Clinical Factors: bed to chair transfer performed with STEDY secondary to pt notable fatigue on today's date. PT Exercises  Exercise Treatment: AP, LAQ, QS, GS, 1X12 each in seated, pt educated on performing exercises from distal to proximal to help with LE edema. ASSESSMENT/PROGRESS TOWARDS GOALS  Vital Signs  Heart Rate: 95  BP: (!) 102/57  MAP (Calculated): 72  SpO2: 95 %  Comment: HR 95 BPM, SPO2 95% on room air, /57 in supine,  BPM, SPO2 97% on room air, /59 in seated after first sit<>stand transfer. Assessment  Assessment: pt seen for the latter half of his treatment in conjunction with OT to maximize pt mobility and safety and in order to progress mobility. pt found in bed, agreeable to PT treatment. pt performed bed mobility including rolling R/L and sup>sit with MAX A to don pants, required education and frequent cues to maintain sternal precautions, assisted pt in changing his shirt once sitting working on sitting balance and endurance. performed seated exercises at EOB to improve strength and endurance and worked on sitting endurnace and maintaining balance unsupported at this time. MOISÉS was brought in and OT arrived at this point, pt required MAX A X2 initially to perform sit<>stand transfers with ANITADY, after education and VCs for anterior weight shift pt able to perform sit<>stand transfers with MOD A X2.  pt performed X3 sit<>stand transfers, required prolonged seated rest breaks in between each transfer secondary to fatigue. pt transferred to chair using MOISÉS, left sitting in chair with lunch tray with call light within reach with instruction to call nursing when he was ready to transfer back to bed. pt continues to demosntrate decreased strength, endurance, mobility, and activity tolerance and ocntinues to require VCs to maintain sternal precautions, although pt able to state sternal precautions when questioned on today's date. pt would continue to benefit from skilled PT to address the above stated deficits.   Activity Tolerance: Patient limited by fatigue;Patient limited by endurance  Discharge Recommendations: 24 hour supervision or assist;Home with Home health PT  PT Equipment Recommendations  Equipment Needed: Yes  Antonieta Toussaint: Rolling (bariatric.)  Other: CTA, likely bariatric RW, possible manual w/c      PLAN OF CARE/SAFETY  Physcial Therapy Plan  General Plan: 5-7 times per week  Therapy Duration: 3 Weeks  Specific Instructions for Next Treatment: Progress mobility as tolerated  Current Treatment Recommendations: Strengthening;ROM;Balance training;Gait training;Home exercise program;Safety education & training;Stair training;Functional mobility training;Neuromuscular re-education;Patient/Caregiver education & training;Transfer training; Therapeutic activities; Endurance training;Equipment evaluation, education, & procurement;Positioning; Wheelchair mobility training;Vestibular rehab  Safety Devices  Type of Devices: Patient at risk for falls; All fall risk precautions in place; Left in bed; All nikolas prominences offloaded;Call light within reach;Nurse notified;Gait belt; Heels elevated for pressure relief;Bed alarm in place  Restraints  Restraints Initially in Place: No    EDUCATION  Education  Education Given To: Patient; Family (pt's spouse present for PT session.)  Education Provided: Role of Therapy;Plan of Care;Precautions; Fall Prevention Strategies;Transfer Training; Safety;ADL Function; Energy Conservation;Mobility Training;Home Exercise Program  Education Provided Comments: Educated on role of PT, schedule and care conferences on ARU, goals and d/c planning and progression of mobility.  Reviewed sternal px and cued to maintain with all mobility  Education Method: Demonstration;Verbal  Barriers to Learning: None  Education Outcome: Verbalized understanding;Continued education needed        Therapy Time   Individual Concurrent Group Co-treatment   Time In 1030     1100   Time Out 1100     1130   Minutes 30     [address], PT, DPT 01/28/23 at 5:43 PM

## 2023-01-28 NOTE — PROGRESS NOTES
[facility]: ACUTE REHAB UNIT  SPEECH-LANGUAGE PATHOLOGY      [x] Daily  [] Weekly Care Conference Note  [] Discharge    [place]      BJO:[birth year]  U:[id number]  Rehab Dx/Hx: Aortic dissection (HCC) [I71.00]    Precautions: falls  Home situation: Spouse and three children  ST Dx: [] Aphasia  [] Dysarthria  [] Apraxia   [] Oropharyngeal dysphagia [] Cognitive Impairment  [] Other:   Date of Admit: 1/26/2023  Room #: 2341/1408-17    Current functional status (updated daily):         Pt being seen for : [] Speech/Language Treatment  [] Dysphagia Treatment [] Cognitive Treatment  [] Other:  Communication: []WFL  [] Aphasia  [] Dysarthria  [] Apraxia  [] Pragmatic Impairment [] Non-verbal  [] Hearing Loss  [x] Other: Hoarse, weak, dysphonic 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   Cognition: [] WFL  [x] Mild  [] Moderate  [] Severe [] Unable to Assess  [] Other:  Memory: [] WFL  [x] Mild  [] Moderate  [] Severe [] Unable to Assess  [] Other:  Behavior: [] Alert  [] Cooperative  []  Pleasant  [] Confused  [] Agitated  [] Uncooperative  [] Distractible [] Motivated  [] Self-Limiting [] Anxious  [] Other:  Endurance:  [] Adequate for participation in SLP sessions  [] Reduced overall  [] Lethargic  [] Other:  Safety: [] No concerns at this time  [] Reduced insight into deficits  []  Reduced safety awareness [] Not following call light procedures  [] Unable to Assess  [] Other:    Current Diet Order:ADULT ORAL NUTRITION SUPPLEMENT; Breakfast, Dinner; Standard High Calorie/High Protein Oral Supplement  ADULT DIET; Regular;  No Added Salt (3-4 gm)  Swallowing Precautions: Sit up for all meals and thereafter for 30 minutes, Eat with small bites (1/2 tsp; 1 tsp), and Alternate solids with liquids        Date: 1/28/2023      Tx session 1  0730-0830 Tx session 2  All needs met in session 1    Total Timed Code Min 0    Total Treatment Minutes 60    Individual Treatment Minutes 60    Group Treatment Minutes 0 0 Co-Treat Minutes 0 0   Variance/Reason:  N/a     Pain Denies     Pain Intervention [] RN notified  [] Repositioned  [] Intervention offered and patient declined  [x] N/A  [] Other: [] RN notified  [] Repositioned  [] Intervention offered and patient declined  [] N/A  [] Other:   Subjective     Pt alert, cooperative and pleasant for tx. Pt seen sitting upright in bed. Objective:  Goals     Short Term Goals  Time Frame for Short Term Goals: 18 Days (02/13/23)    Goal 1: Pt will complete vocal functioning exercises / resonant voice therapy exercises 10/10 given min cues    *Unable to complete lip and tongue trills     Warm-ups at a comfortable pitch: x2   -\"whoo\" as in whoop   -\"oh\" as in \"knoll\"   -hum   -nasal \"eee\"    Stretching- glide from highest pitch to lowest pitch   -\"whoo\" as in whoop   -\"oh\" as in \"knoll\"   -hum     Contraction- glide from lowest pitch to highest pitch   -\"whoo\" as in whoop   -\"oh\" as in \"knoll\"   -hum   -nasal \"eee\"    Chanting: complete on a single pitch   -tawjn-yvu-wvb-sara  -lcvsk-wwr-tdh-sara   -dfntl-ouy-jxh-sara   -jwtuy-gt-jt-my   -ubnfl-wvr-ief-sarah  -mmmmm-may-may-may       Goal 2: Pt will complete respiratory retraining exercises 10/10 given min cues   Diaphragmatic breathing  10x     Controlled exhalation   10x   -Breath in for 3 seconds and out for 6   Increased to in for 4 seconds and out for 8     Controlled Exhalation on S  -So in for 4: 1, 2, 3, 4. Out for 8 (on ssss): 1, 2, 3, 4, 5, 6, 7, 8. Put your hand on  your stomach to feel how it moves out and in. Soft - Loud - Soft on S  -Breathe in for 4, and exhale for 8 on the S sound. But when you exhale, go from  soft to loud to soff, like this ssssSSSSSsssss    Counting 1 - 20  -One, two, three, four, five. TAKE A BREATH. Six, seven, eight, nine,  ten. TAKE A BREATH. Eleven, twelve, thirteen, fourteen, fifteen. TAKE  A BREATH. Sixteen, seventeen, eighteen, nineteen, twenty.         Goal 3: Pt will complete graded recall tasks using compensatory strategies with 90% acc given min cues   Did not target     Goal 4: Pt will complete executive function tasks (e.g. meds, time, money, etc) with 90% acc given min cues   Did not target     Goal 5: Pt will complete higher level critical thinking tasks with 90% acc given min cues   Did not target     Other areas targeted: N/a     Education:   SLP ed re: rationale for vocal functioning exercises, respiratory breathing    Safety Devices: [x] Call light within reach  [] Chair alarm activated  [x] Bed alarm activated  [] Other: [] Call light within reach  [] Chair alarm activated  [] Bed alarm activated  [] Other:    Assessment: Tx session: Pt alert, cooperative and pleasant for tx. Pt seen sitting upright in bed. Pt completed vocal functioning exercises/ resonant voice exercises with min-mod cues. At the beginning of the session during the vocal exercises pt would run out of breath and would only be able to sustain phonation for ~2 secs. When introduced to diaphragmatic breathing pt was education on not speaking on empty lungs. With the respiratory training pt is already showing improvement with long sustained phonation, ~4 secs. Pt is motivated to achieve his voice goals. Cont POC. Plan: Continue as per plan of care. Additional Information:     Barriers toward progress: N/a   Discharge recommendations:  [] Home independently  [x] Home with assistance []  24 hour supervision  [] ECF [] Other:  Continued Tx Upon Discharge: ? [x] Yes [] No [] TBD based on progress while on ARU [] Vital Stim indicated [] Other:   Estimated discharge date: TBD    Interventions used this date:  [x] Speech/Language Treatment  [] Instruction in HEP [] Group [] Dysphagia Treatment [] Cognitive Treatment   [] Other:       Total Time Breakdown / Charges    Time in Time out Total Time / units   Cognitive Tx      Speech Tx 0730 0830 60 min/ 1 unit    Dysphagia Tx          Electronically Signed by Kj BECKMAN-SLP  Clinical Fellow  [facility].[id number]

## 2023-01-28 NOTE — PLAN OF CARE
Problem: Discharge Planning  Goal: Discharge to home or other facility with appropriate resources  Outcome: Not Progressing     Problem: Safety - Medical Restraint  Goal: Remains free of injury from restraints (Restraint for Interference with Medical Device)  Description: INTERVENTIONS:  1. Determine that other, less restrictive measures have been tried or would not be effective before applying the restraint  2. Evaluate the patient's condition at the time of restraint application  3. Inform patient/family regarding the reason for restraint  4.  Q2H: Monitor safety, psychosocial status, comfort, nutrition and hydration  Outcome: Progressing     Problem: Nutrition Deficit:  Goal: Optimize nutritional status  Outcome: Progressing     Problem: Discharge Planning  Goal: Discharge to home or other facility with appropriate resources  Outcome: Not Progressing

## 2023-01-28 NOTE — PROGRESS NOTES
Occupational Therapy  Facility/Department: [facility]  Rehabilitation Occupational Therapy Daily Treatment Note    Date: 23  Patient Name: Omaira Palomino       Room: 7134/8113-21  MRN: [id number]  Account: [de-identified]   : 1990  (27 y.o.) Gender: male                    Past Medical History:  has a past medical history of Influenza A and Marfan syndrome. Past Surgical History:   has a past surgical history that includes Femoral-femoral Bypass Graft (Bilateral, 2023) and Thoracic aortic aneurysm repair (N/A, 1/15/2023). Restrictions  Restrictions/Precautions: Fall Risk;Surgical Protocols; General Precautions;Cardiac;Modified Diet  Other position/activity restrictions: R IV, [facility] YOUR INCISIONS DAILY WITH A CLEAN WASHCLOTH AND ANTIBACTERIAL SOAP. Do not wash your incisions after you have cleansed other parts of your body; up with assist    Subjective  Subjective: Pt presented in bed, pleasant, and agreeable to OT/PT cotx  Restrictions/Precautions: Fall Risk;Surgical Protocols; General Precautions;Cardiac;Modified Diet             Objective     Cognition  Overall Cognitive Status: Exceptions  Arousal/Alertness: Appropriate responses to stimuli  Following Commands: Follows multistep commands with repitition; Follows multistep commands with increased time  Attention Span: Appears intact  Memory: Decreased recall of precautions;Decreased short term memory  Safety Judgement: Decreased awareness of need for assistance;Decreased awareness of need for safety  Problem Solving: Assistance required to correct errors made;Assistance required to implement solutions;Assistance required to identify errors made  Insights: Decreased awareness of deficits  Initiation: Requires cues for some  Sequencing: Requires cues for some  Orientation  Overall Orientation Status: Within Functional Limits  Orientation Level: Oriented X4         ADL  Feeding  Assistance Level:  Independent  Tub/Shower Transfers  Skilled Clinical Factors: pt declined adls          Functional Mobility  Assistance Level: Requires x 2 assistance; Moderate assistance  Skilled Clinical Factors: mod-max A x 2 for mobility  Bridging  Skilled Clinical Factors: seated EOB with PT at start of session  Transfers  Surface: From bed (with STEDY)  Sit to Stand  Skilled Clinical Factors: maximum cues to maintain sternal precuations   OT Exercises  Exercise Treatment: BUE AROM in semi-mix's  A/AROM Exercises: Semi-fowlers, 2 x15 hand flexion/extension, wrist flexion/extension, supination/pronation, flexion/extension, shoulder flexion to 90*     Assessment  Assessment  Assessment: First session: Pt pleasant and agreeable to therapy with cues of encouragement. Co-tx with PT to address transfers and mobility due to sternal precuations/body positioning. Pt required mod-max A x2 for transfers in stedy. Pt required frequent rests breaks and cues for encouragement. Cont with POC. Second session: Pt pleasant and agreeable but reporting increased fatigue. Pt educated on BUE exercises modified to sternal precautions. Pt given handout and educated on the importance of continued activity. Cont with POC. Activity Tolerance: Patient tolerated evaluation without incident;Patient limited by fatigue;Patient limited by endurance  Discharge Recommendations: 24 hour supervision or assist;Home with Home health OT;S Level 1  OT Equipment Recommendations  Equipment Needed: Yes  ADL Assistive Devices: Transfer Tub Bench; Toileting - Heavy Duty Commode  Safety Devices  Safety Devices in place: Yes  Type of devices: Chair alarm in place; Left in chair;Call light within reach;Gait belt;Nurse notified    Patient Education  Education  Education Given To: Patient; Family  Education Provided: Plan of Care;Precautions; Safety; Energy Conservation;Transfer Training;Mobility Training;Family Education  Education Method: Demonstration;Verbal  Education Outcome: Verbalized understanding;Continued education needed    Plan  Occupational Therapy Plan  Times Per Week: 5/7 days/week  Current Treatment Recommendations: ROM;Balance training;Functional mobility training; Endurance training;Patient/Caregiver education & training; Safety education & training;Positioning;Self-Care / ADL; Strengthening;Home management training;Equipment evaluation, education, & procurement    Goals  Patient Goals   Patient goals : \"I want to walk and do things on my own\"  Short Term Goals  Time Frame for Short Term Goals: 1 week- 2/1/23  Short Term Goal 1: Pt will complete functional transfers with max A. Short Term Goal 2: Pt will perform full body bathing with max A. Short Term Goal 3: Pt will complete toileting with max A. Short Term Goal 4: Pt will perform grooming with supervision. Short Term Goal 5: Pt will complete full body dressing with max A. Long Term Goals  Time Frame for Long Term Goals : 3 weeks- 2/15/23  Long Term Goal 1: Pt will perform functional transfers with CGA. Long Term Goal 2: Pt will complete toileting with CGA. Long Term Goal 3: Pt will complete full body dressing with CGA. Long Term Goal 4: Pt will perform full body bathing with CGA. Long Term Goal 5: Pt will complete IADL task with min A.               Therapy Time   Individual Concurrent Group Co-treatment   Time In       1100   Time Out       1130   Minutes       30   Timed Code Treatment Minutes: 30 Minutes       Second Session Therapy Time:   Individual Concurrent Group Co-treatment   Time In 1400     1100   Time Out 1430     1130   Minutes 30     30     Timed Code Treatment Minutes:  30 Minutes    Total Treatment Minutes:   ISMAEL Morris/SUSAN

## 2023-01-29 PROCEDURE — 6370000000 HC RX 637 (ALT 250 FOR IP): Performed by: STUDENT IN AN ORGANIZED HEALTH CARE EDUCATION/TRAINING PROGRAM

## 2023-01-29 PROCEDURE — 6360000002 HC RX W HCPCS: Performed by: STUDENT IN AN ORGANIZED HEALTH CARE EDUCATION/TRAINING PROGRAM

## 2023-01-29 PROCEDURE — 1280000000 HC REHAB R&B

## 2023-01-29 RX ADMIN — MAGNESIUM OXIDE TAB 400 MG (240 MG ELEMENTAL MG) 400 MG: 400 (240 MG) TAB at 21:04

## 2023-01-29 RX ADMIN — FUROSEMIDE 40 MG: 40 TABLET ORAL at 17:11

## 2023-01-29 RX ADMIN — ACETAMINOPHEN 1000 MG: 500 TABLET ORAL at 13:05

## 2023-01-29 RX ADMIN — ACETAMINOPHEN 1000 MG: 500 TABLET ORAL at 21:04

## 2023-01-29 RX ADMIN — METOPROLOL TARTRATE 50 MG: 50 TABLET, FILM COATED ORAL at 21:04

## 2023-01-29 RX ADMIN — FUROSEMIDE 40 MG: 40 TABLET ORAL at 08:50

## 2023-01-29 RX ADMIN — Medication 5 MG: at 21:04

## 2023-01-29 RX ADMIN — BISACODYL 5 MG: 5 TABLET, COATED ORAL at 08:50

## 2023-01-29 RX ADMIN — ATORVASTATIN CALCIUM 40 MG: 40 TABLET, FILM COATED ORAL at 21:04

## 2023-01-29 RX ADMIN — ACETAMINOPHEN 1000 MG: 500 TABLET ORAL at 05:56

## 2023-01-29 RX ADMIN — FAMOTIDINE 20 MG: 20 TABLET, FILM COATED ORAL at 08:50

## 2023-01-29 RX ADMIN — POTASSIUM CHLORIDE 20 MEQ: 1500 TABLET, EXTENDED RELEASE ORAL at 08:50

## 2023-01-29 RX ADMIN — METOPROLOL TARTRATE 50 MG: 50 TABLET, FILM COATED ORAL at 08:50

## 2023-01-29 RX ADMIN — ASPIRIN 81 MG: 81 TABLET, COATED ORAL at 08:49

## 2023-01-29 RX ADMIN — POTASSIUM CHLORIDE 20 MEQ: 1500 TABLET, EXTENDED RELEASE ORAL at 17:11

## 2023-01-29 RX ADMIN — GABAPENTIN 100 MG: 100 CAPSULE ORAL at 08:50

## 2023-01-29 RX ADMIN — GABAPENTIN 100 MG: 100 CAPSULE ORAL at 13:05

## 2023-01-29 RX ADMIN — GABAPENTIN 100 MG: 100 CAPSULE ORAL at 21:04

## 2023-01-29 RX ADMIN — ENOXAPARIN SODIUM 40 MG: 100 INJECTION SUBCUTANEOUS at 08:49

## 2023-01-29 RX ADMIN — MAGNESIUM OXIDE TAB 400 MG (240 MG ELEMENTAL MG) 400 MG: 400 (240 MG) TAB at 08:49

## 2023-01-29 RX ADMIN — FAMOTIDINE 20 MG: 20 TABLET, FILM COATED ORAL at 21:03

## 2023-01-29 RX ADMIN — OXYCODONE HYDROCHLORIDE 5 MG: 5 TABLET ORAL at 21:04

## 2023-01-29 RX ADMIN — TRAZODONE HYDROCHLORIDE 50 MG: 50 TABLET ORAL at 21:04

## 2023-01-29 RX ADMIN — POTASSIUM CHLORIDE 20 MEQ: 1500 TABLET, EXTENDED RELEASE ORAL at 13:06

## 2023-01-29 ASSESSMENT — PAIN SCALES - GENERAL
PAINLEVEL_OUTOF10: 0
PAINLEVEL_OUTOF10: 4
PAINLEVEL_OUTOF10: 0

## 2023-01-29 ASSESSMENT — PAIN DESCRIPTION - LOCATION: LOCATION: LEG

## 2023-01-29 ASSESSMENT — PAIN DESCRIPTION - ORIENTATION: ORIENTATION: LEFT

## 2023-01-29 NOTE — PLAN OF CARE

## 2023-01-29 NOTE — PROGRESS NOTES
Writer received handoff from Jono Milligan RN at this time.  Electronically signed by Griselda Velazquez RN on 1/29/2023 at 3:55 PM

## 2023-01-30 LAB
A/G RATIO: 0.9 (ref 1.1–2.2)
ALBUMIN SERPL-MCNC: 2.9 G/DL (ref 3.4–5)
ALP BLD-CCNC: 93 U/L (ref 40–129)
ALT SERPL-CCNC: 117 U/L (ref 10–40)
ANION GAP SERPL CALCULATED.3IONS-SCNC: 10 MMOL/L (ref 3–16)
AST SERPL-CCNC: 53 U/L (ref 15–37)
BASOPHILS ABSOLUTE: 0.2 K/UL (ref 0–0.2)
BASOPHILS RELATIVE PERCENT: 1.4 %
BILIRUB SERPL-MCNC: 0.4 MG/DL (ref 0–1)
BUN BLDV-MCNC: 21 MG/DL (ref 7–20)
CALCIUM SERPL-MCNC: 8.6 MG/DL (ref 8.3–10.6)
CHLORIDE BLD-SCNC: 102 MMOL/L (ref 99–110)
CO2: 24 MMOL/L (ref 21–32)
CREAT SERPL-MCNC: 0.7 MG/DL (ref 0.9–1.3)
EOSINOPHILS ABSOLUTE: 0.5 K/UL (ref 0–0.6)
EOSINOPHILS RELATIVE PERCENT: 3.1 %
GFR SERPL CREATININE-BSD FRML MDRD: >60 ML/MIN/{1.73_M2}
GLUCOSE BLD-MCNC: 94 MG/DL (ref 70–99)
HCT VFR BLD CALC: 31.7 % (ref 40.5–52.5)
HEMOGLOBIN: 10.1 G/DL (ref 13.5–17.5)
LYMPHOCYTES ABSOLUTE: 2.6 K/UL (ref 1–5.1)
LYMPHOCYTES RELATIVE PERCENT: 16.6 %
MAGNESIUM: 2.5 MG/DL (ref 1.8–2.4)
MCH RBC QN AUTO: 26 PG (ref 26–34)
MCHC RBC AUTO-ENTMCNC: 31.7 G/DL (ref 31–36)
MCV RBC AUTO: 81.8 FL (ref 80–100)
MONOCYTES ABSOLUTE: 1.4 K/UL (ref 0–1.3)
MONOCYTES RELATIVE PERCENT: 8.9 %
NEUTROPHILS ABSOLUTE: 10.9 K/UL (ref 1.7–7.7)
NEUTROPHILS RELATIVE PERCENT: 70 %
PDW BLD-RTO: 15.1 % (ref 12.4–15.4)
PLATELET # BLD: 600 K/UL (ref 135–450)
PMV BLD AUTO: 6.9 FL (ref 5–10.5)
POTASSIUM REFLEX MAGNESIUM: 4.9 MMOL/L (ref 3.5–5.1)
RBC # BLD: 3.88 M/UL (ref 4.2–5.9)
SODIUM BLD-SCNC: 136 MMOL/L (ref 136–145)
TOTAL PROTEIN: 6.2 G/DL (ref 6.4–8.2)
WBC # BLD: 15.6 K/UL (ref 4–11)

## 2023-01-30 PROCEDURE — 1280000000 HC REHAB R&B

## 2023-01-30 PROCEDURE — 97530 THERAPEUTIC ACTIVITIES: CPT

## 2023-01-30 PROCEDURE — 6370000000 HC RX 637 (ALT 250 FOR IP): Performed by: STUDENT IN AN ORGANIZED HEALTH CARE EDUCATION/TRAINING PROGRAM

## 2023-01-30 PROCEDURE — 36415 COLL VENOUS BLD VENIPUNCTURE: CPT

## 2023-01-30 PROCEDURE — 85025 COMPLETE CBC W/AUTO DIFF WBC: CPT

## 2023-01-30 PROCEDURE — 83735 ASSAY OF MAGNESIUM: CPT

## 2023-01-30 PROCEDURE — 6360000002 HC RX W HCPCS: Performed by: STUDENT IN AN ORGANIZED HEALTH CARE EDUCATION/TRAINING PROGRAM

## 2023-01-30 PROCEDURE — 80053 COMPREHEN METABOLIC PANEL: CPT

## 2023-01-30 PROCEDURE — 97116 GAIT TRAINING THERAPY: CPT

## 2023-01-30 PROCEDURE — 97542 WHEELCHAIR MNGMENT TRAINING: CPT

## 2023-01-30 PROCEDURE — 92507 TX SP LANG VOICE COMM INDIV: CPT

## 2023-01-30 PROCEDURE — 97535 SELF CARE MNGMENT TRAINING: CPT

## 2023-01-30 PROCEDURE — 97129 THER IVNTJ 1ST 15 MIN: CPT

## 2023-01-30 RX ORDER — LANOLIN ALCOHOL/MO/W.PET/CERES
400 CREAM (GRAM) TOPICAL DAILY
Status: DISCONTINUED | OUTPATIENT
Start: 2023-01-31 | End: 2023-02-01

## 2023-01-30 RX ORDER — POTASSIUM CHLORIDE 20 MEQ/1
20 TABLET, EXTENDED RELEASE ORAL DAILY
Status: DISCONTINUED | OUTPATIENT
Start: 2023-01-31 | End: 2023-02-01

## 2023-01-30 RX ADMIN — GABAPENTIN 100 MG: 100 CAPSULE ORAL at 14:24

## 2023-01-30 RX ADMIN — METOPROLOL TARTRATE 50 MG: 50 TABLET, FILM COATED ORAL at 20:26

## 2023-01-30 RX ADMIN — FAMOTIDINE 20 MG: 20 TABLET, FILM COATED ORAL at 20:26

## 2023-01-30 RX ADMIN — GABAPENTIN 100 MG: 100 CAPSULE ORAL at 20:26

## 2023-01-30 RX ADMIN — GABAPENTIN 100 MG: 100 CAPSULE ORAL at 08:45

## 2023-01-30 RX ADMIN — METOPROLOL TARTRATE 50 MG: 50 TABLET, FILM COATED ORAL at 08:46

## 2023-01-30 RX ADMIN — FAMOTIDINE 20 MG: 20 TABLET, FILM COATED ORAL at 08:45

## 2023-01-30 RX ADMIN — ACETAMINOPHEN 1000 MG: 500 TABLET ORAL at 05:51

## 2023-01-30 RX ADMIN — ENOXAPARIN SODIUM 40 MG: 100 INJECTION SUBCUTANEOUS at 08:46

## 2023-01-30 RX ADMIN — FUROSEMIDE 40 MG: 40 TABLET ORAL at 08:47

## 2023-01-30 RX ADMIN — OXYCODONE HYDROCHLORIDE 5 MG: 5 TABLET ORAL at 05:52

## 2023-01-30 RX ADMIN — Medication 5 MG: at 20:26

## 2023-01-30 RX ADMIN — BISACODYL 5 MG: 5 TABLET, COATED ORAL at 08:46

## 2023-01-30 RX ADMIN — ACETAMINOPHEN 1000 MG: 500 TABLET ORAL at 20:26

## 2023-01-30 RX ADMIN — ACETAMINOPHEN 1000 MG: 500 TABLET ORAL at 14:24

## 2023-01-30 RX ADMIN — ATORVASTATIN CALCIUM 40 MG: 40 TABLET, FILM COATED ORAL at 20:26

## 2023-01-30 RX ADMIN — POLYETHYLENE GLYCOL 3350 17 G: 17 POWDER, FOR SOLUTION ORAL at 08:46

## 2023-01-30 RX ADMIN — ASPIRIN 81 MG: 81 TABLET, COATED ORAL at 08:45

## 2023-01-30 ASSESSMENT — PAIN DESCRIPTION - PAIN TYPE: TYPE: ACUTE PAIN;SURGICAL PAIN

## 2023-01-30 ASSESSMENT — PAIN SCALES - GENERAL: PAINLEVEL_OUTOF10: 4

## 2023-01-30 NOTE — PLAN OF CARE
Problem: Nutrition Deficit:  Goal: Optimize nutritional status  Outcome: Progressing     Problem: Skin/Tissue Integrity  Goal: Absence of new skin breakdown  Description: 1.   Monitor for areas of redness and/or skin breakdown   Outcome: Progressing       Problem: Safety - Adult  Goal: Free from fall injury  Outcome: Progressing

## 2023-01-30 NOTE — PROGRESS NOTES
[facility]: ACUTE REHAB UNIT  SPEECH-LANGUAGE PATHOLOGY      [x] Daily  [] Weekly Care Conference Note  [] Discharge    [place]      RML:1/11/[birth year]79  HGD:[id number]  Rehab Dx/Hx: Aortic dissection (HCC) [I71.00]    Precautions: falls  Home situation: Spouse and three children. Works full-time. Wasn't taking meds prior to admission, shares finances with wife. ST Dx: [] Aphasia  [] Dysarthria  [] Apraxia   [] Oropharyngeal dysphagia [x] Cognitive Impairment  [x] Other: voice tx  Date of Admit: 1/26/2023  Room #: 6218/0634-21    Current functional status (updated daily):         Pt being seen for : [x] Speech/Language Treatment  [] Dysphagia Treatment [x] Cognitive Treatment  [] Other:  Communication: []WFL  [] Aphasia  [] Dysarthria  [] Apraxia  [] Pragmatic Impairment [] Non-verbal  [] Hearing Loss  [x] Other: Hoarse, weak, dysphonic 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   Cognition: [] WFL  [x] Mild  [] Moderate  [] Severe [] Unable to Assess  [] Other:  Memory: [] WFL  [x] Mild  [] Moderate  [] Severe [] Unable to Assess  [] Other:  Behavior: [x] Alert  [x] Cooperative  [x]  Pleasant  [] Confused  [] Agitated  [] Uncooperative  [] Distractible [] Motivated  [] Self-Limiting [] Anxious  [] Other:  Endurance:  [x] Adequate for participation in SLP sessions  [] Reduced overall  [] Lethargic  [] Other:  Safety: [x] No concerns at this time  [] Reduced insight into deficits  []  Reduced safety awareness [] Not following call light procedures  [] Unable to Assess  [] Other:    Current Diet Order:ADULT ORAL NUTRITION SUPPLEMENT; Breakfast, Dinner; Standard High Calorie/High Protein Oral Supplement  ADULT DIET; Regular;  No Added Salt (3-4 gm)  Swallowing Precautions: Sit up for all meals and thereafter for 30 minutes, Eat with small bites (1/2 tsp; 1 tsp), and Alternate solids with liquids        Date: 1/30/2023      Tx session 1  0930 - 1030 Tx session 2  All needs met in session 1    Total Timed Code Min 15    Total Treatment Minutes 60    Individual Treatment Minutes 60    Group Treatment Minutes 0 0   Co-Treat Minutes 0 0   Variance/Reason:  N/a     Pain Buttocks discomfort      Pain Intervention [x] RN notified  [x] Repositioned  [] Intervention offered and patient declined  [] N/A  [x] Other: transferred from w/c to bedside chair  [] RN notified  [] Repositioned  [] Intervention offered and patient declined  [] N/A  [] Other:   Subjective     Pt alert, cooperative and pleasant for tx. Pt seen sitting upright in w/c initially, then transferred to bedside chair. Pt's wife arrived long term through session. Objective:  Goals     Short Term Goals  Time Frame for Short Term Goals: 18 Days (23)    Goal 1: Pt will complete vocal functioning exercises / resonant voice therapy exercises 10/10 given min cues    Warm-ups at a comfortable pitch:   -\"whoo\" as in whoop: 10x  -\"oh\" as in \"knoll\": 5x  -hum: 5x  -\"eee\": 5x    Stretching- glide from lowest to highest:  -\"whoop\" - 5x  -\"oh\" as in \"knoll\": -5x  -hum  5x    *difficult for pt to go high  .   Contraction- glide from highest pitch to lowest pitch   -\"whoop\" - 5x  -\"oh\" as in \"knoll\": -5x  -hum  5x    *difficult for pt to go high    Chanting: complete on a single pitch   -igqdw-vbd-dyn-sara  -pfytv-hla-afn-sara   -xhqnr-mba-yvi-sara   -vvax-ts-cw-my   -gzxyd-scz-qaj-sarah  -mmmay-may-may-may     Resonant Voice Therapy:  -hold out /mmm/ and say: meet me Monday, meet me Monday [place]      Goal 2: Pt will complete respiratory retraining exercises 10/10 given min cues   Pt completed the following exercises given min-mod cues:  -Diaphragmatic breathing: 10x   -breathe in and out through straw: 10x  -breathe in nose and out straw: 10x  -Sniff x2 through nose, then breathe out through straw: 5x  -exhale while sustaining \"Shhh\": x10  -exhale while sustaining \"f\": x10  -exhale while sustaining \"z\": x5        Goal 3: Pt will complete graded recall tasks using compensatory strategies with 90% acc given min cues   Sorting and Remembering Categories  -pt given 12 items that could be sorted into 3 groups  -edu re: recall strategies - repetition, association, grouping/chunking  -immediate recall: pt recalled 10/12 items indep; +2 given min cues  -10 min delay: pt recalled 10/12 items indep; +2 given min cues      Goal 4: Pt will complete executive function tasks (e.g. meds, time, money, etc) with 90% acc given min cues   Goal not directly targeted. Goal 5: Pt will complete higher level critical thinking tasks with 90% acc given min cues   Goal not directly targeted. Other areas targeted: N/a     Education:   SLP edu pt re: vocal hygiene, rationale of voice exercises, recall strategies     Safety Devices: [x] Call light within reach  [x] Chair alarm activated  [] Bed alarm activated  [x] Other: wife present  [] Call light within reach  [] Chair alarm activated  [] Bed alarm activated  [] Other:    Assessment: Pt alert and cooperative, agreeable to tx. Pt completed a variety of vocal function exercises and respiratory retraining exercises. Pt with reduced endurance, requiring frequent breaks and fewer reps of some exercises. Pt with increased difficulty with higher pitch. Edu to pt re: vocal hygiene exercises. Edu re: recall strategies - repetition, association, grouping/chunking. Use of these strategies improved pt's ability to recall 10/12 items indep (immediately and following a 10 min delay), and then benefiting from min cues for final 2 items. Pt is motivated to improve. Continue goals above. Plan: Continue as per plan of care.       Additional Information:     Barriers toward progress: N/a   Discharge recommendations:  [] Home independently  [x] Home with assistance []  24 hour supervision  [] ECF [] Other:  Continued Tx Upon Discharge: ? [x] Yes [] No [] TBD based on progress while on ARU [] Vital Stim indicated [] Other:   Estimated discharge date: TBD    Interventions used this date:  [x] Speech/Language Treatment  [] Instruction in HEP [] Group [] Dysphagia Treatment [] Cognitive Treatment   [] Other:       Total Time Breakdown / Charges    Time in Time out Total Time / units   Cognitive Tx 0930 0945 15 min / 1 unit   Speech Tx 0945 1030 45 min / 1 unit   Dysphagia Tx -- -- --       Electronically Signed by     Sarahy Matthew MA CCC-SLP #[id number]  Speech Language Pathologist

## 2023-01-30 NOTE — PROGRESS NOTES
Occupational Therapy  Facility/Department: [facility]  Rehabilitation Occupational Therapy Daily Treatment Note    Date: 23  Patient Name: Javi Reynoso       Room: [facility]/3152-60  MRN: [id number]  Account: [de-identified]   : 1990  (27 y.o.) Gender: male                    Past Medical History:  has a past medical history of Influenza A and Marfan syndrome. Past Surgical History:   has a past surgical history that includes Femoral-femoral Bypass Graft (Bilateral, 2023) and Thoracic aortic aneurysm repair (N/A, 1/15/2023). Restrictions  Restrictions/Precautions: Fall Risk;Surgical Protocols; General Precautions;Cardiac;Modified Diet  Other position/activity restrictions: R IV, [address] YOUR INCISIONS DAILY WITH A CLEAN WASHCLOTH AND ANTIBACTERIAL SOAP. Do not wash your incisions after you have cleansed other parts of your body; up with assist    Subjective  Subjective: Pt in recliner, pleasant, no pain, agreeable to OT session. BP 94/57,  ,O2 sats 99% on RA. Restrictions/Precautions: Fall Risk;Surgical Protocols; General Precautions;Cardiac;Modified Diet             Objective     Cognition  Overall Cognitive Status: Exceptions  Arousal/Alertness: Appropriate responses to stimuli  Following Commands: Follows multistep commands with repitition; Follows multistep commands with increased time  Attention Span: Appears intact  Memory: Decreased recall of precautions;Decreased short term memory  Safety Judgement: Decreased awareness of need for assistance;Decreased awareness of need for safety  Problem Solving: Assistance required to correct errors made;Assistance required to implement solutions;Assistance required to identify errors made  Insights: Decreased awareness of deficits  Initiation: Requires cues for some  Sequencing: Requires cues for some  Orientation  Overall Orientation Status: Within Functional Limits         ADL  Grooming/Oral Hygiene  Assistance Level: Supervision  Skilled Clinical Factors: to wash hands while seated at sink  Lower Extremity Dressing  Assistance Level: Maximum assistance  Skilled Clinical Factors: use of reacher to assist in threading BLEs into brief/shorts max A to thread into clothing, assist to pull over hips fully in back in stance, mod/max A to stand  Toileting  Assistance Level: Maximum assistance  Skilled Clinical Factors: able to partially complete bowel hygiene in stance, managed clothing down without assistance, assist to manage fully up in back  Toilet Transfers  Technique: Stand pivot  Equipment: Beside commode  Additional Factors: Set-up; Verbal cues;Cues for hand placement; Increased time to complete  Assistance Level: Maximum assistance          Functional Mobility  Assistance Level: Dependent  Skilled Clinical Factors: mod A + min A with RW for mobility up to 2:50, fading to min A of 1  Bed Mobility  Overall Assistance Level: Dependent  Sit to Supine  Assistance Level: Dependent  Skilled Clinical Factors: assist for trunk and BLEs  Transfers  Surface: To bed;Bedside commode; Wheelchair; To chair with arms;From chair with arms  Additional Factors: Set-up; Hand placement cues; Verbal cues; Increased time to complete  Device: Walker  Sit to Stand  Assistance Level: Maximum assistance  Skilled Clinical Factors: improving to mod A  Stand to Sit  Assistance Level: Moderate assistance  Bed To/From Chair  Technique: Stand pivot  Assistance Level: Maximum assistance         Assessment  Assessment  Assessment: First Session: Pt agreeable to OT session. Pt demonstrated improved standing balance this date with mod/max A in stance to RW and max A for sit<>stands. Pt required max A for LB dressing and toileting and fair to poor use of reacher. Pt requested to stay in w/c at end of session. RN present and ok with pt being in w/c. Pt educated on need to call for assistance prior to getting out of w/c. Pt verbalized understanding.    Second Session: Pt seen for co-tx due to poor standing tolerance, increased assist level needed, and anxiety with standing. Pt demostrated improved tolerance to ambulate short distance for 1:15 with min A of 2. Pt completed forward, sidesteps, and backward steps with min A of 2 for 2:20 and 2:15 with pt reporting fatigued LEs but minimal SOB. HR elevating to 127/128 after each stand but recovering to ~115 with 1-2 minutes of seated rest break. Pt demonstrated fair to poor ability to toss to target and cues for proper use of RW. Pt demonstrated improved tolerance to locate 6/6 cones in hallway and name food items of certain colors. Pt completed mobility for 2:50 and 100 feet with CGA+ min A. Pt demonstrated much improved tolerance this date compared to previous dates. Continue POC. Activity Tolerance: Patient limited by fatigue;Patient limited by endurance; Patient tolerated treatment well  Discharge Recommendations: 24 hour supervision or assist;Home with Home health OT;S Level 1  OT Equipment Recommendations  Equipment Needed: Yes  Mobility Devices: ADL Assistive Devices  ADL Assistive Devices: Transfer Tub Bench; Toileting - Heavy Duty Commode  Safety Devices  Safety Devices in place: Yes  Type of devices: Chair alarm in place; Left in chair;Call light within reach;Gait belt;Nurse notified    Patient Education  Education  Education Given To: Patient  Education Provided: Plan of Care;Precautions; Safety; Energy Conservation;Transfer Training;Mobility Training;Family Education;Role of Therapy; Fall Prevention Strategies;DME/Home Modifications; Equipment;ADL Function  Education Provided Comments: role of OT, safety with transfers, ADL technique with sternal precautions  Education Method: Demonstration;Verbal  Barriers to Learning: Cognition  Education Outcome: Verbalized understanding;Continued education needed    Plan  Occupational Therapy Plan  Times Per Week: 5/7 days/week  Current Treatment Recommendations: ROM;Balance training;Functional mobility training; Endurance training;Patient/Caregiver education & training; Safety education & training;Positioning;Self-Care / ADL; Strengthening;Home management training;Equipment evaluation, education, & procurement    Goals  Short Term Goals  Time Frame for Short Term Goals: 1 week- 2/1/23  Short Term Goal 1: Pt will complete functional transfers with max A. Short Term Goal 2: Pt will perform full body bathing with max A. Short Term Goal 3: Pt will complete toileting with max A. GOAL MET 1/30/23 Pt completed toileting with max A. Short Term Goal 4: Pt will perform grooming with supervision. Short Term Goal 5: Pt will complete full body dressing with max A. Long Term Goals  Time Frame for Long Term Goals : 3 weeks- 2/15/23  Long Term Goal 1: Pt will perform functional transfers with CGA. Long Term Goal 2: Pt will complete toileting with CGA. Long Term Goal 3: Pt will complete full body dressing with CGA. Long Term Goal 4: Pt will perform full body bathing with CGA. Long Term Goal 5: Pt will complete IADL task with min A.     Therapy Time   Individual Concurrent Group Co-treatment   Time In 0830     1300   Time Out 0900     1330   Minutes 30     30   Timed Code Treatment Minutes: [address]

## 2023-01-30 NOTE — PROGRESS NOTES
Physical Therapy  Facility/Department: [facility]  Rehabilitation Physical Therapy Treatment Note    NAME: Karyn Blake  : 1990 (34 y.o.)  MRN: [id number]  CODE STATUS: Full Code    Date of Service: 23       Restrictions:  Restrictions/Precautions: Fall Risk;Surgical Protocols; General Precautions;Cardiac;Modified Diet  Position Activity Restriction  Sternal Precautions: No Pushing; No Pulling;5# Lifting Restrictions  Other position/activity restrictions: R IV, [facility] YOUR INCISIONS DAILY WITH A CLEAN WASHCLOTH AND ANTIBACTERIAL SOAP. Do not wash your incisions after you have cleansed other parts of your body; up with assist     SUBJECTIVE  Subjective  Subjective: Pt seated in recliner on approach, agreeable to PT tx, reports improving pain in LE  Pain: Pt denies pain at start of session, does report pain in groin with mobility, does not provide a pain rating               OBJECTIVE  Cognition  Overall Cognitive Status: Exceptions  Arousal/Alertness: Appropriate responses to stimuli  Following Commands: Follows multistep commands with repitition; Follows multistep commands with increased time  Attention Span: Appears intact  Memory: Decreased recall of precautions;Decreased short term memory  Safety Judgement: Decreased awareness of need for assistance;Decreased awareness of need for safety  Problem Solving: Assistance required to correct errors made;Assistance required to implement solutions;Assistance required to identify errors made  Insights: Decreased awareness of deficits  Initiation: Requires cues for some  Sequencing: Requires cues for some  Orientation  Overall Orientation Status: Within Functional Limits    Functional Mobility  Sit to Supine  Assistance Level: Dependent  Skilled Clinical Factors: HOB flat, assist at trunk and LE, increased time to complete, cues to maintain sternal px    Balance  Sitting Balance: Stand by assistance  Standing Balance: Minimal assistance  Standing Balance  Activity: Grossly Kostas with RW  Dynamic balance with RW, Kostas x 2, completed x 2 bouts with seated rest break between bouts   4 corner gait task with RW    Gait x 6' forwards    Gait x 6' L lateral stepping    Gait x 6' retro walking    Gait x 6' R lateral stepping  Cues for sequence/technique, increased time to complete. Completed to improve strength, dynamic balance and improve safety and I with functional mobility and gait. Transfers  Surface: From chair with arms;From mat; Wheelchair  Additional Factors: Increased time to complete;Hand placement cues; Verbal cues; Mat raised  Device: Walker (RW or beti RW)  Sit to Stand  Assistance Level: Minimal assistance; Moderate assistance; Requires x 2 assistance  Skilled Clinical Factors: Pt completes multiple t/f from EOM and w/c with variable Kostas x 2 to Kostas/modA x 2, cues for anterior WS, positioning and maintaining sternal px  Stand to Sit  Assistance Level: Minimal assistance; Moderate assistance; Requires x 2 assistance  Skilled Clinical Factors: Pt completes multiple t/f from EOM and w/c with variable Kostas x 2 to Kostas/modA x 2, cues for anterior WS, positioning and maintaining sternal px  Bed To/From Chair  Technique: Stand pivot  Assistance Level: Minimal assistance; Moderate assistance; Requires x 2 assistance  Skilled Clinical Factors: Kostas/modA x 2 without AD  Stand Pivot  Assistance Level: Minimal assistance; Moderate assistance; Requires x 2 assistance  Skilled Clinical Factors: Kostas/modA x 2 with RW, cues for hand placement and sternal px      Environmental Mobility  Ambulation  Surface: Level surface  Device: Rolling walker (beti RW)  Distance: 27' + 100'  Activity: Within Room; Within Unit  Activity Comments: Distances limited by fatigue and SOB, seated rest break between bouts, encouragement to increase distances. HR elevated to 120-130 with gait activities  Additional Factors: Verbal cues; Increased time to complete  Assistance Level: Minimal assistance; Requires x 2 assistance;Contact guard assist  Gait Deviations: Slow julita;Decreased step length bilateral;Unsteady gait; Decreased heel strike right;Decreased heel strike left;Decreased weight shift left; Wide base of support  Skilled Clinical Factors: Step to pattern, B decreased toe clearance, forward flexed trunk. Mildly unsteady, no overt LOB. Cues for promotion of reciprocal pattern and upright posture. Wheelchair  Surface: Level surface  Device: Standard wheelchair;Standard Cushion  Additional Factors: Increased time to complete  Assistance Required to Manage Parts: Brakes  Assistance Level for Propulsion: Stand by assist  Propulsion Method: Bilateral lower extremities  Propulsion Quality: Slow velocity; Decreased fluidity  Propulsion Distance: 100'  Skilled Clinical Factors: Increased time to complete, pt navigates through doorway                    ASSESSMENT/PROGRESS TOWARDS GOALS  Vital Signs  Heart Rate: (!) 103  Heart Rate Source: Monitor  BP: (!) 94/55  BP Location: Left upper arm  MAP (Calculated): 68  SpO2: 95 %  O2 Device: None (Room air)    Assessment  Assessment: Pt seen initially for 30 minute individual session with focus on functional t/f and strength. Pt then seen for 30 minute co-tx with OT secondary to complexity of condition, decreased strength, balance and activity tolerance. Pt benefits from x 2 skilled hands to provide increased cues and feedback with mobility to improve safety and I. Co-tx session focused on progression of gait and dynamic balance with gait activities. Pt demonstrates good progress with all mobility and activity tolerance. Pt is able to tolerate gait training this date. Pt demosntrates bed mobility with TD, t/f with RW and variable Kostas x 2 to Kostas/modA x 2, gait up to 100' with RW and Kostas + CGA. Pt is limted by pain, decreased strength, balance and activity tolerance. Pt requires intermittent rest breaks with activity.  Pt will benefit from continued skilled PT in ARU to address above deficits, will continue to progress mobility as tolerated. Activity Tolerance: Patient limited by fatigue;Patient limited by endurance; Patient tolerated treatment well  Discharge Recommendations: 24 hour supervision or assist;Home with Home health PT  PT Equipment Recommendations  Equipment Needed: Yes  Mobility Devices: [place]: Rolling (Bariatric RW)  Other: CTA, likely bariatric RW, possible manual w/c    Goals  Patient Goals   Patient Goals : \"Be able to walk\"  Short Term Goals  Time Frame for Short Term Goals: 10 days 2/05/23  Short Term Goal 1: Pt will complete bed mobility with modA  Short Term Goal 2: Pt will complete sit to/from stand with modA  Short Term Goal 3: Pt will complete bed <> chair with LRAD with modA  Short Term Goal 4: Pt will demonstrate gait x 10' in // bars with modA without LOB -- GOAL MET x 100' with beti RW CGA + Kostas  Short Term Goal 5: Pt will tolerate assessment of steps and appropriate goal to be set  Long Term Goals  Time Frame for Long Term Goals : 3 weeks 2/16/23  Long Term Goal 1: Pt will demonstrate bed mobility with MI  Long Term Goal 2: Pt will complete functional t/f with LRAD with SBA  Long Term Goal 3: Pt will ambulate >50' with LRAD with SBA without LOB  Long Term Goal 4: Pt will demonstrate car t/f with LRAD with [address]  Physcial Therapy Plan  General Plan: 5-7 times per week  Therapy Duration: 3 Weeks  Specific Instructions for Next Treatment: Progress mobility as tolerated  Current Treatment Recommendations: Strengthening;ROM;Balance training;Gait training;Home exercise program;Safety education & training;Stair training;Functional mobility training;Neuromuscular re-education;Patient/Caregiver education & training;Transfer training; Therapeutic activities; Endurance training;Equipment evaluation, education, & procurement;Positioning; Wheelchair mobility training;Vestibular rehab  Safety Devices  Type of Devices: Patient at risk for falls; All fall risk precautions in place; Left in bed; All nikolas prominences offloaded;Call light within reach;Nurse notified;Gait belt; Heels elevated for pressure relief;Bed alarm in place    EDUCATION  Education  Education Given To: Patient  Education Provided: Role of Therapy;Plan of Care;Precautions; Fall Prevention Strategies;Transfer Training; Safety;ADL Function; Energy Conservation;Mobility Training;Home Exercise Program  Education Provided Comments: Educated on role of PT, safe progression of activity, monitoring vitals with mobility.   Education Method: Demonstration;Verbal  Barriers to Learning: None  Education Outcome: Verbalized understanding;Continued education needed        Therapy Time   Individual Concurrent Group Co-treatment   Time In 1230     1300   Time Out 1300     1330   Minutes 30     30     Timed Code Treatment Minutes: [facility] 01/30/23 at 3:20 PM

## 2023-01-30 NOTE — PROGRESS NOTES
Radha Menard  1/30/2023  [id number]    Chief Complaint: Aortic dissection (HCC)    Subjective:   No acute events overnight. Today Tyrone Clark is seen with nursing and his wife present. He reports improvement in the swelling in his left leg. He also reports improving strength. Vascular inspected bilateral groin incisions. ROS: denies f/c, n/v, cp, sob    Objective:  Patient Vitals for the past 24 hrs:   BP Temp Temp src Pulse Resp SpO2 Weight   01/30/23 1230 (!) 94/55 -- -- (!) 103 -- 95 % --   01/30/23 0750 103/64 98.8 °F (37.1 °C) Oral (!) 112 18 94 % --   01/30/23 0600 -- -- -- -- -- -- (!) 380 lb 4.7 oz (172.5 kg)   01/29/23 1945 (!) 97/58 99.3 °F (37.4 °C) Oral (!) 115 18 96 % --     Gen: No distress, pleasant. HEENT: Normocephalic, atraumatic. CV: Regular rate and rhythm. Resp: No respiratory distress. Lungs CTAB  Abd: Soft, nondistended  Ext: No edema. Neuro: Alert, oriented, appropriately interactive. Wt Readings from Last 3 Encounters:   01/30/23 (!) 380 lb 4.7 oz (172.5 kg)   01/25/23 279 lb 9 oz (126.8 kg)   03/12/20 (!) 350 lb (158.8 kg)       Laboratory data:   Lab Results   Component Value Date    WBC 15.6 (H) 01/30/2023    HGB 10.1 (L) 01/30/2023    HCT 31.7 (L) 01/30/2023    MCV 81.8 01/30/2023     (H) 01/30/2023       Lab Results   Component Value Date/Time     01/30/2023 07:56 AM    K 4.9 01/30/2023 07:56 AM     01/30/2023 07:56 AM    CO2 24 01/30/2023 07:56 AM    BUN 21 01/30/2023 07:56 AM    CREATININE 0.7 01/30/2023 07:56 AM    GLUCOSE 94 01/30/2023 07:56 AM    CALCIUM 8.6 01/30/2023 07:56 AM        Therapy progress:  PT  Position Activity Restriction  Sternal Precautions: No Pushing, No Pulling, 5# Lifting Restrictions  Other position/activity restrictions: R IV, [address] YOUR INCISIONS DAILY WITH A CLEAN WASHCLOTH AND ANTIBACTERIAL SOAP.  Do not wash your incisions after you have cleansed other parts of your body; up with assist  Objective     Sit to Stand: Dependent/Total, 2 Person Assistance  Stand to Sit: Dependent/Total, 2 Person Assistance  Bed to Chair: Dependent/Total, 2 Person Assistance     OT  PT Equipment Recommendations  Equipment Needed: Yes  Mobility Devices: [place]: Rolling (Bariatric RW)  Other: CTA, likely bariatric RW, possible manual w/c  Equipment Used: Drop-arm commode  Toilet Transfers Comments: use of Helen Dickerson to transfer on/off BSC over toilet  Assessment        SLP                Body mass index is 46.29 kg/m². Assessment and Plan:  Rosa Styles is a 34year old male with a past medical history significant for Marfan syndrome and morbid obesity who presented to [facility] on [facility] with severe chest pain and lower extremity numbness, found to have aortic dissection s/p replacement. He was admitted to [facility] on 1/26/23 due to functional deficits below his baseline. Type A Aortic Dissection s/p replacement  - sternal precautions  - asa, statin BB  - goal SBP<140  - PT, OT     Right to left fem-fem bypass  - asa, statin, BB  - Wed remove every other staple     Vocal cord paralysis  - ENT evaluated during acute stay  - ST     Aspiration pneumonia, resolved  - completed augmentin     Fluid overload  - lasix  - K and mag while on lasix  - daily weights, I/Os     Acute Blood Loss Anemia  - monitor and transfuse for Hb<7     Marfan Syndrome  - noted     Obesity  - BMI 34  - encourage lifestyle modifications     Bowels: adjust medications as needed for regular bowel movements     Bladder: Check PVR x 3. [address] if PVR > 200ml or if any volume is > 500 ml. Sleep: Trazodone provided prn. PPX  DVT: lovenox  GI: pepcid     Follow up appointments: CT surgery, PCP     Alo Patton.  Shun Walsh MD 1/30/2023, 3:59 PM

## 2023-01-31 PROCEDURE — 97110 THERAPEUTIC EXERCISES: CPT

## 2023-01-31 PROCEDURE — 6370000000 HC RX 637 (ALT 250 FOR IP): Performed by: STUDENT IN AN ORGANIZED HEALTH CARE EDUCATION/TRAINING PROGRAM

## 2023-01-31 PROCEDURE — 97129 THER IVNTJ 1ST 15 MIN: CPT

## 2023-01-31 PROCEDURE — 97530 THERAPEUTIC ACTIVITIES: CPT

## 2023-01-31 PROCEDURE — 97116 GAIT TRAINING THERAPY: CPT

## 2023-01-31 PROCEDURE — 6360000002 HC RX W HCPCS: Performed by: STUDENT IN AN ORGANIZED HEALTH CARE EDUCATION/TRAINING PROGRAM

## 2023-01-31 PROCEDURE — 92507 TX SP LANG VOICE COMM INDIV: CPT

## 2023-01-31 PROCEDURE — 1280000000 HC REHAB R&B

## 2023-01-31 RX ORDER — HYDROXYZINE HYDROCHLORIDE 10 MG/1
10 TABLET, FILM COATED ORAL 3 TIMES DAILY PRN
Status: DISCONTINUED | OUTPATIENT
Start: 2023-01-31 | End: 2023-02-11 | Stop reason: HOSPADM

## 2023-01-31 RX ADMIN — MAGNESIUM OXIDE TAB 400 MG (240 MG ELEMENTAL MG) 400 MG: 400 (240 MG) TAB at 10:05

## 2023-01-31 RX ADMIN — ACETAMINOPHEN 1000 MG: 500 TABLET ORAL at 20:23

## 2023-01-31 RX ADMIN — GABAPENTIN 100 MG: 100 CAPSULE ORAL at 20:23

## 2023-01-31 RX ADMIN — BISACODYL 5 MG: 5 TABLET, COATED ORAL at 10:05

## 2023-01-31 RX ADMIN — ENOXAPARIN SODIUM 40 MG: 100 INJECTION SUBCUTANEOUS at 10:04

## 2023-01-31 RX ADMIN — FUROSEMIDE 40 MG: 40 TABLET ORAL at 10:05

## 2023-01-31 RX ADMIN — FUROSEMIDE 40 MG: 40 TABLET ORAL at 18:06

## 2023-01-31 RX ADMIN — ACETAMINOPHEN 1000 MG: 500 TABLET ORAL at 14:42

## 2023-01-31 RX ADMIN — FAMOTIDINE 20 MG: 20 TABLET, FILM COATED ORAL at 20:23

## 2023-01-31 RX ADMIN — ASPIRIN 81 MG: 81 TABLET, COATED ORAL at 10:05

## 2023-01-31 RX ADMIN — Medication 5 MG: at 20:23

## 2023-01-31 RX ADMIN — POTASSIUM CHLORIDE 20 MEQ: 1500 TABLET, EXTENDED RELEASE ORAL at 10:05

## 2023-01-31 RX ADMIN — GABAPENTIN 100 MG: 100 CAPSULE ORAL at 10:05

## 2023-01-31 RX ADMIN — ATORVASTATIN CALCIUM 40 MG: 40 TABLET, FILM COATED ORAL at 20:23

## 2023-01-31 RX ADMIN — FAMOTIDINE 20 MG: 20 TABLET, FILM COATED ORAL at 10:05

## 2023-01-31 RX ADMIN — ACETAMINOPHEN 1000 MG: 500 TABLET ORAL at 05:24

## 2023-01-31 RX ADMIN — GABAPENTIN 100 MG: 100 CAPSULE ORAL at 14:42

## 2023-01-31 RX ADMIN — POLYETHYLENE GLYCOL 3350 17 G: 17 POWDER, FOR SOLUTION ORAL at 10:06

## 2023-01-31 RX ADMIN — METOPROLOL TARTRATE 50 MG: 50 TABLET, FILM COATED ORAL at 10:05

## 2023-01-31 ASSESSMENT — PAIN SCALES - GENERAL: PAINLEVEL_OUTOF10: 0

## 2023-01-31 NOTE — PROGRESS NOTES
Physical Therapy  Facility/Department: [facility]  Rehabilitation Physical Therapy Treatment Note    NAME: Blair Fitch  : 1990 (34 y.o.)  MRN: [id number]  CODE STATUS: Full Code    Date of Service: 23       Restrictions:  Restrictions/Precautions: Fall Risk;Surgical Protocols; General Precautions;Cardiac;Modified Diet  Position Activity Restriction  Sternal Precautions: No Pushing; No Pulling;5# Lifting Restrictions  Other position/activity restrictions: R IV, [address] YOUR INCISIONS DAILY WITH A CLEAN WASHCLOTH AND ANTIBACTERIAL SOAP. Do not wash your incisions after you have cleansed other parts of your body; up with assist     SUBJECTIVE  Subjective  Subjective: pt found in recliner  Pain: denies pain         OBJECTIVE  Cognition  Overall Cognitive Status: Exceptions  Arousal/Alertness: Appropriate responses to stimuli  Following Commands: Follows multistep commands with repitition; Follows multistep commands with increased time  Attention Span: Appears intact  Memory: Decreased recall of precautions;Decreased short term memory  Safety Judgement: Decreased awareness of need for assistance;Decreased awareness of need for safety  Problem Solving: Assistance required to correct errors made;Assistance required to implement solutions;Assistance required to identify errors made  Insights: Decreased awareness of deficits  Initiation: Requires cues for some  Sequencing: Requires cues for some  Cognition Comment: pt equired MAX VC's to maintain sternal precautions. Orientation  Overall Orientation Status: Within Functional Limits  Orientation Level: Oriented X4    Functional Mobility     Pt found in recliner, vitals assessed. Stand pivot from recliner to w/c with multiple attempts and mod-max A.      Sit to stand  w/c to RW x 2 -3 reps with min A of 2  Gait x 130 ft with RW, cGA demonstrating partial step through pattern with decreased RL step length (cues for increased R step length), cues for increased L step height and heel strike, cues for hip/trunk extension. Pt completed 8 6\" step ups with BUE support on rails and CGA, max Vc for safe sequencing  Pt ascended/descended 4 6\" steps with BHR, step to pattern and cGA, cues for safe sequencing. HR increases to 130 bpm and utilizes 3-4 min rest break. Second Session  Sit to stand w/c to RW ranges from min-mod a throughout session    Pt completed 2 sets of 1.5 min BLE alternating foot taps onto 6\" step with BUE support on RW, CGA for balance. Cues for sagittal plane motion LLE and increased hip flexion. Completed to facilitate standing tolerance and gait cycle. Lengthy seated rest between     Wc mobility x 75 ft with supv, use of BLEs to propel only 2* sternal precautions, limited by fatigue. Stand pivot wc to recliner with RW and mod A and multiple attempts. Pt completed 15 reps of BLE seated ankle pumps, LAQ, marches  Pt in recliner at end of session with call light/needs within reach and alarm donned. ASSESSMENT/PROGRESS TOWARDS GOALS  Vital Signs  Heart Rate: (!) 118  Heart Rate Source: Monitor  BP: 126/65  BP Location: Left upper arm  MAP (Calculated): 85  SpO2: 93 %  O2 Device: None (Room air)    Assessment  Assessment: pt seen as co treat with OT for increased safety progressing functional mobility. co treat no longer indicated as pt is consistently assist of 1 person. min-mod A of 1 for transfers, CGA for gait up to 130 ft. pt requires cgA for 8 steps ups and 4 6\" steps with BHR support, max VC for safe sequencing. individual session with emphasis on standing tolerance and LE strength. continue ot rec home with initial 24/7 and HHPT. DME: TBD  Activity Tolerance: Patient limited by fatigue;Patient limited by endurance; Patient tolerated treatment well  Discharge Recommendations: 24 hour supervision or assist;Home with Home health PT  PT Equipment Recommendations  Equipment Needed: Yes  Walker: Rolling  Other: CTA, likely bariatric RW, possible manual w/c    Goals  Patient Goals   Patient Goals : \"Be able to walk\"  Short Term Goals  Time Frame for Short Term Goals: 10 days 2/05/23  Short Term Goal 1: Pt will complete bed mobility with modA  Short Term Goal 2: Pt will complete sit to/from stand with modA  Short Term Goal 3: Pt will complete bed <> chair with LRAD with modA  Short Term Goal 4: Pt will demonstrate gait x 10' in // bars with modA without LOB -- GOAL MET x 100' with beti RW CGA + Kostas  Short Term Goal 5: Pt will tolerate assessment of steps and appropriate goal to be set  Long Term Goals  Time Frame for Long Term Goals : 3 weeks 2/16/23  Long Term Goal 1: Pt will demonstrate bed mobility with MI  Long Term Goal 2: Pt will complete functional t/f with LRAD with SBA  Long Term Goal 3: Pt will ambulate >50' with LRAD with SBA without LOB  Long Term Goal 4: Pt will demonstrate car t/f with LRAD with modA    PLAN OF CARE/SAFETY  Physcial Therapy Plan  General Plan: 5-7 times per week  Specific Instructions for Next Treatment: Progress mobility as tolerated  Current Treatment Recommendations: Strengthening;ROM;Balance training;Gait training;Home exercise program;Safety education & training;Stair training;Functional mobility training;Neuromuscular re-education;Patient/Caregiver education & training;Transfer training; Therapeutic activities; Endurance training;Equipment evaluation, education, & procurement;Positioning; Wheelchair mobility training;Vestibular rehab  Safety Devices  Type of Devices: Patient at risk for falls; All fall risk precautions in place; Left in bed; All nikolas prominences offloaded;Call light within reach;Nurse notified;Gait belt; Heels elevated for pressure relief;Bed alarm in place    EDUCATION  Education  Education Given To: Patient  Education Provided: Role of Therapy;Plan of Care;Precautions; Fall Prevention Strategies;Transfer Training; Safety;ADL Function; Energy Conservation;Mobility Training;Home Exercise Program  Education Provided Comments: Educated on role of PT, safe progression of activity, monitoring vitals with mobility.   Education Method: Demonstration;Verbal  Barriers to Learning: None  Education Outcome: Verbalized understanding;Continued education needed        Therapy Time   Individual Concurrent Group Co-treatment   Time In 0930     0900   Time Out 1000     0930   Minutes 30     30     Timed Code Treatment Minutes: [address] [facility]       Lidia Gr PT, 01/31/23 at 9:38 AM

## 2023-01-31 NOTE — PATIENT CARE CONFERENCE
[facility]  Inpatient Rehabilitation  Weekly Team Conference Note    Date: 2023  Patient Name: Yaakov Lopez        MRN: [id number]    : 1990  (34 y.o.)  Gender: male   Referring Practitioner: Meka Moreno MD  Diagnosis: Aortic dissection s/p aorta replacement      Interventions to be utilized toward barriers to discharge, per discipline:  [address] observed barriers to dc: Pain, Upper extremity weakness, Lower extremity weakness. Nursing interventions: Assist with ADLs, pain management, medication management  Family Education: Yes  Fall Risk:  Yes    Stay with me?: No    PHYSICAL THERAPY  Physical therapy observed barriers to dc:    Baseline: lives in 2 level condo with level entry (bed/bath upstairs), ind with no AD, works full time [employer]   Current level: max a bed mobility, min-mod A of 2 for transfers, gait up to 30 ft with RW, min A of 2, stairs not attempted yet   Barriers to DC: strength, endurance, steps in home   Needs in order to achieve dc home/next level of care: anticipate pt will return home with assist prn and HHPT progressing to OPPT when appropriate.  DME: TBD      Physical therapy interventions:   Current Treatment Recommendations: Strengthening, ROM, Balance training, Gait training, Home exercise program, Safety education & training, Stair training, Functional mobility training, Neuromuscular re-education, Patient/Caregiver education & training, Transfer training, Therapeutic activities, Endurance training, Equipment evaluation, education, & procurement, Positioning, Wheelchair mobility training, Vestibular rehab    PT Goals:            Short Term Goals  Time Frame for Short Term Goals: 10 days 23  Short Term Goal 1: Pt will complete bed mobility with modA  Short Term Goal 2: Pt will complete sit to/from stand with modA  Short Term Goal 3: Pt will complete bed <> chair with LRAD with modA  Short Term Goal 4: Pt will demonstrate gait x 10' in // bars with modA without LOB -- GOAL MET x 100' with beti RW CGA + Kostas  Short Term Goal 5: Pt will tolerate assessment of steps and appropriate goal to be set            Long Term Goals  Time Frame for Long Term Goals : 3 weeks 2/16/23  Long Term Goal 1: Pt will demonstrate bed mobility with MI  Long Term Goal 2: Pt will complete functional t/f with LRAD with SBA  Long Term Goal 3: Pt will ambulate >50' with LRAD with SBA without LOB  Long Term Goal 4: Pt will demonstrate car t/f with LRAD with modA    PT Assessment:  Recommendation:   PT Equipment Recommendations  Equipment Needed: Yes  Mobility Devices: Alka Dilling: Rolling  Other: CTA, likely bariatric RW, possible manual w/c  Assessment: Pt seen initially for 30 minute individual session with focus on functional t/f and strength. Pt then seen for 30 minute co-tx with OT secondary to complexity of condition, decreased strength, balance and activity tolerance. Pt benefits from x 2 skilled hands to provide increased cues and feedback with mobility to improve safety and I. Co-tx session focused on progression of gait and dynamic balance with gait activities. Pt demonstrates good progress with all mobility and activity tolerance. Pt is able to tolerate gait training this date. Pt demosntrates bed mobility with TD, t/f with RW and variable Kostas x 2 to Kostas/modA x 2, gait up to 100' with RW and Kostas + CGA. Pt is limted by pain, decreased strength, balance and activity tolerance. Pt requires intermittent rest breaks with activity. Pt will benefit from continued skilled PT in ARU to address above deficits, will continue to progress mobility as tolerated.        OCCUPATIONAL THERAPY  Occupational therapy observed barriers to dc:    Baseline: mod I all ADLs and transfers, working FT   Current level: max A LB ADLs and transfers, max A sit>stands   Barriers to DC: decreased strength, anxiety, difficulty with sit>stands   Needs in order to achieve dc home/next level of care: mod I/SPV for all ADLs and transfers, DME: TTB, BSC, home with 24HR, HHOT    Occupational Therapy interventions:  Current Treatment Recommendations: ROM, Balance training, Functional mobility training, Endurance training, Patient/Caregiver education & training, Safety education & training, Positioning, Self-Care / ADL, Strengthening, Home management training, Equipment evaluation, education, & procurement    OT Goals:  Patient Goals   Patient goals : \"I want to walk and do things on my own\"  Short Term Goals  Time Frame for Short Term Goals: 1 week- 2/1/23  Short Term Goal 1: Pt will complete functional transfers with max A. Short Term Goal 2: Pt will perform full body bathing with max A. Short Term Goal 3: Pt will complete toileting with max A. GOAL MET 1/30/23 Pt completed toileting with max A. Short Term Goal 4: Pt will perform grooming with supervision. Short Term Goal 5: Pt will complete full body dressing with max A. Long Term Goals  Time Frame for Long Term Goals : 3 weeks- 2/15/23  Long Term Goal 1: Pt will perform functional transfers with CGA. Long Term Goal 2: Pt will complete toileting with CGA. Long Term Goal 3: Pt will complete full body dressing with CGA. Long Term Goal 4: Pt will perform full body bathing with CGA. Long Term Goal 5: Pt will complete IADL task with min A.    OT Assessment:  Assessment  Assessment: First Session: Pt agreeable to therapy session. Pt seen for co-treat due to decreased strength and unsteadiness in stance with weak LEs. Pt continues to require mod/max A for sit>stands but improved to min A of 2, fading to min A of 1 for mobility with RW. Pt completed mobility of 130 feet in 2:45 and then traversing single step x8 and then 4 sequential stairs with CGA+SBA and no knee buckling with mod VCs for proper step julita.  PT assisted with balance at hips and verbal cueing for step pattern while OT assisted with balance at trunk, upright posture, and tactile cues for proper step pattern on steps. Pt will no longer require co-treat after this date. Second Session: Pt performed functional transfers with max A for sit>stand from w/c but min A fading to CGA for sit>stands from higher level chair. Pt completed sit<>stands with CGA/min A from higher level chair with min/mod SOB and HR ~119 after task. Pt demonstrated fair strength for BUE ther ex. Continue POC. Activity Tolerance: Patient limited by fatigue;Patient limited by endurance; Patient tolerated treatment well  Discharge Recommendations: 24 hour supervision or assist;Home with Home health OT;S Level 1  OT Equipment Recommendations  Equipment Needed: Yes  Mobility Devices: ADL Assistive Devices  ADL Assistive Devices: Transfer Tub Bench; Toileting - Heavy Duty Commode  Safety Devices  Safety Devices in place: Yes  Type of devices: Chair alarm in place; Left in chair;Call light within reach;Gait belt;Nurse notified      SPEECH THERAPY:  Speech therapy observed barriers to dc:    Baseline: Pt lives with spouse and three children. Works full-time. Wasn't taking meds prior to admission, shares finances with wife. Active . Current level: Mild cognitive-linguistic deficits. Hoarse, weak, reduced vocal quality. Dysphonia due to left vocal fold paralysis. Barriers to DC: Reduced endurance   Needs in order to achieve dc home/next level of care: Carryover of compensatory strategies, improved vocal quality, may need assist with finances.      Speech Therapy interventions:  Dysphagia:  N/A  Speech/Language/Cognition: Compensatory strategy training and carryover, recall/STM, problem solving, reasoning, exec function, thought organization, attention, voice tx, respiratory retraining exercises     Dysphagia Goals: N/A    Speech/Language/Cog Goals:  Time Frame for Long Term Goals: 21 Days (02/16/23)  Goal 1: Pt will improve overall cognitive-linguistic abilities to promote safe and independent return home PLOF - 01/31 - addressed, ongoing, progressing     Short Term Goals  Time Frame for Short Term Goals: 18 Days (02/13/23)  Goal 1: Pt will complete vocal functioning exercises / resonant voice therapy exercises 10/10 given min cues  - 01/31 - addressed, ongoing, progressing   Goal 2: Pt will complete respiratory retraining exercises 10/10 given min cues  - 01/31 - addressed, ongoing, progressing   Goal 3: Pt will complete graded recall tasks using compensatory strategies with 90% acc given min cues  - 01/31 - addressed, ongoing, progressing   Goal 4: Pt will complete executive function tasks (e.g. meds, time, money, etc) with 90% acc given min cues  - 01/31 - addressed, ongoing, progressing   Goal 5: Pt will complete higher level critical thinking tasks with 90% acc given min cues  - 01/31 - addressed, ongoing, progressing     ST Assessment:  Pt alert and cooperative, agreeable to tx. Pt completed a variety of vocal function exercises and respiratory retraining exercises - benefits from min-mod cues. Pt with reduced endurance, requiring frequent breaks and fewer reps of some exercises. Pt able to reach a higher pitch this date compared to yesterday, but had more difficulty with humming. Money general questions were asked to target cognition - pt did have difficulty completing these, requiring mod cues to increase acc. Pt will likely benefit from assist with finances at d/c. Continue goals above. NUTRITION  Weight: (!) 380 lb (172.4 kg) / Body mass index is 46.26 kg/m². Diet Order: ADULT ORAL NUTRITION SUPPLEMENT; Breakfast, Dinner; Standard High Calorie/High Protein Oral Supplement  ADULT DIET; Regular; No Added Salt (3-4 gm)  PO Meals Eaten (%): 51 - 75%  Education: Education completed      CASE MANAGEMENT  Assessment: 34 yr old male. Dx:Aortic dissection. Patient independent PLOF w/o AD. Lives with spouse and 3 young children in a 2 story townhouse with a level entry. Wife does not work so she would be there 24 hrs. [facility] following. Therapy recommendations are 24 hour supervision or assist;Home with Home health PT/OT. DME bariatric RW, possible manual w/c, Transfer Tub Bench; Toileting - Heavy Duty Commode. [employer] home care following. Interdisciplinary Goals:   1.) Pt will complete bed mobility with min A and LRAD. 2.) Pt will complete respiratory retraining exercises given min cues   3.) Pt will complete toileting with CGA. [x]  Family Training discussed at conference and to be scheduled. Discharge Plan   Estimated discharge date: 2/11/2023  Destination: home health  Pass:No  Services at Discharge: [address] [facility], Occupational Therapy, Speech Therapy, and Nursing   Equipment at Discharge: TTB, BSC, possible beti RW. Team Members Present at Conference:  : Edilberto Martínez RN  Occupational Therapist: Brijesh Mack, OTR/L  Physical Therapist: Everlean Nyhan, PT  Speech Therapist: Timi Nieves, [address]  Nurse: Sheryl Thapa RN  Dietician: Tyler Andres RDN, LD  : Carin Gomez, OTR/L  Psychiatry: N/A    Family members present at conference: No      I led this team conference and I approve the established interdisciplinary plan of care as documented within the medical record of Soco Ying.     MD: Electronically signed by Sandy Banda MD on 2/1/2023 at 2:10 PM

## 2023-01-31 NOTE — PROGRESS NOTES
Lakeshia Mcintosh  1/31/2023  [id number]    Chief Complaint: Aortic dissection (HCC)    Subjective:   No acute events overnight. Today Jeanette Marmolejo is seen with his wife present. He reports feeling well. Leg appears less swollen. He expresses anxiety about having some staples removed tomorrow. ROS: denies f/c, n/v, cp, sob    Objective:  Patient Vitals for the past 24 hrs:   BP Temp Temp src Pulse Resp SpO2 Weight   01/31/23 0935 126/65 -- -- (!) 118 -- 93 % --   01/31/23 0745 91/62 98.4 °F (36.9 °C) Oral (!) 110 -- 97 % --   01/31/23 0532 -- -- -- (!) 110 -- -- (!) 380 lb (172.4 kg)   01/30/23 2018 104/69 98.6 °F (37 °C) Oral (!) 118 20 96 % --   01/30/23 1700 (!) 94/52 -- -- (!) 124 -- -- --     Gen: No distress, pleasant. HEENT: Normocephalic, atraumatic. CV: extremities well perfused  Resp: No respiratory distress. Abd: Soft, nondistended  Ext: No edema. Neuro: Alert, oriented, appropriately interactive. Wt Readings from Last 3 Encounters:   01/31/23 (!) 380 lb (172.4 kg)   01/25/23 279 lb 9 oz (126.8 kg)   03/12/20 (!) 350 lb (158.8 kg)       Laboratory data:   Lab Results   Component Value Date    WBC 15.6 (H) 01/30/2023    HGB 10.1 (L) 01/30/2023    HCT 31.7 (L) 01/30/2023    MCV 81.8 01/30/2023     (H) 01/30/2023       Lab Results   Component Value Date/Time     01/30/2023 07:56 AM    K 4.9 01/30/2023 07:56 AM     01/30/2023 07:56 AM    CO2 24 01/30/2023 07:56 AM    BUN 21 01/30/2023 07:56 AM    CREATININE 0.7 01/30/2023 07:56 AM    GLUCOSE 94 01/30/2023 07:56 AM    CALCIUM 8.6 01/30/2023 07:56 AM        Therapy progress:  PT  Position Activity Restriction  Sternal Precautions: No Pushing, No Pulling, 5# Lifting Restrictions  Other position/activity restrictions: R IV, [address] YOUR INCISIONS DAILY WITH A CLEAN WASHCLOTH AND ANTIBACTERIAL SOAP.  Do not wash your incisions after you have cleansed other parts of your body; up with assist  Objective     Sit to Stand: Dependent/Total, 2 Person Assistance  Stand to Sit: Dependent/Total, 2 Person Assistance  Bed to Chair: Dependent/Total, 2 Person Assistance     OT  PT Equipment Recommendations  Equipment Needed: Yes  Mobility Devices: Jose Enrique Priyank: Rolling  Other: CTA, likely bariatric RW, possible manual w/c  Equipment Used: Drop-arm commode  Toilet Transfers Comments: use of Kathy Cook to transfer on/off BSC over toilet  Assessment        SLP                Body mass index is 46.26 kg/m². Assessment and Plan:  Shahid Bryant is a 34year old male with a past medical history significant for Marfan syndrome and morbid obesity who presented to [facility] on 1/15/23 with severe chest pain and lower extremity numbness, found to have aortic dissection s/p replacement. He was admitted to [facility] on 1/26/23 due to functional deficits below his baseline. Type A Aortic Dissection s/p replacement  - sternal precautions  - asa, statin BB  - goal SBP<140  - PT, OT     Right to left fem-fem bypass  - asa, statin, BB  - Wed remove every other staple     Vocal cord paralysis  - ENT evaluated during acute stay  - ST     Aspiration pneumonia, resolved  - completed augmentin     Fluid overload  - lasix  - K and mag while on lasix  - daily weights, I/Os     Acute Blood Loss Anemia  - monitor and transfuse for Hb<7     Marfan Syndrome  - noted     Obesity  - BMI 34  - encourage lifestyle modifications     Bowels: adjust medications as needed for regular bowel movements     Bladder: Check PVR x 3. [address] if PVR > 200ml or if any volume is > 500 ml. Sleep: Trazodone provided prn. PPX  DVT: lovenox  GI: pepcid     Follow up appointments: CT surgery, PCP     Taurus Peña.  Edith Chong MD 1/31/2023, 3:41 PM

## 2023-01-31 NOTE — PROGRESS NOTES
Pt assessment completed and charted. VSS. Pt a/o x4. Medications given crushed. Pt has sternal incs CDI, w/ 2 femoral incs CDI w/ dressing over top. Pt in sternal precautions. Pt uses urinal at bedside. Bed alarm on. Bed in lowest position and wheels locked. Call light within reach. Bedside table within reach. Non-skid footwear in place. Pt denies any other needs at this time. Pt calls out appropriately.

## 2023-01-31 NOTE — PROGRESS NOTES
Occupational Therapy  Facility/Department: [address] [place] [address]  Rehabilitation Occupational Therapy Daily Treatment Note    Date: 23  Patient Name: Teresa Batista       Room: [facility]-  MRN: [id number]  Account: [de-identified]   : 1990  (27 y.o.) Gender: male                    Past Medical History:  has a past medical history of Influenza A and Marfan syndrome. Past Surgical History:   has a past surgical history that includes Femoral-femoral Bypass Graft (Bilateral, 2023) and Thoracic aortic aneurysm repair (N/A, 1/15/2023). Restrictions  Restrictions/Precautions: Fall Risk;Surgical Protocols; General Precautions;Cardiac;Modified Diet  Other position/activity restrictions: R IV, [address] YOUR INCISIONS DAILY WITH A CLEAN WASHCLOTH AND ANTIBACTERIAL SOAP. Do not wash your incisions after you have cleansed other parts of your body; up with assist    Subjective  Subjective: Pt in recliner, pleasant, no pain, agreeable to OT/PT session, /65, , O2 sats 94% on RA. Restrictions/Precautions: Fall Risk;Surgical Protocols; General Precautions;Cardiac;Modified Diet             Objective     Cognition  Overall Cognitive Status: Exceptions  Arousal/Alertness: Appropriate responses to stimuli  Following Commands: Follows multistep commands with repitition; Follows multistep commands with increased time  Attention Span: Appears intact  Memory: Decreased recall of precautions;Decreased short term memory  Safety Judgement: Decreased awareness of need for assistance;Decreased awareness of need for safety  Problem Solving: Assistance required to correct errors made;Assistance required to implement solutions;Assistance required to identify errors made  Insights: Decreased awareness of deficits  Initiation: Requires cues for some  Sequencing: Requires cues for some  Orientation  Overall Orientation Status: Within Functional Limits         ADL   Pt declined ADLs this date.           Functional Mobility  Device: Rolling walker  Activity: To/From therapy gym  Assistance Level: Dependent  Skilled Clinical Factors: min A x2 initially, improving to min A of 1, CGA + SBA for traversing stairs x4  Bed Mobility  Overall Assistance Level: Dependent  Sit to Supine  Assistance Level: Dependent  Skilled Clinical Factors: assist for trunk and BLEs  Transfers  Surface: To bed;Bedside commode; Wheelchair; To chair with arms;From chair with arms  Additional Factors: Set-up; Hand placement cues; Verbal cues; Increased time to complete  Device: Walker  Sit to Stand  Assistance Level: Maximum assistance  Skilled Clinical Factors: improving to mod A  Stand to Sit  Assistance Level: Moderate assistance  Skilled Clinical Factors: mod VCs for hand placement  Bed To/From Chair  Technique: Stand pivot  Assistance Level: Maximum assistance   OT Exercises  Resistive Exercises: 4# weight for 15 reps of elbow flexion, wrist flexion, and wrist extension  Circulation/Endurance Exercises: sit<>stands x5, x3     Assessment  Assessment  Assessment: First Session: Pt agreeable to therapy session. Pt seen for co-treat due to decreased strength and unsteadiness in stance with weak LEs. Pt continues to require mod/max A for sit>stands but improved to min A of 2, fading to min A of 1 for mobility with RW. Pt completed mobility of 130 feet in 2:45 and then traversing single step x8 and then 4 sequential stairs with CGA+SBA and no knee buckling with mod VCs for proper step julita. PT assisted with balance at hips and verbal cueing for step pattern while OT assisted with balance at trunk, upright posture, and tactile cues for proper step pattern on steps. Pt will no longer require co-treat after this date. Second Session: Pt performed functional transfers with max A for sit>stand from w/c but min A fading to CGA for sit>stands from higher level chair. Pt completed sit<>stands with CGA/min A from higher level chair with min/mod SOB and HR ~119 after task.  Pt demonstrated fair strength for BUE ther ex. Continue POC. Activity Tolerance: Patient limited by fatigue;Patient limited by endurance; Patient tolerated treatment well  Discharge Recommendations: 24 hour supervision or assist;Home with Home health OT;S Level 1  OT Equipment Recommendations  Equipment Needed: Yes  Mobility Devices: ADL Assistive Devices  ADL Assistive Devices: Transfer Tub Bench; Toileting - Heavy Duty Commode  Safety Devices  Safety Devices in place: Yes  Type of devices: Chair alarm in place; Left in chair;Call light within reach;Gait belt;Nurse notified    Patient Education  Education  Education Given To: Patient  Education Provided: Plan of Care;Precautions; Safety; Energy Conservation;Transfer Training;Mobility Training;Family Education;Role of Therapy; Fall Prevention Strategies;DME/Home Modifications; Equipment;ADL Function  Education Provided Comments: role of OT, safety with transfers, ADL technique with sternal precautions, weight shifting during sit<>stands  Education Method: Demonstration;Verbal  Barriers to Learning: Cognition  Education Outcome: Verbalized understanding;Continued education needed    Plan  Occupational Therapy Plan  Times Per Week: 5/7 days/week  Current Treatment Recommendations: ROM;Balance training;Functional mobility training; Endurance training;Patient/Caregiver education & training; Safety education & training;Positioning;Self-Care / ADL; Strengthening;Home management training;Equipment evaluation, education, & procurement    Goals  Short Term Goals  Time Frame for Short Term Goals: 1 week- 2/1/23  Short Term Goal 1: Pt will complete functional transfers with max A. Short Term Goal 2: Pt will perform full body bathing with max A. Short Term Goal 3: Pt will complete toileting with max A. GOAL MET 1/30/23 Pt completed toileting with max A. Short Term Goal 4: Pt will perform grooming with supervision. Short Term Goal 5: Pt will complete full body dressing with max A.   Long Term Goals  Time Frame for Long Term Goals : 3 weeks- 2/15/23  Long Term Goal 1: Pt will perform functional transfers with CGA. Long Term Goal 2: Pt will complete toileting with CGA. Long Term Goal 3: Pt will complete full body dressing with CGA. Long Term Goal 4: Pt will perform full body bathing with CGA. Long Term Goal 5: Pt will complete IADL task with min A.     Therapy Time   Individual Concurrent Group Co-treatment   Time In 1330     0900   Time Out 1400     0930   Minutes 30     30   Timed Code Treatment Minutes: [address]

## 2023-01-31 NOTE — CARE COORDINATION
CM faxed disability form to the Cardiovascular surgeons office. ELI spoke with Franco Mauricio with CT sx and she confirmed she has received this form and gave it to Dr. Fletcher Salinas nurse.  Zackery Trujillo RN Problem: General Plan of Care (Inpatient Behavioral)  Goal: Team Discussion  Team Plan:   Outcome: No Change  BEHAVIORAL TEAM DISCUSSION    Continued Stay Criteria/Rationale: New patient  Plan: Dr. Cabrera and Lois BURR will complete the family assessment by phone today at 11:00am.  Participants: Dr. Cabrera, Dr. Martinez, Charis Carrillo , Kaycee Presley RN, Katja Hinton RN, Vitaly Rodarte Medical Student, Jere Saravia Occupational Therapist, Shelley BURR, Lois BURR  Summary/Recommendation: Patient's medications will be monitored and possibly changed.  Patient will participate in groups and learn coping skills.  Medical/Physical: None reported  Progress: Continuing to assess.

## 2023-01-31 NOTE — PROGRESS NOTES
[facility]: ACUTE REHAB UNIT  SPEECH-LANGUAGE PATHOLOGY      [x] Daily  [] Weekly Care Conference Note  [] Discharge    [place]      GIW:[birth year]00[birth year]16  AAM:[id number]  Rehab Dx/Hx: Aortic dissection (HCC) [I71.00]    Precautions: falls  Home situation: Spouse and three children. Works full-time. Wasn't taking meds prior to admission, shares finances with wife. ST Dx: [] Aphasia  [] Dysarthria  [] Apraxia   [] Oropharyngeal dysphagia [x] Cognitive Impairment  [x] Other: voice tx  Date of Admit: 1/26/2023  Room #: 8848/9572-44    Current functional status (updated daily):         Pt being seen for : [x] Speech/Language Treatment  [] Dysphagia Treatment [x] Cognitive Treatment  [] Other:  Communication: []WFL  [] Aphasia  [] Dysarthria  [] Apraxia  [] Pragmatic Impairment [] Non-verbal  [] Hearing Loss  [x] Other: Hoarse, weak, dysphonic 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   Cognition: [] WFL  [x] Mild  [] Moderate  [] Severe [] Unable to Assess  [] Other:  Memory: [] WFL  [x] Mild  [] Moderate  [] Severe [] Unable to Assess  [] Other:  Behavior: [x] Alert  [x] Cooperative  [x]  Pleasant  [] Confused  [] Agitated  [] Uncooperative  [] Distractible [] Motivated  [] Self-Limiting [] Anxious  [] Other:  Endurance:  [x] Adequate for participation in SLP sessions  [] Reduced overall  [] Lethargic  [] Other:  Safety: [x] No concerns at this time  [] Reduced insight into deficits  []  Reduced safety awareness [] Not following call light procedures  [] Unable to Assess  [] Other:    Current Diet Order:ADULT ORAL NUTRITION SUPPLEMENT; Breakfast, Dinner; Standard High Calorie/High Protein Oral Supplement  ADULT DIET; Regular;  No Added Salt (3-4 gm)  Swallowing Precautions: Sit up for all meals and thereafter for 30 minutes, Eat with small bites (1/2 tsp; 1 tsp), and Alternate solids with liquids        Date: 1/31/2023      Tx session 1  1000 - 1100 Tx session 2  All needs met in session 1    Total Timed Code Min 15    Total Treatment Minutes 60    Individual Treatment Minutes 60    Group Treatment Minutes 0 0   Co-Treat Minutes 0 0   Variance/Reason:  N/a     Pain Denies      Pain Intervention [] RN notified  [] Repositioned  [x] Intervention offered and patient declined  [] N/A  [] Other:  [] RN notified  [] Repositioned  [] Intervention offered and patient declined  [] N/A  [] Other:   Subjective     Pt alert, cooperative and pleasant for tx. Pt seen sitting upright in bedside chair. Objective:  Goals     Short Term Goals  Time Frame for Short Term Goals: 18 Days (23)    Goal 1: Pt will complete vocal functioning exercises / resonant voice therapy exercises 10/10 given min cues    Warm-ups at a comfortable pitch:   -\"whoo\" as in whoop: 5x  -\"oh\" as in \"knoll\": 5x  -hum: 5x  -lip trills: 5x    Stretching- glide from lowest to highest:  -\"whoop\" - 5x  -\"oh\" as in \"knoll\": -6x  -hum  5x  -lip trills: 5x  .   Contraction- glide from highest pitch to lowest pitch   -\"boom\" - 5x (difficult for pt to do whoop from high to low)  -\"oh\" as in \"knoll\" - 5x  -hum  5x    Chanting: complete on a single pitch   -yxslt-bjl-iou-sara  -jdhzk-bfc-cdv-muu   -fioya-xld-nbu-[facility]   -kucw-lo-wo-my   -mntvy-xrc-gto-sarah  -mmmay-may-may-may     *pt did require min cues to hold out the \"mm\" initially     Resonant Voice Therapy:  -hold out /mmm/ and say: meet me Monday, meet me Monday [place]  -hold out /mmm/ and say: mama, mama marched, mama marched much, mama marched much more, mama marched much more than [place]: 2x    -*difficult for pt to hmm this date       Goal 2: Pt will complete respiratory retraining exercises 10/10 given min cues   Pt completed the following exercises given min-mod cues:  -Diaphragmatic breathing: 10x   -breathe in and out through straw: 10x  -breathe in nose and out straw: 10x  -Sniff x2 through nose, then breathe out through straw: 10x  -exhale while sustaining \"Shhh\": 10x  -exhale while sustaining \"f\": 10x  -exhale while sustaining \"z\": 10x  -breathe in nose out through pursed lips: 10x  -Sniff x2 through nose, then breathe out through pursed lips: 10x      Goal 3: Pt will complete graded recall tasks using compensatory strategies with 90% acc given min cues   Goal not directly targeted. Goal 4: Pt will complete executive function tasks (e.g. meds, time, money, etc) with 90% acc given min cues   Money General Questions   -e.g. are 5 dimes equal to 9 nickels?  -pt completed task with 65% acc indep, improved to 90% acc given mod cues        Goal 5: Pt will complete higher level critical thinking tasks with 90% acc given min cues   Goal not directly targeted. Other areas targeted: N/a     Education:   SLP edu pt re: vocal hygiene, rationale of voice exercises, calc strategies     Safety Devices: [x] Call light within reach  [x] Chair alarm activated  [] Bed alarm activated  [x] Other: student RN in room  [] Call light within reach  [] Chair alarm activated  [] Bed alarm activated  [] Other:    Assessment: Pt alert and cooperative, agreeable to tx. Pt completed a variety of vocal function exercises and respiratory retraining exercises - benefits from min-mod cues. Pt with reduced endurance, requiring frequent breaks and fewer reps of some exercises. Pt able to reach a higher pitch this date compared to yesterday, but had more difficulty with humming. Money general questions were asked to target cognition - pt did have difficulty completing these, requiring mod cues to increase acc. Pt will likely benefit from assist with finances at d/c. Continue goals above. Plan: Continue as per plan of care.       Additional Information:     Barriers toward progress: N/a   Discharge recommendations:  [] Home independently  [x] Home with assistance []  24 hour supervision  [] ECF [] Other:  Continued Tx Upon Discharge: ? [x] Yes [] No [] TBD based on progress while on ARU [] Vital Stim indicated [] Other:   Estimated discharge date: TBD    Interventions used this date:  [x] Speech/Language Treatment  [] Instruction in HEP [] Group [] Dysphagia Treatment [x] Cognitive Treatment   [] Other:       Total Time Breakdown / Charges    Time in Time out Total Time / units   Cognitive Tx 1050 1100 10 min / 1 unit    Speech Tx 1000 1050 50 min / 1 unit    Dysphagia Tx -- -- --       Electronically Signed by     Bay Moss MA CCC-SLP #[id number]  Speech Language Pathologist

## 2023-02-01 LAB
A/G RATIO: 0.8 (ref 1.1–2.2)
ALBUMIN SERPL-MCNC: 2.8 G/DL (ref 3.4–5)
ALP BLD-CCNC: 84 U/L (ref 40–129)
ALT SERPL-CCNC: 79 U/L (ref 10–40)
ANION GAP SERPL CALCULATED.3IONS-SCNC: 10 MMOL/L (ref 3–16)
AST SERPL-CCNC: 41 U/L (ref 15–37)
BASOPHILS ABSOLUTE: 0.2 K/UL (ref 0–0.2)
BASOPHILS RELATIVE PERCENT: 1.9 %
BILIRUB SERPL-MCNC: 0.4 MG/DL (ref 0–1)
BUN BLDV-MCNC: 18 MG/DL (ref 7–20)
CALCIUM SERPL-MCNC: 8.6 MG/DL (ref 8.3–10.6)
CHLORIDE BLD-SCNC: 106 MMOL/L (ref 99–110)
CO2: 24 MMOL/L (ref 21–32)
CREAT SERPL-MCNC: 0.6 MG/DL (ref 0.9–1.3)
EOSINOPHILS ABSOLUTE: 0.4 K/UL (ref 0–0.6)
EOSINOPHILS RELATIVE PERCENT: 3.8 %
GFR SERPL CREATININE-BSD FRML MDRD: >60 ML/MIN/{1.73_M2}
GLUCOSE BLD-MCNC: 97 MG/DL (ref 70–99)
HCT VFR BLD CALC: 26.9 % (ref 40.5–52.5)
HEMOGLOBIN: 9 G/DL (ref 13.5–17.5)
LYMPHOCYTES ABSOLUTE: 2 K/UL (ref 1–5.1)
LYMPHOCYTES RELATIVE PERCENT: 19.2 %
MCH RBC QN AUTO: 26.9 PG (ref 26–34)
MCHC RBC AUTO-ENTMCNC: 33.5 G/DL (ref 31–36)
MCV RBC AUTO: 80.3 FL (ref 80–100)
MONOCYTES ABSOLUTE: 1.2 K/UL (ref 0–1.3)
MONOCYTES RELATIVE PERCENT: 11.4 %
NEUTROPHILS ABSOLUTE: 6.7 K/UL (ref 1.7–7.7)
NEUTROPHILS RELATIVE PERCENT: 63.7 %
PDW BLD-RTO: 14.7 % (ref 12.4–15.4)
PLATELET # BLD: 523 K/UL (ref 135–450)
PMV BLD AUTO: 6.7 FL (ref 5–10.5)
POTASSIUM REFLEX MAGNESIUM: 4.4 MMOL/L (ref 3.5–5.1)
RBC # BLD: 3.35 M/UL (ref 4.2–5.9)
SODIUM BLD-SCNC: 140 MMOL/L (ref 136–145)
TOTAL PROTEIN: 6.5 G/DL (ref 6.4–8.2)
WBC # BLD: 10.5 K/UL (ref 4–11)

## 2023-02-01 PROCEDURE — 97535 SELF CARE MNGMENT TRAINING: CPT

## 2023-02-01 PROCEDURE — 6360000002 HC RX W HCPCS: Performed by: STUDENT IN AN ORGANIZED HEALTH CARE EDUCATION/TRAINING PROGRAM

## 2023-02-01 PROCEDURE — 92507 TX SP LANG VOICE COMM INDIV: CPT

## 2023-02-01 PROCEDURE — 1280000000 HC REHAB R&B

## 2023-02-01 PROCEDURE — 85025 COMPLETE CBC W/AUTO DIFF WBC: CPT

## 2023-02-01 PROCEDURE — 80053 COMPREHEN METABOLIC PANEL: CPT

## 2023-02-01 PROCEDURE — 97116 GAIT TRAINING THERAPY: CPT

## 2023-02-01 PROCEDURE — 36415 COLL VENOUS BLD VENIPUNCTURE: CPT

## 2023-02-01 PROCEDURE — 97530 THERAPEUTIC ACTIVITIES: CPT

## 2023-02-01 PROCEDURE — 97129 THER IVNTJ 1ST 15 MIN: CPT

## 2023-02-01 PROCEDURE — 6370000000 HC RX 637 (ALT 250 FOR IP): Performed by: STUDENT IN AN ORGANIZED HEALTH CARE EDUCATION/TRAINING PROGRAM

## 2023-02-01 RX ORDER — LORAZEPAM 1 MG/1
1 TABLET ORAL
Status: COMPLETED | OUTPATIENT
Start: 2023-02-01 | End: 2023-02-01

## 2023-02-01 RX ADMIN — FUROSEMIDE 40 MG: 40 TABLET ORAL at 08:26

## 2023-02-01 RX ADMIN — ASPIRIN 81 MG: 81 TABLET, COATED ORAL at 08:27

## 2023-02-01 RX ADMIN — FUROSEMIDE 40 MG: 40 TABLET ORAL at 17:22

## 2023-02-01 RX ADMIN — ACETAMINOPHEN 1000 MG: 500 TABLET ORAL at 06:31

## 2023-02-01 RX ADMIN — ACETAMINOPHEN 1000 MG: 500 TABLET ORAL at 13:26

## 2023-02-01 RX ADMIN — FAMOTIDINE 20 MG: 20 TABLET, FILM COATED ORAL at 08:26

## 2023-02-01 RX ADMIN — POTASSIUM CHLORIDE 20 MEQ: 1500 TABLET, EXTENDED RELEASE ORAL at 08:26

## 2023-02-01 RX ADMIN — ATORVASTATIN CALCIUM 40 MG: 40 TABLET, FILM COATED ORAL at 22:15

## 2023-02-01 RX ADMIN — FAMOTIDINE 20 MG: 20 TABLET, FILM COATED ORAL at 22:15

## 2023-02-01 RX ADMIN — ACETAMINOPHEN 1000 MG: 500 TABLET ORAL at 22:15

## 2023-02-01 RX ADMIN — Medication 5 MG: at 22:15

## 2023-02-01 RX ADMIN — ENOXAPARIN SODIUM 40 MG: 100 INJECTION SUBCUTANEOUS at 08:26

## 2023-02-01 RX ADMIN — POLYETHYLENE GLYCOL 3350 17 G: 17 POWDER, FOR SOLUTION ORAL at 08:26

## 2023-02-01 RX ADMIN — MAGNESIUM OXIDE TAB 400 MG (240 MG ELEMENTAL MG) 400 MG: 400 (240 MG) TAB at 08:26

## 2023-02-01 RX ADMIN — METOPROLOL TARTRATE 50 MG: 50 TABLET, FILM COATED ORAL at 08:26

## 2023-02-01 RX ADMIN — GABAPENTIN 100 MG: 100 CAPSULE ORAL at 08:27

## 2023-02-01 RX ADMIN — GABAPENTIN 100 MG: 100 CAPSULE ORAL at 22:15

## 2023-02-01 RX ADMIN — GABAPENTIN 100 MG: 100 CAPSULE ORAL at 13:26

## 2023-02-01 RX ADMIN — BISACODYL 5 MG: 5 TABLET, COATED ORAL at 08:26

## 2023-02-01 RX ADMIN — LORAZEPAM 1 MG: 1 TABLET ORAL at 17:22

## 2023-02-01 ASSESSMENT — PAIN SCALES - GENERAL: PAINLEVEL_OUTOF10: 0

## 2023-02-01 NOTE — PROGRESS NOTES
Comprehensive Nutrition Assessment    Type and Reason for Visit:  Reassess    Nutrition Recommendations/Plan:   Continue no added salt diet   Continue Ensure BID   Encourage PO intakes as tolerated   Monitor further diet education needs   Monitor nutrition adequacy, pertinent labs, bowel habits, wt changes, and clinical progress     Malnutrition Assessment:  Malnutrition Status: Moderate malnutrition (01/26/23 1539)    Context:  Acute Illness     Findings of the 6 clinical characteristics of malnutrition:  Energy Intake:  75% or less of estimated energy requirements for 7 or more days  Weight Loss:  Greater than 5% over 1 month (17% weight loss in 2 weeks per EMR, some r/t fluid)       Nutrition Assessment:    Follow up: Pt remains nutritionally at risk AEB fair appetite. PO intakes have improved since admission to ARU. Unfavorable to most food provided by the hospital, assisted pt with ordering dinner this evening. Family occasionally bringing in meals for pt. Encouraged PO intakes as tolerated for healing. Drinking most of his ONS, will continue. Further discussion of heart healthy diet. Will monitor. Nutrition Related Findings:    BM x2 on 1/31. Active BS. Labs reviewed. Wound Type: Surgical Incision       Current Nutrition Intake & Therapies:    Average Meal Intake: 26-50%, 51-75%, %  Average Supplements Intake: %  ADULT ORAL NUTRITION SUPPLEMENT; Breakfast, Dinner; Standard High Calorie/High Protein Oral Supplement  ADULT DIET; Regular; No Added Salt (3-4 gm)    Anthropometric Measures:  Height: 6' 4\" (193 cm)  Ideal Body Weight (IBW): 202 lbs (92 kg)    Admission Body Weight: 338 lb (153.3 kg) (bed scale)  Current Body Weight: 380 lb (172.4 kg) (question accuracy), 138.1 % IBW. Weight Source: Bed Scale  Current BMI (kg/m2): 46.3  Usual Body Weight:  (stated weight hx 330-340 lb)                       BMI Categories: Obese Class 1 (BMI 30.0-34. 9)    Estimated Daily Nutrient Needs:  Energy Requirements Based On: Kcal/kg (25-30)  Weight Used for Energy Requirements: Ideal  Energy (kcal/day): 8390-8717 kcal  Weight Used for Protein Requirements: Ideal (1.0-1.2 g/kg)  Protein (g/day):  g  Method Used for Fluid Requirements: 1 ml/kcal  Fluid (ml/day): 4233-2801 mL    Nutrition Diagnosis:   Inadequate oral intake related to inadequate protein-energy intake as evidenced by poor intake prior to admission, weight loss    Nutrition Interventions:   Food and/or Nutrient Delivery: Continue Current Diet, Continue Oral Nutrition Supplement  Nutrition Education/Counseling: Education completed  Coordination of Nutrition Care: Continue to monitor while inpatient, Interdisciplinary Rounds       Goals:  Previous Goal Met: Progressing toward Goal(s)  Goals: PO intake 50% or greater, prior to discharge       Nutrition Monitoring and Evaluation:   Behavioral-Environmental Outcomes: Readiness for Change  Food/Nutrient Intake Outcomes: Food and Nutrient Intake, Supplement Intake  Physical Signs/Symptoms Outcomes: Biochemical Data, Nutrition Focused Physical Findings, Weight    Discharge Planning:    Continue current diet, Continue Oral Nutrition Supplement     Daniel Champion MS, RD, LD  Contact: [phone]

## 2023-02-01 NOTE — PROGRESS NOTES
Gael Benitez  2/1/2023  [id number]    Chief Complaint: Aortic dissection ([address]    Subjective:   No acute events overnight. Today David Chacon is seen working with therapies. He reports feeling well and that he is getting stronger. He is anxious about having some staples removed later today. ROS: denies f/c, n/v, cp, sob    Objective:  Patient Vitals for the past 24 hrs:   BP Temp Temp src Pulse Resp SpO2   02/01/23 0930 107/70 98.4 °F (36.9 °C) Oral (!) 110 18 96 %   01/31/23 2017 (!) 97/57 99 °F (37.2 °C) Oral (!) 107 20 98 %   01/31/23 1806 (!) 106/58 -- -- -- -- --     Gen: No distress, pleasant. HEENT: Normocephalic, atraumatic. CV: extremities well perfused  Resp: No respiratory distress. Abd: Soft, nondistended  Ext: No edema. Neuro: Alert, oriented, appropriately interactive. Ambulated with walker and therapy assistance    Wt Readings from Last 3 Encounters:   01/31/23 (!) 380 lb (172.4 kg)   01/25/23 279 lb 9 oz (126.8 kg)   03/12/20 (!) 350 lb (158.8 kg)       Laboratory data:   Lab Results   Component Value Date    WBC 10.5 02/01/2023    HGB 9.0 (L) 02/01/2023    HCT 26.9 (L) 02/01/2023    MCV 80.3 02/01/2023     (H) 02/01/2023       Lab Results   Component Value Date/Time     02/01/2023 07:29 AM    K 4.4 02/01/2023 07:29 AM     02/01/2023 07:29 AM    CO2 24 02/01/2023 07:29 AM    BUN 18 02/01/2023 07:29 AM    CREATININE 0.6 02/01/2023 07:29 AM    GLUCOSE 97 02/01/2023 07:29 AM    CALCIUM 8.6 02/01/2023 07:29 AM        Therapy progress:  PT  Position Activity Restriction  Sternal Precautions: No Pushing, No Pulling, 5# Lifting Restrictions  Other position/activity restrictions: R IV, [address] YOUR INCISIONS DAILY WITH A CLEAN WASHCLOTH AND ANTIBACTERIAL SOAP.  Do not wash your incisions after you have cleansed other parts of your body; up with assist  Objective     Sit to Stand: Dependent/Total, 2 Person Assistance  Stand to Sit: Dependent/Total, 2 Person Assistance  Bed to Chair: Dependent/Total, 2 Person Assistance     OT  PT Equipment Recommendations  Equipment Needed: Yes  Mobility Devices: Paulett Fam: Rolling  Other: Bariatric RW  Equipment Used: Drop-arm commode  Toilet Transfers Comments: use of [address] to transfer on/off BSC over toilet  Assessment        SLP                Body mass index is 46.26 kg/m². Assessment and Plan:  Crystal Petty is a 34year old male with a past medical history significant for Marfan syndrome and morbid obesity who [facility] with severe chest pain and lower extremity numbness, found to have aortic dissection s/p replacement. He was admitted to [facility] on 1/26/23 due to functional deficits below his baseline. Type A Aortic Dissection s/p replacement  - sternal precautions  - asa, statin BB  - goal SBP<140  - PT, OT     Right to left fem-fem bypass  - asa, statin, BB  - Wed remove every other staple     Vocal cord paralysis  - ENT evaluated during acute stay  - ST     Aspiration pneumonia, resolved  - completed augmentin     Fluid overload, improving  - complete week of lasix  - discontinue K and Mg and monitor  - daily weights, I/Os     Acute Blood Loss Anemia  - monitor and transfuse for Hb<7     Marfan Syndrome  - noted     Obesity  - BMI 34  - encourage lifestyle modifications     Bowels: adjust medications as needed for regular bowel movements     Bladder: Check PVR x 3. [address] if PVR > 200ml or if any volume is > 500 ml. Sleep: Trazodone provided prn. PPX  DVT: lovenox  GI: pepcid     Follow up appointments: CT surgery, PCP    Services:  PT, OT, ST, nursing  EDOD: 2/11    Interdisciplinary team conference was held today with entire rehab treatment team including PT, OT, SLP, Dietician, RN, and SW. Discussion focused on progress toward rehab goals and discharge planning. Barriers: wound care, endurance, comorbidities. Total treatment time >35 min with greater than 50% spent in care coordination. [address] [facility] Hospitalvalerie Huizar MD 2/1/2023, 3:49 PM

## 2023-02-01 NOTE — PROGRESS NOTES
Physical Therapy  Facility/Department: [facility]  Rehabilitation Physical Therapy Treatment Note    NAME: Morgan Bird  : 1990 (34 y.o.)  MRN: [id number]  CODE STATUS: Full Code    Date of Service: 23       Restrictions:  Restrictions/Precautions: Fall Risk;Surgical Protocols; General Precautions;Cardiac;Modified Diet  Position Activity Restriction  Sternal Precautions: No Pushing; No Pulling;5# Lifting Restrictions  Other position/activity restrictions: R IV, 8 [address] YOUR INCISIONS DAILY WITH A CLEAN WASHCLOTH AND ANTIBACTERIAL SOAP. Do not wash your incisions after you have cleansed other parts of your body; up with assist     SUBJECTIVE  Subjective  Subjective: Pt seated in recliner on approach, pleasant and agreeable to PT tx  Pain: denies c/o pain               OBJECTIVE  Cognition  Overall Cognitive Status: Exceptions  Arousal/Alertness: Appropriate responses to stimuli  Following Commands: Follows multistep commands with repitition; Follows multistep commands with increased time  Attention Span: Appears intact  Memory: Decreased recall of precautions;Decreased short term memory  Safety Judgement: Decreased awareness of need for assistance;Decreased awareness of need for safety  Problem Solving: Assistance required to correct errors made;Assistance required to implement solutions;Assistance required to identify errors made  Insights: Decreased awareness of deficits  Initiation: Requires cues for some  Sequencing: Requires cues for some  Cognition Comment: min VCs to maintain sternal precautions  Orientation  Overall Orientation Status: Within Functional Limits    Functional Mobility  Balance  Sitting Balance: Supervision  Standing Balance: Minimal assistance  Standing Balance  Activity: CGA with RW, Kostas without AD    Transfers  Surface: From chair with arms;From mat; Wheelchair;Raised toilet Seat  Additional Factors: Increased time to complete;Hand placement cues; Verbal cues; Mat raised  Device: Loc Alsyessica (beti RW and without AD)  Sit to Stand  Assistance Level: Minimal assistance; Moderate assistance; Requires x 2 assistance  Skilled Clinical Factors: Variable assist for multiple t/f throughout session with RW and without AD, CGA to Kostas x 2, min cues for anterior WS and maintaining sternal px  Stand to Sit  Assistance Level: Minimal assistance;Contact guard assist  Skilled Clinical Factors: Grossly Kostas to CGA without AD and with beti RW, min cues for safety, anterior WS and sternal px  Car Transfer  Assistance Level: Maximum assistance  Skilled Clinical Factors: With beti RW, increased time to complete, cues for sequence and safety, requires assist for LE, reports pain in LE to bring up into car      Environmental Mobility  Ambulation  Surface: Level surface  Device: Rolling walker (beti RW; no AD)  Distance: 21' + 80' with beti RW + 76' + 60' + 120' without AD  Activity: Within Room; Within Unit  Activity Comments: Distances limited by fatigue and SOB, seated rest break between bouts. HR elevated to 120-125 with gait activities but recovers to ~105 with seated rest break  Additional Factors: Verbal cues; Increased time to complete  Assistance Level: Minimal assistance;Contact guard assist (CGA with RW, Kostas without AD)  Gait Deviations: Slow julita;Decreased step length bilateral;Unsteady gait; Decreased heel strike right;Decreased heel strike left;Decreased weight shift left; Wide base of support;Decreased trunk rotation  Skilled Clinical Factors: Initial step to pattern but progressing to partial step through pattern, antalgic gait, wide FIONA, B decreased toe clearance and heel strike, slight forward flexed trunk with RW, improved upright positioning without AD but increased unsteadiness. Stairs  Stair Height: 6''  Device: Bilateral handrails  Number of Stairs: 8  Additional Factors: Verbal cues; Increased time to complete;Reciprocal going up;Non-reciprocal going down  Assistance Level: Contact guard assist  Skilled Clinical Factors: Cues for sequence and safety, HR elevated to 120 following stairs but recovers within ~ 2 minutes  Skilled Clinical Factors - Comments: Limited by fatigue                    ASSESSMENT/PROGRESS TOWARDS GOALS  /70    SpO2 96% on RA    Assessment  Assessment: Pt seen in am for PT tx, pt continues to demonstrate good progress with all mobility and gait. Pt demonstrates functional t/f with variable CGA to Kostas x 1-2, gait with RW up to 120' with CGA and without AD up to 70 with Kostas. Pt completes 8 steps with HR with CGA. Pt is limited by decreased activity tolerance and balance. Pt will benefit from continued skilled PT in ARU to address above deficits, will continue to progress mobility as tolerated. Activity Tolerance: Patient limited by fatigue;Patient limited by endurance; Patient tolerated treatment well  Discharge Recommendations: 24 hour supervision or assist;Home with Home health PT  PT Equipment Recommendations  Equipment Needed: Yes  Mobility Devices: Michael Avtar: Rolling  Other: Bariatric RW    Goals  Patient Goals   Patient Goals :  \"Be able to walk\"  Short Term Goals  Time Frame for Short Term Goals: 10 days 2/05/23  Short Term Goal 1: Pt will complete bed mobility with modA  Short Term Goal 2: Pt will complete sit to/from stand with modA -- GOAL MET CGA to Kostas x 1-2 with RW  Short Term Goal 3: Pt will complete bed <> chair with LRAD with modA -- GOAL MET Kostas x 1-2 with RW  Short Term Goal 4: Pt will demonstrate gait x 10' in // bars with modA without LOB -- GOAL MET x 100' with beti RW CGA + Kostas  Short Term Goal 5: Pt will tolerate assessment of steps and appropriate goal to be set -- GOAL MET x 8 steps with HR CGA  Long Term Goals  Time Frame for Long Term Goals : 3 weeks 2/16/23  Long Term Goal 1: Pt will demonstrate bed mobility with MI  Long Term Goal 2: Pt will complete functional t/f with LRAD with SBA  Long Term Goal 3: Pt will ambulate >50' with LRAD with SBA without LOB  Long Term Goal 4: Pt will demonstrate car t/f with LRAD with modA  Long Term Goal 5: Pt will ascend/descend 12 steps with HR with S    PLAN OF CARE/SAFETY  Physcial Therapy Plan  General Plan: 5-7 times per week  Therapy Duration: 3 Weeks  Specific Instructions for Next Treatment: Progress mobility as tolerated  Current Treatment Recommendations: Strengthening;ROM;Balance training;Gait training;Home exercise program;Safety education & training;Stair training;Functional mobility training;Neuromuscular re-education;Patient/Caregiver education & training;Transfer training; Therapeutic activities; Endurance training;Equipment evaluation, education, & procurement;Positioning; Wheelchair mobility training;Vestibular rehab  Safety Devices  Type of Devices: Patient at risk for falls; All fall risk precautions in place; All nikolas prominences offloaded;Call light within reach;Nurse notified;Gait belt;Left in chair;Chair alarm in place    EDUCATION  Education  Education Given To: Patient  Education Provided: Role of Therapy;Plan of Care;Precautions; Fall Prevention Strategies;Transfer Training; Safety;ADL Function; Energy Conservation;Mobility Training;Home Exercise Program  Education Provided Comments: Educated on role of PT, safe progression of activity, monitoring vitals with mobility.   Education Method: Demonstration;Verbal  Barriers to Learning: None  Education Outcome: Verbalized understanding;Continued education needed        Therapy Time   Individual Concurrent Group Co-treatment   Time In 0930         Time Out 1030         Minutes 60           Timed Code Treatment Minutes: [id number] [facility] Center[facility]ph[facility]ora [facility], DPT 02/01/23 at 3:22 PM

## 2023-02-01 NOTE — PROGRESS NOTES
Occupational Therapy  Facility/Department: [facility]  Rehabilitation Occupational Therapy Daily Treatment Note    Date: 23  Patient Name: Parminder Mora       Room: [facility]43844-64  MRN: [id number]  Account: [de-identified]   : 1990  (34 y.o.) Gender: male                    Past Medical History:  has a past medical history of Influenza A and Marfan syndrome. Past Surgical History:   has a past surgical history that includes Femoral-femoral Bypass Graft (Bilateral, 2023) and Thoracic aortic aneurysm repair (N/A, 1/15/2023). Restrictions  Restrictions/Precautions: Fall Risk;Surgical Protocols; General Precautions;Cardiac;Modified Diet  Other position/activity restrictions: R IV, 8 Rue Shalom Labidi YOUR INCISIONS DAILY WITH A CLEAN WASHCLOTH AND ANTIBACTERIAL SOAP. Do not wash your incisions after you have cleansed other parts of your body; up with assist    Subjective  Subjective: Pt in w/c, anxious about staples being removed and shower, no pain, agreeable to OT session, /58, , O2 sats 98% on RA. Restrictions/Precautions: Fall Risk;Surgical Protocols; General Precautions;Cardiac;Modified Diet             Objective     Cognition  Overall Cognitive Status: Exceptions  Arousal/Alertness: Appropriate responses to stimuli  Following Commands: Follows multistep commands with repitition; Follows multistep commands with increased time  Attention Span: Appears intact  Memory: Decreased recall of precautions;Decreased short term memory  Safety Judgement: Decreased awareness of need for assistance;Decreased awareness of need for safety  Problem Solving: Assistance required to correct errors made;Assistance required to implement solutions;Assistance required to identify errors made  Insights: Decreased awareness of deficits  Initiation: Requires cues for some  Sequencing: Requires cues for some  Cognition Comment: min VCs to maintain sternal precautions  Orientation  Overall Orientation Status: Within Functional Limits         ADL  Grooming/Oral Hygiene  Skilled Clinical Factors: refused to comb hair after showeringg  Upper Extremity Bathing  Assistance Level: Moderate assistance  Skilled Clinical Factors: to rinse/dry B underarms and abdomen  Lower Extremity Bathing  Assistance Level: Maximum assistance  Skilled Clinical Factors: assist to bathe B lower legs, feet, and buttocks in stance, mod A to stand from TTB  Upper Extremity Dressing  Assistance Level: Minimal assistance  Skilled Clinical Factors: to thread RUE fully  Lower Extremity Dressing  Assistance Level: Moderate assistance  Skilled Clinical Factors: good use of reacher for donning pants, assist with reacher to thread LLE into underwear, mod A to stand to manage pants over hips  Putting On/Taking Off Footwear  Assistance Level: Dependent  Skilled Clinical Factors: to don socks/shoes, max A to remove socks with reacher  Tub/Shower Transfers  Type: Shower  Transfer From: [facility]  Transfer To: Tub transfer bench  Additional Factors: Set-up; Verbal cues;Cues for hand placement; Increased time to complete  Assistance Level: Moderate assistance  Skilled Clinical Factors: for sit<>stands, CGA for mobility with RW          Functional Mobility  Device: Rolling walker  Activity: To/From bathroom  Assistance Level: Contact guard assist  Bed Mobility  Overall Assistance Level: Moderate Assistance  Additional Factors: Verbal cues; Head of bed flat  Sit to Supine  Assistance Level: Moderate assistance  Skilled Clinical Factors: assist for LLE and cues for scooting to middle of bed  Transfers  Surface: To bed; Wheelchair;From chair with arms; To chair with arms  Additional Factors: Set-up; Hand placement cues; Verbal cues; Increased time to complete  Device: Walker  Sit to Stand  Assistance Level: Maximum assistance  Skilled Clinical Factors: from w/c, mod A from TTB  Stand to Sit  Assistance Level: Minimal assistance  Bed To/From Chair  Technique: Stand step  Assistance Level: Maximum assistance  Skilled Clinical Factors: max A to stand from w/c, mod A to stand from TTB, CGA/min A for mobility with RW         Assessment  Assessment  Assessment: Pt agreeable to OT session but anxious about removal of staples and bathing of incision this date. Pt completed sit>stand from w/c with max A but improved to min/mod A to stand from TTB. Pt completed mobility to/from bathroom with RW and CGA/min A. Pt required max A for bathing due to anxiety related to seeing incisions/blood. Pt completed UB dressing with min A and LB dressing with mod A with improved use of a/e. Pt very fatigued at end of session and declined attempting shoes/socks this date. Pt educated on use of sock aid to attempt during next session. Pt completed sit>supine with mod A. Continue POC. Activity Tolerance: Patient limited by fatigue;Patient limited by endurance; Patient tolerated treatment well  Discharge Recommendations: 24 hour supervision or assist;Home with Home health OT;S Level 1  OT Equipment Recommendations  Equipment Needed: Yes  Mobility Devices: ADL Assistive Devices  ADL Assistive Devices: Transfer Tub Bench; Toileting - Heavy Duty Commode  Safety Devices  Safety Devices in place: Yes  Type of devices: Call light within reach;Gait belt;Nurse notified; Left in bed;Bed alarm in place    Patient Education  Education  Education Given To: Patient  Education Provided: Plan of Care;Precautions; Safety; Energy Conservation;Transfer Training;Mobility Training;Family Education;Role of Therapy; Fall Prevention Strategies;DME/Home Modifications; Equipment;ADL Function  Education Provided Comments: role of OT, safety with transfers, ADL technique with sternal precautions, weight shifting during sit<>stands, family education regarding bathing of wounds/incision, use of a/e  Education Method: Demonstration;Verbal  Barriers to Learning: Cognition  Education Outcome: Verbalized understanding;Continued education needed  Skilled Clinical Factors: Spouse verbalized understanding for incision care regarding bathing    Plan  Occupational Therapy Plan  Times Per Week: 5/7 days/week  Current Treatment Recommendations: ROM;Balance training;Functional mobility training; Endurance training;Patient/Caregiver education & training; Safety education & training;Positioning;Self-Care / ADL; Strengthening;Home management training;Equipment evaluation, education, & procurement    Goals  Short Term Goals  Time Frame for Short Term Goals: 1 week- 2/1/23  Short Term Goal 1: Pt will complete functional transfers with max A. GOAL MET 2/1/23 Pt completed functional transfers with max A. Short Term Goal 2: Pt will perform full body bathing with max A. GOAL MET 2/1/23 Pt completed full body bathing with max A. Short Term Goal 3: Pt will complete toileting with max A. GOAL MET 1/30/23 Pt completed toileting with max A. Short Term Goal 4: Pt will perform grooming with supervision. progressing. 2/1 SBA. Short Term Goal 5: Pt will complete full body dressing with max A. GOAL MET 2/1/23 Pt completed full body dressing with max A. Long Term Goals  Time Frame for Long Term Goals : 3 weeks- 2/15/23  Long Term Goal 1: Pt will perform functional transfers with CGA. Long Term Goal 2: Pt will complete toileting with CGA. Long Term Goal 3: Pt will complete full body dressing with CGA. Long Term Goal 4: Pt will perform full body bathing with CGA. Long Term Goal 5: Pt will complete IADL task with min A.     Therapy Time   Individual Concurrent Group Co-treatment   Time In 1330         Time Out 1430         Minutes 60         Timed Code Treatment Minutes: 16 Jadon Parra

## 2023-02-01 NOTE — PROGRESS NOTES
Every other staple removed from groin incisions. Yellow bloody drainage noted. Tolerated procedure fair. Inter-dry placed in groin folds .

## 2023-02-01 NOTE — PROGRESS NOTES
[facility]: ACUTE REHAB UNIT  SPEECH-LANGUAGE PATHOLOGY      [x] Daily  [] Weekly Care Conference Note  [] Discharge    [place]      ITE:4/47/9085  GUR:[id number]  Rehab Dx/Hx: Aortic dissection (HCC) [I71.00]    Precautions: falls  Home situation: Spouse and three children. Works full-time. Wasn't taking meds prior to admission, shares finances with wife. 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   Cognition: [] WFL  [x] Mild  [] Moderate  [] Severe [] Unable to Assess  [] Other:  Memory: [] WFL  [x] Mild  [] Moderate  [] Severe [] Unable to Assess  [] Other:  Behavior: [x] Alert  [x] Cooperative  [x]  Pleasant  [] Confused  [] Agitated  [] Uncooperative  [] Distractible [] Motivated  [] Self-Limiting [] Anxious  [] Other:  Endurance:  [x] Adequate for participation in SLP sessions  [] Reduced overall  [] Lethargic  [] Other:  Safety: [x] No concerns at this time  [] Reduced insight into deficits  []  Reduced safety awareness [] Not following call light procedures  [] Unable to Assess  [] Other:    Current Diet Order:ADULT ORAL NUTRITION SUPPLEMENT; Breakfast, Dinner; Standard High Calorie/High Protein Oral Supplement  ADULT DIET; Regular;  No Added Salt (3-4 gm)  Swallowing Precautions: Sit up for all meals and thereafter for 30 minutes, Eat with small bites (1/2 tsp; 1 tsp), and Alternate solids with liquids        Date: 2/1/2023      Tx session 1  0830 - 0930 Tx session 2  All needs met in session 1    Total Timed Code Min 15    Total Treatment Minutes 60    Individual Treatment Minutes 60    Group Treatment Minutes 0 0   Co-Treat Minutes 0 0   Variance/Reason:  N/a     Pain Denies      Pain Intervention [] RN notified  [] Repositioned  [x] Intervention offered and patient declined  [] N/A  [] Other:  [] RN notified  [] Repositioned  [] Intervention offered and patient declined  [] N/A  [] Other:   Subjective     Pt alert, cooperative and pleasant for tx. Pt seen sitting upright in bedside chair. Pt emotional during session. Objective:  Goals     Short Term Goals  Time Frame for Short Term Goals: 18 Days (23)    Goal 1: Pt will complete vocal functioning exercises / resonant voice therapy exercises 10/10 given min cues    Warm-ups at a comfortable pitch:   -\"whoo\" as in whoop: 5x  -\"oh\" as in \"knoll\": 5x  -hum: 5x  -lip trills: 5x    Stretching- glide from lowest to highest:  -\"whoop\" - 6x  -\"oh\" as in \"knoll\": -5x  -hum  5x  -lip trills: 5x  .   Contraction- glide from highest pitch to lowest pitch   -\"boom\" - 5x   -\"oh\" as in \"knoll\" - 5x  -hum  5x (difficult for pt to go to a low pitch)    Chanting: complete on a single pitch   -noalu-xsz-bat-sara  -tnepd-use-ohr-muu   -vuncx-hjx-dqt-[facility]   -imje-yq-pb-my   -heype-evm-jnz-sarah  -mmmay-may-may-may     *pt did require min cues to hold out the \"mm\" initially     Resonant Voice Therapy:  -hold out /mmm/ and say: meet me Monday, meet me Monday Morn[place]  -hold out /mm/ and say: my, my mice, my mice might, my mice might make, my mice might make me, my mice might make me crazy    Semi-occluded voice therapy  -pt blew bubbles for as long as he could; 5 trials - averaged 7.32 seconds      Goal 2: Pt will complete respiratory retraining exercises 10/10 given min cues   Pt completed the following exercises given min-mod cues:  -Diaphragmatic breathing: 10x   -breathe in and out through straw: 10x  -breathe in nose and out straw: 10x  -Sniff x2 through nose, then breathe out through straw: 10x  -exhale while sustaining \"Shhh\": 10x  -exhale while sustaining \"f\": 10x  -exhale while sustaining \"z\": 10x  -breathe in nose out through pursed lips: 10x  -Sniff x2 through nose, then breathe out through pursed lips: 10x      Goal 3: Pt will complete graded recall tasks using compensatory strategies with 90% acc given min cues   Goal not directly targeted. Goal 4: Pt will complete executive function tasks (e.g. meds, time, money, etc) with 90% acc given min cues   Sample Prescription Label  -pt used a sample label to answer questions with 75% acc indep; cues were not beneficial  -pt with difficulty comprehending the number of pills with \"take 2 pills 2x a day\" and determining dosage       Goal 5: Pt will complete higher level critical thinking tasks with 90% acc given min cues   Word Deduction Task  -pt given 3 clues that describe an item; asked to name the item being described  -e.g. bubbles, drink, carbonated = pop  -pt completed task with 60% indep, improved 96% acc given mod cues       Other areas targeted: N/a     Education:   SLP edu pt re: vocal hygiene, rationale of voice exercises, understanding prescription labels     Safety Devices: [x] Call light within reach  [x] Chair alarm activated  [] Bed alarm activated  [] Other:  [] Call light within reach  [] Chair alarm activated  [] Bed alarm activated  [] Other:    Assessment: Pt alert and cooperative, agreeable to tx. Pt completed a variety of vocal function exercises and respiratory retraining exercises - benefits from min-mod cues. Pt with reduced endurance, requiring frequent breaks and fewer reps of some exercises. Pt does appear to be improving with ability to complete these exercises, thus will increase reps in upcoming sessions. Pt benefited from mod cues to complete a word deduction task using reasoning abilities. Pt with some difficulty with comprehending a sample prescription label. Pt was not taken meds PTA, thus new task for pt. Pt with difficulty determining dosage and number of pills to take in a day. Pt will benefit from ongoing edu re: med management. Continue goals above. Plan: Continue as per plan of care. Additional Information:     Barriers toward progress: N/a   Discharge recommendations:  [] Home independently  [x] Home with assistance []  24 hour supervision  [] ECF [] Other:  Continued Tx Upon Discharge: ? [x] Yes [] No [] TBD based on progress while on ARU [] Vital Stim indicated [] Other:   Estimated discharge date: TBD    Interventions used this date:  [x] Speech/Language Treatment  [] Instruction in HEP [] Group [] Dysphagia Treatment [x] Cognitive Treatment   [] Other:       Total Time Breakdown / Charges    Time in Time out Total Time / units   Cognitive Tx 0915 0930 15 min / 1 unit    Speech Tx 0830 0915 45 min / 1 unit    Dysphagia Tx -- -- --       Electronically Signed by     Agnieszka Wilcox MA CCC-SLP #[id number]  Speech Language Pathologist

## 2023-02-02 PROCEDURE — 97530 THERAPEUTIC ACTIVITIES: CPT

## 2023-02-02 PROCEDURE — 97110 THERAPEUTIC EXERCISES: CPT

## 2023-02-02 PROCEDURE — 92507 TX SP LANG VOICE COMM INDIV: CPT

## 2023-02-02 PROCEDURE — 6370000000 HC RX 637 (ALT 250 FOR IP): Performed by: STUDENT IN AN ORGANIZED HEALTH CARE EDUCATION/TRAINING PROGRAM

## 2023-02-02 PROCEDURE — 6360000002 HC RX W HCPCS: Performed by: STUDENT IN AN ORGANIZED HEALTH CARE EDUCATION/TRAINING PROGRAM

## 2023-02-02 PROCEDURE — 97535 SELF CARE MNGMENT TRAINING: CPT

## 2023-02-02 PROCEDURE — 97116 GAIT TRAINING THERAPY: CPT

## 2023-02-02 PROCEDURE — 1280000000 HC REHAB R&B

## 2023-02-02 RX ORDER — TRAZODONE HYDROCHLORIDE 50 MG/1
100 TABLET ORAL NIGHTLY PRN
Status: DISCONTINUED | OUTPATIENT
Start: 2023-02-02 | End: 2023-02-04

## 2023-02-02 RX ADMIN — ENOXAPARIN SODIUM 40 MG: 100 INJECTION SUBCUTANEOUS at 10:18

## 2023-02-02 RX ADMIN — ACETAMINOPHEN 1000 MG: 500 TABLET ORAL at 12:58

## 2023-02-02 RX ADMIN — ATORVASTATIN CALCIUM 40 MG: 40 TABLET, FILM COATED ORAL at 20:03

## 2023-02-02 RX ADMIN — GABAPENTIN 100 MG: 100 CAPSULE ORAL at 20:03

## 2023-02-02 RX ADMIN — METOPROLOL TARTRATE 50 MG: 50 TABLET, FILM COATED ORAL at 08:52

## 2023-02-02 RX ADMIN — Medication 5 MG: at 20:03

## 2023-02-02 RX ADMIN — ACETAMINOPHEN 1000 MG: 500 TABLET ORAL at 06:51

## 2023-02-02 RX ADMIN — FAMOTIDINE 20 MG: 20 TABLET, FILM COATED ORAL at 20:04

## 2023-02-02 RX ADMIN — METOPROLOL TARTRATE 50 MG: 50 TABLET, FILM COATED ORAL at 20:03

## 2023-02-02 RX ADMIN — GABAPENTIN 100 MG: 100 CAPSULE ORAL at 12:58

## 2023-02-02 RX ADMIN — ASPIRIN 81 MG: 81 TABLET, COATED ORAL at 08:52

## 2023-02-02 RX ADMIN — OXYCODONE HYDROCHLORIDE 5 MG: 5 TABLET ORAL at 20:03

## 2023-02-02 RX ADMIN — GABAPENTIN 100 MG: 100 CAPSULE ORAL at 08:52

## 2023-02-02 RX ADMIN — FAMOTIDINE 20 MG: 20 TABLET, FILM COATED ORAL at 08:52

## 2023-02-02 RX ADMIN — POLYETHYLENE GLYCOL 3350 17 G: 17 POWDER, FOR SOLUTION ORAL at 10:19

## 2023-02-02 RX ADMIN — ACETAMINOPHEN 1000 MG: 500 TABLET ORAL at 20:03

## 2023-02-02 RX ADMIN — BISACODYL 5 MG: 5 TABLET, COATED ORAL at 08:52

## 2023-02-02 ASSESSMENT — PAIN DESCRIPTION - ORIENTATION: ORIENTATION: LEFT

## 2023-02-02 ASSESSMENT — PAIN DESCRIPTION - LOCATION: LOCATION: FOOT

## 2023-02-02 ASSESSMENT — PAIN SCALES - GENERAL
PAINLEVEL_OUTOF10: 0
PAINLEVEL_OUTOF10: 7

## 2023-02-02 NOTE — PROGRESS NOTES
Physical Therapy  Facility/Department: [facility]  Rehabilitation Physical Therapy Treatment Note    NAME: Merline Sieving  : 1990 (34 y.o.)  MRN: [id number]  CODE STATUS: Full Code    Date of Service: 23       Restrictions:  Restrictions/Precautions: Fall Risk;Surgical Protocols; General Precautions;Cardiac;Modified Diet  Position Activity Restriction  Sternal Precautions: No Pushing; No Pulling;5# Lifting Restrictions  Other position/activity restrictions: R IV, [address] YOUR INCISIONS DAILY WITH A CLEAN WASHCLOTH AND ANTIBACTERIAL SOAP. Do not wash your incisions after you have cleansed other parts of your body; up with assist     SUBJECTIVE  Subjective  Subjective: Pt seated in w/c on arrival, agreeable to PT tx  Pain: denies c/o pain               OBJECTIVE  Cognition  Overall Cognitive Status: Exceptions  Arousal/Alertness: Appropriate responses to stimuli  Following Commands: Follows multistep commands with repitition; Follows multistep commands with increased time  Attention Span: Appears intact  Memory: Decreased recall of precautions;Decreased short term memory  Safety Judgement: Decreased awareness of need for assistance;Decreased awareness of need for safety  Problem Solving: Assistance required to correct errors made;Assistance required to implement solutions;Assistance required to identify errors made  Insights: Decreased awareness of deficits  Initiation: Requires cues for some  Sequencing: Requires cues for some  Orientation  Overall Orientation Status: Within Functional Limits    Functional Mobility  Balance  Sitting Balance: Supervision  Standing Balance: Minimal assistance  Standing Balance  Dynamic circuit task x 3 bouts   X 3 reps STS from higher chair CGA to SBA   Gait x 20-40' within community room with visual scanning to find lego pieces including completing multiple turns and navigating around obstacles, grossly Kostas for balance  Dynamic gait/balance:   X 2 bouts   Gait forwards x 15' reach up to place ring on target (shoulder height and below) x 2 reps   Gait x 15' retrowalking up to [facility] for posterior LOB  Seated rest break between bouts to recover from fatigue. Completed to improve dynamic balance and stability with gait within the home and community as well as improve activity tolerance. Instabilities and fear with retro walking. Transfers  Surface: From chair with arms; Wheelchair  Additional Factors: Increased time to complete;Hand placement cues; Verbal cues; Mat raised  Device:  (no AD)  Sit to Stand  Assistance Level: Contact guard assist;Minimal assistance  Skilled Clinical Factors: pt completes multiple t/f throughout session, CGA from higher surfaces, Kostas for lower surfaces with increased time to complete, min cues for sternal px and safety/positioning  Stand to Sit  Assistance Level: Minimal assistance;Contact guard assist  Skilled Clinical Factors: Grossly Kostas to CGA without AD, completed from various surfaces with min cues for anterior WS and sternal px      Environmental Mobility  Ambulation  Surface: Level surface  Device:  (no AD)  Distance: 130' x 2  Activity: Within Room; Within Unit  Activity Comments: Distances limited somewhat by fatigue and SOB, HR up to 115-120 with seated rest break to recover  Additional Factors: Verbal cues; Increased time to complete  Assistance Level: Minimal assistance;Contact guard assist (CGA to Kostas, mildly unsteady but improved from previous date)  Gait Deviations: Slow julita;Decreased step length bilateral;Unsteady gait; Decreased heel strike right;Decreased heel strike left;Decreased weight shift left; Wide base of support;Decreased trunk rotation  Skilled Clinical Factors: Grossly reciprcoal pattern, B decreased heel strike, slight antalgic pattern, increased truncal sway. Stairs  Stair Height: 6''  Device: Bilateral handrails  Number of Stairs: 12  Additional Factors: Verbal cues; Increased time to complete;Reciprocal going up;Non-reciprocal going down  Assistance Level: Contact guard assist  Skilled Clinical Factors: Cues for sequence and safety, HR elevated to 120 following stairs but recovers within ~ 2 minutes  Skilled Clinical Factors - Comments: Limited by fatigue                    ASSESSMENT/PROGRESS TOWARDS GOALS  /80    SpO2 96% on RA    Assessment  Assessment: Pt seen in am for PT tx, continues to demonstrate good progress and participation. Pt demonstrates t/f with CGA to Kostas, gait up to 130' without AD with CGA to Kostas, 12 steps with HR with CGA. Pt participates in dynamic balance exercises requring up to Kostas for balance, pt demonstrates unsteadiness during turns as well as retro walking. Pt is limited by decreased strength, balance and activity tolerance. Pt will benefit from continued skilled PT in ARU to address above deficits, will continue to progress mobility as tolerated. Activity Tolerance: Patient limited by fatigue;Patient limited by endurance; Patient tolerated treatment well  Discharge Recommendations: 24 hour supervision or assist;Home with Home health PT  PT Equipment Recommendations  Equipment Needed: Yes  Mobility Devices: Rodney Olivas: Rolling  Other: Bariatric RW    Goals  Patient Goals   Patient Goals :  \"Be able to walk\"  Short Term Goals  Time Frame for Short Term Goals: 10 days 2/05/23  Short Term Goal 1: Pt will complete bed mobility with modA  Short Term Goal 2: Pt will complete sit to/from stand with modA -- GOAL MET CGA to Kostas x 1-2 with RW  Short Term Goal 3: Pt will complete bed <> chair with LRAD with modA -- GOAL MET Kostas x 1-2 with RW  Short Term Goal 4: Pt will demonstrate gait x 10' in // bars with modA without LOB -- GOAL MET x 100' with beti RW CGA + Kostas  Short Term Goal 5: Pt will tolerate assessment of steps and appropriate goal to be set -- GOAL MET x 8 steps with HR CGA  Long Term Goals  Time Frame for Long Term Goals : 3 weeks 2/16/23  Long Term Goal 1: Pt will demonstrate bed mobility with MI  Long Term Goal 2: Pt will complete functional t/f with LRAD with SBA  Long Term Goal 3: Pt will ambulate >50' with LRAD with SBA without LOB  Long Term Goal 4: Pt will demonstrate car t/f with LRAD with modA  Long Term Goal 5: Pt will ascend/descend 12 steps with HR with S    PLAN OF CARE/SAFETY  Physcial Therapy Plan  General Plan: 5-7 times per week  Therapy Duration: 3 Weeks  Specific Instructions for Next Treatment: Progress mobility as tolerated  Current Treatment Recommendations: Strengthening;ROM;Balance training;Gait training;Home exercise program;Safety education & training;Stair training;Functional mobility training;Neuromuscular re-education;Patient/Caregiver education & training;Transfer training; Therapeutic activities; Endurance training;Equipment evaluation, education, & procurement;Positioning; Wheelchair mobility training;Vestibular rehab  Safety Devices  Type of Devices: Patient at risk for falls; All fall risk precautions in place; All nikolas prominences offloaded;Call light within reach;Nurse notified;Gait belt;Left in chair;Chair alarm in place    EDUCATION  Education  Education Given To: Patient  Education Provided: Role of Therapy;Plan of Care;Precautions; Fall Prevention Strategies;Transfer Training; Safety;ADL Function; Energy Conservation;Mobility Training;Home Exercise Program  Education Provided Comments: Educated on role of PT, safe progression of activity, monitoring vitals with mobility.   Education Method: Demonstration;Verbal  Barriers to Learning: None  Education Outcome: Verbalized understanding;Continued education needed        Therapy Time   Individual Concurrent Group Co-treatment   Time In 0930         Time Out 1030         Minutes 60           Timed Code Treatment Minutes: [id number] [facility] Josefina Tidwell PT, MADONNA 02/02/23 at 2:47 PM

## 2023-02-02 NOTE — PROGRESS NOTES
Soco Ying  2/2/2023  [id number]    Chief Complaint: Aortic dissection ([address]    Subjective:   No acute events overnight. Today Brayden is seen in his room with his wife present. He reports tolerating the staple removal yesterday. He denies other acute complaints at this time. ROS: denies f/c, n/v, cp, sob    Objective:  Patient Vitals for the past 24 hrs:   BP Temp Temp src Pulse Resp SpO2 Weight   02/02/23 0900 112/80 98.2 °F (36.8 °C) Oral (!) 122 16 96 % --   02/02/23 0445 -- -- -- -- -- -- (!) 332 lb 3.2 oz (150.7 kg)   02/01/23 1944 99/61 99.1 °F (37.3 °C) Oral (!) 112 16 94 % --     Gen: No distress, pleasant. HEENT: Normocephalic, atraumatic. CV: extremities well perfused  Resp: No respiratory distress. On room air  Abd: Soft, nondistended  Ext: No edema. Neuro: Alert, oriented, appropriately interactive. Psych: appropriate mood and affect    Wt Readings from Last 3 Encounters:   02/02/23 (!) 332 lb 3.2 oz (150.7 kg)   01/25/23 279 lb 9 oz (126.8 kg)   03/12/20 (!) 350 lb (158.8 kg)       Laboratory data:   Lab Results   Component Value Date    WBC 10.5 02/01/2023    HGB 9.0 (L) 02/01/2023    HCT 26.9 (L) 02/01/2023    MCV 80.3 02/01/2023     (H) 02/01/2023       Lab Results   Component Value Date/Time     02/01/2023 07:29 AM    K 4.4 02/01/2023 07:29 AM     02/01/2023 07:29 AM    CO2 24 02/01/2023 07:29 AM    BUN 18 02/01/2023 07:29 AM    CREATININE 0.6 02/01/2023 07:29 AM    GLUCOSE 97 02/01/2023 07:29 AM    CALCIUM 8.6 02/01/2023 07:29 AM        Therapy progress:  PT  Position Activity Restriction  Sternal Precautions: No Pushing, No Pulling, 5# Lifting Restrictions  Other position/activity restrictions: R IV, [address] YOUR INCISIONS DAILY WITH A CLEAN WASHCLOTH AND ANTIBACTERIAL SOAP.  Do not wash your incisions after you have cleansed other parts of your body; up with assist  Objective     Sit to Stand: Dependent/Total, 2 Person Assistance  Stand to Sit: Dependent/Total, 2 Person Assistance  Bed to Chair: Dependent/Total, 2 Person Assistance     OT  PT Equipment Recommendations  Equipment Needed: Yes  Mobility Devices: Tomeka Lair: Rolling  Other: Bariatric RW  Equipment Used: Drop-arm commode  Toilet Transfers Comments: use of Rubbie Collar to transfer on/off BSC over toilet  Assessment        SLP                Body mass index is 40.44 kg/m². Assessment and Plan:  Mariana Hobbs is a 34year old male with a past medical history significant for Marfan syndrome and morbid obesity who presented to [facility] on 1/15/23 with severe chest pain and lower extremity numbness, found to have aortic dissection s/p replacement. He was admitted to [facility] on 1/26/23 due to functional deficits below his baseline. Type A Aortic Dissection s/p replacement  - sternal precautions  - asa, statin BB  - goal SBP<140  - PT, OT     Right to left fem-fem bypass  - asa, statin, BB  - Wed remove every other staple     Vocal cord paralysis  - ENT evaluated during acute stay  - ST     Aspiration pneumonia, resolved  - completed augmentin     Fluid overload, improving  - complete week of lasix  - discontinue K and Mg and monitor  - daily weights, I/Os     Acute Blood Loss Anemia  - monitor and transfuse for Hb<7     Marfan Syndrome  - noted     Obesity  - BMI 34  - encourage lifestyle modifications     Bowels: adjust medications as needed for regular bowel movements     Bladder: Check PVR x 3. [address] if PVR > 200ml or if any volume is > 500 ml. Sleep: Trazodone provided prn. PPX  DVT: lovenox  GI: pepcid     Follow up appointments: CT surgery, PCP    Services: Bee Bowers OT, [address] [place] [address] , nursing  EDOD: 2/11    Scot Dust.  Katie Campos MD 2/2/2023, 6:06 PM

## 2023-02-02 NOTE — PLAN OF CARE
Problem: Discharge Planning  Goal: Discharge to home or other facility with appropriate resources  Outcome: Progressing     Problem: Nutrition Deficit:  Goal: Optimize nutritional status  Outcome: Progressing     Problem: Skin/Tissue Integrity  Goal: Absence of new skin breakdown  Description: 1. Monitor for areas of redness and/or skin breakdown  2. Assess vascular access sites hourly  3. Every 4-6 hours minimum:  Change oxygen saturation probe site  4. Every 4-6 hours:  If on nasal continuous positive airway pressure, respiratory therapy assess nares and determine need for appliance change or resting period.   Outcome: Progressing     Problem: Safety - Adult  Goal: Free from fall injury  Outcome: Progressing     Problem: Pain  Goal: Verbalizes/displays adequate comfort level or baseline comfort level  Outcome: Progressing

## 2023-02-02 NOTE — PROGRESS NOTES
Occupational Therapy  Facility/Department: [facility]  Rehabilitation Occupational Therapy Daily Treatment Note    Date: 23  Patient Name: Tommy Chung       Room: 7420/0414-27  MRN: [id number]  Account: [de-identified]   : 1990  (34 y.o.) Gender: male                    Past Medical History:  has a past medical history of Influenza A and Marfan syndrome. Past Surgical History:   has a past surgical history that includes Femoral-femoral Bypass Graft (Bilateral, 2023) and Thoracic aortic aneurysm repair (N/A, 1/15/2023). Restrictions  Restrictions/Precautions: Fall Risk;Surgical Protocols; General Precautions;Cardiac;Modified Diet  Other position/activity restrictions: R IV, [address] YOUR INCISIONS DAILY WITH A CLEAN WASHCLOTH AND ANTIBACTERIAL SOAP. Do not wash your incisions after you have cleansed other parts of your body; up with assist    Subjective  Subjective: Pt in bed, pleasant, tired, agreeable to OT session with encouragement, no pain, /59, , O2 sats 97% on RA. Restrictions/Precautions: Fall Risk;Surgical Protocols; General Precautions;Cardiac;Modified Diet             Objective     Cognition  Overall Cognitive Status: Exceptions  Arousal/Alertness: Appropriate responses to stimuli  Following Commands: Follows multistep commands with repitition; Follows multistep commands with increased time  Attention Span: Appears intact  Memory: Decreased recall of precautions;Decreased short term memory  Safety Judgement: Decreased awareness of need for assistance;Decreased awareness of need for safety  Problem Solving: Assistance required to correct errors made;Assistance required to implement solutions;Assistance required to identify errors made  Insights: Decreased awareness of deficits  Initiation: Requires cues for some  Sequencing: Requires cues for some  Orientation  Overall Orientation Status: Within Functional Limits         ADL  Grooming/Oral Hygiene  Assistance Level: Supervision  Skilled Clinical Factors: seated at sink to brush teeth    Instrumental ADL's  Instrumental ADLs: Yes  Commmunity Re-entry  Community Re-Entry Level: Other (no AD)  Community Re-entry Level of Assistance: Stand by assistance  Community Re-Entry: organizing grocery items around gym and to place on shelves in simulated grocery store; SBA for balance/mobility, tolerance for 1:30, 3:30, and 2:15     Functional Mobility  Device:  (no AD)  Assistance Level: Stand by assist  Skilled Clinical Factors: much improved balance, no AD this date for mobility around gym for 3:30, 1:30, and 2:15, min A for w/c mobility to gym  Bed Mobility  Overall Assistance Level: Maximum Assistance  Additional Factors: Verbal cues; Head of bed raised  Supine to Sit  Assistance Level: Maximum assistance  Transfers  Surface: To bed; Wheelchair;From chair with arms; To chair with arms;From bed  Additional Factors: Set-up; Hand placement cues; Verbal cues; Increased time to complete  Device:  (no AD)  Sit to Stand  Assistance Level: Moderate assistance  Skilled Clinical Factors: from EOB and w/c, SBA from higher chair in gym  Stand to Sit  Assistance Level: Contact guard assist  Bed To/From Chair  Technique: Stand step  Assistance Level: Moderate assistance   OT Exercises  A/AROM Exercises: AROM for LUE chest press, elbow flexion, and flexed elbow shoulder abduction x20  Resistive Exercises: 4# weight on RUE for 20 reps of elbow flexion, wrist flexion, chest press, and wrist extension; 2# weight on LUE for wrist flexion and wrist extension x20; 2# weight on RUE for flexed elbow shoulder abduction x20     Assessment  Assessment  Assessment: Pt agreeable to OT session. Pt feeling tired and joking about not wanting to do anything but willing to do all tasks asked. Pt required encouragement to get out of bed initially and propel w/c but requested to walk back to room at end of session.  Pt demonstrated improved sit>stand for mod A from w/c and bed and SBA from higher chair in gym. Pt HR elevating to mid 120s but recovering in less than 30 seconds to ~118. Pt completed BUE ther ex with decreased strength in LUE but no pain and fair strength in RUE. Pt demonstrated good ability to organize grocery items, similar to work duties, and improved posture with mobility without AD. Continue POC. Activity Tolerance: Patient limited by fatigue;Patient limited by endurance; Patient tolerated treatment well  Discharge Recommendations: 24 hour supervision or assist;Home with Home health OT;S Level 1  OT Equipment Recommendations  Equipment Needed: Yes  Mobility Devices: ADL Assistive Devices  ADL Assistive Devices: Transfer Tub Bench; Toileting - Heavy Duty Commode  Safety Devices  Safety Devices in place: Yes  Type of devices: Call light within reach;Gait belt;Nurse notified; Left in bed;Bed alarm in place    Patient Education  Education  Education Given To: Patient  Education Provided: Plan of Care;Precautions; Safety; Energy Conservation;Transfer Training;Mobility Training;Family Education;Role of Therapy; Fall Prevention Strategies;DME/Home Modifications; Equipment;ADL Function  Education Provided Comments: role of OT, safety with transfers, ADL technique with sternal precautions, weight shifting during sit<>stands, IADL safety  Education Method: Demonstration;Verbal  Barriers to Learning: Cognition  Education Outcome: Verbalized understanding;Continued education needed    Plan  Occupational Therapy Plan  Times Per Week: 5/7 days/week  Current Treatment Recommendations: ROM;Balance training;Functional mobility training; Endurance training;Patient/Caregiver education & training; Safety education & training;Positioning;Self-Care / ADL; Strengthening;Home management training;Equipment evaluation, education, & procurement    Goals  Short Term Goals  Time Frame for Short Term Goals: 1 week- 2/1/23  Short Term Goal 1: Pt will complete functional transfers with max A.  GOAL MET 2/1/23 Pt completed functional transfers with max A. Short Term Goal 2: Pt will perform full body bathing with max A. GOAL MET 2/1/23 Pt completed full body bathing with max A. Short Term Goal 3: Pt will complete toileting with max A. GOAL MET 1/30/23 Pt completed toileting with max A. Short Term Goal 4: Pt will perform grooming with supervision. GOAL MET 2/2/23 Pt performed grooming with supervision. Short Term Goal 5: Pt will complete full body dressing with max A. GOAL MET 2/1/23 Pt completed full body dressing with max A. Long Term Goals  Time Frame for Long Term Goals : 3 weeks- 2/15/23  Long Term Goal 1: Pt will perform functional transfers with CGA. Long Term Goal 2: Pt will complete toileting with CGA. Long Term Goal 3: Pt will complete full body dressing with CGA. Long Term Goal 4: Pt will perform full body bathing with CGA. Long Term Goal 5: Pt will complete IADL task with min A.     Therapy Time   Individual Concurrent Group Co-treatment   Time In 1330         Time Out 1430         Minutes 60         Timed Code Treatment Minutes: 16 Nia Ramos [place]

## 2023-02-02 NOTE — PROGRESS NOTES
[facility]: ACUTE REHAB UNIT  SPEECH-LANGUAGE PATHOLOGY      [x] Daily  [] Weekly Care Conference Note  [] Discharge    [place]      XYI:1/12/7[birth year]  AYA:[id number]  Rehab Dx/Hx: Aortic dissection (HCC) [I71.00]    Precautions: falls  Home situation: Spouse and three children. Works full-time. Wasn't taking meds prior to admission, shares finances with wife. ST Dx: [] Aphasia  [] Dysarthria  [] Apraxia   [] Oropharyngeal dysphagia [x] Cognitive Impairment  [x] Other: voice tx  Date of Admit: 1/26/2023  Room #: 1434/6618-54    Current functional status (updated daily):         Pt being seen for : [x] Speech/Language Treatment  [] Dysphagia Treatment [x] Cognitive Treatment  [] Other:  Communication: []WFL  [] Aphasia  [] Dysarthria  [] Apraxia  [] Pragmatic Impairment [] Non-verbal  [] Hearing Loss  [x] Other: Hoarse, weak, dysphonic 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   Cognition: [] WFL  [x] Mild  [] Moderate  [] Severe [] Unable to Assess  [] Other:  Memory: [] WFL  [x] Mild  [] Moderate  [] Severe [] Unable to Assess  [] Other:  Behavior: [x] Alert  [x] Cooperative  [x]  Pleasant  [] Confused  [] Agitated  [] Uncooperative  [] Distractible [] Motivated  [] Self-Limiting [] Anxious  [] Other:  Endurance:  [x] Adequate for participation in SLP sessions  [] Reduced overall  [] Lethargic  [] Other:  Safety: [x] No concerns at this time  [] Reduced insight into deficits  []  Reduced safety awareness [] Not following call light procedures  [] Unable to Assess  [] Other:    Current Diet Order:ADULT ORAL NUTRITION SUPPLEMENT; Breakfast, Dinner; Standard High Calorie/High Protein Oral Supplement  ADULT DIET; Regular;  No Added Salt (3-4 gm)  Swallowing Precautions: Sit up for all meals and thereafter for 30 minutes, Eat with small bites (1/2 tsp; 1 tsp), and Alternate solids with liquids        Date: 2/2/2023      Tx session 1  0830 - 0930 Tx session 2  All needs met in session 1    Total Timed Code Min 0    Total Treatment Minutes 60    Individual Treatment Minutes 60    Group Treatment Minutes 0 0   Co-Treat Minutes 0 0   Variance/Reason:  N/a     Pain Denies      Pain Intervention [] RN notified  [] Repositioned  [x] Intervention offered and patient declined  [] N/A  [] Other:  [] RN notified  [] Repositioned  [] Intervention offered and patient declined  [] N/A  [] Other:   Subjective     Pt alert, cooperative and pleasant for tx. Pt seen sitting upright in bed, then transferred to w/c. Objective:  Goals     Short Term Goals  Time Frame for Short Term Goals: 18 Days (23)    Goal 1: Pt will complete vocal functioning exercises / resonant voice therapy exercises 10/10 given min cues    Warm-ups at a comfortable pitch:   -\"whoo\" as in whoop: 8x  -\"oh\" as in \"knoll\": 8x  -hum: 8x  -lip trills: 8x    Stretching- glide from lowest to highest:  -\"whoop\" - 8x  -\"oh\" as in \"knoll\": 8x  -hum : 8x  -lip trills: 8x  .   Contraction- glide from highest pitch to lowest pitch   -\"boom\" - 8x   -\"oh\" as in \"knoll\" - 8x  -hum : 8x     Chanting: complete on a single pitch   -gbxsj-dbx-bqm-sara  -kivmk-pkw-kol-muu   -iiguc-imj-whr-[facility]   -kcur-zb-cu-my   -wvqiw-jqw-zct-sarah  -mmmay-may-may-may     *pt did require min cues to hold out the \"mm\" initially     Resonant Voice Therapy with min cues:   -hold out /mmm/ and say: meet me Monday, meet me Monday Morninx  -hold out /mm/ and say: my, my mice, my mice might, my mice might make, my mice might make me, my mice might make me crazy: 1x  -hold out /mm/ and say: Daniel, Daniel mopped, Daniel mopped more, Daniel mopped more mud, Daniel mopped more mud than than Kristan: 1x     Semi-occluded voice therapy  -pt blew bubbles for as long as he could; 5 trials - averaged 10.4 seconds      Goal 2: Pt will complete respiratory retraining exercises 10/10 given min cues   Pt completed the following exercises given min cues:  -Diaphragmatic breathing: 10x   -breathe in and out through straw: 10x  -breathe in nose and out straw: 10x  -Sniff x2 through nose, then breathe out through straw: 6x  -exhale while sustaining \"Shhh\": 10x  -exhale while sustaining \"f\": 10x  -exhale while sustaining \"z\": 10x  -breathe in nose out through pursed lips: 10x  -Sniff x2 through nose, then breathe out through pursed lips: 10x      Goal 3: Pt will complete graded recall tasks using compensatory strategies with 90% acc given min cues   Goal not directly targeted. Goal 4: Pt will complete executive function tasks (e.g. meds, time, money, etc) with 90% acc given min cues   Goal not directly targeted. Goal 5: Pt will complete higher level critical thinking tasks with 90% acc given min cues   Goal not directly targeted. Other areas targeted: N/a     Education:   SLP edu pt re: rationale of exercises, increasing reps    Safety Devices: [x] Call light within reach  [] Chair alarm activated  [] Bed alarm activated  [x] Other: pt entering room  [] Call light within reach  [] Chair alarm activated  [] Bed alarm activated  [] Other:    Assessment: Pt alert and cooperative, agreeable to tx. Pt completed a variety of vocal function exercises and respiratory retraining exercises - benefits from min cues. Pt has demonstrated improvement with ability to complete 5 reps of these exercises, thus number of reps for VFEs was increased to 8. Pt tolerated 6-10 reps of respiratory retraining exercises today. Improved endurance, but still does require breaks. Continue goals above. Plan: Continue as per plan of care.       Additional Information:     Barriers toward progress: N/a   Discharge recommendations:  [] Home independently  [x] Home with assistance []  24 hour supervision  [] ECF [] Other:  Continued Tx Upon Discharge: ? [x] Yes [] No [] TBD based on progress while on ARU [] Vital Stim indicated [] Other:   Estimated discharge date: 02/11/2023    Interventions used this date:  [x] Speech/Language Treatment  [] Instruction in HEP [] Group [] Dysphagia Treatment [x] Cognitive Treatment   [] Other:       Total Time Breakdown / Charges    Time in Time out Total Time / units   Cognitive Tx      Speech Tx 0830 0930 60 min / 1 unit   Dysphagia Tx -- -- --       Electronically Signed by     David Hurt MA CCC-SLP #[id number]  Speech Language Pathologist

## 2023-02-03 LAB
ANION GAP SERPL CALCULATED.3IONS-SCNC: 8 MMOL/L (ref 3–16)
BASOPHILS ABSOLUTE: 0.2 K/UL (ref 0–0.2)
BASOPHILS RELATIVE PERCENT: 1.9 %
BUN BLDV-MCNC: 15 MG/DL (ref 7–20)
CALCIUM SERPL-MCNC: 8.6 MG/DL (ref 8.3–10.6)
CHLORIDE BLD-SCNC: 106 MMOL/L (ref 99–110)
CO2: 25 MMOL/L (ref 21–32)
CREAT SERPL-MCNC: 0.6 MG/DL (ref 0.9–1.3)
EOSINOPHILS ABSOLUTE: 0.4 K/UL (ref 0–0.6)
EOSINOPHILS RELATIVE PERCENT: 3.8 %
GFR SERPL CREATININE-BSD FRML MDRD: >60 ML/MIN/{1.73_M2}
GLUCOSE BLD-MCNC: 91 MG/DL (ref 70–99)
HCT VFR BLD CALC: 28.6 % (ref 40.5–52.5)
HEMOGLOBIN: 9.1 G/DL (ref 13.5–17.5)
LYMPHOCYTES ABSOLUTE: 1.6 K/UL (ref 1–5.1)
LYMPHOCYTES RELATIVE PERCENT: 17.3 %
MAGNESIUM: 2.5 MG/DL (ref 1.8–2.4)
MCH RBC QN AUTO: 26.1 PG (ref 26–34)
MCHC RBC AUTO-ENTMCNC: 31.9 G/DL (ref 31–36)
MCV RBC AUTO: 81.7 FL (ref 80–100)
MONOCYTES ABSOLUTE: 0.8 K/UL (ref 0–1.3)
MONOCYTES RELATIVE PERCENT: 8.3 %
NEUTROPHILS ABSOLUTE: 6.5 K/UL (ref 1.7–7.7)
NEUTROPHILS RELATIVE PERCENT: 68.7 %
PDW BLD-RTO: 15 % (ref 12.4–15.4)
PLATELET # BLD: 495 K/UL (ref 135–450)
PMV BLD AUTO: 6.6 FL (ref 5–10.5)
POTASSIUM SERPL-SCNC: 4.1 MMOL/L (ref 3.5–5.1)
RBC # BLD: 3.5 M/UL (ref 4.2–5.9)
SODIUM BLD-SCNC: 139 MMOL/L (ref 136–145)
WBC # BLD: 9.4 K/UL (ref 4–11)

## 2023-02-03 PROCEDURE — 97116 GAIT TRAINING THERAPY: CPT

## 2023-02-03 PROCEDURE — 83735 ASSAY OF MAGNESIUM: CPT

## 2023-02-03 PROCEDURE — 97530 THERAPEUTIC ACTIVITIES: CPT

## 2023-02-03 PROCEDURE — 1280000000 HC REHAB R&B

## 2023-02-03 PROCEDURE — 6370000000 HC RX 637 (ALT 250 FOR IP): Performed by: STUDENT IN AN ORGANIZED HEALTH CARE EDUCATION/TRAINING PROGRAM

## 2023-02-03 PROCEDURE — 6360000002 HC RX W HCPCS: Performed by: STUDENT IN AN ORGANIZED HEALTH CARE EDUCATION/TRAINING PROGRAM

## 2023-02-03 PROCEDURE — 97535 SELF CARE MNGMENT TRAINING: CPT

## 2023-02-03 PROCEDURE — 97110 THERAPEUTIC EXERCISES: CPT

## 2023-02-03 PROCEDURE — 36415 COLL VENOUS BLD VENIPUNCTURE: CPT

## 2023-02-03 PROCEDURE — 85025 COMPLETE CBC W/AUTO DIFF WBC: CPT

## 2023-02-03 PROCEDURE — 92507 TX SP LANG VOICE COMM INDIV: CPT

## 2023-02-03 PROCEDURE — 80048 BASIC METABOLIC PNL TOTAL CA: CPT

## 2023-02-03 RX ADMIN — GABAPENTIN 100 MG: 100 CAPSULE ORAL at 20:30

## 2023-02-03 RX ADMIN — ACETAMINOPHEN 1000 MG: 500 TABLET ORAL at 05:06

## 2023-02-03 RX ADMIN — FAMOTIDINE 20 MG: 20 TABLET, FILM COATED ORAL at 20:30

## 2023-02-03 RX ADMIN — ATORVASTATIN CALCIUM 40 MG: 40 TABLET, FILM COATED ORAL at 20:30

## 2023-02-03 RX ADMIN — ACETAMINOPHEN 1000 MG: 500 TABLET ORAL at 20:30

## 2023-02-03 RX ADMIN — ENOXAPARIN SODIUM 40 MG: 100 INJECTION SUBCUTANEOUS at 10:29

## 2023-02-03 RX ADMIN — ASPIRIN 81 MG: 81 TABLET, COATED ORAL at 10:30

## 2023-02-03 RX ADMIN — FAMOTIDINE 20 MG: 20 TABLET, FILM COATED ORAL at 10:30

## 2023-02-03 RX ADMIN — Medication 5 MG: at 20:31

## 2023-02-03 RX ADMIN — BISACODYL 5 MG: 5 TABLET, COATED ORAL at 10:30

## 2023-02-03 RX ADMIN — ACETAMINOPHEN 1000 MG: 500 TABLET ORAL at 14:12

## 2023-02-03 RX ADMIN — GABAPENTIN 100 MG: 100 CAPSULE ORAL at 14:12

## 2023-02-03 RX ADMIN — GABAPENTIN 100 MG: 100 CAPSULE ORAL at 10:30

## 2023-02-03 RX ADMIN — METOPROLOL TARTRATE 50 MG: 50 TABLET, FILM COATED ORAL at 20:30

## 2023-02-03 RX ADMIN — METOPROLOL TARTRATE 50 MG: 50 TABLET, FILM COATED ORAL at 10:30

## 2023-02-03 ASSESSMENT — PAIN DESCRIPTION - ORIENTATION: ORIENTATION: LEFT

## 2023-02-03 ASSESSMENT — PAIN SCALES - GENERAL: PAINLEVEL_OUTOF10: 1

## 2023-02-03 ASSESSMENT — PAIN DESCRIPTION - LOCATION: LOCATION: FOOT

## 2023-02-03 NOTE — PROGRESS NOTES
Harpreet Monson  2/3/2023  [id number]    Chief Complaint: Aortic dissection ([address]    Subjective:   No acute events overnight. Today Nancy Chapin is seen in his room with Speech and his wife present. He reports feeling well and denies acute complaints. He feels that his leg is getting stronger. ROS: denies f/c, n/v, cp, sob    Objective:  Patient Vitals for the past 24 hrs:   BP Temp Temp src Pulse Resp SpO2 Weight   02/03/23 0930 132/74 -- -- (!) 103 -- 95 % --   02/03/23 0715 114/66 98 °F (36.7 °C) Oral 93 18 95 % --   02/03/23 0511 -- -- -- -- -- -- (!) 327 lb 6.4 oz (148.5 kg)   02/02/23 2001 127/64 99.2 °F (37.3 °C) Oral (!) 107 16 95 % --     Gen: No distress, pleasant. HEENT: Normocephalic, atraumatic. CV: RRR  Resp: No respiratory distress. Lungs CTAB  Abd: Soft, nondistended  Ext: No edema. Neuro: Alert, oriented, appropriately interactive. Psych: appropriate mood and affect    Wt Readings from Last 3 Encounters:   02/03/23 (!) 327 lb 6.4 oz (148.5 kg)   01/25/23 279 lb 9 oz (126.8 kg)   03/12/20 (!) 350 lb (158.8 kg)       Laboratory data:   Lab Results   Component Value Date    WBC 9.4 02/03/2023    HGB 9.1 (L) 02/03/2023    HCT 28.6 (L) 02/03/2023    MCV 81.7 02/03/2023     (H) 02/03/2023       Lab Results   Component Value Date/Time     02/03/2023 07:14 AM    K 4.1 02/03/2023 07:14 AM    K 4.4 02/01/2023 07:29 AM     02/03/2023 07:14 AM    CO2 25 02/03/2023 07:14 AM    BUN 15 02/03/2023 07:14 AM    CREATININE 0.6 02/03/2023 07:14 AM    GLUCOSE 91 02/03/2023 07:14 AM    CALCIUM 8.6 02/03/2023 07:14 AM        Therapy progress:  PT  Position Activity Restriction  Sternal Precautions: No Pushing, No Pulling, 5# Lifting Restrictions  Other position/activity restrictions: R IV, [address] YOUR INCISIONS DAILY WITH A CLEAN WASHCLOTH AND ANTIBACTERIAL SOAP.  Do not wash your incisions after you have cleansed other parts of your body; up with assist  Objective     Sit to Stand: Dependent/Total, 2 Person Assistance  Stand to Sit: Dependent/Total, 2 Person Assistance  Bed to Chair: Dependent/Total, 2 Person Assistance     OT  PT Equipment Recommendations  Equipment Needed: Yes  Mobility Devices: Nanine Tree: Rolling  Other: Bariatric RW  Equipment Used: Drop-arm commode  Toilet Transfers Comments: use of Paula Olea to transfer on/off BSC over toilet  Assessment        SLP                Body mass index is 39.85 kg/m². Assessment and Plan:  Julia Mendoza is a 34year old male with a past medical history significant for Marfan syndrome and morbid obesity who presented to [facility] on 1/15/23 with severe chest pain and lower extremity numbness, found to have aortic dissection s/p replacement. He was admitted to [facility] on 1/26/23 due to functional deficits below his baseline. Type A Aortic Dissection s/p replacement  - sternal precautions  - asa, statin BB  - goal SBP<140  - PT, OT     Right to left fem-fem bypass  - asa, statin, BB  - every other staple removed 2/1  - Vascular to assess incisions     Vocal cord paralysis  - ENT evaluated during acute stay  - ST     Aspiration pneumonia, resolved  - completed augmentin     Fluid overload, improved  - completed week of lasix  - discontinued K and Mg and monitor  - daily weights, I/Os     Acute Blood Loss Anemia  - Hb stable  - monitor and transfuse for Hb<7     Marfan Syndrome  - noted     Obesity  - BMI 34  - encourage lifestyle modifications     Bowels: adjust medications as needed for regular bowel movements     Bladder: Check PVR x 3. [address] if PVR > 200ml or if any volume is > 500 ml. Sleep: Trazodone provided prn. PPX  DVT: lovenox  GI: pepcid     Follow up appointments: CT surgery, PCP    Services: Donaldo Dixon OT, [address] [place] [address] , nursing  EDOD: 2/11    Anneliese eLe.  Maureen Pimentel MD 2/3/2023, 4:00 PM

## 2023-02-03 NOTE — PROGRESS NOTES
Occupational Therapy  Facility/Department: [facility]  Rehabilitation Occupational Therapy Daily Treatment Note    Date: 2/3/23  Patient Name: [facility]       Room: 9870/3793-84  MRN: [id number]  Account: [de-identified]   : 1990  (34 y.o.) Gender: male                    Past Medical History:  has a past medical history of Influenza A and Marfan syndrome. Past Surgical History:   has a past surgical history that includes Femoral-femoral Bypass Graft (Bilateral, 2023) and Thoracic aortic aneurysm repair (N/A, 1/15/2023). Restrictions  Restrictions/Precautions: Fall Risk;Surgical Protocols; General Precautions;Cardiac;Modified Diet  Other position/activity restrictions: R IV, [address] YOUR INCISIONS DAILY WITH A CLEAN WASHCLOTH AND ANTIBACTERIAL SOAP. Do not wash your incisions after you have cleansed other parts of your body; up with assist    Subjective  Subjective: Pt in recliner, no pain but mild discomfort in B groin incisions, MD planning to check incisions later today, agreeable to OT session, spouse present and requesting for pt to change underwear daily, /69, HR 98, O2 sats 94% on RA. Restrictions/Precautions: Fall Risk;Surgical Protocols; General Precautions;Cardiac;Modified Diet             Objective     Cognition  Overall Cognitive Status: Exceptions  Arousal/Alertness: Appropriate responses to stimuli  Following Commands: Follows multistep commands with repitition; Follows multistep commands with increased time  Attention Span: Appears intact  Memory: Decreased recall of precautions;Decreased short term memory  Safety Judgement: Decreased awareness of need for assistance;Decreased awareness of need for safety  Problem Solving: Assistance required to correct errors made;Assistance required to implement solutions;Assistance required to identify errors made  Insights: Decreased awareness of deficits  Initiation: Requires cues for some  Sequencing: Requires cues for some  Orientation  Overall Orientation Status: Within Functional Limits         ADL  Upper Extremity Dressing  Assistance Level: Supervision;Verbal cues; Set-up  Skilled Clinical Factors: to doff/don shirt  Lower Extremity Dressing  Assistance Level: Moderate assistance  Skilled Clinical Factors: to stand to manage underwear/pants over hips, improved use of reacher to thread BLEs into underwear/pants  Putting On/Taking Off Footwear  Assistance Level: Maximum assistance  Skilled Clinical Factors: able to doff R shoe, assist to don L shoe and don B shoes          Functional Mobility  Device:  (no AD)  Activity: To/From therapy gym  Assistance Level: Stand by assist  Transfers  Surface: To bed; Wheelchair;From chair with arms; To mat;From mat  Additional Factors: Set-up; Hand placement cues; Verbal cues; Increased time to complete  Sit to Stand  Assistance Level: Moderate assistance  Skilled Clinical Factors: from w/c, SBA to stand from EOM  Stand to Sit  Assistance Level: Contact guard assist  Bed To/From Chair  Technique: Stand step  Assistance Level: Moderate assistance   OT Exercises  Resistive Exercises: 4# weight on RUE for 20 reps of elbow flexion; 2# weight on LUE for wrist flexion, elbow flexion, supination/pronation, and wrist extension x20; 2# weight on RUE for wrist flexion/extension and supination/pronation for 15 reps  Circulation/Endurance Exercises: sit<>stands 2x10 from EOM; 2x10 reps of a-p leaning with 2# medicine ball     Assessment  Assessment  Assessment: Pt agreeable to OT session. Pt's spouse requesting to have pt change underwear daily. Pt required mod A to stand from recliner but improved use of reacher to doff/don pants/underwear with cues and min A to lift LLE. Pt required encouragement to complete all tasks and exercises throughout session with pt joking about being done with therapy and not wanting to do more. Pt agreeable with increased time and fair strength for BUE ther ex and core ther ex. Pt completed 2x10 sit>stands with SBA from mat table at 22.5 inch height. Pt demonstrated good standing tolerance and balance to ambulate back to room at end of session with SBA. Pt's HR elevating to 122 at highest after activity this date. Continue POC. Activity Tolerance: Patient limited by fatigue;Patient limited by endurance; Patient tolerated treatment well  Discharge Recommendations: 24 hour supervision or assist;Home with Home health OT;S Level 1  OT Equipment Recommendations  Equipment Needed: Yes  Mobility Devices: ADL Assistive Devices  ADL Assistive Devices: Transfer Tub Bench; Toileting - Heavy Duty Commode  Safety Devices  Safety Devices in place: Yes  Type of devices: Call light within reach;Gait belt;Nurse notified; Left in bed;Bed alarm in place    Patient Education  Education  Education Given To: Patient  Education Provided: Plan of Care;Precautions; Safety; Energy Conservation;Transfer Training;Mobility Training;Family Education;Role of Therapy; Fall Prevention Strategies;DME/Home Modifications; Equipment;ADL Function  Education Provided Comments: role of OT, safety with transfers, ADL technique with sternal precautions, weight shifting during sit<>stands, use of a/e  Education Method: Demonstration;Verbal  Barriers to Learning: Cognition  Education Outcome: Verbalized understanding;Continued education needed    Plan  Occupational Therapy Plan  Times Per Week: 5/7 days/week  Current Treatment Recommendations: ROM;Balance training;Functional mobility training; Endurance training;Patient/Caregiver education & training; Safety education & training;Positioning;Self-Care / ADL; Strengthening;Home management training;Equipment evaluation, education, & procurement    Goals  Short Term Goals  Time Frame for Short Term Goals: 1 week- 2/1/23  Short Term Goal 1: Pt will complete functional transfers with max A. GOAL MET 2/1/23 Pt completed functional transfers with max A.   Short Term Goal 2: Pt will perform full body bathing with max A. GOAL MET 2/1/23 Pt completed full body bathing with max A. Short Term Goal 3: Pt will complete toileting with max A. GOAL MET 1/30/23 Pt completed toileting with max A. Short Term Goal 4: Pt will perform grooming with supervision. GOAL MET 2/2/23 Pt performed grooming with supervision. Short Term Goal 5: Pt will complete full body dressing with max A. GOAL MET 2/1/23 Pt completed full body dressing with max A. Long Term Goals  Time Frame for Long Term Goals : 3 weeks- 2/15/23  Long Term Goal 1: Pt will perform functional transfers with CGA. Long Term Goal 2: Pt will complete toileting with CGA. Long Term Goal 3: Pt will complete full body dressing with CGA. Long Term Goal 4: Pt will perform full body bathing with CGA. Long Term Goal 5: Pt will complete IADL task with min A.     Therapy Time   Individual Concurrent Group Co-treatment   Time In 1330         Time Out 1430         Minutes 60         Timed Code Treatment Minutes: 16 Ciera Parra

## 2023-02-03 NOTE — PROGRESS NOTES
[facility]: ACUTE REHAB UNIT  SPEECH-LANGUAGE PATHOLOGY      [x] Daily  [] Weekly Care Conference Note  [] Discharge    [place]      ONZ:7/69/3157  TGN:[id number]  Rehab Dx/Hx: Aortic dissection (HCC) [I71.00]    Precautions: falls  Home situation: Spouse and three children. Works full-time. Wasn't taking meds prior to admission, shares finances with wife. ST Dx: [] Aphasia  [] Dysarthria  [] Apraxia   [] Oropharyngeal dysphagia [x] Cognitive Impairment  [x] Other: voice tx  Date of Admit: 1/26/2023  Room #: 6798/6470-45    Current functional status (updated daily):         Pt being seen for : [x] Speech/Language Treatment  [] Dysphagia Treatment [x] Cognitive Treatment  [] Other:  Communication: []WFL  [] Aphasia  [] Dysarthria  [] Apraxia  [] Pragmatic Impairment [] Non-verbal  [] Hearing Loss  [x] Other: Hoarse, weak, dysphonic 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   Cognition: [] WFL  [x] Mild  [] Moderate  [] Severe [] Unable to Assess  [] Other:  Memory: [] WFL  [x] Mild  [] Moderate  [] Severe [] Unable to Assess  [] Other:  Behavior: [x] Alert  [x] Cooperative  [x]  Pleasant  [] Confused  [] Agitated  [] Uncooperative  [] Distractible [] Motivated  [] Self-Limiting [] Anxious  [] Other:  Endurance:  [x] Adequate for participation in SLP sessions  [] Reduced overall  [] Lethargic  [] Other:  Safety: [x] No concerns at this time  [] Reduced insight into deficits  []  Reduced safety awareness [] Not following call light procedures  [] Unable to Assess  [] Other:    Current Diet Order:ADULT ORAL NUTRITION SUPPLEMENT; Breakfast, Dinner; Standard High Calorie/High Protein Oral Supplement  ADULT DIET; Regular;  No Added Salt (3-4 gm)  Swallowing Precautions: Sit up for all meals and thereafter for 30 minutes, Eat with small bites (1/2 tsp; 1 tsp), and Alternate solids with liquids        Date: 2/3/2023      Tx session 1  0900 - 0930 Tx session 2  1030 - 1100    Total Timed Code Min 0 0   Total Treatment Minutes 30 30   Individual Treatment Minutes 30 30   Group Treatment Minutes 0 0   Co-Treat Minutes 0 0   Variance/Reason:  N/a  N/A   Pain Denies   Denies    Pain Intervention [] RN notified  [] Repositioned  [] Intervention offered and patient declined  [x] N/A  [] Other:  [] RN notified  [] Repositioned  [] Intervention offered and patient declined  [x] N/A  [] Other:   Subjective     Pt alert and cooperative, agreeable to tx. Pt partially reclined in bed, did not want to sit upright. Pt alert and cooperative, agreeable to tx. Pt upright in w/c. Wife present. Objective:  Goals     Short Term Goals  Time Frame for Short Term Goals: 18 Days (23)    Goal 1: Pt will complete vocal functioning exercises / resonant voice therapy exercises 10/10 given min cues    Warm-ups at a comfortable pitch:   -\"whoo\" as in whoop: 10x  -\"oh\" as in \"knoll\": 10x  -hum: 10x  -lip trills: 10x    Stretching- glide from lowest to highest:  -\"whoop\" - 10x  -\"oh\" as in \"knoll\": 10x  -hum : 10x  -lip trills: 10x  .   Contraction- glide from highest pitch to lowest pitch   -\"boom\" - 10x   -\"oh\" as in \"knoll\" - 10x  -hum : 10x     Chanting: complete on a single pitch   -bahen-ysj-ccm-sara  -idgva-xfp-uhy-muu   -fytpg-fsr-khg-[facility]   -wibm-tz-sl-my   -botqw-qlz-nbi-sarah  -mmmay-may-may-may     Resonant Voice Therapy with min cues:   -hold out /mmm/ and say: meet me Monday, meet me Monday Morninx  -hold out /mm/ and say: my, my mice, my mice might, my mice might make, my mice might make me, my mice might make me crazy: 1x  -hold out /mm/ and say: Daniel, Daniel mopped, Daniel mopped more, Daniel mopped more mud, Daniel mopped more mud than than Kristan: 1x    Semi-occluded voice therapy  -pt blew bubbles for as long as he could; 5 trials - averaged 10.7 seconds   Goal 2: Pt will complete respiratory retraining exercises 10/10 given min cues   Pt completed the following exercises given min cues:  -exhale while sustaining \"f\": 10x  -exhale while sustaining \"Shhh\": 10x  -exhale while sustaining \"z\": 10x   Pt completed the following exercises given min cus:  -breathe in and out through straw: 10x  -breathe in nose and out straw: 10x  -Sniff x2 through nose, then breathe out through straw: 6x  -breathe in nose out through pursed lips: 10x  -Sniff x2 through nose, then breathe out through pursed lips: 10x     Goal 3: Pt will complete graded recall tasks using compensatory strategies with 90% acc given min cues   Goal not directly targeted. Goal not directly targeted. Goal 4: Pt will complete executive function tasks (e.g. meds, time, money, etc) with 90% acc given min cues   Goal not directly targeted. Goal not directly targeted. Goal 5: Pt will complete higher level critical thinking tasks with 90% acc given min cues   Goal not directly targeted. Goal not directly targeted. Other areas targeted: N/a  N/A   Education:   SLP edu pt re: rationale of exercises, increasing reps SLP edu pt re: completion of exercises this weekend; reviewed handout    Safety Devices: [x] Call light within reach  [] Chair alarm activated  [x] Bed alarm activated  [] Other:  [x] Call light within reach  [] Chair alarm activated  [] Bed alarm activated  [x] Other: wife present     Assessment: Tx session 1: Pt alert and cooperative, agreeable to tx. Pt created handout with VFE for pt to start completing on his own. SLP reviewed the vocal functioning exercises / resonant voice therapy exercises, and pt completed 10 reps of each exercise. Continue goals above. Tx session 2: Pt alert and cooperative, agreeable to tx. Pt completed semi-occluded voice therapy with blowing bubbles - increased time by 0.4 seconds compared to yesterday's session. Pt also completed remainder of respiratory retraining exercises. Pt increased reps to 10. Reviewed handout with pt and wife re: completing of exercises this weekend - 5-10 reps, 1-2x daily. Pt and wife verbalized understanding. Continue goals above. Plan: Continue as per plan of care. Additional Information:     Barriers toward progress: N/a   Discharge recommendations:  [] Home independently  [x] Home with assistance []  24 hour supervision  [] ECF [] Other:  Continued Tx Upon Discharge: ? [x] Yes [] No [] TBD based on progress while on ARU [] Vital Stim indicated [] Other:   Estimated discharge date: 02/11/2023    Interventions used this date:  [x] Speech/Language Treatment  [] Instruction in HEP [] Group [] Dysphagia Treatment [x] Cognitive Treatment   [] Other:       Total Time Breakdown / Charges    Time in Time out Total Time / units   Cognitive Tx      Speech Tx 0900  1030 0930  1100 30 min  / 1 unit   30 min  / 1 unit    Dysphagia Tx -- -- --       Electronically Signed by     Anaya Mcdowell MA CCC-SLP #[id number]  Speech Language Pathologist

## 2023-02-03 NOTE — PROGRESS NOTES
Physical Therapy  Facility/Department: [facility]  Rehabilitation Physical Therapy Treatment Note    NAME: Radha Menard  : 1990 (34 y.o.)  MRN: [id number]  CODE STATUS: Full Code    Date of Service: 2/3/23       Restrictions:  Restrictions/Precautions: Fall Risk;Surgical Protocols; General Precautions;Cardiac;Modified Diet  Position Activity Restriction  Sternal Precautions: No Pushing; No Pulling;5# Lifting Restrictions  Other position/activity restrictions: R IV, [address] YOUR INCISIONS DAILY WITH A CLEAN WASHCLOTH AND ANTIBACTERIAL SOAP. Do not wash your incisions after you have cleansed other parts of your body; up with assist     SUBJECTIVE  Subjective  Subjective: Pt seated in w/c on arrival, agreeable to PT tx  Pain: denies c/o pain              OBJECTIVE  Cognition  Overall Cognitive Status: WNL  Arousal/Alertness: Appropriate responses to stimuli  Following Commands: Follows multistep commands with repitition; Follows multistep commands with increased time  Attention Span: Appears intact  Memory: Decreased recall of precautions;Decreased short term memory  Safety Judgement: Decreased awareness of need for assistance;Decreased awareness of need for safety  Problem Solving: Assistance required to correct errors made;Assistance required to implement solutions;Assistance required to identify errors made  Insights: Decreased awareness of deficits  Initiation: Requires cues for some  Sequencing: Requires cues for some  Orientation  Overall Orientation Status: Within Functional Limits    Functional Mobility  Bed Mobility  Additional Factors: Increased time to complete;Verbal cues  Supine to Sit  Assistance Level: Maximum assistance  Skilled Clinical Factors: HOB flat no BR, Max assist at trunk increased time to complete, cues for sequence and safety  Balance  Sitting Balance: Supervision  Standing Balance: Contact guard assistance  Standing Balance  Activity: CGA without AD  Transfers  Surface: From chair with arms;Wheelchair  Additional Factors: Increased time to complete;Hand placement cues; Verbal cues  Device:  (no AD)  Sit to Stand  Assistance Level: Contact guard assist  Skilled Clinical Factors: pt completes multiple t/f throughout session with increased time to complete, min cues for sternal px and safety/positioning  Stand to Sit  Assistance Level: Contact guard assist  Skilled Clinical Factors: Grossly CGA without AD, completed from various surfaces with min cues for anterior WS and sternal px      Environmental Mobility  Ambulation  Surface: Level surface  Device:  (no AD)  Distance: 130Ft x2+ 50ft+ 279 Ft ( 6MWT)  Activity: Within Room; Within Unit  Activity Comments: Distances limited somewhat by fatigue and SOB, HR up to 115-130 during gait with seated rest break to recover  Additional Factors: Verbal cues; Increased time to complete  Assistance Level: Contact guard assist  Gait Deviations: Slow julita;Decreased step length bilateral;Unsteady gait; Decreased heel strike right;Decreased heel strike left;Decreased weight shift left; Wide base of support;Decreased trunk rotation  Skilled Clinical Factors: Grossly reciprcoal pattern, B decreased heel strike, slight antalgic pattern, increased truncal sway. Stairs  Stair Height: 6''  Device: Bilateral handrails  Number of Stairs: 16  Additional Factors: Verbal cues; Increased time to complete;Reciprocal going up;Non-reciprocal going down  Assistance Level: Contact guard assist  Skilled Clinical Factors: Cues for sequence and safety, HR elevated to 120 following stairs but recovers within ~ 2 minutes  Skilled Clinical Factors - Comments: Limited by fatigue             PT Exercises  Circulation/Endurance Exercises: Pt completes x2 + 2 min on SciFit level 1.0-1.6 with B LE only to improve endurance and gait through reciprocal LE movement. Pt maintains ave SPM >60 with cues.       ASSESSMENT/PROGRESS TOWARDS GOALS  Vital Signs  Heart Rate: (!) 103  Heart Rate Source: Monitor  BP: 132/74  BP Location: Left upper arm  MAP (Calculated): 93  SpO2: 95 %  O2 Device: None (Room air)    Assessment  Assessment: Pt seen in am for PT tx, continues to demonstrate good progress and participation. Pt demonstrates bed mobility with Max Assist,  t/f with CGA , gait up to 279 Ft without AD with CGA , 16  steps with HR with CGA. Pt participates in SciFit for endurance and gait training. Pt participates in the 6 minutte walk test to measure endurance and gait speed. Pt ambulates up to 279 Ft but is only able to tolerate up to 3 min 15 seconds without a sitting break. Pt is limited by decreased strength, balance and activity tolerance. Pt will benefit from continued skilled PT in ARU to address above deficits, will continue to progress mobility as tolerated. Activity Tolerance: Patient limited by fatigue;Patient limited by endurance; Patient tolerated treatment well  Discharge Recommendations: 24 hour supervision or assist;Home with Home health PT  PT Equipment Recommendations  Equipment Needed: Yes  Mobility Devices: Dearl Stalling: Rolling  Other: Bariatric RW    Goals  Patient Goals   Patient Goals :  \"Be able to walk\"  Short Term Goals  Time Frame for Short Term Goals: 10 days 2/05/23  Short Term Goal 1: Pt will complete bed mobility with modA  Short Term Goal 2: Pt will complete sit to/from stand with modA -- GOAL MET CGA to Kostas x 1-2 with RW  Short Term Goal 3: Pt will complete bed <> chair with LRAD with modA -- GOAL MET Kostas x 1-2 with RW  Short Term Goal 4: Pt will demonstrate gait x 10' in // bars with modA without LOB -- GOAL MET x 100' with beti RW CGA + Kostas  Short Term Goal 5: Pt will tolerate assessment of steps and appropriate goal to be set -- GOAL MET x 8 steps with HR CGA  Long Term Goals  Time Frame for Long Term Goals : 3 weeks 2/16/23  Long Term Goal 1: Pt will demonstrate bed mobility with MI  Long Term Goal 2: Pt will complete functional t/f with LRAD with SBA  Long Term Goal 3: Pt will ambulate >50' with LRAD with SBA without LOB  Long Term Goal 4: Pt will demonstrate car t/f with LRAD with modA  Long Term Goal 5: Pt will ascend/descend 12 steps with HR with S  Additional Goals?: Yes  Long term goal 6: Pt will demonstrate improved  ability on 6MWT to > 500 Ft to demostrate improved endurance and dynamic balance. PLAN OF CARE/SAFETY  Physcial Therapy Plan  General Plan: 5-7 times per week  Therapy Duration: 3 Weeks  Specific Instructions for Next Treatment: Progress mobility as tolerated  Current Treatment Recommendations: Strengthening;ROM;Balance training;Gait training;Home exercise program;Safety education & training;Stair training;Functional mobility training;Neuromuscular re-education;Patient/Caregiver education & training;Transfer training; Therapeutic activities; Endurance training;Equipment evaluation, education, & procurement;Positioning; Wheelchair mobility training;Vestibular rehab  Safety Devices  Type of Devices: Patient at risk for falls; All fall risk precautions in place; All nikolas prominences offloaded;Call light within reach;Nurse notified;Gait belt;Left in chair  Restraints  Restraints Initially in Place: No    EDUCATION  Education  Education Given To: Patient  Education Provided: Role of Therapy;Plan of Care;Precautions; Fall Prevention Strategies;Transfer Training; Safety;ADL Function; Energy Conservation;Mobility Training;Home Exercise Program  Education Provided Comments: Educated on role of PT, safe progression of activity, monitoring vitals with mobility.   Education Method: Demonstration;Verbal  Barriers to Learning: None  Education Outcome: Verbalized understanding;Continued education needed        Therapy Time   Individual Concurrent Group Co-treatment   Time In 0930         Time Out 1030         Minutes 60           Timed Code Treatment Minutes: 60 Minutes       Huma Cantu SPT, 02/03/23 at 3:35 PM

## 2023-02-04 PROCEDURE — 6360000002 HC RX W HCPCS: Performed by: STUDENT IN AN ORGANIZED HEALTH CARE EDUCATION/TRAINING PROGRAM

## 2023-02-04 PROCEDURE — 1280000000 HC REHAB R&B

## 2023-02-04 PROCEDURE — 6370000000 HC RX 637 (ALT 250 FOR IP): Performed by: STUDENT IN AN ORGANIZED HEALTH CARE EDUCATION/TRAINING PROGRAM

## 2023-02-04 RX ORDER — TRAZODONE HYDROCHLORIDE 50 MG/1
100 TABLET ORAL NIGHTLY
Status: DISCONTINUED | OUTPATIENT
Start: 2023-02-04 | End: 2023-02-11 | Stop reason: HOSPADM

## 2023-02-04 RX ADMIN — TRAZODONE HYDROCHLORIDE 100 MG: 50 TABLET ORAL at 19:57

## 2023-02-04 RX ADMIN — GABAPENTIN 100 MG: 100 CAPSULE ORAL at 09:38

## 2023-02-04 RX ADMIN — ATORVASTATIN CALCIUM 40 MG: 40 TABLET, FILM COATED ORAL at 19:57

## 2023-02-04 RX ADMIN — ENOXAPARIN SODIUM 40 MG: 100 INJECTION SUBCUTANEOUS at 09:40

## 2023-02-04 RX ADMIN — ACETAMINOPHEN 1000 MG: 500 TABLET ORAL at 20:00

## 2023-02-04 RX ADMIN — ACETAMINOPHEN 1000 MG: 500 TABLET ORAL at 13:48

## 2023-02-04 RX ADMIN — ACETAMINOPHEN 1000 MG: 500 TABLET ORAL at 05:19

## 2023-02-04 RX ADMIN — FAMOTIDINE 20 MG: 20 TABLET, FILM COATED ORAL at 19:57

## 2023-02-04 RX ADMIN — Medication 5 MG: at 19:56

## 2023-02-04 RX ADMIN — GABAPENTIN 100 MG: 100 CAPSULE ORAL at 13:48

## 2023-02-04 RX ADMIN — METOPROLOL TARTRATE 50 MG: 50 TABLET, FILM COATED ORAL at 09:39

## 2023-02-04 RX ADMIN — GABAPENTIN 100 MG: 100 CAPSULE ORAL at 19:57

## 2023-02-04 RX ADMIN — METOPROLOL TARTRATE 50 MG: 50 TABLET, FILM COATED ORAL at 19:57

## 2023-02-04 RX ADMIN — ASPIRIN 81 MG: 81 TABLET, COATED ORAL at 09:38

## 2023-02-04 ASSESSMENT — PAIN DESCRIPTION - ORIENTATION: ORIENTATION: LEFT

## 2023-02-04 ASSESSMENT — PAIN SCALES - GENERAL
PAINLEVEL_OUTOF10: 2
PAINLEVEL_OUTOF10: 0
PAINLEVEL_OUTOF10: 2
PAINLEVEL_OUTOF10: 2
PAINLEVEL_OUTOF10: 0

## 2023-02-04 ASSESSMENT — PAIN DESCRIPTION - LOCATION: LOCATION: FOOT

## 2023-02-04 NOTE — PROGRESS NOTES
Pt assessment completed and charted. VSS. Pt a/o x4. Pt in sternal precautions. Pt w/ chest incision and 2 groin incs. Medications given per MAR. Pt uses urinal at bedside. Pt is x1 w/ walker on ambulation. Bed alarm on. Bed in lowest position and wheels locked. Call light within reach. Bedside table within reach. Non-skid footwear in place. Pt denies any other needs at this time. Pt calls out appropriately.

## 2023-02-04 NOTE — PROGRESS NOTES
Lakeshia Parkeroks  2/4/2023  [id number]    Chief Complaint: Aortic dissection ([address]    Subjective:   No acute events overnight. Today Jeanette Marmolejo is seen in his room with his wife present. Vascular following groin incisions and suggested wound care orders. He reports sleeping poorly at night. Discussed scheduling trazodone. ROS: denies f/c, n/v, cp, sob    Objective:  Patient Vitals for the past 24 hrs:   BP Temp Temp src Pulse Resp SpO2   02/04/23 0936 126/68 97.5 °F (36.4 °C) Oral (!) 109 16 95 %   02/03/23 2025 113/71 98.6 °F (37 °C) Oral (!) 106 18 95 %     Gen: No distress, pleasant. Supine in bed  HEENT: Normocephalic, atraumatic. CV: extremities well perfused  Resp: No respiratory distress. Abd: Soft, nondistended  Ext: No edema. Neuro: Alert, oriented, appropriately interactive. Psych: appropriate mood and affect    Wt Readings from Last 3 Encounters:   02/03/23 (!) 327 lb 6.4 oz (148.5 kg)   01/25/23 279 lb 9 oz (126.8 kg)   03/12/20 (!) 350 lb (158.8 kg)       Laboratory data:   Lab Results   Component Value Date    WBC 9.4 02/03/2023    HGB 9.1 (L) 02/03/2023    HCT 28.6 (L) 02/03/2023    MCV 81.7 02/03/2023     (H) 02/03/2023       Lab Results   Component Value Date/Time     02/03/2023 07:14 AM    K 4.1 02/03/2023 07:14 AM    K 4.4 02/01/2023 07:29 AM     02/03/2023 07:14 AM    CO2 25 02/03/2023 07:14 AM    BUN 15 02/03/2023 07:14 AM    CREATININE 0.6 02/03/2023 07:14 AM    GLUCOSE 91 02/03/2023 07:14 AM    CALCIUM 8.6 02/03/2023 07:14 AM        Therapy progress:  PT  Position Activity Restriction  Sternal Precautions: No Pushing, No Pulling, 5# Lifting Restrictions  Other position/activity restrictions: R IV, [address] YOUR INCISIONS DAILY WITH A CLEAN WASHCLOTH AND ANTIBACTERIAL SOAP.  Do not wash your incisions after you have cleansed other parts of your body; up with assist  Objective     Sit to Stand: Dependent/Total, 2 Person Assistance  Stand to Sit: Dependent/Total, 2 Person Assistance  Bed to Chair: Dependent/Total, 2 Person Assistance     OT  PT Equipment Recommendations  Equipment Needed: Yes  Mobility Devices: Tomeka Lair: Rolling  Other: Bariatric RW  Equipment Used: Drop-arm commode  Toilet Transfers Comments: use of Rubbie Collar to transfer on/off BSC over toilet  Assessment        SLP                Body mass index is 39.85 kg/m². Assessment and Plan:  Mariana Hobbs is a 34year old male with a past medical history significant for Marfan syndrome and morbid obesity who presented to [facility] on [facility]/15/23 with severe chest pain and lower extremity numbness, found to have aortic dissection s/p replacement. He was admitted to [facility] on 1/26/23 due to functional deficits below his baseline. Type A Aortic Dissection s/p replacement  - sternal precautions  - asa, statin BB  - goal SBP<140  - PT, OT     Right to left fem-fem bypass  - asa, statin, BB  - every other staple removed 2/1  - Vascular following, incision care per orders     Vocal cord paralysis  - ENT evaluated during acute stay  - ST     Aspiration pneumonia, resolved  - completed augmentin     Fluid overload, improved  - completed week of lasix  - discontinued K and Mg and monitor  - daily weights, I/Os     Acute Blood Loss Anemia  - Hb stable  - monitor and transfuse for Hb<7     Marfan Syndrome  - noted     Obesity  - BMI 34  - encourage lifestyle modifications     Bowels: adjust medications as needed for regular bowel movements     Bladder: Check PVR x 3. [address] if PVR > 200ml or if any volume is > 500 ml. Sleep: Trazodone provided prn. PPX  DVT: lovenox  GI: pepcid     Follow up appointments: CT surgery, PCP    Services: Bee Bowers OT, [address] [place] [address] , nursing  EDOD: 2/11    [facility].  Katie Campos MD 2/4/2023, 1:07 PM

## 2023-02-04 NOTE — PROGRESS NOTES
Vascular    S/p fem-fem bypass with Dr. Meme Gresham   Asked to re-evaluate incisions    AFVSS  Right groin wound with superficial seperation  Deep tissue intact  Left groin moist and staples intact    Lab Results   Component Value Date    WBC 9.4 02/03/2023    HGB 9.1 (L) 02/03/2023    HCT 28.6 (L) 02/03/2023    MCV 81.7 02/03/2023     (H) 02/03/2023     /P:  s/p fem-fem  Will remove right groin staples today  Deep tissue intact  Damp to dry dressing to right groin bid  Dry dressing to left groin      Tolu Mao M.D., FACS.  2/4/2023  9:08 AM

## 2023-02-04 NOTE — PLAN OF CARE
Patient remains free from falls and injuries. Patient up with staff and calls when needing to transfer.   Call light within reach, non skid footwear on. Nena Lezama RN

## 2023-02-05 VITALS
RESPIRATION RATE: 18 BRPM | OXYGEN SATURATION: 95 % | DIASTOLIC BLOOD PRESSURE: 61 MMHG | TEMPERATURE: 98.7 F | HEART RATE: 101 BPM | SYSTOLIC BLOOD PRESSURE: 127 MMHG | WEIGHT: 315 LBS | HEIGHT: 76 IN | BODY MASS INDEX: 38.36 KG/M2

## 2023-02-05 PROCEDURE — 6370000000 HC RX 637 (ALT 250 FOR IP): Performed by: STUDENT IN AN ORGANIZED HEALTH CARE EDUCATION/TRAINING PROGRAM

## 2023-02-05 PROCEDURE — 6360000002 HC RX W HCPCS: Performed by: STUDENT IN AN ORGANIZED HEALTH CARE EDUCATION/TRAINING PROGRAM

## 2023-02-05 PROCEDURE — 1280000000 HC REHAB R&B

## 2023-02-05 RX ADMIN — METOPROLOL TARTRATE 50 MG: 50 TABLET, FILM COATED ORAL at 21:34

## 2023-02-05 RX ADMIN — ACETAMINOPHEN 1000 MG: 500 TABLET ORAL at 08:23

## 2023-02-05 RX ADMIN — FAMOTIDINE 20 MG: 20 TABLET, FILM COATED ORAL at 21:34

## 2023-02-05 RX ADMIN — FAMOTIDINE 20 MG: 20 TABLET, FILM COATED ORAL at 08:16

## 2023-02-05 RX ADMIN — ENOXAPARIN SODIUM 40 MG: 100 INJECTION SUBCUTANEOUS at 08:16

## 2023-02-05 RX ADMIN — TRAZODONE HYDROCHLORIDE 100 MG: 50 TABLET ORAL at 21:33

## 2023-02-05 RX ADMIN — METOPROLOL TARTRATE 50 MG: 50 TABLET, FILM COATED ORAL at 08:16

## 2023-02-05 RX ADMIN — ACETAMINOPHEN 1000 MG: 500 TABLET ORAL at 21:33

## 2023-02-05 RX ADMIN — ATORVASTATIN CALCIUM 40 MG: 40 TABLET, FILM COATED ORAL at 21:34

## 2023-02-05 RX ADMIN — Medication 5 MG: at 21:33

## 2023-02-05 RX ADMIN — BISACODYL 5 MG: 5 TABLET, COATED ORAL at 08:16

## 2023-02-05 RX ADMIN — ACETAMINOPHEN 1000 MG: 500 TABLET ORAL at 14:04

## 2023-02-05 RX ADMIN — ASPIRIN 81 MG: 81 TABLET, COATED ORAL at 08:15

## 2023-02-05 RX ADMIN — GABAPENTIN 100 MG: 100 CAPSULE ORAL at 14:04

## 2023-02-05 RX ADMIN — GABAPENTIN 100 MG: 100 CAPSULE ORAL at 21:33

## 2023-02-05 RX ADMIN — GABAPENTIN 100 MG: 100 CAPSULE ORAL at 08:16

## 2023-02-05 ASSESSMENT — PAIN SCALES - WONG BAKER
WONGBAKER_NUMERICALRESPONSE: 8

## 2023-02-05 ASSESSMENT — PAIN SCALES - GENERAL: PAINLEVEL_OUTOF10: 8

## 2023-02-05 NOTE — PROGRESS NOTES
Vascular    No new issues  Right groin dressing changed  No erythema  Deep sutures felt to be intact  New dressing placed  Wet to dry bid  Dr. Leone  to follow up tomorrow      Tolu Hines M.D., FACS.  2/5/2023  8:03 AM

## 2023-02-06 LAB
ANION GAP SERPL CALCULATED.3IONS-SCNC: 12 MMOL/L (ref 3–16)
BASOPHILS ABSOLUTE: 0.2 K/UL (ref 0–0.2)
BASOPHILS RELATIVE PERCENT: 2.1 %
BUN BLDV-MCNC: 13 MG/DL (ref 7–20)
CALCIUM SERPL-MCNC: 9 MG/DL (ref 8.3–10.6)
CHLORIDE BLD-SCNC: 106 MMOL/L (ref 99–110)
CO2: 23 MMOL/L (ref 21–32)
CREAT SERPL-MCNC: 0.6 MG/DL (ref 0.9–1.3)
EOSINOPHILS ABSOLUTE: 0.4 K/UL (ref 0–0.6)
EOSINOPHILS RELATIVE PERCENT: 4.3 %
GFR SERPL CREATININE-BSD FRML MDRD: >60 ML/MIN/{1.73_M2}
GLUCOSE BLD-MCNC: 98 MG/DL (ref 70–99)
HCT VFR BLD CALC: 29.5 % (ref 40.5–52.5)
HEMOGLOBIN: 9.2 G/DL (ref 13.5–17.5)
LYMPHOCYTES ABSOLUTE: 2 K/UL (ref 1–5.1)
LYMPHOCYTES RELATIVE PERCENT: 20.8 %
MAGNESIUM: 2.2 MG/DL (ref 1.8–2.4)
MCH RBC QN AUTO: 25.1 PG (ref 26–34)
MCHC RBC AUTO-ENTMCNC: 31.1 G/DL (ref 31–36)
MCV RBC AUTO: 80.8 FL (ref 80–100)
MONOCYTES ABSOLUTE: 0.8 K/UL (ref 0–1.3)
MONOCYTES RELATIVE PERCENT: 7.9 %
NEUTROPHILS ABSOLUTE: 6.4 K/UL (ref 1.7–7.7)
NEUTROPHILS RELATIVE PERCENT: 64.9 %
PDW BLD-RTO: 14.9 % (ref 12.4–15.4)
PLATELET # BLD: 518 K/UL (ref 135–450)
PMV BLD AUTO: 6.9 FL (ref 5–10.5)
POTASSIUM SERPL-SCNC: 4.6 MMOL/L (ref 3.5–5.1)
RBC # BLD: 3.65 M/UL (ref 4.2–5.9)
SODIUM BLD-SCNC: 141 MMOL/L (ref 136–145)
WBC # BLD: 9.8 K/UL (ref 4–11)

## 2023-02-06 PROCEDURE — 85025 COMPLETE CBC W/AUTO DIFF WBC: CPT

## 2023-02-06 PROCEDURE — 1280000000 HC REHAB R&B

## 2023-02-06 PROCEDURE — 97530 THERAPEUTIC ACTIVITIES: CPT

## 2023-02-06 PROCEDURE — 97110 THERAPEUTIC EXERCISES: CPT

## 2023-02-06 PROCEDURE — 83735 ASSAY OF MAGNESIUM: CPT

## 2023-02-06 PROCEDURE — 97116 GAIT TRAINING THERAPY: CPT

## 2023-02-06 PROCEDURE — 36415 COLL VENOUS BLD VENIPUNCTURE: CPT

## 2023-02-06 PROCEDURE — 92507 TX SP LANG VOICE COMM INDIV: CPT

## 2023-02-06 PROCEDURE — 6360000002 HC RX W HCPCS: Performed by: STUDENT IN AN ORGANIZED HEALTH CARE EDUCATION/TRAINING PROGRAM

## 2023-02-06 PROCEDURE — 80048 BASIC METABOLIC PNL TOTAL CA: CPT

## 2023-02-06 PROCEDURE — 6370000000 HC RX 637 (ALT 250 FOR IP): Performed by: STUDENT IN AN ORGANIZED HEALTH CARE EDUCATION/TRAINING PROGRAM

## 2023-02-06 PROCEDURE — 97535 SELF CARE MNGMENT TRAINING: CPT

## 2023-02-06 PROCEDURE — 97129 THER IVNTJ 1ST 15 MIN: CPT

## 2023-02-06 RX ADMIN — BISACODYL 5 MG: 5 TABLET, COATED ORAL at 07:56

## 2023-02-06 RX ADMIN — GABAPENTIN 100 MG: 100 CAPSULE ORAL at 07:56

## 2023-02-06 RX ADMIN — Medication 5 MG: at 21:22

## 2023-02-06 RX ADMIN — METOPROLOL TARTRATE 50 MG: 50 TABLET, FILM COATED ORAL at 21:22

## 2023-02-06 RX ADMIN — GABAPENTIN 100 MG: 100 CAPSULE ORAL at 13:18

## 2023-02-06 RX ADMIN — ACETAMINOPHEN 1000 MG: 500 TABLET ORAL at 05:54

## 2023-02-06 RX ADMIN — TRAZODONE HYDROCHLORIDE 100 MG: 50 TABLET ORAL at 21:22

## 2023-02-06 RX ADMIN — ATORVASTATIN CALCIUM 40 MG: 40 TABLET, FILM COATED ORAL at 21:22

## 2023-02-06 RX ADMIN — METOPROLOL TARTRATE 50 MG: 50 TABLET, FILM COATED ORAL at 07:56

## 2023-02-06 RX ADMIN — FAMOTIDINE 20 MG: 20 TABLET, FILM COATED ORAL at 07:56

## 2023-02-06 RX ADMIN — FAMOTIDINE 20 MG: 20 TABLET, FILM COATED ORAL at 21:22

## 2023-02-06 RX ADMIN — ACETAMINOPHEN 1000 MG: 500 TABLET ORAL at 21:22

## 2023-02-06 RX ADMIN — ASPIRIN 81 MG: 81 TABLET, COATED ORAL at 07:56

## 2023-02-06 RX ADMIN — ENOXAPARIN SODIUM 40 MG: 100 INJECTION SUBCUTANEOUS at 07:56

## 2023-02-06 RX ADMIN — ACETAMINOPHEN 1000 MG: 500 TABLET ORAL at 13:18

## 2023-02-06 RX ADMIN — GABAPENTIN 100 MG: 100 CAPSULE ORAL at 21:22

## 2023-02-06 RX ADMIN — OXYCODONE HYDROCHLORIDE 5 MG: 5 TABLET ORAL at 09:09

## 2023-02-06 ASSESSMENT — PAIN SCALES - GENERAL
PAINLEVEL_OUTOF10: 2
PAINLEVEL_OUTOF10: 1
PAINLEVEL_OUTOF10: 0
PAINLEVEL_OUTOF10: 2
PAINLEVEL_OUTOF10: 7
PAINLEVEL_OUTOF10: 1
PAINLEVEL_OUTOF10: 0
PAINLEVEL_OUTOF10: 2
PAINLEVEL_OUTOF10: 0

## 2023-02-06 ASSESSMENT — PAIN DESCRIPTION - DESCRIPTORS
DESCRIPTORS: ACHING
DESCRIPTORS: TINGLING

## 2023-02-06 ASSESSMENT — PAIN - FUNCTIONAL ASSESSMENT: PAIN_FUNCTIONAL_ASSESSMENT: ACTIVITIES ARE NOT PREVENTED

## 2023-02-06 ASSESSMENT — PAIN DESCRIPTION - LOCATION
LOCATION: FOOT
LOCATION: GROIN

## 2023-02-06 ASSESSMENT — PAIN DESCRIPTION - ORIENTATION
ORIENTATION: RIGHT
ORIENTATION: LEFT

## 2023-02-06 ASSESSMENT — PAIN DESCRIPTION - PAIN TYPE: TYPE: ACUTE PAIN

## 2023-02-06 ASSESSMENT — PAIN DESCRIPTION - FREQUENCY: FREQUENCY: INTERMITTENT

## 2023-02-06 ASSESSMENT — PAIN DESCRIPTION - ONSET: ONSET: GRADUAL

## 2023-02-06 NOTE — CARE COORDINATION
CM spoke to Addison (Spouse) [phone] via telephone. CM explained about the durable medical equipment needed for discharge. CM discussed referral to [facility] with Rodríguez as well as the wound vac.  Dharmesh Mcgee RN

## 2023-02-06 NOTE — PROGRESS NOTES
Dr. Lety Overton at bedside to see patient. I removed staples to left groin per MD request.  Wet to dry dressing placed on left groin and right groin dressing redone. Patient tolerated well.  Winston Daniels RN

## 2023-02-06 NOTE — PROGRESS NOTES
Vascular    All sutures removed from bilateral groins- nursing to remove left groin staples. Deep tissues intact  Will benefit from Wound VAC to right groin, wet to dry dressing to open area on left.

## 2023-02-06 NOTE — PLAN OF CARE
Pt. Free from falls or self injury at this time. Falls risk protocol maintained. Call light within reach.

## 2023-02-06 NOTE — PROGRESS NOTES
[facility]: ACUTE REHAB UNIT  SPEECH-LANGUAGE PATHOLOGY      [x] Daily  [] Weekly Care Conference Note  [] Discharge    [place]      NISSA:[birth year]  JVI:[id number]  Rehab Dx/Hx: Aortic dissection (HCC) [I71.00]    Precautions: falls  Home situation: Spouse and three children. Works full-time. Wasn't taking meds prior to admission, shares finances with wife. 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   Cognition: [] WFL  [x] Mild  [] Moderate  [] Severe [] Unable to Assess  [] Other:  Memory: [] WFL  [x] Mild  [] Moderate  [] Severe [] Unable to Assess  [] Other:  Behavior: [x] Alert  [x] Cooperative  [x]  Pleasant  [] Confused  [] Agitated  [] Uncooperative  [] Distractible [] Motivated  [] Self-Limiting [] Anxious  [] Other:  Endurance:  [x] Adequate for participation in SLP sessions  [] Reduced overall  [] Lethargic  [] Other:  Safety: [x] No concerns at this time  [] Reduced insight into deficits  []  Reduced safety awareness [] Not following call light procedures  [] Unable to Assess  [] Other:    Current Diet Order:ADULT ORAL NUTRITION SUPPLEMENT; Breakfast, Dinner; Standard High Calorie/High Protein Oral Supplement  ADULT DIET; Regular;  No Added Salt (3-4 gm)  Swallowing Precautions: Sit up for all meals and thereafter for 30 minutes, Eat with small bites (1/2 tsp; 1 tsp), and Alternate solids with liquids        Date: 2/6/2023      Tx session 1  1230 - 1330 Tx session 2  All tx needs met in session 1   Total Timed Code Min 0 0   Total Treatment Minutes 60 0   Individual Treatment Minutes 60 0   Group Treatment Minutes 0 0   Co-Treat Minutes 0 0   Variance/Reason:  N/a  N/A   Pain Denies   Denies    Pain Intervention [] RN notified  [] Repositioned  [] Intervention offered and patient declined  [x] N/A  [] Other:  [] RN notified  [] Repositioned  [] Intervention offered and patient declined  [x] N/A  [] Other:   Subjective     Pt alert and cooperative, agreeable to tx. Pt partially reclined in bed, did not want to sit upright. Objective:  Goals     Short Term Goals  Time Frame for Short Term Goals: 18 Days (02/13/23)    Goal 1: Pt will complete vocal functioning exercises / resonant voice therapy exercises 10/10 given min cues    Warm-ups at a comfortable pitch:   -\"whoo\" as in whoop: 10x  -\"oh\" as in \"knoll\": 10x  -hum: 10x  -lip trills: 10x    Stretching- glide from lowest to highest:  -\"whoop\" - 10x  -\"oh\" as in \"knoll\": 10x  -hum : 10x  -lip trills: 10x  .   Contraction- glide from highest pitch to lowest pitch   -\"boom\" - 10x   -\"oh\" as in \"knoll\" - 10x  -hum : 10x     Semi-occluded voice therapy  -pt blew bubbles for as long as he could; 5 trials - averaged 13.6 seconds      Goal 2: Pt will complete respiratory retraining exercises 10/10 given min cues   Pt completed the following exercises given min cues:  -exhale while sustaining \"f\": 10x  -exhale while sustaining \"Shhh\": 10x  -exhale while sustaining \"z\": 10x  -breathe in and out through straw: 10x  -breathe in nose and out straw: 10x  -Sniff x2 through nose, then breathe out through straw: 6x  -breathe in nose out through pursed lips: 10x  -Sniff x2 through nose, then breathe out through pursed lips: 10x        Goal 3: Pt will complete graded recall tasks using compensatory strategies with 90% acc given min cues   5 Novel Word Recall  -immediate recall: 5/5 items indep   -20 min delay: pt recalled 2/5 items indep; +3 given category cues      Goal 4: Pt will complete executive function tasks (e.g. meds, time, money, etc) with 90% acc given min cues   Goal not directly targeted. Goal 5: Pt will complete higher level critical thinking tasks with 90% acc given min cues   Letter Alternating / Thought Org Task  -pt instructed to go through the alphabet and alternate naming a girl's name, then a boy's name  -pt completed task with 60% acc given min cues, improved to 80% acc given mod cues       Other areas targeted: N/a  N/A   Education:   SLP edu pt re: rationale of VFE, recall strategies, thought org strategies     Safety Devices: [x] Call light within reach  [] Chair alarm activated  [x] Bed alarm activated  [] Other:  [] Call light within reach  [] Chair alarm activated  [] Bed alarm activated  [] Other: wife present     Assessment: Pt alert and cooperative, agreeable to tx. Pt completed 10 reps of the vocal functioning exercises / resonant voice therapy exercises. Pt completed semi-occluded voice therapy with blowing bubbles - increased time compared to previous sessions. SLP provided pt with 5 novel words to recall. Pt did well immediately, but did require mod category cues to recall 3 items following a 20 min delay (pt indep recalled 2). Pt also required min-mod cues for a thought org task. Continue goals above. Plan: Continue as per plan of care. Additional Information:     Barriers toward progress: N/a   Discharge recommendations:  [] Home independently  [x] Home with assistance []  24 hour supervision  [] ECF [] Other:  Continued Tx Upon Discharge: ? [x] Yes [] No [] TBD based on progress while on ARU [] Vital Stim indicated [] Other:   Estimated discharge date: 02/11/2023    Interventions used this date:  [x] Speech/Language Treatment  [] Instruction in HEP [] Group [] Dysphagia Treatment [x] Cognitive Treatment   [] Other:       Total Time Breakdown / Charges    Time in Time out Total Time / units   Cognitive Tx 1315 1330 15 min / 1 unit    Speech Tx 1230 1315 45 min / 1 unit    Dysphagia Tx -- -- --       Electronically Signed by     Conner Cabezas MA CCC-SLP #[id number]  Speech Language Pathologist

## 2023-02-06 NOTE — PROGRESS NOTES
Physical Therapy  Facility/Department: [facility]  Rehabilitation Physical Therapy Treatment Note    NAME: Delmi Bolton  : 1990 (34 y.o.)  MRN: [id number]  CODE STATUS: Full Code    Date of Service: 23     Vitals:    Restrictions:  Restrictions/Precautions: Fall Risk;Surgical Protocols; General Precautions;Cardiac;Modified Diet  Position Activity Restriction  Sternal Precautions: No Pushing; No Pulling;5# Lifting Restrictions  Other position/activity restrictions: [address] YOUR INCISIONS DAILY WITH A CLEAN WASHCLOTH AND ANTIBACTERIAL SOAP. Do not wash your incisions after you have cleansed other parts of your body; up with assist     SUBJECTIVE                    OBJECTIVE  Cognition  Overall Cognitive Status: WFL  Arousal/Alertness: Appropriate responses to stimuli  Following Commands: Follows all commands without difficulty  Attention Span: Appears intact  Memory: Decreased short term memory  Safety Judgement: Decreased awareness of need for safety  Problem Solving: Assistance required to generate solutions  Insights: Fully aware of deficits  Initiation: Does not require cues  Sequencing: Requires cues for some  Cognition Comment: limited effort/motivation at times but appears to have improved cognition  Orientation  Overall Orientation Status: Within Functional Limits  Orientation Level: Oriented X4    Functional Mobility         Environmental Mobility                       ASSESSMENT/PROGRESS TOWARDS GOALS            Goals  Patient Goals   Patient Goals :  \"Be able to walk\"  Short Term Goals  Time Frame for Short Term Goals: 10 days 23  Short Term Goal 1: Pt will complete bed mobility with modA  Short Term Goal 2: Pt will complete sit to/from stand with modA -- GOAL MET CGA to Kostas x 1-2 with RW  Short Term Goal 3: Pt will complete bed <> chair with LRAD with modA -- GOAL MET Kostas x 1-2 with RW  Short Term Goal 4: Pt will demonstrate gait x 10' in // bars with modA without LOB -- GOAL MET x 100' with beti RW CGA + Kostas  Short Term Goal 5: Pt will tolerate assessment of steps and appropriate goal to be set -- GOAL MET x 8 steps with HR CGA  Long Term Goals  Time Frame for Long Term Goals : 3 weeks 2/16/23  Long Term Goal 1: Pt will demonstrate bed mobility with MI  Long Term Goal 2: Pt will complete functional t/f with LRAD with SBA  Long Term Goal 3: Pt will ambulate >50' with LRAD with SBA without LOB  Long Term Goal 4: Pt will demonstrate car t/f with LRAD with modA  Long Term Goal 5: Pt will ascend/descend 12 steps with HR with S  Additional Goals?: Yes  Long term goal 6: Pt will demonstrate improved  ability on 6MWT to > 500 Ft to demostrate improved endurance and dynamic balance. PLAN OF CARE/SAFETY  Physcial Therapy Plan  General Plan: 5-7 times per week  Specific Instructions for Next Treatment: Progress mobility as tolerated  Current Treatment Recommendations: Strengthening;ROM;Balance training;Gait training;Home exercise program;Safety education & training;Stair training;Functional mobility training;Neuromuscular re-education;Patient/Caregiver education & training;Transfer training; Therapeutic activities; Endurance training;Equipment evaluation, education, & procurement;Positioning; Wheelchair mobility training;Vestibular rehab    EDUCATION           Therapy Time   Individual Concurrent Group Co-treatment   Time In           Time Out           Minutes                     Esdras Calvo, 02/06/23 at 2:19 PM

## 2023-02-06 NOTE — PLAN OF CARE
Patient remains free from falls and injuries. Call light within reach, patient has non skid footwear on and does call staff when he needs to transfer.  Celso James RN

## 2023-02-06 NOTE — PROGRESS NOTES
Occupational Therapy  Facility/Department: [facility]  Rehabilitation Occupational Therapy Daily Treatment Note    Date: 23  Patient Name: John Kohler       Room: [facility]4/5099-53  MRN: [id number]  Account: [de-identified]   : 1990  (34 y.o.) Gender: male                    Past Medical History:  has a past medical history of Influenza A and Marfan syndrome. Past Surgical History:   has a past surgical history that includes Femoral-femoral Bypass Graft (Bilateral, 2023) and Thoracic aortic aneurysm repair (N/A, 1/15/2023). Restrictions  Restrictions/Precautions: Fall Risk;Surgical Protocols; General Precautions;Cardiac;Modified Diet  Other position/activity restrictions: [address] YOUR INCISIONS DAILY WITH A CLEAN WASHCLOTH AND ANTIBACTERIAL SOAP. Do not wash your incisions after you have cleansed other parts of your body; up with assist    Subjective  Subjective: Pt in recliner, wanting to go back to bed, pleasant, no pain, agreeable to OT session, anxious about wound vac and incision, /63, , O2 sats 97% on RA. Restrictions/Precautions: Fall Risk;Surgical Protocols; General Precautions;Cardiac;Modified Diet             Objective     Cognition  Overall Cognitive Status: WFL  Arousal/Alertness: Appropriate responses to stimuli  Following Commands:  Follows all commands without difficulty  Attention Span: Appears intact  Memory: Decreased short term memory  Safety Judgement: Decreased awareness of need for safety  Problem Solving: Assistance required to generate solutions  Insights: Fully aware of deficits  Initiation: Does not require cues  Sequencing: Requires cues for some  Cognition Comment: limited effort/motivation at times but appears to have improved cognition  Orientation  Overall Orientation Status: Within Functional Limits         ADL  Upper Extremity Dressing  Assistance Level: Minimal assistance  Skilled Clinical Factors: to pull shirt partially up in back when doffing, setup to don shirt  Lower Extremity Dressing  Assistance Level: Minimal assistance  Skilled Clinical Factors: min A to stand, min VCs for technique with reacher, good ability to thread BLEs into underwear/pants without assistance  Putting On/Taking Off Footwear  Assistance Level: Maximum assistance  Skilled Clinical Factors: able to doff/don R shoe with reacher, assist to doff/don L shoe and B socks with difficulty with sock aid  Tub/Shower Transfers  Type: Tub  Transfer From: Standing without device  Transfer To: Tub transfer bench  Additional Factors: Set-up; Verbal cues;Cues for hand placement; Increased time to complete  Assistance Level: Moderate assistance  Skilled Clinical Factors: CGA to sit and lift LLE into tub, attempted to stand with LLE in tub and RLE out of tub and step out of tub with LLE over tub and L foot got caught on edge of tub, requiring mod A for balance while adjusting L foot          Functional Mobility  Device:  (no AD)  Activity: To/From therapy gym  Assistance Level: Stand by assist  Skilled Clinical Factors: good balance for mobility in gym for up to 10 minutes to good tolerance and HR elevating to 124  Transfers  Surface: To bed; Wheelchair;From chair with arms  Additional Factors: Set-up; Hand placement cues; Verbal cues; Increased time to complete  Device:  (no AD)  Sit to Stand  Assistance Level: Minimal assistance  Skilled Clinical Factors: from recliner and high backed chair  Stand to Sit  Assistance Level: Contact guard assist  Bed To/From Chair  Technique: Stand step  Assistance Level: Minimal assistance   OT Exercises  Resistive Exercises: 3# RUE, 2# LUE for elbow flexion, wrist extension, supination/pronation, chest press, shoulder flexion to 90* x20 each hand     Assessment  Assessment  Assessment: Pt agreeable to OT session. Pt completed sit<>stands this date with much improved strength and balance for min A from recliner and high backed chair.  Pt performed functional mobility with SPV/SBA and good tolerance to stand for up to 10 minutes. Pt required min A for LB dressing but still requires max A for footwear and min A occasaionally for UB dressing. Pt demostrated good sequencing for collecting number/letter cards in alternating order with no VCs. Pt demonstrated fair strength for BUE ther ex. Continue POC. Activity Tolerance: Patient limited by fatigue;Patient limited by endurance; Patient tolerated treatment well  Discharge Recommendations: 24 hour supervision or assist;Home with Home health OT;S Level 1  OT Equipment Recommendations  Equipment Needed: Yes  Mobility Devices: ADL Assistive Devices  ADL Assistive Devices: Transfer Tub Bench; Toileting - Heavy Duty Commode  Safety Devices  Safety Devices in place: Yes  Type of devices: Call light within reach;Gait belt;Nurse notified; Left in chair (left with PT)    Patient Education  Education  Education Given To: Patient  Education Provided: Plan of Care;Precautions; Safety; Energy Conservation;Transfer Training;Mobility Training;Family Education;Role of Therapy; Fall Prevention Strategies;DME/Home Modifications; Equipment;ADL Function  Education Provided Comments: role of OT, safety with transfers, ADL technique with sternal precautions, weight shifting during sit<>stands, use of a/e  Education Method: Demonstration;Verbal  Barriers to Learning: Cognition  Education Outcome: Verbalized understanding;Continued education needed    Plan  Occupational Therapy Plan  Times Per Week: 5/7 days/week  Current Treatment Recommendations: ROM;Balance training;Functional mobility training; Endurance training;Patient/Caregiver education & training; Safety education & training;Positioning;Self-Care / ADL; Strengthening;Home management training;Equipment evaluation, education, & procurement    Goals  Short Term Goals  Time Frame for Short Term Goals: 1 week- 2/1/23  Short Term Goal 1: Pt will complete functional transfers with max A.  GOAL MET 2/1/23 Pt completed functional transfers with max A. Short Term Goal 2: Pt will perform full body bathing with max A. GOAL MET 2/1/23 Pt completed full body bathing with max A. Short Term Goal 3: Pt will complete toileting with max A. GOAL MET 1/30/23 Pt completed toileting with max A. Short Term Goal 4: Pt will perform grooming with supervision. GOAL MET 2/2/23 Pt performed grooming with supervision. Short Term Goal 5: Pt will complete full body dressing with max A. GOAL MET 2/1/23 Pt completed full body dressing with max A. Long Term Goals  Time Frame for Long Term Goals : 3 weeks- 2/15/23  Long Term Goal 1: Pt will perform functional transfers with CGA. Long Term Goal 2: Pt will complete toileting with CGA. Long Term Goal 3: Pt will complete full body dressing with CGA. Long Term Goal 4: Pt will perform full body bathing with CGA. Long Term Goal 5: Pt will complete IADL task with min A.     Therapy Time   Individual Concurrent Group Co-treatment   Time In 1330         Time Out 1430         Minutes 60         Timed Code Treatment Minutes: 16 Ciera Parra

## 2023-02-06 NOTE — PROGRESS NOTES
Physical Therapy  Facility/Department: [facility]  Rehabilitation Physical Therapy Treatment Note    NAME: Osman Ferraro  : 1990 (34 y.o.)  MRN: [id number]  CODE STATUS: Full Code    Date of Service: 23       Restrictions:  Restrictions/Precautions: Fall Risk;Surgical Protocols; General Precautions;Cardiac;Modified Diet  Position Activity Restriction  Sternal Precautions: No Pushing; No Pulling;5# Lifting Restrictions  Other position/activity restrictions: [address] YOUR INCISIONS DAILY WITH A CLEAN WASHCLOTH AND ANTIBACTERIAL SOAP. Do not wash your incisions after you have cleansed other parts of your body; up with assist     SUBJECTIVE  Subjective  Subjective: Pt supine in bed on approach, wound care present applying R wound vac on arrival  Pain: Reports discomfort from wound vac placement but does not provide a pain rating               OBJECTIVE  Cognition  Overall Cognitive Status: WFL  Arousal/Alertness: Appropriate responses to stimuli  Following Commands:  Follows all commands without difficulty  Attention Span: Appears intact  Memory: Decreased short term memory  Safety Judgement: Decreased awareness of need for safety  Problem Solving: Assistance required to generate solutions  Insights: Fully aware of deficits  Initiation: Does not require cues  Sequencing: Requires cues for some  Cognition Comment: limited effort/motivation at times but appears to have improved cognition  Orientation  Overall Orientation Status: Within Functional Limits  Orientation Level: Oriented X4    Functional Mobility  Supine to Sit  Assistance Level: Maximum assistance  Skilled Clinical Factors: HOB flat no BR, Max assist at trunk increased time to complete, cues for sequence and safety    Balance  Sitting Balance: Independent  Standing Balance: Stand by assistance  Standing Balance  Activity: SBA without AD  Dynamic balance:   X 8 reps   Gait forwards 12' no AD SBA   Step up to and off 6\" step without UE support CGA, place ring on target (shoulder height)   Retrowalking x 12' no AD CGA  Pt mildly unsteady with retrowalking and step up/down. Seated rest break following task, no overt LOB but mild retropulsion during retrowalking. Completed to improve strength and dynamic balance to decrease risk of falls. Transfers  Surface: From chair with arms; Wheelchair;From bed  Additional Factors: Increased time to complete;Hand placement cues; Verbal cues  Device:  (no AD)  Sit to Stand  Assistance Level: Contact guard assist;Minimal assistance  Skilled Clinical Factors: pt completes multiple t/f throughout session with increased time to complete, min cues for sternal px and safety/positioning, grossly CGA, intermittent Kostas from lower surfaces this date  Stand to Sit  Assistance Level: Contact guard assist;Stand by assist  Skilled Clinical Factors: Grossly CGA to SBA without AD, min cues for safety      Environmental Mobility  Ambulation  Surface: Level surface  Device:  (no AD)  Distance: 30' + 200' x 2 + 80'  Activity: Within Room; Within Unit  Activity Comments: Distances limited somewhat by fatigue and SOB, HR up to 115-130 during gait with seated rest break to recover  Additional Factors: Verbal cues; Increased time to complete  Assistance Level: Stand by assist  Gait Deviations: Slow julita;Decreased step length bilateral;Unsteady gait; Decreased heel strike right;Decreased heel strike left;Decreased weight shift left; Wide base of support;Decreased trunk rotation  Skilled Clinical Factors: Grossly reciprcoal pattern, B decreased heel strike, slight antalgic pattern, increased truncal sway. Second bout of 200' completed with intermittent cues to change gait velocity to challenge dynamic balance. Stairs  Stair Height: 6''  Device: Bilateral handrails  Number of Stairs: 16  Additional Factors: Verbal cues; Increased time to complete;Non-reciprocal going down;Non-reciprocal going up  Assistance Level: Stand by assist  Skilled Clinical Factors: Min cues for safety      2nd PT Session: Performed By Cristine Goddard, SPT  -Pt found in gym on approach with OT. Agreeable to PT. Required SBA for STS and SBA for amb w/o AD on this date. Demo'd an increased tolerance to activity. Had reports of fatigue, but otherwise remained asymptomatic. Pt demo'd understanding of sternal precautions for all functional mobility and did not require VC. Ambulation  -Amb w/o AD x 520 ft x 520 ft x 110 ft. Required SBA for all 3 trials. Required a seated rest break in between all bouts of amb. ++ time to complete. Demo'd slow julita, decreased step length bilaterally, and lateral trunk sway during amb. No overt LOB. Amb within unit. Pulse measured at low 130s after each bout of amb. Pt left in chair, with chair alarm, and call light within reach at end of session. Discussed progressions to activity tolerance for subsequent PT sessions. ASSESSMENT/PROGRESS TOWARDS GOALS  /86  , increased to 115-120 with activity  SpO2 95% on RA    Assessment  Assessment: Pt seen in am for PT tx, pt with wound care on arrival for placement of wound vac so session delayed and pt reports discomfort from wound vac placement. Pt continues to demonstrate good progress with mobility demonstrating bed mobility with maxA, t/f without AD with CGA up to Kostas from lower surfaces initially, SBA for gait up to 200' without AD and SBA for 16 steps with HR. Pt is limited by decreased activity tolerance and balance. Pt will benefit from continued skilled PT in ARU to address above deficits, will continue to progress mobility as tolerated. Activity Tolerance: Patient limited by fatigue;Patient limited by endurance; Patient tolerated treatment well  Discharge Recommendations: 24 hour supervision or assist;Home with Home health PT  PT Equipment Recommendations  Mobility Devices: Svetlana Sameere: Rolling  Other: Bariatric RW, CTA for need      Goals  Patient Goals   Patient Goals : \"Be able to walk\"  Short Term Goals  Time Frame for Short Term Goals: 10 days 2/05/23  Short Term Goal 1: Pt will complete bed mobility with modA -- 2/06: GOAL NOT MET maxA  Short Term Goal 2: Pt will complete sit to/from stand with modA -- GOAL MET CGA to Kostas x 1-2 with RW  Short Term Goal 3: Pt will complete bed <> chair with LRAD with modA -- GOAL MET Kostas x 1-2 with RW  Short Term Goal 4: Pt will demonstrate gait x 10' in // bars with modA without LOB -- GOAL MET x 100' with beti RW CGA + Kostas  Short Term Goal 5: Pt will tolerate assessment of steps and appropriate goal to be set -- GOAL MET x 8 steps with HR CGA  Long Term Goals  Time Frame for Long Term Goals : 3 weeks 2/16/23  Long Term Goal 1: Pt will demonstrate bed mobility with MI  Long Term Goal 2: Pt will complete functional t/f with LRAD with SBA  Long Term Goal 3: Pt will ambulate >50' with LRAD with SBA without LOB -- 2/06: GOAL MET upgrade goal: Pt will ambulate >80' with LRAD with MI without LOB  Long Term Goal 4: Pt will demonstrate car t/f with LRAD with modA  Long Term Goal 5: Pt will ascend/descend 12 steps with HR with S  Additional Goals?: Yes  Long term goal 6: Pt will demonstrate improved  ability on 6MWT to > 500 Ft to demostrate improved endurance and dynamic balance. PLAN OF CARE/SAFETY  Physcial Therapy Plan  General Plan: 5-7 times per week  Therapy Duration: 3 Weeks  Specific Instructions for Next Treatment: Progress mobility as tolerated  Current Treatment Recommendations: Strengthening;ROM;Balance training;Gait training;Home exercise program;Safety education & training;Stair training;Functional mobility training;Neuromuscular re-education;Patient/Caregiver education & training;Transfer training; Therapeutic activities; Endurance training;Equipment evaluation, education, & procurement;Positioning; Wheelchair mobility training;Vestibular rehab  Safety Devices  Type of Devices: Patient at risk for falls; All fall risk precautions in place; All nikolas prominences offloaded;Call light within reach;Nurse notified;Gait belt;Left in chair    EDUCATION  Education  Education Given To: Patient  Education Provided: Role of Therapy;Plan of Care;Precautions; Fall Prevention Strategies;Transfer Training; Safety;ADL Function; Energy Conservation;Mobility Training;Home Exercise Program  Education Provided Comments: Educated on role of PT, safe progression of activity, monitoring vitals with mobility.   Education Method: Demonstration;Verbal  Barriers to Learning: None  Education Outcome: Verbalized understanding;Continued education needed        Therapy Time   Individual Concurrent Group Co-treatment   Time In 0950         Time Out 1030         Minutes 40             Second Session Therapy Time:   Individual Concurrent Group Co-treatment   Time In 1435         Time Out 1505         Minutes 30             Timed Code Treatment Minutes: [address] Heddy Aase, PT, DPT 02/06/23 at 3:21 PM  Jacky Madden, SPT

## 2023-02-06 NOTE — PROGRESS NOTES
Jeanette Estrada  2/6/2023  [id number]    Chief Complaint: Aortic dissection ([address]    Subjective:   No acute events overnight. Today Akira Lazaro is seen in his room with wound care present. He reports feeling anxious about having wound vac placed. He denies any pain at the site of his groin incisions. ROS: denies f/c, n/v, cp, sob    Objective:  Patient Vitals for the past 24 hrs:   BP Temp Temp src Pulse Resp SpO2   02/06/23 0753 (!) 145/86 97.7 °F (36.5 °C) Oral (!) 111 16 95 %   02/05/23 2130 127/61 98.7 °F (37.1 °C) Oral (!) 101 18 95 %     Gen: No distress, pleasant. Seated up in chair  HEENT: Normocephalic, atraumatic. CV: extremities well perfused  Resp: No respiratory distress. Abd: Soft, nondistended  Ext: No edema. Neuro: Alert, oriented, appropriately interactive. Psych: appropriate mood and affect    Wt Readings from Last 3 Encounters:   02/03/23 (!) 327 lb 6.4 oz (148.5 kg)   01/25/23 279 lb 9 oz (126.8 kg)   03/12/20 (!) 350 lb (158.8 kg)       Laboratory data:   Lab Results   Component Value Date    WBC 9.8 02/06/2023    HGB 9.2 (L) 02/06/2023    HCT 29.5 (L) 02/06/2023    MCV 80.8 02/06/2023     (H) 02/06/2023       Lab Results   Component Value Date/Time     02/06/2023 07:04 AM    K 4.6 02/06/2023 07:04 AM    K 4.4 02/01/2023 07:29 AM     02/06/2023 07:04 AM    CO2 23 02/06/2023 07:04 AM    BUN 13 02/06/2023 07:04 AM    CREATININE 0.6 02/06/2023 07:04 AM    GLUCOSE 98 02/06/2023 07:04 AM    CALCIUM 9.0 02/06/2023 07:04 AM        Therapy progress:  PT  Position Activity Restriction  Sternal Precautions: No Pushing, No Pulling, 5# Lifting Restrictions  Other position/activity restrictions: [address] YOUR INCISIONS DAILY WITH A CLEAN WASHCLOTH AND ANTIBACTERIAL SOAP.  Do not wash your incisions after you have cleansed other parts of your body; up with assist  Objective     Sit to Stand: Dependent/Total, 2 Person Assistance  Stand to Sit: Dependent/Total, 2 Person Assistance  Bed to Chair: Dependent/Total, 2 Person Assistance     OT  PT Equipment Recommendations  Equipment Needed: Yes  Mobility Devices: Anton Siad: Rolling  Other: Bariatric RW, CTA for need  Equipment Used: Drop-arm commode  Toilet Transfers Comments: use of Trevor Ball to transfer on/off BSC over toilet  Assessment        SLP                Body mass index is 39.85 kg/m². Assessment and Plan:  Shell Steven is a 34year old male with a past medical history significant for Marfan syndrome and morbid obesity who presented to [facility] on [facility]/15[facility]23 with severe chest pain and lower extremity numbness, found to have aortic dissection s/p replacement. He was admitted to [facility] on 1/26/23 due to functional deficits below his baseline. Type A Aortic Dissection s/p replacement  - sternal precautions  - asa, statin BB  - goal SBP<140  - PT, OT     Right to left fem-fem bypass  - asa, statin, BB  - every other staple removed 2/1  - Vascular following, incision care per orders. Wound vac to right groin incision and wet to dry dressings on left     Vocal cord paralysis  - ENT evaluated during acute stay  - ST     Aspiration pneumonia, resolved  - completed augmentin     Fluid overload, improved  - completed week of lasix  - discontinued K and Mg and monitor  - daily weights, I/Os     Acute Blood Loss Anemia  - Hb stable  - monitor and transfuse for Hb<7     Marfan Syndrome  - noted     Obesity  - BMI 34  - encourage lifestyle modifications     Bowels: adjust medications as needed for regular bowel movements     Bladder: Check PVR x 3. [address] if PVR > 200ml or if any volume is > 500 ml. Sleep: Trazodone provided prn. PPX  DVT: lovenox  GI: pepcid     Follow up appointments: CT surgery, PCP    Services: Torres Pearce, OT, [address] [place] [address] , nursing  EDOD: 2/11    Arcelia Mon.  Radha Powell MD 2/6/2023, 3:19 PM

## 2023-02-07 PROCEDURE — 97535 SELF CARE MNGMENT TRAINING: CPT

## 2023-02-07 PROCEDURE — 92507 TX SP LANG VOICE COMM INDIV: CPT

## 2023-02-07 PROCEDURE — 1280000000 HC REHAB R&B

## 2023-02-07 PROCEDURE — 97116 GAIT TRAINING THERAPY: CPT

## 2023-02-07 PROCEDURE — 97129 THER IVNTJ 1ST 15 MIN: CPT

## 2023-02-07 PROCEDURE — 6370000000 HC RX 637 (ALT 250 FOR IP): Performed by: STUDENT IN AN ORGANIZED HEALTH CARE EDUCATION/TRAINING PROGRAM

## 2023-02-07 PROCEDURE — 97530 THERAPEUTIC ACTIVITIES: CPT

## 2023-02-07 PROCEDURE — 97130 THER IVNTJ EA ADDL 15 MIN: CPT

## 2023-02-07 PROCEDURE — 6360000002 HC RX W HCPCS: Performed by: STUDENT IN AN ORGANIZED HEALTH CARE EDUCATION/TRAINING PROGRAM

## 2023-02-07 PROCEDURE — 97110 THERAPEUTIC EXERCISES: CPT

## 2023-02-07 RX ADMIN — GABAPENTIN 100 MG: 100 CAPSULE ORAL at 14:24

## 2023-02-07 RX ADMIN — ENOXAPARIN SODIUM 40 MG: 100 INJECTION SUBCUTANEOUS at 09:07

## 2023-02-07 RX ADMIN — ASPIRIN 81 MG: 81 TABLET, COATED ORAL at 09:07

## 2023-02-07 RX ADMIN — BISACODYL 5 MG: 5 TABLET, COATED ORAL at 09:07

## 2023-02-07 RX ADMIN — Medication 5 MG: at 20:23

## 2023-02-07 RX ADMIN — FAMOTIDINE 20 MG: 20 TABLET, FILM COATED ORAL at 20:23

## 2023-02-07 RX ADMIN — METOPROLOL TARTRATE 50 MG: 50 TABLET, FILM COATED ORAL at 09:06

## 2023-02-07 RX ADMIN — GABAPENTIN 100 MG: 100 CAPSULE ORAL at 09:07

## 2023-02-07 RX ADMIN — GABAPENTIN 100 MG: 100 CAPSULE ORAL at 20:23

## 2023-02-07 RX ADMIN — ACETAMINOPHEN 1000 MG: 500 TABLET ORAL at 20:23

## 2023-02-07 RX ADMIN — TRAZODONE HYDROCHLORIDE 100 MG: 50 TABLET ORAL at 20:23

## 2023-02-07 RX ADMIN — METOPROLOL TARTRATE 50 MG: 50 TABLET, FILM COATED ORAL at 20:23

## 2023-02-07 RX ADMIN — ATORVASTATIN CALCIUM 40 MG: 40 TABLET, FILM COATED ORAL at 20:23

## 2023-02-07 RX ADMIN — FAMOTIDINE 20 MG: 20 TABLET, FILM COATED ORAL at 09:06

## 2023-02-07 RX ADMIN — ACETAMINOPHEN 1000 MG: 500 TABLET ORAL at 14:24

## 2023-02-07 RX ADMIN — ACETAMINOPHEN 1000 MG: 500 TABLET ORAL at 06:15

## 2023-02-07 ASSESSMENT — PAIN SCALES - GENERAL
PAINLEVEL_OUTOF10: 2
PAINLEVEL_OUTOF10: 0
PAINLEVEL_OUTOF10: 0

## 2023-02-07 ASSESSMENT — PAIN DESCRIPTION - LOCATION: LOCATION: GROIN

## 2023-02-07 ASSESSMENT — PAIN DESCRIPTION - ORIENTATION: ORIENTATION: RIGHT

## 2023-02-07 ASSESSMENT — PAIN DESCRIPTION - DESCRIPTORS: DESCRIPTORS: ACHING

## 2023-02-07 NOTE — PLAN OF CARE
At risk for skin breakdown. Assess patients skin integrity every shift. Encourage turning every two hours in bed. Keep heels elevated off bed. Protective barrier placed as needed Use cushions in recliner and wheelchair and pillows in bed for protection. Use barrier wipes in the event of incontinence. Will continue to monitor for skin breakdown.

## 2023-02-07 NOTE — PLAN OF CARE
Problem: Nutrition Deficit:  Goal: Optimize nutritional status  Outcome: Progressing     Problem: Skin/Tissue Integrity  Goal: Absence of new skin breakdown  Description:  Monitor for areas of redness and/or skin breakdown  2/7/2023 1326 by Maria C Maldonado RN  Outcome: Progressing       Problem: Pain  Goal: Verbalizes/displays adequate comfort level or baseline comfort level  Outcome: Progressing

## 2023-02-07 NOTE — PROGRESS NOTES
Comprehensive Nutrition Assessment    Type and Reason for Visit:  Reassess    Nutrition Recommendations/Plan:   Continue no added salt diet   Continue Ensure BID - prefers vanilla   Encourage PO intakes as tolerated   Monitor further diet education needs   Monitor nutrition adequacy, pertinent labs, bowel habits, wt changes, and clinical progress     Malnutrition Assessment:  Malnutrition Status: Moderate malnutrition (01/26/23 1539)    Context:  Acute Illness     Findings of the 6 clinical characteristics of malnutrition:  Energy Intake:  75% or less of estimated energy requirements for 7 or more days  Weight Loss:  Greater than 5% over 1 month (17% weight loss in 2 weeks per EMR, some r/t fluid)       Nutrition Assessment:    Follow up: Pt remains nutritionally at risk AEB fair appetite. Variable PO intakes per EMR, 1-100%. Family brings in some meals for pt. Weights down since admission, but variable. Continues to drink at least 1 Ensure daily, will continue. Further discussed diet for home. No further nutrition questions or concerns at this time. Will monitor. Nutrition Related Findings:    Labs reviewed. Active BS. BM on 2/7. Wound Type: Wound Vac       Current Nutrition Intake & Therapies:    Average Meal Intake: 1-25%, 26-50%, 51-75%, %  Average Supplements Intake: Unable to assess  ADULT ORAL NUTRITION SUPPLEMENT; Breakfast, Dinner; Standard High Calorie/High Protein Oral Supplement  ADULT DIET; Regular; No Added Salt (3-4 gm)    Anthropometric Measures:  Height: 6' 4\" (193 cm)  Ideal Body Weight (IBW): 202 lbs (92 kg)    Admission Body Weight: 338 lb (153.3 kg) (bed scale)  Current Body Weight: 322 lb (146.1 kg), 138.1 % IBW.  Weight Source: Standing Scale  Current BMI (kg/m2): 39.2  Usual Body Weight:  (stated weight hx 330-340 lb)                       BMI Categories: Obese Class 2 (BMI 35.0 -39.9)    Estimated Daily Nutrient Needs:  Energy Requirements Based On: Kcal/kg (25-30)  Weight Used for Energy Requirements: Ideal  Energy (kcal/day): 2491-1553 kcal  Weight Used for Protein Requirements: Ideal (1.0-1.2 g/kg)  Protein (g/day):  g  Method Used for Fluid Requirements: 1 ml/kcal  Fluid (ml/day): 3815-6614 mL    Nutrition Diagnosis:   Inadequate oral intake related to inadequate protein-energy intake as evidenced by poor intake prior to admission, weight loss    Nutrition Interventions:   Food and/or Nutrient Delivery: Continue Current Diet, Continue Oral Nutrition Supplement  Nutrition Education/Counseling: Education completed  Coordination of Nutrition Care: Continue to monitor while inpatient, Interdisciplinary Rounds       Goals:  Previous Goal Met: Progressing toward Goal(s)  Goals: PO intake 50% or greater, prior to discharge       Nutrition Monitoring and Evaluation:   Behavioral-Environmental Outcomes: Readiness for Change  Food/Nutrient Intake Outcomes: Food and Nutrient Intake, Supplement Intake  Physical Signs/Symptoms Outcomes: Biochemical Data, Nutrition Focused Physical Findings, Weight    Discharge Planning:    Continue current diet, Continue Oral Nutrition Supplement     Isamar Fofana, MS, RD, LD  Contact: [phone]

## 2023-02-07 NOTE — PROGRESS NOTES
[facility]: ACUTE REHAB UNIT  SPEECH-LANGUAGE PATHOLOGY      [x] Daily  [] Weekly Care Conference Note  [] Discharge    [place]      J:[birth year]  YWQ:[id number]  Rehab Dx/Hx: Aortic dissection (HCC) [I71.00]    Precautions: falls  Home situation: Spouse and three children. Works full-time. Wasn't taking meds prior to admission, shares finances with wife. ST Dx: [] Aphasia  [] Dysarthria  [] Apraxia   [] Oropharyngeal dysphagia [x] Cognitive Impairment  [x] Other: voice tx  Date of Admit: 1/26/2023  Room #: 2005/5328-79    Current functional status (updated daily):         Pt being seen for : [x] Speech/Language Treatment  [] Dysphagia Treatment [x] Cognitive Treatment  [] Other:  Communication: []WFL  [] Aphasia  [] Dysarthria  [] Apraxia  [] Pragmatic Impairment [] Non-verbal  [] Hearing Loss  [x] Other: Hoarse, weak, dysphonic 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   Cognition: [] WFL  [x] Mild  [] Moderate  [] Severe [] Unable to Assess  [] Other:  Memory: [] WFL  [x] Mild  [] Moderate  [] Severe [] Unable to Assess  [] Other:  Behavior: [x] Alert  [x] Cooperative  [x]  Pleasant  [] Confused  [] Agitated  [] Uncooperative  [] Distractible [] Motivated  [] Self-Limiting [] Anxious  [] Other:  Endurance:  [x] Adequate for participation in SLP sessions  [] Reduced overall  [] Lethargic  [] Other:  Safety: [x] No concerns at this time  [] Reduced insight into deficits  []  Reduced safety awareness [] Not following call light procedures  [] Unable to Assess  [] Other:    Current Diet Order:ADULT ORAL NUTRITION SUPPLEMENT; Breakfast, Dinner; Standard High Calorie/High Protein Oral Supplement  ADULT DIET; Regular;  No Added Salt (3-4 gm)  Swallowing Precautions: Sit up for all meals and thereafter for 30 minutes, Eat with small bites (1/2 tsp; 1 tsp), and Alternate solids with liquids        Date: 2/7/2023      Tx session 1  1030 - 1130 Tx session 2  All tx needs met in session 1   Total Timed Code Min 30 0   Total Treatment Minutes 60 0   Individual Treatment Minutes 60 0   Group Treatment Minutes 0 0   Co-Treat Minutes 0 0   Variance/Reason:  N/a  N/A   Pain Buttocks pain Denies    Pain Intervention [] RN notified  [x] Repositioned  [] Intervention offered and patient declined  [] N/A  [] Other:  [] RN notified  [] Repositioned  [] Intervention offered and patient declined  [x] N/A  [] Other:   Subjective     Pt alert and cooperative, agreeable to tx. Pt upright in bedside chair for session. Objective:  Goals     Short Term Goals  Time Frame for Short Term Goals: 18 Days (02/13/23)    Goal 1: Pt will complete vocal functioning exercises / resonant voice therapy exercises 10/10 given min cues    Warm-ups at a comfortable pitch:   -\"whoo\" as in whoop: 10x  -\"oh\" as in \"knoll\": 10x  -hum: 10x  -lip trills: 10x    Stretching- glide from lowest to highest:  -\"whoop\" - 10x  -\"oh\" as in \"knoll\": 10x  -hum : 10x  -lip trills: 10x  . Contraction- glide from highest pitch to lowest pitch   -\"boom\" - 10x   -\"oh\" as in \"knoll\" - 10x    Semi-occluded voice therapy  -pt blew bubbles for as long as he could; 5 trials - averaged 14.4 seconds      Goal 2: Pt will complete respiratory retraining exercises 10/10 given min cues   Pt completed the following exercises given min cues:  -exhale while sustaining \"f\": 10x  -exhale while sustaining \"Shhh\": 10x  -exhale while sustaining \"z\": 10x  -breathe in and out through straw: 10x  -breathe in nose and out straw: 10x  -Sniff x2 through nose, then breathe out through straw: 6x  -breathe in nose out through pursed lips: 10x  -Sniff x2 through nose, then breathe out through pursed lips: 10x        Goal 3: Pt will complete graded recall tasks using compensatory strategies with 90% acc given min cues   5 Novel Word Recall on MoCA  -immediate recall trial 1: 5/5  -immediate recall trial 2: 4/5  -5 min delay: 4/5 indep; +1 given min cues    Goal met 02/07/23.        Goal 4: Pt will complete executive function tasks (e.g. meds, time, money, etc) with 90% acc given min cues   At d/c, wife is going to manage all of pt's medications. Clock Drawing on MoCA  -pt completed with 100% acc   -accurate contour, hands, and numbers    Calculation on MoCA  -pt completed with 100% acc     Goal met 02/07/23. Goal 5: Pt will complete higher level critical thinking tasks with 90% acc given min cues   Gleason Making on MoCA  -100% acc     Number Repetition on MoCA  -forward and backward - 100% acc    Divergent Naming on MoCA  -pt named 8 words that begin with S in 1 min  -received 0/1 points    Abstraction on MoCA - 100% acc    Goal met 02/07/23. Other areas targeted: Repeat MoCA - 28/30 (1 point added for 12 yr edu)  -executive / visuospatial: 4/5  -naming: 3/3  -attention: 6/6  -language: 2/3  -abstraction: 2/2  -delayed recall: 4/5  -orientation: 6/6   N/A   Education:   SLP edu pt re: rationale of VFE, results of repeat MoCA    Safety Devices: [x] Call light within reach  [x] Chair alarm activated  [] Bed alarm activated  [x] Other:  wife at bedside  [] Call light within reach  [] Chair alarm activated  [] Bed alarm activated  [] Other: wife present     Assessment: Pt alert and cooperative, agreeable to tx. Pt completed 10 reps of the vocal functioning exercises / resonant voice therapy exercises. Pt completed semi-occluded voice therapy with blowing bubbles - increased time compared to previous sessions. SLP administered the MoCA for re-assessment of cognitive-linguistic abilities. Pt scored 28/30 points compared to 24/30 on initial eval. Pt demonstrated improvement with recall, executive function, and thought org. Cognitive goals met this date. Continue voice goals above. Plan: Continue as per plan of care.       Additional Information:     Barriers toward progress: N/a   Discharge recommendations:  [] Home independently  [x] Home with assistance []  24 hour supervision  [] ECF [] Other:  Continued Tx Upon Discharge: ? [x] Yes [] No [] TBD based on progress while on ARU [] Vital Stim indicated [] Other:   Estimated discharge date: 02/11/2023    Interventions used this date:  [x] Speech/Language Treatment  [] Instruction in HEP [] Group [] Dysphagia Treatment [x] Cognitive Treatment   [] Other:       Total Time Breakdown / Charges    Time in Time out Total Time / units   Cognitive Tx 1100 1130 30 min / 2 units    Speech Tx 1030 1100 30 min / 1 unit    Dysphagia Tx -- -- --       Electronically Signed by     Luc Morton MA CCC-SLP #[id number]  Speech Language Pathologist

## 2023-02-07 NOTE — PATIENT CARE CONFERENCE
[facility]  Inpatient Rehabilitation  Weekly Team Conference Note    Date: 2023  Patient Name: Sergio Prado        MRN: [id number]    : 1990  (34 y.o.)  Gender: male   Referring Practitioner: Oralia Hanna MD  Diagnosis: Aortic dissection s/p aorta replacement      Interventions to be utilized toward barriers to discharge, per discipline:  NURSING  Nursing observed barriers to dc: Decreased motivation, Limited insight into deficits, Decreased endurance, Wound Care, and Medication managment  Nursing interventions: Medication management, assist with ADL's, encourage oob and up to chair, wound care, sternal precautions  Family Education: Yes  Fall Risk:  Yes    Stay with me?: No    PHYSICAL THERAPY  Physical therapy observed barriers to dc:      Baseline: lives in 2 level condo with level entry (bed/bath upstairs), ind with no AD, works full time [employer]              Current level: modA bed mobility, SBA transfers, gait up to 500' no AD SBA to S, up to 20 steps with HR SBA              Barriers to DC: strength, endurance, steps in home, sternal px              Needs in order to achieve dc home/next level of care: home with assist PRN, HHPT, needs to be S to I functional mobility, gait and stairs to d/c home      Physical therapy interventions:   Current Treatment Recommendations: Strengthening, ROM, Balance training, Gait training, Home exercise program, Safety education & training, Stair training, Functional mobility training, Neuromuscular re-education, Patient/Caregiver education & training, Transfer training, Therapeutic activities, Endurance training, Equipment evaluation, education, & procurement, Positioning, Wheelchair mobility training, Vestibular rehab    PT Goals:            Short Term Goals  Time Frame for Short Term Goals: 10 days 23  Short Term Goal 1: Pt will complete bed mobility with modA -- : GOAL NOT MET maxA  Short Term Goal 2: Pt will complete sit to/from stand with modA -- GOAL MET CGA to Kostas x 1-2 with RW  Short Term Goal 3: Pt will complete bed <> chair with LRAD with modA -- GOAL MET Kostas x 1-2 with RW  Short Term Goal 4: Pt will demonstrate gait x 10' in // bars with modA without LOB -- GOAL MET x 100' with beti RW CGA + Kostas  Short Term Goal 5: Pt will tolerate assessment of steps and appropriate goal to be set -- GOAL MET x 8 steps with HR CGA            Long Term Goals  Time Frame for Long Term Goals : 3 weeks 2/16/23  Long Term Goal 1: Pt will demonstrate bed mobility with MI  Long Term Goal 2: Pt will complete functional t/f with LRAD with SBA  Long Term Goal 3: Pt will ambulate >50' with LRAD with SBA without LOB -- 2/06: GOAL MET upgrade goal: Pt will ambulate >150' with LRAD with MI without LOB  Long Term Goal 4: Pt will demonstrate car t/f with LRAD with modA -- 2/07: GOAL MET Kostas; upgrade goal: Pt will complete car t/f with LRAD and SBA  Long Term Goal 5: Pt will ascend/descend 12 steps with HR with S  Additional Goals?: Yes  Long term goal 6: Pt will demonstrate improved  ability on 6MWT to > 500 Ft to demostrate improved endurance and dynamic balance. PT Assessment:  Recommendation:   PT Equipment Recommendations  Equipment Needed: No  Mobility Devices: Taryn [place]: Rolling  Other: No needs anticipated  Assessment  Assessment: Pt seen in am for PT tx, pt demonstrates continued progress with all mobility, able to complete bed mobility with SBA to modA with cues for sequence, t/f with grossly SBA from various surfaces, gait up to 200' without AD with SBA to S and 20 steps with HR with SBA. Pt does require encouragement and education on completing t/f from lower surfaces and working on difficult tasks including bed mobility and car t/f. Pt participates in A balance assessment and scores 14, demonstrating increased fall risk. Pt has most difficulty with stepping over item and gait in narrow FIONA.  Pt will benefit from continued skilled PT in ARU to address above deficits, will continue to progress mobility as tolerated  Activity Tolerance: Patient limited by fatigue;Patient limited by endurance; Patient tolerated treatment well  Discharge Recommendations: 24 hour supervision or assist;Home with Home health PT  PT Equipment Recommendations  Equipment Needed: No  Other: No needs anticipated      OCCUPATIONAL THERAPY  Occupational therapy observed barriers to dc:    Baseline: mod I all ADLs and transfers, working FT              Current level: mod A LB ADLs, SBA sit>stands and transfers              Barriers to DC: anxiety              Needs in order to achieve dc home/next level of care: min A/SPV for all ADLs and transfers, DME: TTB, home with PRN, HHOT    Occupational Therapy interventions:  Current Treatment Recommendations: ROM, Balance training, Functional mobility training, Endurance training, Patient/Caregiver education & training, Safety education & training, Positioning, Self-Care / ADL, Strengthening, Home management training, Equipment evaluation, education, & procurement    OT Goals:  Patient Goals   Patient goals : \"I want to walk and do things on my own\"  Short Term Goals  Time Frame for Short Term Goals: 1 week- 2/1/23  Short Term Goal 1: Pt will complete functional transfers with max A. GOAL MET 2/1/23 Pt completed functional transfers with max A. Short Term Goal 2: Pt will perform full body bathing with max A. GOAL MET 2/1/23 Pt completed full body bathing with max A. Short Term Goal 3: Pt will complete toileting with max A. GOAL MET 1/30/23 Pt completed toileting with max A. Short Term Goal 4: Pt will perform grooming with supervision. GOAL MET 2/2/23 Pt performed grooming with supervision. Short Term Goal 5: Pt will complete full body dressing with max A. GOAL MET 2/1/23 Pt completed full body dressing with max A.   Long Term Goals  Time Frame for Long Term Goals : 3 weeks- 2/15/23  Long Term Goal 1: Pt will perform functional transfers with CGA. GOAL MET 2/7/23 Pt peformed functional transfers with CGA. Long Term Goal 2: Pt will complete toileting with CGA. Long Term Goal 3: Pt will complete full body dressing with CGA. Long Term Goal 4: Pt will perform full body bathing with CGA. Long Term Goal 5: Pt will complete IADL task with min JANETH    OT Assessment:  Assessment  Assessment: Pt agreeable to OT session. Pt completed functional transfers with much improved ability to perform sit>stands without assistance. Pt required CGA on 1 attempt for sit>stand but SBA for majority of session with sit<>stands and mobility. Pt tolerated standing for 3-4 minutes for grooming and improved use of reacher/sock aid for LB dressing. Pt tolerated core exercises with fair strength and encouragement to complete all tasks. Pt and spouse verbalized understanding of all education regarding ADL safety and completion with a/e and incision/wound care. Continue POC. Activity Tolerance: Patient limited by fatigue;Patient limited by endurance; Patient tolerated treatment well  Discharge Recommendations: Home with Home health OT;S Level 1;Home with assist PRN  OT Equipment Recommendations  Equipment Needed: Yes  Mobility Devices: ADL Assistive Devices  ADL Assistive Devices: Transfer Tub Bench       SPEECH THERAPY  Speech therapy observed barriers to dc:               Baseline: Pt lives with spouse and three children. Works full-time. Wasn't taking meds prior to admission, shares finances with wife. Active . Current level: Cognition WFL. Hoarse, weak, reduced vocal quality. Dysphonia due to left vocal fold paralysis.                Barriers to DC: Reduced endurance              Needs in order to achieve dc home/next level of care: Carryover of compensatory strategies, improved vocal quality     Speech Therapy interventions:  Dysphagia:  N/A  Speech/Language/Cognition: Compensatory strategy training and carryover, recall/STM, problem solving, reasoning, exec function, thought organization, attention, voice tx, respiratory retraining exercises     Dysphagia Goals: N/A      Speech/Language/Cog Goals:  Time Frame for Long Term Goals: 21 Days (02/16/23)  Goal 1: Pt will improve overall cognitive-linguistic abilities to promote safe and independent return home PLOF - 02/07 - addressed, ongoing, progressing     Short Term Goals  Time Frame for Short Term Goals: 18 Days (02/13/23)  Goal 1: Pt will complete vocal functioning exercises / resonant voice therapy exercises 10/10 given min cues - 02/07 - addressed, ongoing, progressing   Goal 2: Pt will complete respiratory retraining exercises 10/10 given min cues  - 02/07 - addressed, ongoing, progressing   Goal 3: Pt will complete graded recall tasks using compensatory strategies with 90% acc given min cues - goal met 02/07/23  Goal 4: Pt will complete executive function tasks (e.g. meds, time, money, etc) with 90% acc given min cues  - goal met 02/07/23  Goal 5: Pt will complete higher level critical thinking tasks with 90% acc given min cues  - goal met 02/07/23    ST Assessment:  Pt alert and cooperative, agreeable to tx. Pt completed 10 reps of the vocal functioning exercises / resonant voice therapy exercises. Pt completed semi-occluded voice therapy with blowing bubbles - increased time compared to previous sessions. SLP administered the MoCA for re-assessment of cognitive-linguistic abilities. Pt scored 28/30 points compared to 24/30 on initial eval. Pt demonstrated improvement with recall, executive function, and thought org. Cognitive goals met this date. Continue voice goals above. NUTRITION  Weight: (!) 322 lb (146.1 kg) / Body mass index is 39.2 kg/m². Diet Order: ADULT ORAL NUTRITION SUPPLEMENT; Breakfast, Dinner; Standard High Calorie/High Protein Oral Supplement  ADULT DIET; Regular;  No Added Salt (3-4 gm)  PO Meals Eaten (%): 51 - 75%  Education: Education completed      CASE MANAGEMENT  Assessment: 34 yr old male. Dx:Aortic dissection. Vascular Sx and wound care following. Patient independent PLOF w/o AD. Lives with spouse and 3 young children in a 2 story townhouse with a level entry. Wife does not work so she would be there 24 hrs. [facility] following. Therapy recommendations are 24 hour supervision or assist;Home with Home health PT/OT. DME bariatric RW, possible manual w/c, Transfer Tub Bench; Toileting - Heavy Duty Commode. [employer] home care following. Wound vac on Dc. Interdisciplinary Goals:   1.) Pt will complete respiratory retraining exercises given min cues   2.) Pt will complete toileting with SPV.  3.) Pt will complete supine to/from sit with Kostas      [x]  Family Training discussed at conference and to be scheduled. Discharge Plan   Estimated discharge date: 2/11/2023  Destination: home health  Pass:No  Services at Discharge: [address] [place] [address] Rd, Occupational Therapy, Speech Therapy, and Nursing   Equipment at Discharge: TTB. Team Members Present at Conference:  : Javier Bansal RN   Occupational Therapist: Brisa Escobedo OTR/L  Physical Therapist: Balaji Hearing PT, DPT  Speech Therapist: Lucía Cobos CCC-SLP  Nurse: Bari Khanna RN  Dietician: Roxana Ford RDN, LD  : Sherryle Fresh, OTR/L  Psychiatry: N/A    Family members present at conference: No      I led this team conference and I approve the established interdisciplinary plan of care as documented within the medical record of Vasyl Maldonado.     MD: Electronically signed by Binh Green MD on 2/8/2023 at 1:38 PM

## 2023-02-07 NOTE — PROGRESS NOTES
Physical Therapy  Facility/Department: [facility]  Rehabilitation Physical Therapy Treatment Note    NAME: Zachary Mccartney  : 1990 (34 y.o.)  MRN: [id number]  CODE STATUS: Full Code    Date of Service: 23       Restrictions:  Restrictions/Precautions: Fall Risk;Surgical Protocols; General Precautions;Cardiac;Modified Diet  Position Activity Restriction  Sternal Precautions: No Pushing; No Pulling;5# Lifting Restrictions  Other position/activity restrictions: [address] YOUR INCISIONS DAILY WITH A CLEAN WASHCLOTH AND ANTIBACTERIAL SOAP. Do not wash your incisions after you have cleansed other parts of your body; up with assist     SUBJECTIVE  Subjective  Subjective: Pt seated in chair on approach, agreeable to PT tx  Pain: Pt denies c/o pain               OBJECTIVE  Orientation  Overall Orientation Status: Within Functional Limits    Functional Mobility  Sit to Supine  Assistance Level: Stand by assist  Skilled Clinical Factors: On flat mat table without BR, cues for sequence, maintains sternal px with min cues, increased time to complete, x 2 reps  Supine to Sit  Assistance Level:  Moderate assistance  Skilled Clinical Factors: on flat mat table without BR, cues for sequence, maintains sternal px with min cues, increased time to complete x 2 reps    Balance  Sitting Balance: Independent  Standing Balance: Supervision  Standing Balance  Activity: SBA to S, FGA completed (see below)        Functional Gait Assessment    Patient: Zachary Mccartney  : 1990  MRN: [id number]  Date: 2023  Electronically Signed by: Jesus Sadler, PT    Gait level surface:    Instructions:  Walk at your normal speed from here to the next mita (20)  Grading:  Mita the lowest category that applies   []3 Normal:  Walks 20, no AD, good speed, no evidence for imbalance,    normal gait pattern   [x]2 Mild Impairment:  Walks 20, uses AD, slower speed, mild gait    deviations   []1 Moderate Impairment:  Walks 20, slow speed, abnormal gait    pattern, evidence for imbalance   []0 Severe Impairment:  Cannot walk 20 without assistance, severe    gait deviations or imbalance    Change in gait speed:  Instructions:  Begin walking at your normal pace (for 5), when I tell you to [facility], walk as fast as you can (for 5). When I tell you slow, walk as slowly as you can (for 5)  Grading:  Brendan the lowest category that applies  []3 Normal:  Able to smoothly change walking speed without loss of balance or gait deviation, shows a significant difference in walking speeds between normal, fast, and slow speeds  [x]2 Mild Impairment:  Is able to change speed, but demonstrates mild gait deviations or no gait deviations, but is unable to achieve a significant change in velocity, or uses an assistive device  []1 Moderate Impairment:  Makes only minor adjustments to walking speed, or accomplishes a change in speed with significant gait deviations, or changes speed, but loses significant gait deviations, or changes speed but loses balance, but is able to recover and continue walking    []0 Severe Impairment:  Cannot change speeds, or loses balance and     has to reach for wall/be caught    Gait with horizontal head turns:  Instructions:  Begin walking at your normal pace. When I tell you to [facility] right, keep walking straight, but turn your head to the right. Keep looking to the right until I tell you to [facility] left, then keep walking straight and turn your head to the left.   Keep your head to the left until I tell you to [facility] straight, then keep walking straight, but return your head to the center  Grading:  Nakia People the lowest category that applies    []3 Normal:  Performs head turns smoothly with no change in gait  []2 Mild Impairment:  Performs head turns smoothly with a slight change in gait velocity (i.e. minor disruption to smooth gait path or uses AD  [x]1 Moderate Impairment:  Performs head turns with moderate change in gait velocity, slows down, staggers but recovers, can continue to walk  []0 Severe Impairment:  Performs head turns with severe disruption of gait (i.e. staggers, outside 15 path, loses balance, stops, reaches for wall)    Gait with vertical head turns:  Instructions:  Begin walking at your normal pace. When I tell you to [facility] up, keep walking straight, but tip your head up. Keep looking up until I tell you look down, then keep walking straight and tip your head down. Keep your head down until I tell you look straight, then keep walking straight, but return your head to the center. Grading:  Brendan the lowest category that applies    []3 Normal:  Performs head turns smoothly with no change in gait   []2 Mild Impairment:  Performs task with slight change in gait velocity (i.e. minor disruption to smooth gait path or uses AD  [x]1 Moderate Impairment:  Performs task with moderate change in gait velocity, slows down, staggers but recovers, can continue to walk  []0 Severe Impairment:  Performs task with severe disruption of gait (i.e. staggers, outside 15 path, loses balance, stops, reaches for wall    Gait and pivot turn: Instructions:  Begin walking at your normal pace. When I tell you, turn & stop, turn as quickly as you can to face the opposite direction and stop. Grading:  Brendan the lowest category that applies    []3 Normal:  Pivot turns safely within 3 seconds and stops quickly with     no loss of balance   []2 Mild Impairment:  Pivot turns safely in > 3 seconds and stops with    no loss of balance  [x]1 Moderate Impairment:  Turns slowly, requires verbal cueing, requires several stops to catch balance following turn & stop  []0 Severe Impairment:  Cannot turn safely, requires assistance to turn    and stop    Step over obstacle: Instructions:  Begin walking at your normal speed. When you come to the shoebox, step over it, not around it, and keep walking.    Grading:  Ulises Christensen the lowest category that applies    []3 Normal:  Is able to step over the box without changing gait speed, no    evidence of imbalance. []2 Mild Impairment:  Is able to step over the box, but must slow down and adjust steps to clear box safely. []1 Moderate Impairment:  Is able to step over box but must stop, then step over, may require verbal cueing. [x]0 Severe Impairment:  Cannot perform activity without assistance. Gait with narrow base of support:  Instructions:  Walk on the floor with arms folded across the chest, feet aligned heel to toe in tandem for a distance of 12 feet. The number of steps taken in a straight line are counted for a maximum of 10 steps. Grading:  Irene Bashir the lowest category that applies    []3 Normal:  Is able to ambulate for 10 steps heel to toe with no staggering. []2 Mild Impairment:  Ambulates 7-9 steps  []1 Moderate Impairment:  Ambulates 4-7 steps  [x]0 Severe Impairment:  Ambulates less than 4 steps heel to toe or cannot perform without assistance    Gait with eyes closed:  Instructions:  Walk at normal speed from here to the next brendan (20ft) with your eyes closed. Grading:  Brendan the lowest category that applies  []3 Normal:  Walks 20 ft, no assistive devices, good speed, no evidence for imbalance, normal gait pattern, deviates no more than 6 in outside of 12 in walkway width. Ambulates 20 ft in less than 7 seconds. [x]2 Mild Impairment:  Walks 20 ft, uses assistive device, slower speed, mild gait deviations, deviates 6-10 in outside of 12 in walkway width. Ambulates 20 ft in less than 9 seconds but greater than 7 seconds. []1 Moderate Impairment:  Walks 20 ft, slow speed, abnormal gait pattern, evidence for imbalance, mild gait deviations, deviates 10-15 inches outside of 12 in walkway width.   Requires more than 9 seconds to ambulate 20 ft  []0 Severe Impairment:  Cannot walk 20 ft without assistance, severe gait deviations or imbalance, deviates greater than 15 in outside of 12 in walkway width or will not attempt the task. Ambulating Backwards:  Instructions:  Walk backwards until I tell you to stop. Grading:  Brendan the lowest category that applies  []3 Normal:  Walks 20 ft, no assistive devices, good speed, no evidence for imbalance, normal gait pattern, deviates no more than 6 in outside of 12 in walkway width. [x]2 Mild Impairment:  Walks 20 ft, uses assistive device, slower speed, mild gait deviations, deviates 6-10 in outside of 12 in walkway width. []1 Moderate Impairment:  Walks 20 ft, slow speed, abnormal gait pattern, evidence for imbalance, mild gait deviations, deviates 10-15 inches outside of 12 in walkway width. Requires more than 9 seconds to ambulate 20 ft  []0 Severe Impairment:  Cannot walk 20 ft without assistance, severe gait deviations or imbalance, deviates greater than 15 in outside of 12 in walkway width or will not attempt the task. Steps: Instructions:  Walk up these stairs as you would at home (use railing if necessary). At the top, turn around and walk down. Grading:  [facility] the lowest category that applies  []3 Normal:  Alternating feet, no rail. [x]2 Mild Impairment:  Alternating feet, must use rail.    []1 Moderate Impairment:  Two feet to a stair, must use rail  []0 Severe Impairment:  Cannot do safely    TOTAL SCORE:    13/30    G-Code Crosswalk:  Functional Gait Assessment Score Disability Index CMS Modifier   30 0% []CH   25-29 1-19% []CI   19-24 20-39% []CJ   13-18 40-59% [x]CK   7-12 60-79% []CL   1-6 80-99% []CM   0 100% []CN        Cut-Off Scores     Community-dwelling Older Adults:   ([facility], 2010; n = 28; aged 61 to 80, Older Adults)   Scores of ? 22/30 on the FGA were found to be effective in predicting falls, Sensitivity 85%, Specificity 86%   Scores of ? 20/30 on the FGA were optimal to predict older adults residing in community dwellings who would sustain unexplained falls in the next 6 months, Sensitivity 100%, Specificity 76%   Parkinson's Disease: (Emily et al, 2011; n = 80; mean disease duration = 6.9 years (3.38), Parkinson's Disease)   Scores of 15/30 on the FGA indicate predictive ability to clinically identify fallers in Parkinsons patients when sensitivity and specificity was maximized     Information from [email or web address]      Transfers  Surface: From chair with arms; Wheelchair;From bed  Additional Factors: Increased time to complete;Hand placement cues; Verbal cues  Device:  (No AD)  Sit to Stand  Assistance Level: Stand by assist  Skilled Clinical Factors: Pt completes multiple t/f throughout session from various surfaces, able to complete with SBA (does require encouragement to complete from lower surfaces and with decreased assist), maintains sternal px without cues  Stand to Sit  Assistance Level: Supervision  Skilled Clinical Factors: Without AD, maintains sternal px without cues  Car Transfer  Assistance Level: Minimal assistance  Skilled Clinical Factors: Without AD, requires Kostas to get LE into car, increased time to complete, min cues for safety      Environmental Mobility  Ambulation  Surface: Level surface  Device:  (no AD)  Distance: 100' + 250' + 180'  Activity: Within Room; Within Unit  Activity Comments: Distances limited somewhat by fatigue but improving activity tolerance noted  Additional Factors: Verbal cues; Increased time to complete  Assistance Level: Stand by assist;Supervision  Gait Deviations: Slow julita;Decreased step length bilateral;Unsteady gait; Decreased heel strike right;Decreased heel strike left;Decreased weight shift left; Wide base of support;Decreased trunk rotation;Decreased arm swing bilateral  Skilled Clinical Factors: Grossly reciprcoal pattern, B decreased heel strike, slight antalgic pattern, increased truncal sway. Stairs  Stair Height: 6''  Device: Bilateral handrails  Number of Stairs: 20  Additional Factors: Verbal cues; Increased time to complete;Reciprocal going up;Reciprocal going down (initially non-reciprocal d/t reports of fear/caution from wound vac, with encouragement, pt able to complete with reciprocal pattern)  Assistance Level: Stand by assist  Skilled Clinical Factors: Min cues for safety                    ASSESSMENT/PROGRESS TOWARDS GOALS  /67  HR: 110  SpO2 95% on RA    Assessment  Assessment: Pt seen in am for PT tx, pt demonstrates continued progress with all mobility, able to complete bed mobility with SBA to modA with cues for sequence, t/f with grossly SBA from various surfaces, gait up to 200' without AD with SBA to S and 20 steps with HR with SBA. Pt does require encouragement and education on completing t/f from lower surfaces and working on difficult tasks including bed mobility and car t/f. Pt participates in A balance assessment and scores 14, demonstrating increased fall risk. Pt has most difficulty with stepping over item and gait in narrow FIONA. Pt will benefit from continued skilled PT in ARU to address above deficits, will continue to progress mobility as tolerated  Activity Tolerance: Patient limited by fatigue;Patient limited by endurance; Patient tolerated treatment well  Discharge Recommendations: 24 hour supervision or assist;Home with Home health PT  PT Equipment Recommendations  Equipment Needed: No  Other: No needs anticipated    Goals  Patient Goals   Patient Goals :  \"Be able to walk\"  Short Term Goals  Time Frame for Short Term Goals: 10 days 2/05/23  Short Term Goal 1: Pt will complete bed mobility with modA -- 2/06: GOAL NOT MET maxA  Short Term Goal 2: Pt will complete sit to/from stand with modA -- GOAL MET CGA to Kostas x 1-2 with RW  Short Term Goal 3: Pt will complete bed <> chair with LRAD with modA -- GOAL MET Kostas x 1-2 with RW  Short Term Goal 4: Pt will demonstrate gait x 10' in // bars with modA without LOB -- GOAL MET x 100' with beti RW CGA + [place]  Short Term Goal 5: Pt will tolerate assessment of steps and appropriate goal to be set -- GOAL MET x 8 steps with HR CGA  Long Term Goals  Time Frame for Long Term Goals : 3 weeks 2/16/23  Long Term Goal 1: Pt will demonstrate bed mobility with MI  Long Term Goal 2: Pt will complete functional t/f with LRAD with SBA  Long Term Goal 3: Pt will ambulate >50' with LRAD with SBA without LOB -- 2/06: GOAL MET upgrade goal: Pt will ambulate >150' with LRAD with MI without LOB  Long Term Goal 4: Pt will demonstrate car t/f with LRAD with modA -- 2/07: GOAL MET Kostas; upgrade goal: Pt will complete car t/f with LRAD and SBA  Long Term Goal 5: Pt will ascend/descend 12 steps with HR with S  Long term goal 6: Pt will demonstrate improved  ability on 6MWT to > 500 Ft to demostrate improved endurance and dynamic balance. PLAN OF CARE/SAFETY  Physcial Therapy Plan  General Plan: 5-7 times per week  Therapy Duration: 3 Weeks  Specific Instructions for Next Treatment: Progress mobility as tolerated  Current Treatment Recommendations: Strengthening;ROM;Balance training;Gait training;Home exercise program;Safety education & training;Stair training;Functional mobility training;Neuromuscular re-education;Patient/Caregiver education & training;Transfer training; Therapeutic activities; Endurance training;Equipment evaluation, education, & procurement;Positioning; Wheelchair mobility training;Vestibular rehab  Safety Devices  Type of Devices: Patient at risk for falls; All fall risk precautions in place; All nikolas prominences offloaded;Call light within reach;Nurse notified;Gait belt;Left in chair    EDUCATION  Education  Education Given To: Patient  Education Provided: Role of Therapy;Plan of Care;Precautions; Fall Prevention Strategies;Transfer Training; Safety;ADL Function; Energy Conservation;Mobility Training;Home Exercise Program  Education Provided Comments: Educated on role of PT, safe progression of activity, monitoring vitals with mobility.   Education Method: Demonstration;Verbal  Barriers to Learning: None  Education Outcome: Verbalized understanding;Continued education needed        Therapy Time   Individual Concurrent Group Co-treatment   Time In 0900         Time Out 1000         Minutes 60           Timed Code Treatment Minutes: [address] [facility], PT, DPT 02/07/23 at 1:14 PM

## 2023-02-07 NOTE — PROGRESS NOTES
Gautam Valdovinos  2/7/2023  [id number]    Chief Complaint: Aortic dissection ([address]    Subjective:   No acute events overnight. Today Alphonse Wick is seen with therapy in the gym. He reports that his leg is feeling strong today. He denies feeling light headed or dizzy. He denies other acute complaints at this time. ROS: denies f/c, n/v, cp, sob    Objective:  Patient Vitals for the past 24 hrs:   BP Temp Temp src Pulse Resp SpO2 Weight   02/07/23 0906 121/67 -- -- (!) 110 -- -- --   02/07/23 0824 -- -- -- -- -- 95 % --   02/07/23 0730 121/67 98.1 °F (36.7 °C) Oral (!) 110 18 -- --   02/07/23 0645 -- -- -- -- -- -- (!) 322 lb 12.8 oz (146.4 kg)   02/06/23 2115 123/61 98.4 °F (36.9 °C) Oral 99 16 93 % --     Gen: No distress, pleasant. HEENT: Normocephalic, atraumatic. CV: extremities well perfused  Resp: No respiratory distress. Abd: Soft, nondistended  Ext: No edema. Neuro: Alert, oriented, appropriately interactive. Remains standing during encounter without LOB  Psych: appropriate mood and affect    Wt Readings from Last 3 Encounters:   02/07/23 (!) 322 lb 12.8 oz (146.4 kg)   01/25/23 279 lb 9 oz (126.8 kg)   03/12/20 (!) 350 lb (158.8 kg)       Laboratory data:   Lab Results   Component Value Date    WBC 9.8 02/06/2023    HGB 9.2 (L) 02/06/2023    HCT 29.5 (L) 02/06/2023    MCV 80.8 02/06/2023     (H) 02/06/2023       Lab Results   Component Value Date/Time     02/06/2023 07:04 AM    K 4.6 02/06/2023 07:04 AM    K 4.4 02/01/2023 07:29 AM     02/06/2023 07:04 AM    CO2 23 02/06/2023 07:04 AM    BUN 13 02/06/2023 07:04 AM    CREATININE 0.6 02/06/2023 07:04 AM    GLUCOSE 98 02/06/2023 07:04 AM    CALCIUM 9.0 02/06/2023 07:04 AM        Therapy progress:  PT  Position Activity Restriction  Sternal Precautions: No Pushing, No Pulling, 5# Lifting Restrictions  Other position/activity restrictions: [address] YOUR INCISIONS DAILY WITH A CLEAN WASHCLOTH AND ANTIBACTERIAL SOAP.  Do not wash your incisions after you have cleansed other parts of your body; up with assist  Objective     Sit to Stand: Dependent/Total, 2 Person Assistance  Stand to Sit: Dependent/Total, 2 Person Assistance  Bed to Chair: Dependent/Total, 2 Person Assistance     OT  PT Equipment Recommendations  Equipment Needed: Yes  Mobility Devices: Mary Oar: Rolling  Other: Bariatric RW, CTA for need  Equipment Used: Drop-arm commode  Toilet Transfers Comments: use of Marnie Sprung to transfer on/off BSC over toilet  Assessment        SLP                Body mass index is 39.29 kg/m². Assessment and Plan:  Santos Okeefe is a 34year old male with a past medical history significant for Marfan syndrome and morbid obesity who presented to [facility] on 1/15/23 with severe chest pain and lower extremity numbness, found to have aortic dissection s/p replacement. He was admitted to [facility] on 1/26/23 due to functional deficits below his baseline. Type A Aortic Dissection s/p replacement  - sternal precautions  - asa, statin BB  - goal SBP<140  - PT, OT     Right to left fem-fem bypass  - asa, statin, BB  - every other staple removed 2/1  - Vascular following, incision care per orders. Wound vac to right groin incision and wet to dry dressings on left     Vocal cord paralysis  - ENT evaluated during acute stay  - ST     Aspiration pneumonia, resolved  - completed augmentin     Fluid overload, improved  - completed week of lasix  - discontinued K and Mg and monitor  - daily weights, I/Os     Acute Blood Loss Anemia  - Hb stable  - monitor and transfuse for Hb<7     Marfan Syndrome  - noted     Obesity  - BMI 34  - encourage lifestyle modifications     Bowels: adjust medications as needed for regular bowel movements     Bladder: Check PVR x 3. [address] if PVR > 200ml or if any volume is > 500 ml. Sleep: Trazodone provided prn.       PPX  DVT: lovenox  GI: pepcid     Follow up appointments: CT surgery, PCP    Services: HH PT, OT, ST, nursing  EDOD: 2/11    [address] [facility]  Genie Martinez MD 2/7/2023, 12:39 PM

## 2023-02-07 NOTE — PROGRESS NOTES
Occupational Therapy  Facility/Department: [facility]  Rehabilitation Occupational Therapy Daily Treatment Note    Date: 23  Patient Name: Dean Thomas       Room: 0117/5249-74  MRN: [id number]  Account: [de-identified]   : 1990  (34 y.o.) Gender: male                    Past Medical History:  has a past medical history of Influenza A and Marfan syndrome. Past Surgical History:   has a past surgical history that includes Femoral-femoral Bypass Graft (Bilateral, 2023) and Thoracic aortic aneurysm repair (N/A, 1/15/2023). Restrictions  Restrictions/Precautions: Fall Risk;Surgical Protocols; General Precautions;Cardiac;Modified Diet  Other position/activity restrictions: [address] YOUR INCISIONS DAILY WITH A CLEAN WASHCLOTH AND ANTIBACTERIAL SOAP. Do not wash your incisions after you have cleansed other parts of your body; up with assist    Subjective  Subjective: Pt in recliner, joking, agreeable to OT session, no pain, /76, , O2 sats 97% on RA. Restrictions/Precautions: Fall Risk;Surgical Protocols; General Precautions;Cardiac;Modified Diet             Objective     Cognition  Overall Cognitive Status: Exceptions  Arousal/Alertness: Appropriate responses to stimuli  Following Commands:  Follows all commands without difficulty  Attention Span: Appears intact  Memory: Decreased short term memory  Safety Judgement: Decreased awareness of need for safety  Problem Solving: Assistance required to generate solutions  Insights: Fully aware of deficits  Initiation: Does not require cues  Sequencing: Requires cues for some  Orientation  Overall Orientation Status: Within Functional Limits         ADL  Grooming/Oral Hygiene  Assistance Level: Supervision  Skilled Clinical Factors: standing at sink for 2-3 minutes to brush teeth  Upper Extremity Bathing  Assistance Level: Stand by assist  Lower Extremity Bathing  Assistance Level: Maximum assistance  Skilled Clinical Factors: to bathe lower legs, feet, and groin, able to bathe buttocks in stance with cues and SBA  Upper Extremity Dressing  Assistance Level: Supervision  Skilled Clinical Factors: to doff/don shirt  Lower Extremity Dressing  Equipment Provided: Reachers  Assistance Level: Stand by assist  Skilled Clinical Factors: good use of reacher to thread BLEs into pants/underwear, SBA to stand and pull over hips  Putting On/Taking Off Footwear  Equipment Provided: Reachers;Sock aid  Assistance Level: Moderate assistance  Skilled Clinical Factors: able to doff/don socks with a/e and encouragement/cues, mod A for shoes  Toilet Transfers  Technique: Stand step  Equipment: Standard toilet  Additional Factors: Verbal cues; Increased time to complete;Cues for hand placement  Assistance Level: Stand by assist  Tub/Shower Transfers  Type: Shower  Transfer From: Standing without device  Transfer To: Tub transfer bench  Additional Factors: Set-up; Increased time to complete;Cues for hand placement  Assistance Level: Stand by assist          Functional Mobility  Device:  (no AD)  Activity: To/From bathroom; To/From therapy gym  Assistance Level: Contact guard assist  Skilled Clinical Factors: mild LOB backward on 1 stand requiring CGA, SBA for majority of session  Bed Mobility  Overall Assistance Level: Moderate Assistance  Additional Factors: Verbal cues; Head of bed flat;Set-up; Increased time to complete  Sit to Supine  Assistance Level: Moderate assistance  Skilled Clinical Factors: assist for LLE and cues for scooting to middle of bed  Transfers  Surface: To bed; Wheelchair;From chair with arms;Standard toilet; To chair with arms; To mat;From mat  Additional Factors: Set-up; Hand placement cues; Verbal cues; Increased time to complete  Device:  (no AD)  Sit to Stand  Assistance Level: Contact guard assist  Skilled Clinical Factors: CGA on 1 attempt, SBA for majority of session  Stand to Sit  Assistance Level: Stand by assist  Bed To/From Chair  Technique: Stand step  Assistance Level: Contact guard assist   OT Exercises  Resistive Exercises: 4# medicine ball for a-p leaning 2x10 and chest press x10  Circulation/Endurance Exercises: sit<>stands x10 from mat table     Assessment  Assessment  Assessment: Pt agreeable to OT session. Pt completed functional transfers with much improved ability to perform sit>stands without assistance. Pt required CGA on 1 attempt for sit>stand but SBA for majority of session with sit<>stands and mobility. Pt tolerated standing for 3-4 minutes for grooming and improved use of reacher/sock aid for LB dressing. Pt tolerated core exercises with fair strength and encouragement to complete all tasks. Pt and spouse verbalized understanding of all education regarding ADL safety and completion with a/e and incision/wound care. Continue POC. Activity Tolerance: Patient limited by fatigue;Patient limited by endurance; Patient tolerated treatment well  Discharge Recommendations: Home with Home health OT;S Level 1;Home with assist PRN  OT Equipment Recommendations  Equipment Needed: Yes  Mobility Devices: ADL Assistive Devices  ADL Assistive Devices: Transfer Tub Bench  Safety Devices  Safety Devices in place: Yes  Type of devices: Call light within reach;Gait belt;Nurse notified; Left in bed;Bed alarm in place    Patient Education  Education  Education Given To: Patient; Family  Education Provided: Plan of Care;Precautions; Safety; Energy Conservation;Transfer Training;Mobility Training;Family Education;Role of Therapy; Fall Prevention Strategies;DME/Home Modifications; Equipment;ADL Function  Education Provided Comments: role of OT, safety with transfers, ADL technique with sternal precautions, weight shifting during sit<>stands, use of a/e, ADL assistance/safety with incisions to educate family  Education Method: Demonstration;Verbal  Barriers to Learning: Cognition  Education Outcome: Verbalized understanding;Continued education needed  Skilled Clinical Factors: Spouse verbalized understanding for incision care regarding bathing    Plan  Occupational Therapy Plan  Times Per Week: 5/7 days/week  Current Treatment Recommendations: ROM;Balance training;Functional mobility training; Endurance training;Patient/Caregiver education & training; Safety education & training;Positioning;Self-Care / ADL; Strengthening;Home management training;Equipment evaluation, education, & procurement    Goals  Short Term Goals  Time Frame for Short Term Goals: 1 week- 2/1/23  Short Term Goal 1: Pt will complete functional transfers with max A. GOAL MET 2/1/23 Pt completed functional transfers with max A. Short Term Goal 2: Pt will perform full body bathing with max A. GOAL MET 2/1/23 Pt completed full body bathing with max A. Short Term Goal 3: Pt will complete toileting with max A. GOAL MET 1/30/23 Pt completed toileting with max A. Short Term Goal 4: Pt will perform grooming with supervision. GOAL MET 2/2/23 Pt performed grooming with supervision. Short Term Goal 5: Pt will complete full body dressing with max A. GOAL MET 2/1/23 Pt completed full body dressing with max A. Long Term Goals  Long Term Goal 1: Pt will perform functional transfers with CGA. GOAL MET 2/7/23 Pt peformed functional transfers with CGA. Long Term Goal 2: Pt will complete toileting with CGA. Long Term Goal 3: Pt will complete full body dressing with CGA. Long Term Goal 4: Pt will perform full body bathing with CGA. Long Term Goal 5: Pt will complete IADL task with min A.     Therapy Time   Individual Concurrent Group Co-treatment   Time In 1300         Time Out 1400         Minutes 60         Timed Code Treatment Minutes: [address]

## 2023-02-08 LAB
ANION GAP SERPL CALCULATED.3IONS-SCNC: 10 MMOL/L (ref 3–16)
BASOPHILS ABSOLUTE: 0.1 K/UL (ref 0–0.2)
BASOPHILS RELATIVE PERCENT: 1.4 %
BUN BLDV-MCNC: 10 MG/DL (ref 7–20)
CALCIUM SERPL-MCNC: 8.9 MG/DL (ref 8.3–10.6)
CHLORIDE BLD-SCNC: 107 MMOL/L (ref 99–110)
CO2: 23 MMOL/L (ref 21–32)
CREAT SERPL-MCNC: 0.6 MG/DL (ref 0.9–1.3)
EOSINOPHILS ABSOLUTE: 0.4 K/UL (ref 0–0.6)
EOSINOPHILS RELATIVE PERCENT: 5.2 %
GFR SERPL CREATININE-BSD FRML MDRD: >60 ML/MIN/{1.73_M2}
GLUCOSE BLD-MCNC: 95 MG/DL (ref 70–99)
HCT VFR BLD CALC: 29.5 % (ref 40.5–52.5)
HEMOGLOBIN: 9.3 G/DL (ref 13.5–17.5)
LYMPHOCYTES ABSOLUTE: 1.9 K/UL (ref 1–5.1)
LYMPHOCYTES RELATIVE PERCENT: 23.3 %
MAGNESIUM: 2.2 MG/DL (ref 1.8–2.4)
MCH RBC QN AUTO: 25.2 PG (ref 26–34)
MCHC RBC AUTO-ENTMCNC: 31.5 G/DL (ref 31–36)
MCV RBC AUTO: 80.1 FL (ref 80–100)
MONOCYTES ABSOLUTE: 0.6 K/UL (ref 0–1.3)
MONOCYTES RELATIVE PERCENT: 6.9 %
NEUTROPHILS ABSOLUTE: 5.1 K/UL (ref 1.7–7.7)
NEUTROPHILS RELATIVE PERCENT: 63.2 %
PDW BLD-RTO: 15.1 % (ref 12.4–15.4)
PLATELET # BLD: 484 K/UL (ref 135–450)
PMV BLD AUTO: 6.7 FL (ref 5–10.5)
POTASSIUM SERPL-SCNC: 4.2 MMOL/L (ref 3.5–5.1)
RBC # BLD: 3.68 M/UL (ref 4.2–5.9)
SODIUM BLD-SCNC: 140 MMOL/L (ref 136–145)
WBC # BLD: 8.1 K/UL (ref 4–11)

## 2023-02-08 PROCEDURE — 97530 THERAPEUTIC ACTIVITIES: CPT

## 2023-02-08 PROCEDURE — 85025 COMPLETE CBC W/AUTO DIFF WBC: CPT

## 2023-02-08 PROCEDURE — 97535 SELF CARE MNGMENT TRAINING: CPT

## 2023-02-08 PROCEDURE — 36415 COLL VENOUS BLD VENIPUNCTURE: CPT

## 2023-02-08 PROCEDURE — 1280000000 HC REHAB R&B

## 2023-02-08 PROCEDURE — 97110 THERAPEUTIC EXERCISES: CPT

## 2023-02-08 PROCEDURE — 97116 GAIT TRAINING THERAPY: CPT

## 2023-02-08 PROCEDURE — 92507 TX SP LANG VOICE COMM INDIV: CPT

## 2023-02-08 PROCEDURE — 83735 ASSAY OF MAGNESIUM: CPT

## 2023-02-08 PROCEDURE — 80048 BASIC METABOLIC PNL TOTAL CA: CPT

## 2023-02-08 PROCEDURE — 6370000000 HC RX 637 (ALT 250 FOR IP): Performed by: STUDENT IN AN ORGANIZED HEALTH CARE EDUCATION/TRAINING PROGRAM

## 2023-02-08 PROCEDURE — 6360000002 HC RX W HCPCS: Performed by: STUDENT IN AN ORGANIZED HEALTH CARE EDUCATION/TRAINING PROGRAM

## 2023-02-08 RX ADMIN — FAMOTIDINE 20 MG: 20 TABLET, FILM COATED ORAL at 21:09

## 2023-02-08 RX ADMIN — GABAPENTIN 100 MG: 100 CAPSULE ORAL at 13:02

## 2023-02-08 RX ADMIN — ATORVASTATIN CALCIUM 40 MG: 40 TABLET, FILM COATED ORAL at 21:09

## 2023-02-08 RX ADMIN — METOPROLOL TARTRATE 50 MG: 50 TABLET, FILM COATED ORAL at 10:07

## 2023-02-08 RX ADMIN — ACETAMINOPHEN 1000 MG: 500 TABLET ORAL at 13:02

## 2023-02-08 RX ADMIN — Medication 5 MG: at 21:09

## 2023-02-08 RX ADMIN — BISACODYL 5 MG: 5 TABLET, COATED ORAL at 10:08

## 2023-02-08 RX ADMIN — METOPROLOL TARTRATE 50 MG: 50 TABLET, FILM COATED ORAL at 21:09

## 2023-02-08 RX ADMIN — GABAPENTIN 100 MG: 100 CAPSULE ORAL at 10:07

## 2023-02-08 RX ADMIN — ACETAMINOPHEN 1000 MG: 500 TABLET ORAL at 06:11

## 2023-02-08 RX ADMIN — TRAZODONE HYDROCHLORIDE 100 MG: 50 TABLET ORAL at 21:09

## 2023-02-08 RX ADMIN — ASPIRIN 81 MG: 81 TABLET, COATED ORAL at 10:07

## 2023-02-08 RX ADMIN — ENOXAPARIN SODIUM 40 MG: 100 INJECTION SUBCUTANEOUS at 10:08

## 2023-02-08 RX ADMIN — ACETAMINOPHEN 1000 MG: 500 TABLET ORAL at 21:09

## 2023-02-08 RX ADMIN — GABAPENTIN 100 MG: 100 CAPSULE ORAL at 21:09

## 2023-02-08 RX ADMIN — FAMOTIDINE 20 MG: 20 TABLET, FILM COATED ORAL at 10:08

## 2023-02-08 NOTE — PROGRESS NOTES
Esthela Hany  2/8/2023  [id number]    Chief Complaint: Aortic dissection ([address]    Subjective:   No acute events overnight. Today Korey reports sleeping poorly last night. He endorses sleeping well the night prior. Patient received trazodone 100 mg both nights. ROS: denies f/c, n/v, cp, sob    Objective:  Patient Vitals for the past 24 hrs:   BP Temp Temp src Pulse Resp SpO2 Weight   02/08/23 1007 124/77 -- -- (!) 119 -- -- --   02/08/23 0745 124/77 97.8 °F (36.6 °C) Oral (!) 119 16 97 % --   02/08/23 0600 -- -- -- -- -- -- (!) 322 lb (146.1 kg)   02/07/23 2017 136/71 98 °F (36.7 °C) Oral 99 16 95 % --     Gen: No distress, pleasant. HEENT: Normocephalic, atraumatic. CV: extremities well perfused  Resp: No respiratory distress. Abd: Soft, nondistended  Ext: No edema. Wound vac on right groin incision   Neuro: Alert, oriented, appropriately interactive. Psych: appropriate mood and affect    Wt Readings from Last 3 Encounters:   02/08/23 (!) 322 lb (146.1 kg)   01/25/23 279 lb 9 oz (126.8 kg)   03/12/20 (!) 350 lb (158.8 kg)       Laboratory data:   Lab Results   Component Value Date    WBC 8.1 02/08/2023    HGB 9.3 (L) 02/08/2023    HCT 29.5 (L) 02/08/2023    MCV 80.1 02/08/2023     (H) 02/08/2023       Lab Results   Component Value Date/Time     02/08/2023 07:49 AM    K 4.2 02/08/2023 07:49 AM    K 4.4 02/01/2023 07:29 AM     02/08/2023 07:49 AM    CO2 23 02/08/2023 07:49 AM    BUN 10 02/08/2023 07:49 AM    CREATININE 0.6 02/08/2023 07:49 AM    GLUCOSE 95 02/08/2023 07:49 AM    CALCIUM 8.9 02/08/2023 07:49 AM        Therapy progress:  PT  Position Activity Restriction  Sternal Precautions: No Pushing, No Pulling, 5# Lifting Restrictions  Other position/activity restrictions: [address] YOUR INCISIONS DAILY WITH A CLEAN WASHCLOTH AND ANTIBACTERIAL SOAP.  Do not wash your incisions after you have cleansed other parts of your body; up with assist  Objective     Sit to Stand: Dependent/Total, 2 Person Assistance  Stand to Sit: Dependent/Total, 2 Person Assistance  Bed to Chair: Dependent/Total, 2 Person Assistance     OT  PT Equipment Recommendations  Equipment Needed: No  Mobility Devices: Alessandro Hyatt: Rolling  Other: No needs anticipated  Equipment Used: Drop-arm commode  Toilet Transfers Comments: use of Santiago Nunez to transfer on/off BSC over toilet  Assessment        SLP                Body mass index is 39.2 kg/m². Assessment and Plan:  Giana Dahl is a 34year old male with a past medical history significant for Marfan syndrome and morbid obesity who [facility] with severe chest pain and lower extremity numbness, found to have aortic dissection s/p replacement. He was admitted to [facility] on 1/26/23 due to functional deficits below his baseline. Type A Aortic Dissection s/p replacement  - sternal precautions  - asa, statin BB  - goal SBP<140  - PT, OT     Right to left fem-fem bypass  - asa, statin, BB  - every other staple removed 2/1  - Vascular following, incision care per orders. Wound vac to right groin incision and wet to dry dressings on left     Vocal cord paralysis  - ENT evaluated during acute stay  - ST     Aspiration pneumonia, resolved  - completed augmentin     Fluid overload, improved  - completed week of lasix  - discontinued K and Mg and monitor  - daily weights, I/Os     Acute Blood Loss Anemia  - Hb stable  - monitor and transfuse for Hb<7     Marfan Syndrome  - noted     Obesity  - BMI 34  - encourage lifestyle modifications     Bowels: adjust medications as needed for regular bowel movements     Bladder: Check PVR x 3. [address] if PVR > 200ml or if any volume is > 500 ml. Sleep: Trazodone provided prn.       PPX  DVT: lovenox  GI: pepcid     Follow up appointments: CT surgery, PCP    Services:  PT, OT, ST, nursing  EDOD: 2/11    Interdisciplinary team conference was held today with entire rehab treatment team including PT, OT, SLP, Dietician, RN, and SW. Discussion focused on progress toward rehab goals and discharge planning. Barriers: wound vac, pain, endurance, comorbidities. Total treatment time >35 min with greater than 50% spent in care coordination. [address] Velas[place] MD 2/8/2023, 3:30 PM

## 2023-02-08 NOTE — CARE COORDINATION
CM sent referral to [facility] with 3M/I wound vac.  CM awaiting review and benefit approval. Wilian Todd RN

## 2023-02-08 NOTE — PROGRESS NOTES
Pt assessment completed and charted. VSS. Pt a/o x4. Medications given crushed in applesauce. Pt has left groin incs w/ dry dressing in place. Pt has right groin incision w/ wound vac in place. Bed alarm on. Bed in lowest position and wheels locked. Call light within reach. Bedside table within reach. Non-skid footwear in place. Pt denies any other needs at this time. Pt calls out appropriately.

## 2023-02-08 NOTE — PROGRESS NOTES
[facility]: ACUTE REHAB UNIT  SPEECH-LANGUAGE PATHOLOGY      [x] Daily  [] Weekly Care Conference Note  [] Discharge    [place]      JFS:[birth year]  BRD:[id number]  Rehab Dx/Hx: Aortic dissection (HCC) [I71.00]    Precautions: falls  Home situation: Spouse and three children. Works full-time. Wasn't taking meds prior to admission, shares finances with wife. ST Dx: [] Aphasia  [] Dysarthria  [] Apraxia   [] Oropharyngeal dysphagia [x] Cognitive Impairment  [x] Other: voice tx  Date of Admit: 1/26/2023  Room #: 1238/4614-59    Current functional status (updated daily):         Pt being seen for : [x] Speech/Language Treatment  [] Dysphagia Treatment [x] Cognitive Treatment  [] Other:  Communication: []WFL  [] Aphasia  [] Dysarthria  [] Apraxia  [] Pragmatic Impairment [] Non-verbal  [] Hearing Loss  [x] Other: Hoarse, weak, dysphonic 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   Cognition: [] WFL  [x] Mild  [] Moderate  [] Severe [] Unable to Assess  [] Other:  Memory: [] WFL  [x] Mild  [] Moderate  [] Severe [] Unable to Assess  [] Other:  Behavior: [x] Alert  [x] Cooperative  [x]  Pleasant  [] Confused  [] Agitated  [] Uncooperative  [] Distractible [] Motivated  [] Self-Limiting [] Anxious  [] Other:  Endurance:  [x] Adequate for participation in SLP sessions  [] Reduced overall  [] Lethargic  [] Other:  Safety: [x] No concerns at this time  [] Reduced insight into deficits  []  Reduced safety awareness [] Not following call light procedures  [] Unable to Assess  [] Other:    Current Diet Order:ADULT ORAL NUTRITION SUPPLEMENT; Breakfast, Dinner; Standard High Calorie/High Protein Oral Supplement  ADULT DIET; Regular;  No Added Salt (3-4 gm)  Swallowing Precautions: Sit up for all meals and thereafter for 30 minutes, Eat with small bites (1/2 tsp; 1 tsp), and Alternate solids with liquids        Date: 2/8/2023      Tx session 1  0800 - 0900 Tx session 2  All tx needs met in session 1   Total Timed Code Min 0 0   Total Treatment Minutes 60 0   Individual Treatment Minutes 60 0   Group Treatment Minutes 0 0   Co-Treat Minutes 0 0   Variance/Reason:  N/a  N/A   Pain Denies  Denies    Pain Intervention [] RN notified  [] Repositioned  [] Intervention offered and patient declined  [x] N/A  [] Other:  [] RN notified  [] Repositioned  [] Intervention offered and patient declined  [x] N/A  [] Other:   Subjective     Pt alert and cooperative, agreeable to tx. Pt upright in bedside chair for session. Objective:  Goals     Short Term Goals  Time Frame for Short Term Goals: 18 Days (02/13/23)    Goal 1: Pt will complete vocal functioning exercises / resonant voice therapy exercises 10/10 given min cues    Warm-ups at a comfortable pitch:   -\"whoo\" as in whoop: 10x  -\"oh\" as in \"knoll\": 10x  -hum: 10x  -lip trills: 10x    Stretching- glide from lowest to highest:  -\"whoop\" - 10x  -\"oh\" as in \"knoll\": 10x  -hum : 10x  -lip trills: 10x  . Contraction- glide from highest pitch to lowest pitch   -\"boom\" - 10x   -\"oh\" as in \"knoll\" - 10x      Goal 2: Pt will complete respiratory retraining exercises 10/10 given min cues   Pt completed the following exercises given min cues:  -exhale while sustaining \"f\": 10x  -exhale while sustaining \"Shhh\": 10x  -exhale while sustaining \"z\": 10x  -breathe in and out through straw: 10x  -breathe in nose and out straw: 10x  -breathe in nose out through pursed lips: 10x  -Sniff x2 through nose, then breathe out through pursed lips: 10x         Goal met 02/07/23. Goal met 02/07/23. Goal met 02/07/23.        Other areas targeted: N/A N/A   Education:   SLP edu pt re: rationale of VFE and respiratory retraining exercises     Safety Devices: [x] Call light within reach  [x] Chair alarm activated  [] Bed alarm activated  [x] Other: [] Call light within reach  [] Chair alarm activated  [] Bed alarm activated  [] Other: wife present     Assessment: Pt alert and cooperative, agreeable to tx. Pt completed 10 reps of the vocal functioning exercises / resonant voice therapy exercises. Pt feeling light headed at times during exercises, thus pt required breaks more frequently. As a result, fewer exercises were completed during this session. Pt encouraged to complete exercises throughout the day. Continue goals above. Plan: Continue as per plan of care. Additional Information:     Barriers toward progress: N/a   Discharge recommendations:  [] Home independently  [x] Home with assistance []  24 hour supervision  [] ECF [] Other:  Continued Tx Upon Discharge: ? [x] Yes [] No [] TBD based on progress while on ARU [] Vital Stim indicated [] Other:   Estimated discharge date: 02/11/2023    Interventions used this date:  [x] Speech/Language Treatment  [] Instruction in HEP [] Group [] Dysphagia Treatment [x] Cognitive Treatment   [] Other:       Total Time Breakdown / Charges    Time in Time out Total Time / units   Cognitive Tx      Speech Tx 0800 0900 60 min / 1 unit    Dysphagia Tx -- -- --       Electronically Signed by     Annemarie Dueñas MA CCC-SLP #[id number]  Speech Language Pathologist

## 2023-02-08 NOTE — PROGRESS NOTES
[facility] Nurse  Follow-up Progress Note       NAME:  Dean Thomas  MEDICAL RECORD NUMBER:  [id number]  AGE:  34 y.o. GENDER:  male  :  1990  TODAY'S DATE:  2023    Subjective:I can get into bed. I'm good to transfer over. Oh I know it's going to hurt. Spouse present at bedside. Able to comfort patient through wound dressing change. Wound Identification:Wound Identification:2023 ascending aorta valve replacement with tube graft incisions, right and left groins cannulation sites  Wound Type: surgical  Contributing Factors: thoracic aortic aneurysm repair 2023         Patient Goal of Care:  [x] Wound Healing  [] Odor Control  [] Palliative Care  [x] Pain Control   [] Other:     Braulio Betters in chair, moved over to bed. /77   Pulse (!) 119   Temp 97.8 °F (36.6 °C) (Oral)   Resp 16   Ht 6' 4\" (1.93 m)   Wt (!) 322 lb (146.1 kg)   SpO2 97%   BMI 39.20 kg/m²   Magan Risk Score: Magan Scale Score: 17  Assessment:left groin slough present with serous fluid. Rt groin granulation present. Scant slough deep in wound. Measurements:  Negative Pressure Wound Therapy Leg Anterior;Proximal;Right;Upper (Active)   $ Standard NPWT <=50 sq cm PER TX $ Yes 23 1252   Wound Type Surgical 23 1252   Unit Type [id number] 23 1252   Dressing Type White Foam;Black Foam 23 1252   Number of pieces used 2 23 1252   Number of pieces removed 1 23 1252   Cycle Continuous 23 1252   Target Pressure (mmHg) 125 23 1252   Intensity 1 23 1252   Canister changed?  No 23 1252   Dressing Status New dressing applied 23 1252   Dressing Changed Changed/New 23 1252   Drainage Amount Small 23 1252   Drainage Description Serosanguinous 23 1252   Dressing Change Due 02/10/23 02/08/23 1252   Output (ml) 200 ml 23 1252   Wound Assessment Granulation tissue;Slough 02/08/23 1252   Shirlene-wound Assessment Blanchable erythema 02/08/23 1252   Shape oblong 02/08/23 1252   Odor None 02/08/23 1252   Number of days: 2     Incision 01/16/23 Sternum Anterior (Active)   Dressing Status Other (Comment) 02/07/23 2018   Incision Cleansed Not Cleansed 02/06/23 2115   Dressing/Treatment Open to air;Surgical glue 02/06/23 2115   Closure Surgical glue; Open to air 02/07/23 2018   Incision Assessment Dry 02/06/23 2115   Drainage Amount None 02/07/23 2018   Odor None 02/06/23 2115   Shirlene-incision Assessment Intact 02/07/23 2018   Number of days: 23       Incision Femoral Anterior;Proximal;Right (Active)   Wound Image   02/08/23 1252   Dressing Status New dressing applied 02/08/23 1252   Dressing Change Due 02/10/23 02/08/23 1252   Incision Cleansed Cleansed with saline 02/08/23 1252   Dressing/Treatment Negative pressure wound therapy 02/08/23 1252   Incision Length (cm) 8.5 02/08/23 1252   Incision Width (cm) 1 cm 02/08/23 1252   Incision Depth (cm) 3.5 cm 02/08/23 1252   Closure Other (Comment) 02/06/23 1010   Margins Approximated 02/08/23 1252   Incision Assessment Other (Comment) 02/08/23 1252   Drainage Amount Moderate 02/08/23 1252   Drainage Description Serosanguinous 02/08/23 1252   Odor None 02/08/23 1252   Shirlene-incision Assessment Dry/flaky; Blanchable erythema 02/08/23 1252   Number of days:     Rt groin       Incision Groin Anterior;Left;Proximal (Active)   Wound Image   02/08/23 1252   Dressing Status New dressing applied 02/08/23 1252   Dressing Change Due 02/09/23 02/08/23 1252   Incision Cleansed Cleansed with saline 02/08/23 1252   Dressing/Treatment Alginate with Ag;Gauze dressing/dressing sponge;Tape/Soft cloth adhesive tape 02/08/23 1252   Incision Length (cm) 14 02/08/23 1252   Incision Width (cm) 1 cm 02/08/23 1252   Incision Depth (cm) 1 cm 02/08/23 1252   Closure Other (Comment) 02/08/23 1252   Margins Approximated 02/08/23 1252   Incision Assessment Other (Comment) 02/08/23 1252   Drainage Amount Scant 02/08/23 1252   Drainage Description Sanguinous 02/08/23 1252   Odor None 02/08/23 1252   Shirlene-incision Assessment Dry/flaky 02/08/23 1252   Number of days:    Left groin        Response to treatment:  Poorly tolerated by patient. Pain Assessment:  Severity:  4 / 10  Quality of pain: aching  anxiety  Wound Pain Timing/Severity: intermittent  Premedicated: No  patient refused  Plan:   Plan of Care:       Right groin accessed, moistened with normal saline to remove black sponge from wound bed. Eschar and fatty tissue with muscle present. Wound Flushed with normal saline. Barrier film applied around wound, Vac drape applied around wound, posterior end hydrocolloid strip applied. 1 white sponge inserted related to depth, 1 black sponge covering, covered with Vac drape. Suction applied @ 125 Hg mm  Continuous cycle. Change M-W-F   Change cannister once a week or when full. If suction fails or patient is discharged:  - remove vac dressing  - cleanse wound with normal saline   - pack wound with saline moistened gauze and change every 12 hours     Specialty Bed Required : Yes   [x] Low Air Loss   [x] Pressure Redistribution  [] Fluid Immersion  [] Bariatric  [] Total Pressure Relief  [] Other:     Current Diet: ADULT ORAL NUTRITION SUPPLEMENT; Breakfast, Dinner; Standard High Calorie/High Protein Oral Supplement  ADULT DIET; Regular; No Added Salt (3-4 gm)  Dietician consult:  Yes    Discharge Plan:  Placement for patient upon discharge: home with support   Patient appropriate for Outpatient [address]: Yes    Referrals:  [x]  following  [] [address] [facility]  [] Supplies  [] Other    Patient/Caregiver Teaching:Discussed the use of the white sponge related to the depth of the wound. Patient concerned he did not see drainage in  tube like before explained might take few minutes with thicker sponge in wound.   Will need to continue wound vac once discharged also.  Level of patient/caregiver understanding able to:   [] Indicates understanding       [] Needs reinforcement  [] Unsuccessful      [x] Verbal Understanding  [] Demonstrated understanding       [] No evidence of learning  [] Refused teaching         [] N/A       Electronically signed by Rosa Maria Trujillo RN, on 2/8/2023 at 1:00 PM

## 2023-02-08 NOTE — PROGRESS NOTES
Physical Therapy  Facility/Department: [facility]  Rehabilitation Physical Therapy Treatment Note    NAME: Nieves Erickson  : 1990 (34 y.o.)  MRN: [id number]  CODE STATUS: Full Code    Date of Service: 23       Restrictions:  Restrictions/Precautions: Fall Risk;Surgical Protocols; General Precautions;Cardiac;Modified Diet  Position Activity Restriction  Sternal Precautions: No Pushing; No Pulling;5# Lifting Restrictions  Other position/activity restrictions: [address] YOUR INCISIONS DAILY WITH A CLEAN WASHCLOTH AND ANTIBACTERIAL SOAP. Do not wash your incisions after you have cleansed other parts of your body; up with assist     SUBJECTIVE  Subjective  Subjective: Pt seated in recliner on approach, agreeable to PT tx  Pain: reports discomfort from wound vac change earlier but does not provide a pain score              OBJECTIVE  Cognition  Overall Cognitive Status: Exceptions  Arousal/Alertness: Appropriate responses to stimuli  Following Commands: Follows all commands without difficulty  Attention Span: Appears intact  Memory: Appears intact  Safety Judgement: Decreased awareness of need for safety  Problem Solving: Assistance required to generate solutions  Insights: Fully aware of deficits  Initiation: Does not require cues  Sequencing: Requires cues for some  Orientation  Overall Orientation Status: Within Functional Limits    Functional Mobility  Sit to Supine  Assistance Level: Stand by assist  Skilled Clinical Factors: HOB flat without BR, increased time to complete, min cues for sequence, maintains sternal px    Balance  Sitting Balance: Independent  Standing Balance: Supervision  Standing Balance  Activity: S to I without AD    Transfers  Surface: From chair with arms; Wheelchair;From bed  Additional Factors: Increased time to complete;Hand placement cues; Verbal cues  Device:  (I without AD)  Sit to Stand  Assistance Level: Supervision  Skilled Clinical Factors: Pt completes multiple t/f throughout session from various surfaces, able to complete with S, maintains sternal px without cues  Stand to Sit  Assistance Level: Supervision  Skilled Clinical Factors: Without AD, maintains sternal px without cues      Environmental Mobility  Ambulation  Surface: Level surface  Device:  (no AD)  Distance: 200' +607' (6MWT) + 200'  Activity: Within Room; Within Unit  Activity Comments: Long bout limited by fatigue and endurance, HR elevated to 120 during gait  Additional Factors: Verbal cues; Increased time to complete  Assistance Level: Supervision  Gait Deviations: Slow julita;Decreased step length bilateral;Unsteady gait; Decreased heel strike right;Decreased heel strike left;Decreased weight shift left; Wide base of support;Decreased trunk rotation;Decreased arm swing bilateral  Skilled Clinical Factors: Grossly reciprcoal pattern, B decreased heel strike, slight antalgic pattern, increased truncal sway. HR elevated to 120 with 6MWT             PT Exercises  Dynamic circuit task x 3 reps to improve endurance, strength and promote improved safety and I with mobility. STS from lower chair S  Gait x 50' no AD S  Sit to supine (SBA), supine to sit (modA at trunk) on mat table   Ascend/descend 4 steps with HR SBA  X 2 minutes on SCIFIT level 2.0 to 3.0 with BLE only  1-2 minute seated rest between bouts. ASSESSMENT/PROGRESS TOWARDS GOALS  /77    SpO2 97% on RA    Assessment  Assessment: Pt seen in pm for PT session, agreeable to session, does report increased discomfort from wound vac change but is able to progress with all mobility this date. Pt demosntrates bed mobility with SBA to modA, t/f with S from various surfaces, gait up to 607' without AD with S (for 6 MWT) and stairs with HR with S. Pt participates in dynamic circuit task to improve strength, endurance and performance with mobility.  Pt will benefit from continued skilled PT in ARU to address above deficits, will continue to progress mobility as tolerate  Activity Tolerance: Patient limited by fatigue;Patient limited by endurance; Patient tolerated treatment well  Discharge Recommendations: 24 hour supervision or assist;Home with Home health PT  PT Equipment Recommendations  Equipment Needed: No  Other: No needs anticipated    Goals  Patient Goals   Patient Goals : \"Be able to walk\"  Short Term Goals  Time Frame for Short Term Goals: 10 days 2/05/23  Short Term Goal 1: Pt will complete bed mobility with modA -- 2/08: modA to SBA  Short Term Goal 2: Pt will complete sit to/from stand with modA -- GOAL MET CGA to Kostas x 1-2 with RW  Short Term Goal 3: Pt will complete bed <> chair with LRAD with modA -- GOAL MET Kostas x 1-2 with RW  Short Term Goal 4: Pt will demonstrate gait x 10' in // bars with modA without LOB -- GOAL MET x 100' with beti RW CGA + Kostas  Short Term Goal 5: Pt will tolerate assessment of steps and appropriate goal to be set -- GOAL MET x 8 steps with HR CGA  Long Term Goals  Time Frame for Long Term Goals : 3 weeks 2/16/23  Long Term Goal 1: Pt will demonstrate bed mobility with MI  Long Term Goal 2: Pt will complete functional t/f with LRAD with SBA  Long Term Goal 3: Pt will ambulate >50' with LRAD with SBA without LOB -- 2/06: GOAL MET upgrade goal: Pt will ambulate >80' with LRAD with MI without LOB  Long Term Goal 4: Pt will demonstrate car t/f with LRAD with modA -- 2/07: GOAL MET Kostas; upgrade goal: Pt will complete car t/f with LRAD and SBA  Long Term Goal 5: Pt will ascend/descend 12 steps with HR with S  Additional Goals?: Yes  Long term goal 6: Pt will demonstrate improved  ability on 6MWT to > 500 Ft to demostrate improved endurance and dynamic balance.  -- GOAL MET [address]: 5-7 times per week  Therapy Duration: 3 Weeks  Specific Instructions for Next Treatment: Progress mobility as tolerated  Current Treatment Recommendations: Strengthening;ROM;Balance training;Gait training;Home exercise program;Safety education & training;Stair training;Functional mobility training;Neuromuscular re-education;Patient/Caregiver education & training;Transfer training; Therapeutic activities; Endurance training;Equipment evaluation, education, & procurement;Positioning; Wheelchair mobility training;Vestibular rehab  Safety Devices  Type of Devices: Patient at risk for falls; All fall risk precautions in place; All nikolas prominences offloaded;Call light within reach;Nurse notified;Gait belt;Left in bed;Bed alarm in place    EDUCATION  Education  Education Given To: Patient  Education Provided: Role of Therapy;Plan of Care;Precautions; Fall Prevention Strategies;Transfer Training; Safety;ADL Function; Energy Conservation;Mobility Training;Home Exercise Program  Education Provided Comments: Educated on role of PT, safe progression of activity, monitoring vitals with mobility.   Education Method: Demonstration;Verbal  Barriers to Learning: None  Education Outcome: Verbalized understanding;Continued education needed        Therapy Time   Individual Concurrent Group Co-treatment   Time In 1400         Time Out 1500         Minutes 60           Timed Code Treatment Minutes: [id number] [facility] Delphine Lakhani PT, DPT 02/08/23 at 4:45 PM

## 2023-02-08 NOTE — PROGRESS NOTES
Occupational Therapy  Facility/Department: [address] [place] [address]  Rehabilitation Occupational Therapy Daily Treatment Note    Date: 23  Patient Name: Byron Goodwin       Room: 3085/9319-18  MRN: [id number]  Account: [de-identified]   : 1990  (34 y.o.) Gender: male                    Past Medical History:  has a past medical history of Influenza A and Marfan syndrome. Past Surgical History:   has a past surgical history that includes Femoral-femoral Bypass Graft (Bilateral, 2023) and Thoracic aortic aneurysm repair (N/A, 1/15/2023). Restrictions  Restrictions/Precautions: Fall Risk;Surgical Protocols; General Precautions;Cardiac;Modified Diet  Other position/activity restrictions: [address] YOUR INCISIONS DAILY WITH A CLEAN WASHCLOTH AND ANTIBACTERIAL SOAP. Do not wash your incisions after you have cleansed other parts of your body; up with assist    Subjective  Subjective: Pt in recliner, pleasant, no pain, agreeable to OT session, /71, , O2 sats 94% on RA. Restrictions/Precautions: Fall Risk;Surgical Protocols; General Precautions;Cardiac;Modified Diet             Objective     Cognition  Overall Cognitive Status: Exceptions  Arousal/Alertness: Appropriate responses to stimuli  Following Commands: Follows all commands without difficulty  Attention Span: Appears intact  Memory: Appears intact  Safety Judgement: Decreased awareness of need for safety  Problem Solving: Assistance required to generate solutions  Insights: Fully aware of deficits  Initiation: Does not require cues  Sequencing: Requires cues for some  Orientation  Overall Orientation Status: Within Functional Limits         ADL  Upper Extremity Dressing  Assistance Level: Set-up  Skilled Clinical Factors: to doff/don shirt  Tub/Shower Transfers  Type: Tub  Transfer From: Standing without device  Transfer To: Tub transfer bench  Additional Factors: Set-up; Increased time to complete;Cues for hand placement  Assistance Level: Supervision  Skilled Clinical Factors: cues to swing legs into/out of tub while seated on TTB    Instrumental ADL's  Instrumental ADLs: Yes  Meal Prep  Meal Prep Level: Other  Meal Prep Level of Assistance: Independent  Meal Preparation: to gather items from kitchen and transport across room and then back to kitchen to place items in fridge     Functional Mobility  Device:  (no AD)  Activity: To/From therapy gym;Retrieve items;Transport items  Assistance Level: Supervision  Skilled Clinical Factors: assist to carry wound vac and mild instability only; tolerance to stand for up to 7:15 for collecting and placing rings  Transfers  Surface: From chair with arms; To chair with arms  Additional Factors: Set-up; Hand placement cues; Verbal cues; Increased time to complete  Device:  (no AD)  Sit to Stand  Assistance Level: Supervision  Stand to Sit  Assistance Level: Supervision  Bed To/From Chair  Technique: Stand step  Assistance Level: Supervision   OT Exercises  Resistive Exercises: 3# weight for 15 reps of elbow flexion and wrist extension  Circulation/Endurance Exercises: sit<>stands 2x10 from higher chair     Assessment  Assessment  Assessment: Pt agreeable to OT session. Pt performed functional transfers with improved balance and strength with no assist for sit<>stands from multiple surfaces. Pt demonstrated good standing tolerance to stand for nearly 5 minutes for kitchen task and more than 7 minutes with collecting and placing rings with min VCs for problem solving. Pt and spouse educated on DME recommendations and HHOT at discharge and given handout regarding DME prices and locations for purchase. Continue POC. Activity Tolerance: Patient limited by fatigue;Patient limited by endurance; Patient tolerated treatment well  Discharge Recommendations: Home with Home health OT;S Level 1;Home with assist PRN  OT Equipment Recommendations  Equipment Needed: Yes  Mobility Devices: ADL Assistive Devices  ADL Assistive Devices: Transfer Tub Bench  Safety Devices  Safety Devices in place: Yes  Type of devices: Call light within reach;Gait belt;Nurse notified; Left in chair    Patient Education  Education  Education Given To: Patient; Family  Education Provided: Plan of Care;Precautions; Safety; Energy Conservation;Transfer Training;Mobility Training;Family Education;Role of Therapy; Fall Prevention Strategies;DME/Home Modifications; Equipment;ADL Function;IADL Function  Education Provided Comments: role of OT, safety with transfers, ADL technique with sternal precautions, weight shifting during sit<>stands, use of a/e, ADL assistance/safety with incisions to educate family, DME recommendations, safety in kitchen with sternal precautions  Education Method: Demonstration;Verbal  Barriers to Learning: Cognition  Education Outcome: Verbalized understanding;Continued education needed  Skilled Clinical Factors: Spouse verbalized understanding for incision care regarding IADL safety and DME recommendation    Plan  Occupational Therapy Plan  Times Per Week: 5/7 days/week  Current Treatment Recommendations: ROM;Balance training;Functional mobility training; Endurance training;Patient/Caregiver education & training; Safety education & training;Positioning;Self-Care / ADL; Strengthening;Home management training;Equipment evaluation, education, & procurement    Goals  Short Term Goals  Time Frame for Short Term Goals: 1 week- 2/1/23  Short Term Goal 1: Pt will complete functional transfers with max A. GOAL MET 2/1/23 Pt completed functional transfers with max A. Short Term Goal 2: Pt will perform full body bathing with max A. GOAL MET 2/1/23 Pt completed full body bathing with max A. Short Term Goal 3: Pt will complete toileting with max A. GOAL MET 1/30/23 Pt completed toileting with max A. Short Term Goal 4: Pt will perform grooming with supervision. GOAL MET 2/2/23 Pt performed grooming with supervision.   Short Term Goal 5: Pt will complete full body dressing with max A. GOAL MET 2/1/23 Pt completed full body dressing with max A. Long Term Goals  Time Frame for Long Term Goals : 3 weeks- 2/15/23  Long Term Goal 1: Pt will perform functional transfers with CGA. GOAL MET 2/7/23 Pt peformed functional transfers with CGA. Long Term Goal 2: Pt will complete toileting with CGA. Long Term Goal 3: Pt will complete full body dressing with CGA. Long Term Goal 4: Pt will perform full body bathing with CGA. Long Term Goal 5: Pt will complete IADL task with min A. GOAL MET 2/8/23 Pt completed IADL task with independent.     Therapy Time   Individual Concurrent Group Co-treatment   Time In 1000         Time Out 1100         Minutes 60         Timed Code Treatment Minutes: [address]

## 2023-02-09 PROCEDURE — 97530 THERAPEUTIC ACTIVITIES: CPT

## 2023-02-09 PROCEDURE — 97535 SELF CARE MNGMENT TRAINING: CPT

## 2023-02-09 PROCEDURE — 97110 THERAPEUTIC EXERCISES: CPT

## 2023-02-09 PROCEDURE — 92507 TX SP LANG VOICE COMM INDIV: CPT

## 2023-02-09 PROCEDURE — 6370000000 HC RX 637 (ALT 250 FOR IP): Performed by: STUDENT IN AN ORGANIZED HEALTH CARE EDUCATION/TRAINING PROGRAM

## 2023-02-09 PROCEDURE — 6360000002 HC RX W HCPCS: Performed by: STUDENT IN AN ORGANIZED HEALTH CARE EDUCATION/TRAINING PROGRAM

## 2023-02-09 PROCEDURE — 97116 GAIT TRAINING THERAPY: CPT

## 2023-02-09 PROCEDURE — 1280000000 HC REHAB R&B

## 2023-02-09 RX ORDER — ENOXAPARIN SODIUM 100 MG/ML
30 INJECTION SUBCUTANEOUS 2 TIMES DAILY
Status: DISCONTINUED | OUTPATIENT
Start: 2023-02-09 | End: 2023-02-11 | Stop reason: HOSPADM

## 2023-02-09 RX ADMIN — TRAZODONE HYDROCHLORIDE 100 MG: 50 TABLET ORAL at 20:14

## 2023-02-09 RX ADMIN — FAMOTIDINE 20 MG: 20 TABLET, FILM COATED ORAL at 20:15

## 2023-02-09 RX ADMIN — BISACODYL 5 MG: 5 TABLET, COATED ORAL at 09:04

## 2023-02-09 RX ADMIN — GABAPENTIN 100 MG: 100 CAPSULE ORAL at 20:15

## 2023-02-09 RX ADMIN — METOPROLOL TARTRATE 50 MG: 50 TABLET, FILM COATED ORAL at 20:14

## 2023-02-09 RX ADMIN — ASPIRIN 81 MG: 81 TABLET, COATED ORAL at 09:04

## 2023-02-09 RX ADMIN — ENOXAPARIN SODIUM 30 MG: 100 INJECTION SUBCUTANEOUS at 12:06

## 2023-02-09 RX ADMIN — ATORVASTATIN CALCIUM 40 MG: 40 TABLET, FILM COATED ORAL at 20:14

## 2023-02-09 RX ADMIN — Medication 5 MG: at 20:14

## 2023-02-09 RX ADMIN — GABAPENTIN 100 MG: 100 CAPSULE ORAL at 15:15

## 2023-02-09 RX ADMIN — ACETAMINOPHEN 1000 MG: 500 TABLET ORAL at 15:14

## 2023-02-09 RX ADMIN — GABAPENTIN 100 MG: 100 CAPSULE ORAL at 09:04

## 2023-02-09 RX ADMIN — ACETAMINOPHEN 1000 MG: 500 TABLET ORAL at 05:56

## 2023-02-09 RX ADMIN — ENOXAPARIN SODIUM 30 MG: 100 INJECTION SUBCUTANEOUS at 20:15

## 2023-02-09 RX ADMIN — FAMOTIDINE 20 MG: 20 TABLET, FILM COATED ORAL at 09:04

## 2023-02-09 RX ADMIN — POLYETHYLENE GLYCOL 3350 17 G: 17 POWDER, FOR SOLUTION ORAL at 09:04

## 2023-02-09 RX ADMIN — METOPROLOL TARTRATE 50 MG: 50 TABLET, FILM COATED ORAL at 09:04

## 2023-02-09 RX ADMIN — ACETAMINOPHEN 1000 MG: 500 TABLET ORAL at 20:14

## 2023-02-09 ASSESSMENT — PAIN - FUNCTIONAL ASSESSMENT: PAIN_FUNCTIONAL_ASSESSMENT: PREVENTS OR INTERFERES SOME ACTIVE ACTIVITIES AND ADLS

## 2023-02-09 ASSESSMENT — PAIN DESCRIPTION - ORIENTATION: ORIENTATION: RIGHT;LEFT

## 2023-02-09 ASSESSMENT — PAIN DESCRIPTION - LOCATION: LOCATION: LEG

## 2023-02-09 ASSESSMENT — PAIN SCALES - GENERAL: PAINLEVEL_OUTOF10: 1

## 2023-02-09 ASSESSMENT — PAIN DESCRIPTION - DESCRIPTORS: DESCRIPTORS: ACHING

## 2023-02-09 NOTE — PROGRESS NOTES
Raven Levar  2/9/2023  [id number]    Chief Complaint: Aortic dissection ([address]    Subjective:   No acute events overnight. Today Josse Sweeney is seen in his room with his wife present. He reports that he is feeling well. He is concerned about whether he will need a wound vac on his other incision. He denies other acute complaints at this time. ROS: denies f/c, n/v, cp, sob    Objective:  Patient Vitals for the past 24 hrs:   BP Temp Temp src Pulse Resp SpO2 Weight   02/09/23 0904 123/78 -- -- 98 -- -- --   02/09/23 0800 -- -- -- -- -- 96 % --   02/09/23 0745 123/78 98 °F (36.7 °C) Oral 98 16 -- --   02/09/23 0554 -- -- -- -- -- -- (!) 322 lb (146.1 kg)   02/08/23 2101 136/84 98.1 °F (36.7 °C) Oral (!) 101 16 95 % --     Gen: No distress, pleasant. Seated up in chair  HEENT: Normocephalic, atraumatic. CV: extremities well perfused  Resp: No respiratory distress. Abd: Soft, nondistended  Ext: No edema. Wound vac on right groin incision   Neuro: Alert, oriented, appropriately interactive. Psych: appropriate mood and affect    Wt Readings from Last 3 Encounters:   02/09/23 (!) 322 lb (146.1 kg)   01/25/23 279 lb 9 oz (126.8 kg)   03/12/20 (!) 350 lb (158.8 kg)       Laboratory data:   Lab Results   Component Value Date    WBC 8.1 02/08/2023    HGB 9.3 (L) 02/08/2023    HCT 29.5 (L) 02/08/2023    MCV 80.1 02/08/2023     (H) 02/08/2023       Lab Results   Component Value Date/Time     02/08/2023 07:49 AM    K 4.2 02/08/2023 07:49 AM    K 4.4 02/01/2023 07:29 AM     02/08/2023 07:49 AM    CO2 23 02/08/2023 07:49 AM    BUN 10 02/08/2023 07:49 AM    CREATININE 0.6 02/08/2023 07:49 AM    GLUCOSE 95 02/08/2023 07:49 AM    CALCIUM 8.9 02/08/2023 07:49 AM        Therapy progress:  PT  Position Activity Restriction  Sternal Precautions: No Pushing, No Pulling, 5# Lifting Restrictions  Other position/activity restrictions: [address] YOUR INCISIONS DAILY WITH A CLEAN WASHCLOTH AND ANTIBACTERIAL SOAP.  Do not wash your incisions after you have cleansed other parts of your body; up with assist  Objective     Sit to Stand: Dependent/Total, 2 Person Assistance  Stand to Sit: Dependent/Total, 2 Person Assistance  Bed to Chair: Dependent/Total, 2 Person Assistance     OT  PT Equipment Recommendations  Equipment Needed: No  Mobility Devices: Taryn Cape: Rolling  Other: No needs anticipated  Equipment Used: Drop-arm commode  Toilet Transfers Comments: use of Jun Angles to transfer on/off BSC over toilet  Assessment        SLP                Body mass index is 39.2 kg/m². Assessment and Plan:  Gibson Shields is a 34year old male with a past medical history significant for Marfan syndrome and morbid obesity who presented to [facility] on 1/15/23 with severe chest pain and lower extremity numbness, found to have aortic dissection s/p replacement. He was admitted to [facility] on 1/26/23 due to functional deficits below his baseline. Type A Aortic Dissection s/p replacement  - sternal precautions  - asa, statin BB  - goal SBP<140  - PT, OT     Right to left fem-fem bypass  - asa, statin, BB  - every other staple removed 2/1  - Vascular following, incision care per orders. Wound vac to right groin incision and wet to dry dressings on left     Vocal cord paralysis  - ENT evaluated during acute stay  - ST     Aspiration pneumonia, resolved  - completed augmentin     Fluid overload, improved  - completed week of lasix  - discontinued K and Mg and monitor  - daily weights, I/Os     Acute Blood Loss Anemia  - Hb stable  - monitor and transfuse for Hb<7     Marfan Syndrome  - noted     Obesity  - BMI 34  - encourage lifestyle modifications     Bowels: adjust medications as needed for regular bowel movements     Bladder: Check PVR x 3. [address] if PVR > 200ml or if any volume is > 500 ml. Sleep: Trazodone provided prn.       PPX  DVT: lovenox  GI: pepcid     Follow up appointments: CT surgery, PCP    Services: HH PT, OT, ST, nursing  EDOD: 2/11    Zoila James.  Patrice Orona MD 2/9/2023, 3:13 PM

## 2023-02-09 NOTE — PROGRESS NOTES
Physical Therapy  Facility/Department: [facility]  Rehabilitation Physical Therapy Treatment Note    NAME: Nieves Erickson  : 1990 (34 y.o.)  MRN: [id number]  CODE STATUS: Full Code    Date of Service: 23       Restrictions:  Restrictions/Precautions: Fall Risk;Surgical Protocols; General Precautions;Cardiac;Modified Diet  Position Activity Restriction  Sternal Precautions: No Pushing; No Pulling;5# Lifting Restrictions  Other position/activity restrictions: [address] YOUR INCISIONS DAILY WITH A CLEAN WASHCLOTH AND ANTIBACTERIAL SOAP. Do not wash your incisions after you have cleansed other parts of your body; up with assist     SUBJECTIVE  Subjective  Subjective: Pt seated in chair on approach, agreeable to PT tx  Pain: pt denies c/o pain               OBJECTIVE  Cognition  Overall Cognitive Status: Exceptions  Arousal/Alertness: Appropriate responses to stimuli  Following Commands: Follows all commands without difficulty  Attention Span: Appears intact  Memory: Appears intact  Safety Judgement: Decreased awareness of need for safety  Problem Solving: Assistance required to generate solutions  Insights: Fully aware of deficits  Initiation: Does not require cues  Sequencing: Requires cues for some  Orientation  Overall Orientation Status: Within Functional Limits    Functional Mobility  Sit to Supine  Assistance Level: Stand by assist  Skilled Clinical Factors: HOB flat without BR, increased time to complete, min cues for sequence, maintains sternal px, x 3 reps  Supine to Sit  Assistance Level: Moderate assistance  Skilled Clinical Factors: on flat mat table without BR, cues for sequence, maintains sternal px with min cues, increased time to complete x 3 reps    Balance  Sitting Balance: Independent  Standing Balance: Supervision  Standing Balance  Activity: S to I without AD    Transfers  Surface: From chair with arms; Wheelchair;From bed  Additional Factors: Increased time to complete;Hand placement cues; Verbal cues  Device:  (no AD)  Sit to Stand  Assistance Level: Supervision  Skilled Clinical Factors: Pt completes multiple t/f throughout session from various surfaces, able to complete with S, maintains sternal px without cues  Stand to Sit  Assistance Level: Supervision  Skilled Clinical Factors: Without AD, maintains sternal px without cues  Car Transfer  Assistance Level: Minimal assistance  Skilled Clinical Factors: Without AD, requires Kostas to get LE into car, increased time to complete, min cues for safety      Environmental Mobility  Ambulation  Surface: Level surface  Device:  (no AD)  Distance: 200' + 250' + 450'  Activity: Within Room; Within Unit  Activity Comments: Longer bouts limited by fatigue and endurance HR maintained ~100-1120 with mobility  Additional Factors: Verbal cues; Increased time to complete  Assistance Level: Supervision  Gait Deviations: Slow julita;Decreased step length bilateral;Unsteady gait; Decreased heel strike right;Decreased heel strike left;Decreased weight shift left; Wide base of support;Decreased trunk rotation;Decreased arm swing bilateral  Skilled Clinical Factors: Grossly reciprcoal pattern, B decreased heel strike, slight antalgic pattern, increased truncal sway. Intemrittent change in gait velocity  Stairs  Stair Height: 6''  Device: Bilateral handrails; One handrail  Number of Stairs: 16  Additional Factors: Verbal cues; Increased time to complete;Reciprocal going up;Reciprocal going down  Assistance Level: Supervision  Skilled Clinical Factors: initially with [facility], progressed to [facility] for last 8 to simulate home enviornment  Skilled Clinical Factors - Comments: Limited by fatigue             PT Exercises  Dynamic Circuit task  X 3 bouts:   STS from lower chair SBA to S   4 corner gait task SBA to S    X 8' forwards    X 8' L side stepping    X 8' R side stepping     X 8' retrowalking   X 5 reps B step up to 8\" step in // bars SBA   Figure 8 around 2 cones SBA  1-2 minute rest break between bouts. Completed to improve strength and activity tolerance to improve safety and I with mobility    ASSESSMENT/PROGRESS TOWARDS GOALS  /73  HR 96  SpO2 96% on RA    Assessment  Assessment: Pt seen in am for PT tx, pt agreeable with focus on progression of gait, functional mobility and t/f and improved activity tolerance. Pt's wife present and educated on providing assist for bed mobility as well as pt's current level for t/f, gait and stairs. Malika Colindres demonstrates good understanding. Pt demonstrates bed mobility with modA, t/f with grossly S from various surfaces and gait up to 400' without AD with S. Pt is limited by decreased activity tolerance. Pt will benefit from continued skilled PT in ARU to address above deficits, will continue to progress mobility as tolerated  Activity Tolerance: Patient limited by fatigue;Patient limited by endurance; Patient tolerated treatment well  Discharge Recommendations: 24 hour supervision or assist;Home with Home health PT  PT Equipment Recommendations  Equipment Needed: No  Other: No needs anticipated    Goals  Patient Goals   Patient Goals :  \"Be able to walk\"  Short Term Goals  Time Frame for Short Term Goals: 10 days 2/05/23  Short Term Goal 1: Pt will complete bed mobility with modA -- 2/08: GOAL MET MODA to SBA  Short Term Goal 2: Pt will complete sit to/from stand with modA -- GOAL MET CGA to Kostas x 1-2 with RW  Short Term Goal 3: Pt will complete bed <> chair with LRAD with modA -- GOAL MET Kostas x 1-2 with RW  Short Term Goal 4: Pt will demonstrate gait x 10' in // bars with modA without LOB -- GOAL MET x 100' with beti RW CGA + Kostas  Short Term Goal 5: Pt will tolerate assessment of steps and appropriate goal to be set -- GOAL MET x 8 steps with HR CGA  Long Term Goals  Time Frame for Long Term Goals : 3 weeks 2/16/23  Long Term Goal 1: Pt will demonstrate bed mobility with MI  Long Term Goal 2: Pt will complete functional t/f with LRAD with SBA -- 2/08: GOAL MET S without AD  Long Term Goal 3: Pt will ambulate >50' with LRAD with SBA without LOB -- 2/06: GOAL MET upgrade goal: Pt will ambulate >80' with LRAD with MI without LOB  Long Term Goal 4: Pt will demonstrate car t/f with LRAD with Shereen Comfort -- 2/07: GOAL MET Kostas; upgrade goal: Pt will complete car t/f with LRAD and SBA  Long Term Goal 5: Pt will ascend/descend 12 steps with HR with S -- 2/08: GOAL MET >12 steps with HR with S  Long term goal 6: Pt will demonstrate improved  ability on 6MWT to > 500 Ft to demostrate improved endurance and dynamic balance. -- GOAL MET [address]: 5-7 times per week  Therapy Duration: 3 Weeks  Specific Instructions for Next Treatment: Progress mobility as tolerated  Current Treatment Recommendations: Strengthening;ROM;Balance training;Gait training;Home exercise program;Safety education & training;Stair training;Functional mobility training;Neuromuscular re-education;Patient/Caregiver education & training;Transfer training; Therapeutic activities; Endurance training;Equipment evaluation, education, & procurement;Positioning; Wheelchair mobility training;Vestibular rehab  Safety Devices  Type of Devices: Patient at risk for falls; All fall risk precautions in place; All nikolas prominences offloaded;Call light within reach;Nurse notified;Gait belt;Left in chair;Chair alarm in place    EDUCATION  Education  Education Given To: Patient; Family  Education Provided: Role of Therapy;Plan of Care;Precautions; Fall Prevention Strategies;Transfer Training; Safety;ADL Function; Energy Conservation;Mobility Training;Home Exercise Program  Education Provided Comments: Educated on role of PT, safe progression of activity, monitoring vitals with mobility.  Pt's wife Alison Edge present, educated in providing assistance for bed mobility and pt's current SARAH BETH for t/f, gait and stairs, demonstrates understanding  Education Method: Demonstration;Verbal  Barriers to Learning: None  Education Outcome: Verbalized understanding;Continued education needed        Therapy Time   Individual Concurrent Group Co-treatment   Time In 1000         Time Out 1100         Minutes 60           Timed Code Treatment Minutes: [address] [facility] Scott, AUSTEN, DPT  02/09/23 at 1:57 PM

## 2023-02-09 NOTE — PROGRESS NOTES
Vascular    R groin wound VAC with good seal.   Left groin with 2 open areas of skin separation, underlying deep tissue intact. Skin bridge with undermining between open areas. Skin bridge disrupted connecting 2 areas into single area. Will have wound VAC placed to left groin tomorrow when right groin dressing changed.

## 2023-02-09 NOTE — PROGRESS NOTES
[facility]: ACUTE REHAB UNIT  SPEECH-LANGUAGE PATHOLOGY      [x] Daily  [] Weekly Care Conference Note  [] Discharge    [place]      JTC:4/00/[birth year]  EQT:[id number]  Rehab Dx/Hx: Aortic dissection (HCC) [I71.00]    Precautions: falls  Home situation: Spouse and three children. Works full-time. Wasn't taking meds prior to admission, shares finances with wife. 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   Cognition: [] WFL  [x] Mild  [] Moderate  [] Severe [] Unable to Assess  [] Other:  Memory: [] WFL  [x] Mild  [] Moderate  [] Severe [] Unable to Assess  [] Other:  Behavior: [x] Alert  [x] Cooperative  [x]  Pleasant  [] Confused  [] Agitated  [] Uncooperative  [] Distractible [] Motivated  [] Self-Limiting [] Anxious  [] Other:  Endurance:  [x] Adequate for participation in SLP sessions  [] Reduced overall  [] Lethargic  [] Other:  Safety: [x] No concerns at this time  [] Reduced insight into deficits  []  Reduced safety awareness [] Not following call light procedures  [] Unable to Assess  [] Other:    Current Diet Order:ADULT ORAL NUTRITION SUPPLEMENT; Breakfast, Dinner; Standard High Calorie/High Protein Oral Supplement  ADULT DIET; Regular;  No Added Salt (3-4 gm)  Swallowing Precautions: Sit up for all meals and thereafter for 30 minutes, Eat with small bites (1/2 tsp; 1 tsp), and Alternate solids with liquids        Date: 2/9/2023      Tx session 1  0900 - 1000 Tx session 2  All tx needs met in session 1   Total Timed Code Min 0 0   Total Treatment Minutes 60 0   Individual Treatment Minutes 60 0   Group Treatment Minutes 0 0   Co-Treat Minutes 0 0   Variance/Reason:  N/a  N/A   Pain Denies  Denies    Pain Intervention [] RN notified  [] Repositioned  [] Intervention offered and patient declined  [x] N/A  [] Other:  [] RN notified  [] Repositioned  [] Intervention offered and patient declined  [x] N/A  [] Other:   Subjective     Pt alert and cooperative, agreeable to tx. Pt upright in bedside chair for session. Objective:  Goals     Short Term Goals  Time Frame for Short Term Goals: 18 Days (02/13/23)    Goal 1: Pt will complete vocal functioning exercises / resonant voice therapy exercises 10/10 given min cues    Warm-ups at a comfortable pitch:   -\"whoo\" as in whoop: 10x  -\"oh\" as in \"knoll\": 10x  -hum: 10x  -lip trills: 10x    Stretching- glide from lowest to highest:  -\"whoop\" - 10x  -\"oh\" as in \"knoll\": 10x  -hum : 10x  -lip trills: 10x  .   Contraction- glide from highest pitch to lowest pitch   -\"boom\" - 10x   -\"oh\" as in \"knoll\" - 10x    Chanting: complete on a single pitch   -exclc-opk-gqm-sara  -msfqp-txj-njv-muu   -kvahy-gdg-ppr-[facility]   -nmyr-fb-ya-my   -pdqvn-lat-hef-sarah  -mmmay-may-may-may      Resonant Voice Therapy with min cues:   -hold out /mmm/ and say: meet me Monday, meet me Monday Morning  -hold out /mm/ and say: my, my mice, my mice might, my mice might make, my mice might make me, my mice might make me crazy  -hold out /mm/ and say: Daniel, Daniel mopped, Daniel mopped more, Daniel mopped more mud, Daniel mopped more mud than than Kristan    Semi-occluded voice therapy  -pt blew bubbles for as long as he could; 5 trials - averaged 19/0 seconds      Goal 2: Pt will complete respiratory retraining exercises 10/10 given min cues   Pt completed the following exercises given min cues:  -exhale while sustaining \"f\": 10x  -exhale while sustaining \"Shhh\": 10x  -exhale while sustaining \"z\": 10x  -breathe in nose and out straw: 10x  -Sniff x2 through nose, then breathe out through straw: 10x  -breathe in nose out through pursed lips: 10x  -Sniff x2 through nose, then breathe out through pursed lips: 10x         Goal met 02/07/23. Goal met 02/07/23. Goal met 02/07/23. Other areas targeted: N/A N/A   Education:   SLP edu pt re: rationale of VFE and respiratory retraining exercises     Safety Devices: [x] Call light within reach  [x] Chair alarm activated  [] Bed alarm activated  [x] Other: [] Call light within reach  [] Chair alarm activated  [] Bed alarm activated  [] Other: wife present     Assessment: Pt alert and cooperative, agreeable to tx. Pt completed 10 reps of the vocal functioning exercises / resonant voice therapy exercises. Pt with improvement with semi-occluded voice therapy with blowing bubbles - increased avg from 14.4 seconds to 19 seconds. Pt reports his voice is sounding better. Continue goals above. Plan: Continue as per plan of care. Additional Information:     Barriers toward progress: N/a   Discharge recommendations:  [] Home independently  [x] Home with assistance []  24 hour supervision  [] ECF [] Other:  Continued Tx Upon Discharge: ? [x] Yes [] No [] TBD based on progress while on ARU [] Vital Stim indicated [] Other:   Estimated discharge date: 02/11/2023    Interventions used this date:  [x] Speech/Language Treatment  [] Instruction in HEP [] Group [] Dysphagia Treatment [x] Cognitive Treatment   [] Other:       Total Time Breakdown / Charges    Time in Time out Total Time / units   Cognitive Tx      Speech Tx 0900 1000 60 min / 1 unit    Dysphagia Tx -- -- --       Electronically Signed by     Alex Lepe MA CCC-SLP #[id number]  Speech Language Pathologist

## 2023-02-09 NOTE — PROGRESS NOTES
Occupational Therapy  Facility/Department: [address] [place] [address]  Rehabilitation Occupational Therapy Daily Treatment Note    Date: 23  Patient Name: Esthela Leach       Room: 1580/1193-53  MRN: [id number]  Account: [de-identified]   : 1990  (34 y.o.) Gender: male                    Past Medical History:  has a past medical history of Influenza A and Marfan syndrome. Past Surgical History:   has a past surgical history that includes Femoral-femoral Bypass Graft (Bilateral, 2023) and Thoracic aortic aneurysm repair (N/A, 1/15/2023). Restrictions  Restrictions/Precautions: Fall Risk;Surgical Protocols; General Precautions;Cardiac;Modified Diet  Other position/activity restrictions: [address] YOUR INCISIONS DAILY WITH A CLEAN WASHCLOTH AND ANTIBACTERIAL SOAP. Do not wash your incisions after you have cleansed other parts of your body; up with assist    Subjective  Subjective: Pt in recliner, pleasant, no pain, agreeable to OT session, /81, HR 98, O2 sats 93% on RA. Restrictions/Precautions: Fall Risk;Surgical Protocols; General Precautions;Cardiac;Modified Diet             Objective     Cognition  Overall Cognitive Status: Exceptions  Arousal/Alertness: Appropriate responses to stimuli  Following Commands:  Follows all commands without difficulty  Attention Span: Appears intact  Memory: Appears intact  Safety Judgement: Decreased awareness of need for safety  Problem Solving: Assistance required to generate solutions  Insights: Fully aware of deficits  Initiation: Does not require cues  Sequencing: Requires cues for some  Orientation  Overall Orientation Status: Within Functional Limits         ADL  Grooming/Oral Hygiene  Assistance Level: Supervision  Skilled Clinical Factors: to wash hands in stance at sink  Lower Extremity Dressing  Equipment Provided: Reachers  Assistance Level: Supervision  Skilled Clinical Factors: good use of reacher to thread BLEs into pants/underwear, SPV to stand and pull over hips  Putting On/Taking Off Footwear  Equipment Provided: Reachers  Assistance Level: Minimal assistance  Skilled Clinical Factors: good ability to doff shoes with reacher, able to mostly don shoes and assist for heel in shoe only  Toileting  Assistance Level: Supervision  Skilled Clinical Factors: assist to hand pt wipes, good ability to complete hygiene in stance  Toilet Transfers  Technique: Stand step  Equipment: Standard toilet  Additional Factors: Verbal cues  Assistance Level: Supervision          Functional Mobility  Device:  (no AD)  Activity: To/From therapy gym; To/From bathroom  Assistance Level: Supervision  Transfers  Surface: From chair with arms; To chair with arms;Standard toilet; To mat;From mat  Additional Factors: Set-up; Verbal cues; Increased time to complete  Device:  (no AD)  Sit to Stand  Assistance Level: Supervision  Stand to Sit  Assistance Level: Supervision  Bed To/From Chair  Technique: Stand step  Assistance Level: Supervision   OT Exercises  Resistive Exercises: 3# weight for 15 reps of elbow flexion, chest press, shoulder flexion to 90*, wrist flexion, supination/pronation, and wrist extension  Circulation/Endurance Exercises: sit<>stands x10 from low mat table, a-p leaning x20 with 4# medicine ball     Assessment  Assessment  Assessment: Pt agreeable to OT session. Pt performed functional transfers with improved balance and SPV with good adherence to sternal precautions. Pt completed LB dressing with assist for fully donning shoes only. Pt demonstrated good ability to complete bowel hygiene and toileting with SPV. Pt completed BUE ther ex with fair strength for 3# weight and understanding of all exercises with BUE HEP. Continue POC. Activity Tolerance: Patient limited by fatigue;Patient limited by endurance; Patient tolerated treatment well  Discharge Recommendations: Home with Home health OT;S Level 1;Home with assist PRN  OT Equipment Recommendations  Equipment Needed: Yes  Mobility Devices: ADL Assistive Devices  ADL Assistive Devices: Transfer Tub Bench  Safety Devices  Safety Devices in place: Yes  Type of devices: Call light within reach;Gait belt;Nurse notified; Left in bed    Patient Education  Education  Education Given To: Patient; Family  Education Provided: Plan of Care;Precautions; Safety; Energy Conservation;Transfer Training;Mobility Training;Family Education;Role of Therapy; Fall Prevention Strategies;DME/Home Modifications; Equipment;ADL Function;IADL Function;Home Exercise Program  Education Provided Comments: role of OT, safety with transfers, ADL technique with sternal precautions, weight shifting during sit<>stands, use of a/e, ADL assistance/safety with incisions to educate family, DME recommendations, BUE HEP  Education Method: Demonstration;Verbal  Barriers to Learning: Cognition  Education Outcome: Verbalized understanding;Continued education needed    Plan  Occupational Therapy Plan  Times Per Week: 5/7 days/week  Current Treatment Recommendations: ROM;Balance training;Functional mobility training; Endurance training;Patient/Caregiver education & training; Safety education & training;Positioning;Self-Care / ADL; Strengthening;Home management training;Equipment evaluation, education, & procurement    Goals  Short Term Goals  Time Frame for Short Term Goals: 1 week- 2/1/23  Short Term Goal 1: Pt will complete functional transfers with max A. GOAL MET 2/1/23 Pt completed functional transfers with max A. Short Term Goal 2: Pt will perform full body bathing with max A. GOAL MET 2/1/23 Pt completed full body bathing with max A. Short Term Goal 3: Pt will complete toileting with max A. GOAL MET 1/30/23 Pt completed toileting with max A. Short Term Goal 4: Pt will perform grooming with supervision. GOAL MET 2/2/23 Pt performed grooming with supervision. Short Term Goal 5: Pt will complete full body dressing with max A. GOAL MET 2/1/23 Pt completed full body dressing with max A.   Long Term Goals  Long Term Goal 2: Pt will complete toileting with CGA. GOAL MET 2/9/23 Pt completed toileting with SPV. Long Term Goal 3: Pt will complete full body dressing with CGA. Long Term Goal 4: Pt will perform full body bathing with CGA. Long Term Goal 5: Pt will complete IADL task with min A. GOAL MET 2/8/23 Pt completed IADL task with independent.     Therapy Time   Individual Concurrent Group Co-treatment   Time In 1300         Time Out 1400         Minutes 60         Timed Code Treatment Minutes: [address] [place], [place]

## 2023-02-10 LAB
ANION GAP SERPL CALCULATED.3IONS-SCNC: 11 MMOL/L (ref 3–16)
BASOPHILS ABSOLUTE: 0.1 K/UL (ref 0–0.2)
BASOPHILS RELATIVE PERCENT: 1.6 %
BUN BLDV-MCNC: 9 MG/DL (ref 7–20)
CALCIUM SERPL-MCNC: 9.1 MG/DL (ref 8.3–10.6)
CHLORIDE BLD-SCNC: 106 MMOL/L (ref 99–110)
CO2: 23 MMOL/L (ref 21–32)
CREAT SERPL-MCNC: 0.6 MG/DL (ref 0.9–1.3)
EOSINOPHILS ABSOLUTE: 0.4 K/UL (ref 0–0.6)
EOSINOPHILS RELATIVE PERCENT: 6.2 %
GFR SERPL CREATININE-BSD FRML MDRD: >60 ML/MIN/{1.73_M2}
GLUCOSE BLD-MCNC: 96 MG/DL (ref 70–99)
HCT VFR BLD CALC: 31 % (ref 40.5–52.5)
HEMOGLOBIN: 10.1 G/DL (ref 13.5–17.5)
LYMPHOCYTES ABSOLUTE: 1.9 K/UL (ref 1–5.1)
LYMPHOCYTES RELATIVE PERCENT: 28.5 %
MAGNESIUM: 2.3 MG/DL (ref 1.8–2.4)
MCH RBC QN AUTO: 25.8 PG (ref 26–34)
MCHC RBC AUTO-ENTMCNC: 32.7 G/DL (ref 31–36)
MCV RBC AUTO: 78.9 FL (ref 80–100)
MONOCYTES ABSOLUTE: 0.5 K/UL (ref 0–1.3)
MONOCYTES RELATIVE PERCENT: 7.8 %
NEUTROPHILS ABSOLUTE: 3.7 K/UL (ref 1.7–7.7)
NEUTROPHILS RELATIVE PERCENT: 55.9 %
PDW BLD-RTO: 15.3 % (ref 12.4–15.4)
PLATELET # BLD: 410 K/UL (ref 135–450)
PMV BLD AUTO: 6.6 FL (ref 5–10.5)
POTASSIUM SERPL-SCNC: 4.1 MMOL/L (ref 3.5–5.1)
RBC # BLD: 3.93 M/UL (ref 4.2–5.9)
SODIUM BLD-SCNC: 140 MMOL/L (ref 136–145)
WBC # BLD: 6.7 K/UL (ref 4–11)

## 2023-02-10 PROCEDURE — 97535 SELF CARE MNGMENT TRAINING: CPT

## 2023-02-10 PROCEDURE — 80048 BASIC METABOLIC PNL TOTAL CA: CPT

## 2023-02-10 PROCEDURE — 97110 THERAPEUTIC EXERCISES: CPT

## 2023-02-10 PROCEDURE — 92507 TX SP LANG VOICE COMM INDIV: CPT

## 2023-02-10 PROCEDURE — 85025 COMPLETE CBC W/AUTO DIFF WBC: CPT

## 2023-02-10 PROCEDURE — 6370000000 HC RX 637 (ALT 250 FOR IP): Performed by: STUDENT IN AN ORGANIZED HEALTH CARE EDUCATION/TRAINING PROGRAM

## 2023-02-10 PROCEDURE — 97116 GAIT TRAINING THERAPY: CPT

## 2023-02-10 PROCEDURE — 83735 ASSAY OF MAGNESIUM: CPT

## 2023-02-10 PROCEDURE — 6360000002 HC RX W HCPCS: Performed by: STUDENT IN AN ORGANIZED HEALTH CARE EDUCATION/TRAINING PROGRAM

## 2023-02-10 PROCEDURE — 1280000000 HC REHAB R&B

## 2023-02-10 PROCEDURE — 97530 THERAPEUTIC ACTIVITIES: CPT

## 2023-02-10 PROCEDURE — 36415 COLL VENOUS BLD VENIPUNCTURE: CPT

## 2023-02-10 RX ORDER — FAMOTIDINE 20 MG/1
20 TABLET, FILM COATED ORAL 2 TIMES DAILY
Qty: 60 TABLET | Refills: 0 | Status: SHIPPED | OUTPATIENT
Start: 2023-02-10

## 2023-02-10 RX ORDER — GABAPENTIN 100 MG/1
100 CAPSULE ORAL 3 TIMES DAILY
Qty: 90 CAPSULE | Refills: 0 | Status: SHIPPED | OUTPATIENT
Start: 2023-02-10 | End: 2023-03-12

## 2023-02-10 RX ORDER — TRAZODONE HYDROCHLORIDE 100 MG/1
100 TABLET ORAL NIGHTLY
Qty: 30 TABLET | Refills: 0 | Status: SHIPPED | OUTPATIENT
Start: 2023-02-10

## 2023-02-10 RX ORDER — METOPROLOL TARTRATE 50 MG/1
50 TABLET, FILM COATED ORAL 2 TIMES DAILY
Qty: 60 TABLET | Refills: 3 | Status: SHIPPED | OUTPATIENT
Start: 2023-02-10

## 2023-02-10 RX ORDER — ATORVASTATIN CALCIUM 40 MG/1
40 TABLET, FILM COATED ORAL DAILY
Qty: 30 TABLET | Refills: 3 | Status: SHIPPED | OUTPATIENT
Start: 2023-02-10

## 2023-02-10 RX ORDER — ASPIRIN 81 MG/1
81 TABLET ORAL DAILY
Qty: 30 TABLET | Refills: 3 | Status: SHIPPED | OUTPATIENT
Start: 2023-02-11

## 2023-02-10 RX ADMIN — GABAPENTIN 100 MG: 100 CAPSULE ORAL at 20:52

## 2023-02-10 RX ADMIN — Medication 5 MG: at 20:52

## 2023-02-10 RX ADMIN — POLYETHYLENE GLYCOL 3350 17 G: 17 POWDER, FOR SOLUTION ORAL at 08:43

## 2023-02-10 RX ADMIN — ENOXAPARIN SODIUM 30 MG: 100 INJECTION SUBCUTANEOUS at 20:52

## 2023-02-10 RX ADMIN — ACETAMINOPHEN 1000 MG: 500 TABLET ORAL at 05:26

## 2023-02-10 RX ADMIN — FAMOTIDINE 20 MG: 20 TABLET, FILM COATED ORAL at 08:43

## 2023-02-10 RX ADMIN — BISACODYL 5 MG: 5 TABLET, COATED ORAL at 08:43

## 2023-02-10 RX ADMIN — TRAZODONE HYDROCHLORIDE 100 MG: 50 TABLET ORAL at 20:52

## 2023-02-10 RX ADMIN — ACETAMINOPHEN 1000 MG: 500 TABLET ORAL at 20:52

## 2023-02-10 RX ADMIN — METOPROLOL TARTRATE 50 MG: 50 TABLET, FILM COATED ORAL at 20:52

## 2023-02-10 RX ADMIN — FAMOTIDINE 20 MG: 20 TABLET, FILM COATED ORAL at 20:52

## 2023-02-10 RX ADMIN — ENOXAPARIN SODIUM 30 MG: 100 INJECTION SUBCUTANEOUS at 08:43

## 2023-02-10 RX ADMIN — OXYCODONE HYDROCHLORIDE 5 MG: 5 TABLET ORAL at 10:06

## 2023-02-10 RX ADMIN — GABAPENTIN 100 MG: 100 CAPSULE ORAL at 08:43

## 2023-02-10 RX ADMIN — ASPIRIN 81 MG: 81 TABLET, COATED ORAL at 08:43

## 2023-02-10 RX ADMIN — ACETAMINOPHEN 1000 MG: 500 TABLET ORAL at 13:43

## 2023-02-10 RX ADMIN — METOPROLOL TARTRATE 50 MG: 50 TABLET, FILM COATED ORAL at 08:43

## 2023-02-10 RX ADMIN — ATORVASTATIN CALCIUM 40 MG: 40 TABLET, FILM COATED ORAL at 20:52

## 2023-02-10 RX ADMIN — GABAPENTIN 100 MG: 100 CAPSULE ORAL at 13:44

## 2023-02-10 ASSESSMENT — PAIN DESCRIPTION - LOCATION
LOCATION: CHEST
LOCATION: CHEST

## 2023-02-10 ASSESSMENT — PAIN - FUNCTIONAL ASSESSMENT
PAIN_FUNCTIONAL_ASSESSMENT: PREVENTS OR INTERFERES SOME ACTIVE ACTIVITIES AND ADLS
PAIN_FUNCTIONAL_ASSESSMENT: PREVENTS OR INTERFERES SOME ACTIVE ACTIVITIES AND ADLS

## 2023-02-10 ASSESSMENT — PAIN SCALES - GENERAL
PAINLEVEL_OUTOF10: 5
PAINLEVEL_OUTOF10: 2
PAINLEVEL_OUTOF10: 2
PAINLEVEL_OUTOF10: 5

## 2023-02-10 ASSESSMENT — PAIN DESCRIPTION - DESCRIPTORS: DESCRIPTORS: ACHING;STABBING

## 2023-02-10 NOTE — PROGRESS NOTES
Physical Therapy  Discharge Summary    Name:Eddie Moya  XHN:[id number]  :1990  Treatment Diagnosis: Impaired balance and gait  Diagnosis: Aortic dissection s/p aorta replacement    Restrictions/Precautions  Restrictions/Precautions: Fall Risk, Surgical Protocols, General Precautions, Cardiac, Modified Diet           Position Activity Restriction  Sternal Precautions: No Pushing, No Pulling, 5# Lifting Restrictions  Other position/activity restrictions: [address] YOUR INCISIONS DAILY WITH A CLEAN WASHCLOTH AND ANTIBACTERIAL SOAP.  Do not wash your incisions after you have cleansed other parts of your body; up with assist     Goals:                  Short Term Goals  Time Frame for Short Term Goals: 10 days 23  Short Term Goal 1: Pt will complete bed mobility with modA -- : GOAL MET MODA to SBA  Short Term Goal 2: Pt will complete sit to/from stand with modA -- GOAL MET CGA to Kostas x 1-2 with RW  Short Term Goal 3: Pt will complete bed <> chair with LRAD with modA -- GOAL MET Kostas x 1-2 with RW  Short Term Goal 4: Pt will demonstrate gait x 10' in // bars with modA without LOB -- GOAL MET x 100' with beti RW CGA + Kostas  Short Term Goal 5: Pt will tolerate assessment of steps and appropriate goal to be set -- GOAL MET x 8 steps with HR CGA            Long Term Goals  Time Frame for Long Term Goals : 3 weeks 23  Long Term Goal 1: Pt will demonstrate bed mobility with MI -- 2/10: GOAL PARTIALLY MET supine to sit requires modA, sit to supine MI  Long Term Goal 2: Pt will complete functional t/f with LRAD with SBA -- : GOAL MET S without AD  Long Term Goal 3: Pt will ambulate >50' with LRAD with SBA without LOB -- : GOAL MET upgrade goal: Pt will ambulate >150' with LRAD with MI without LOB -- 2/10: GOAL MET >150' without AD I  Long Term Goal 4: Pt will demonstrate car t/f with LRAD with modA -- : GOAL MET Kostas; upgrade goal: Pt will complete car t/f with LRAD and SBA -- 2/10: GOAL NOT MET Kostas  Long Term Goal 5: Pt will ascend/descend 12 steps with HR with S -- 2/08: GOAL MET >12 steps with HR with S  Additional Goals?: Yes  Long term goal 6: Pt will demonstrate improved  ability on 6MWT to > 500 Ft to demostrate improved endurance and dynamic balance. -- GOAL '    Pt. Met 5/5 short term goals and 6/6 long term goals. Updated LTG for car t/f with MI not met as pt requires Kostas for LE management. Pt's wife Tara White will provide 24hrA at d/c and participates in family training and demonstrated understanding of providing assist for car t/f and bed mobility. Pt. Currently ambulates >400 feet with no AD and I  Up/down 20 steps with HR and MI  Up/down curb step with SBA without AD  Sit to/from stand with I without AD  Bed mobility with MI for rolling and sit to supine, modA at trunk for sit to supine  Recommend HHPT in order to continue improvements in strength, functional mobility and activity tolerance  Pt. Safe to return home with assistance from family. Provided pt. with written HEP and instructed in completion of exercises.     Electronically signed by Jessica Higuera PT, DPT on 2/10/2023 at 11:49 AM

## 2023-02-10 NOTE — CARE COORDINATION
Case Management Discharge Summary  [address] [facility] Rehab Unit    Patient:Eddie Moya     Rehab Dx/Hx: Aortic dissection ([facility] [address] [I71.00]       Today's Date: 2/10/2023    Discharge to: Anticipating discharge on 02/11:Home with spouse and home care   Pre-certification completed:  [x] No       [] Yes     [] N/A   Hospital Exemption Notification (HENS) completed:  [x] No       [] Yes     [] N/A   IMM given:  N/A       New Durable Medical Equipment ordered/agency:  [employer]: transfer tub bench  3M: wound vac   Transportation:  Private       Confirmed discharge plan with:  Patient: [] No       [x] Yes  Family:  [] No       [x] Yes      Name:Alexa Moya (Spouse)   Contact number: [phone]    Facility/Agency, name:    [address] [place] [address]   [phone]  SHIRLEY/AVS faxed    RN, name: W/E nurse       Has lack of transportation kept you from medical appointments, meetings, work, or ADL's? [] Yes, it has kept me from medical appointments or from getting my meds  [] Yes, It has kept me from non-medical meetings, appointments, work, or getting things I need  [x] No  [] Pt unable to respond  [] Pt declines to respond     How often do you need to have someone help you when you read instructions, pamphlets, or other written material from your doctor or pharmacy? [] Never                             [] Always  [x] Rarely                            [] Patient declines to respond  [] Sometimes                    [] Patient unable to respond  [] Often       Note: Discharging nurse to complete SHIRLEY, reconcile AVS, and place final copy with patient's discharge packet. RN to ensure that written prescriptions for  Level II medications are sent with patient to the facility as per protocol.           Signature:  Jody Paulino RN

## 2023-02-10 NOTE — PROGRESS NOTES
Sujey July  2/10/2023  [id number]    Chief Complaint: Aortic dissection (HCC)    Subjective:   No acute events overnight. Today Eddi Kinney is seen working with therapy. He reports feeling well and denies acute complaints at this time. He is looking forward to discharge home tomorrow. ROS: denies f/c, n/v, cp, sob    Objective:  Patient Vitals for the past 24 hrs:   BP Temp Temp src Pulse Resp SpO2   02/10/23 1036 -- -- -- -- 16 --   02/10/23 1006 -- -- -- -- 16 --   02/10/23 0800 129/77 98.3 °F (36.8 °C) Oral (!) 104 16 94 %   02/09/23 2012 137/77 97.9 °F (36.6 °C) Oral 93 16 94 %     Gen: No distress, pleasant. HEENT: Normocephalic, atraumatic. CV: extremities well perfused  Resp: No respiratory distress. Abd: Soft, nondistended  Ext: No edema. Neuro: Alert, oriented, appropriately interactive. Ambulates without LOB  Psych: appropriate mood and affect    Wt Readings from Last 3 Encounters:   02/09/23 (!) 322 lb (146.1 kg)   01/25/23 279 lb 9 oz (126.8 kg)   03/12/20 (!) 350 lb (158.8 kg)       Laboratory data:   Lab Results   Component Value Date    WBC 6.7 02/10/2023    HGB 10.1 (L) 02/10/2023    HCT 31.0 (L) 02/10/2023    MCV 78.9 (L) 02/10/2023     02/10/2023       Lab Results   Component Value Date/Time     02/10/2023 06:37 AM    K 4.1 02/10/2023 06:37 AM    K 4.4 02/01/2023 07:29 AM     02/10/2023 06:37 AM    CO2 23 02/10/2023 06:37 AM    BUN 9 02/10/2023 06:37 AM    CREATININE 0.6 02/10/2023 06:37 AM    GLUCOSE 96 02/10/2023 06:37 AM    CALCIUM 9.1 02/10/2023 06:37 AM        Therapy progress:  PT  Position Activity Restriction  Sternal Precautions: No Pushing, No Pulling, 5# Lifting Restrictions  Other position/activity restrictions: [address] YOUR INCISIONS DAILY WITH A CLEAN WASHCLOTH AND ANTIBACTERIAL SOAP.  Do not wash your incisions after you have cleansed other parts of your body; up with assist  Objective     Sit to Stand: Dependent/Total, 2 Person Assistance  Stand to Sit: Dependent/Total, 2 Person Assistance  Bed to Chair: Dependent/Total, 2 Person Assistance     OT  PT Equipment Recommendations  Equipment Needed: No  Mobility Devices: Yesantie : Rolling  Other: No needs anticipated  Equipment Used: Drop-arm commode  Toilet Transfers Comments: use of Corlis Goad to transfer on/off BSC over toilet  Assessment        SLP                Body mass index is 39.2 kg/m². Assessment and Plan:  Oksana Chen is a 34year old male with a past medical history significant for Marfan syndrome and morbid obesity who [facility] with severe chest pain and lower extremity numbness, found to have aortic dissection s/p replacement. He was admitted to [facility] on 1/26/23 due to functional deficits below his baseline. Type A Aortic Dissection s/p replacement  - sternal precautions  - asa, statin BB  - goal SBP<140  - PT, OT     Right to left fem-fem bypass  - asa, statin, BB  - every other staple removed 2/1  - Vascular following, appreciate assistance. Wound vac bilateral groin incisions     Vocal cord paralysis  - ENT evaluated during acute stay  - ST     Aspiration pneumonia, resolved  - completed augmentin     Fluid overload, improved  - completed week of lasix  - discontinued K and Mg and monitor  - daily weights, I/Os     Acute Blood Loss Anemia  - Hb stable  - monitor and transfuse for Hb<7     Marfan Syndrome  - noted     Obesity  - BMI 34  - encourage lifestyle modifications     Bowels: adjust medications as needed for regular bowel movements     Bladder: Check PVR x 3. [address] if PVR > 200ml or if any volume is > 500 ml. Sleep: Trazodone provided prn. PPX  DVT: lovenox  GI: pepcid     Follow up appointments: CT surgery, PCP    Services: Rene Richards, OT, [address] [place] [address] , nursing  EDOD: 2/11    Miles Mckeon.  Tammi Paul MD 2/10/2023, 2:39 PM

## 2023-02-10 NOTE — CARE COORDINATION
Referral to Eriberto Chahal with [employer] for DME transfer tub bench delivered today to patient's room.  Jeremy Chung RN

## 2023-02-10 NOTE — PROGRESS NOTES
Occupational Therapy  Facility/Department: [facility]  Rehabilitation Occupational Therapy Daily Treatment Note    Date: 2/10/23  Patient Name: Tacos Postal       Room: [facility]/6096-89  MRN: [id number]  Account: [de-identified]   : 1990  (34 y.o.) Gender: male                    Past Medical History:  has a past medical history of Influenza A and Marfan syndrome. Past Surgical History:   has a past surgical history that includes Femoral-femoral Bypass Graft (Bilateral, 2023) and Thoracic aortic aneurysm repair (N/A, 1/15/2023). Restrictions  Restrictions/Precautions: Fall Risk;Surgical Protocols; General Precautions;Cardiac;Modified Diet  Other position/activity restrictions: [address] YOUR INCISIONS DAILY WITH A CLEAN WASHCLOTH AND ANTIBACTERIAL SOAP. Do not wash your incisions after you have cleansed other parts of your body; up with assist    Subjective  Subjective: Pt in recliner, pleasant, no pain, agreeable to OT session, /72, , O2 sats 94% on RA. Restrictions/Precautions: Fall Risk;Surgical Protocols; General Precautions;Cardiac;Modified Diet             Objective     Cognition  Overall Cognitive Status: Exceptions  Arousal/Alertness: Appropriate responses to stimuli  Following Commands: Follows all commands without difficulty  Attention Span: Appears intact  Memory: Appears intact  Safety Judgement: Decreased awareness of need for safety  Problem Solving: Assistance required to generate solutions  Insights: Fully aware of deficits  Initiation: Does not require cues  Sequencing: Requires cues for some  Orientation  Overall Orientation Status: Within Functional Limits         ADL  Feeding  Assistance Level: Independent  Grooming/Oral Hygiene  Assistance Level:  Independent  Skilled Clinical Factors: to stand at sink to brush teeth  Upper Extremity Bathing  Assistance Level: Supervision  Skilled Clinical Factors: cues for washing self with wash cloth; improved ability to bathe incision/wounds  Lower Extremity Bathing  Assistance Level: Moderate assistance  Skilled Clinical Factors: to bathe lower legs and feet, able to bathe buttocks/groin in stance with cues and SPV  Upper Extremity Dressing  Assistance Level: Independent  Skilled Clinical Factors: to doff/don shirt  Lower Extremity Dressing  Equipment Provided: Reachers  Assistance Level: Supervision  Skilled Clinical Factors: cues for use of reacher  Putting On/Taking Off Footwear  Equipment Provided: Reachers;Long-handle shoe horn  Assistance Level: Minimal assistance  Skilled Clinical Factors: good ability to doff shoes with reacher, able to mostly don shoes and assist for heel in L shoe only, min A to fully don socks after use of sock aid  Toileting  Assistance Level: Supervision  Toilet Transfers  Technique: Stand step  Equipment: Standard toilet  Assistance Level: Independent  Tub/Shower Transfers  Type: Shower  Transfer From: Standing without device  Transfer To: Tub transfer bench  Assistance Level: Independent          Functional Mobility  Device:  (no AD)  Activity: To/From therapy gym; To/From bathroom  Assistance Level: Independent  Transfers  Surface: From chair with arms; To chair with arms;Standard toilet; To mat;From mat  Additional Factors: Increased time to complete  Device:  (no AD)  Sit to Stand  Assistance Level: Independent  Stand to Sit  Assistance Level: Independent  Bed To/From Chair  Technique: Stand step  Assistance Level: Independent   OT Exercises  Resistive Exercises: 4# medicine ball for chest press and obliques for 15 reps each with BUEs     Assessment  Assessment  Assessment: Pt agreeable to OT session. Pt continues to require mod A for LB bathing and min A for footwear due to limited reach to LEs and anxiety about wounds/woud vac. Pt demonstrated improved balance to stand with independence at sink and for static standing. Pt demonstrated fair to good use of a/e.  Spouse has been educated and is able to assist with ADLs as needed. Pt performed mobility with no unsteadiness or fatigue noted and good BUE strength for ther ex. Activity Tolerance: Patient limited by fatigue;Patient limited by endurance; Patient tolerated treatment well  Discharge Recommendations: Home with Home health OT;S Level 1;Home with assist PRN  OT Equipment Recommendations  Equipment Needed: Yes  Mobility Devices: ADL Assistive Devices  ADL Assistive Devices: Transfer Tub Bench  Safety Devices  Safety Devices in place: Yes  Type of devices: Call light within reach;Gait belt;Nurse notified; Left in chair    Patient Education  Education  Education Given To: Patient  Education Provided: Plan of Care;Precautions; Safety; Energy Conservation;Transfer Training;Mobility Training;Role of Therapy; Fall Prevention Strategies;DME/Home Modifications; Equipment;ADL Function;IADL Function;Home Exercise Program  Education Provided Comments: role of OT, safety with transfers, ADL technique with sternal precautions, weight shifting during sit<>stands, use of a/e, ADL assistance/safety with incisions to educate family, DME recommendations, BUE HEP  Education Method: Demonstration;Verbal  Barriers to Learning: Cognition  Education Outcome: Verbalized understanding;Continued education needed    Plan  Occupational Therapy Plan  Times Per Week: 5/7 days/week  Current Treatment Recommendations: ROM;Balance training;Functional mobility training; Endurance training;Patient/Caregiver education & training; Safety education & training;Positioning;Self-Care / ADL; Strengthening;Home management training;Equipment evaluation, education, & procurement    Goals  Short Term Goals  Time Frame for Short Term Goals: 1 week- 2/1/23  Short Term Goal 1: Pt will complete functional transfers with max A. GOAL MET 2/1/23 Pt completed functional transfers with max A. Short Term Goal 2: Pt will perform full body bathing with max A.  GOAL MET 2/1/23 Pt completed full body bathing with max A.  Short Term Goal 3: Pt will complete toileting with max A. GOAL MET 1/30/23 Pt completed toileting with max A. Short Term Goal 4: Pt will perform grooming with supervision. GOAL MET 2/2/23 Pt performed grooming with supervision. Short Term Goal 5: Pt will complete full body dressing with max A. GOAL MET 2/1/23 Pt completed full body dressing with max A. Long Term Goals  Time Frame for Long Term Goals : 3 weeks- 2/15/23  Long Term Goal 1: Pt will perform functional transfers with CGA. GOAL MET 2/7/23 Pt peformed functional transfers with CGA. Long Term Goal 2: Pt will complete toileting with CGA. GOAL MET 2/9/23 Pt completed toileting with SPV. Long Term Goal 3: Pt will complete full body dressing with CGA. Goal Not Met Pt requires min A for donning shoes. Long Term Goal 4: Pt will perform full body bathing with CGA. Goal Not Met. Pt requires mod A for LB bathing. Long Term Goal 5: Pt will complete IADL task with min A. GOAL MET 2/8/23 Pt completed IADL task with independent.     Therapy Time   Individual Concurrent Group Co-treatment   Time In 0730         Time Out 0830         Minutes 60         Timed Code Treatment Minutes: [address] Marrion Prader

## 2023-02-10 NOTE — PLAN OF CARE
Problem: Safety - Adult  Goal: Free from fall injury  2/10/2023 1024 by Sobia Zambrano  Outcome: Progressing  2/10/2023 1024 by STELLA PIMENTEL  Outcome: Progressing

## 2023-02-10 NOTE — PROGRESS NOTES
Occupational Therapy  Discharge Summary     Name:Eddie Riggs  VBY:[id number]  :1990  Treatment Diagnosis: Impaired balance and gait  Diagnosis: Aortic dissection s/p aorta replacement    Restrictions/Precautions  Restrictions/Precautions: Fall Risk, Surgical Protocols, General Precautions, Cardiac, Modified Diet           Position Activity Restriction  Sternal Precautions: No Pushing, No Pulling, 5# Lifting Restrictions  Other position/activity restrictions: [address] YOUR INCISIONS DAILY WITH A CLEAN WASHCLOTH AND ANTIBACTERIAL SOAP. Do not wash your incisions after you have cleansed other parts of your body; up with assist     Goals:   Short Term Goals  Time Frame for Short Term Goals: 1 week- 23  Short Term Goal 1: Pt will complete functional transfers with max A. GOAL MET 23 Pt completed functional transfers with max A. Short Term Goal 2: Pt will perform full body bathing with max A. GOAL MET 23 Pt completed full body bathing with max A. Short Term Goal 3: Pt will complete toileting with max A. GOAL MET 23 Pt completed toileting with max A. Short Term Goal 4: Pt will perform grooming with supervision. GOAL MET 23 Pt performed grooming with supervision. Short Term Goal 5: Pt will complete full body dressing with max A. GOAL MET 23 Pt completed full body dressing with max A. Long Term Goals  Time Frame for Long Term Goals : 3 weeks- 2/15/23  Long Term Goal 1: Pt will perform functional transfers with CGA. GOAL MET 23 Pt peformed functional transfers with CGA. Long Term Goal 2: Pt will complete toileting with CGA. GOAL MET 23 Pt completed toileting with SPV. Long Term Goal 3: Pt will complete full body dressing with CGA. Goal Not Met Pt requires min A for donning shoes. Long Term Goal 4: Pt will perform full body bathing with CGA. Goal Not Met. Pt requires mod A for LB bathing. Long Term Goal 5: Pt will complete IADL task with min A.  GOAL MET 23 Pt completed IADL task with independent. Pt. Met 5/5 short term goals and 3/5 long term goals. Pt will have assistance as needed from spouse for bathing/dressing to complete all tasks safety and completely. Spouse has been trained and verbalized understanding of all recommendations and comfort with assisting patient as needed. ADL:  Feeding  Assistance Level: Independent  Grooming/Oral Hygiene  Assistance Level: Independent  Skilled Clinical Factors: to stand at sink to brush teeth  Upper Extremity Bathing  Assistance Level: Supervision  Skilled Clinical Factors: cues for washing self with wash cloth; improved ability to bathe incision/wounds  Lower Extremity Bathing  Assistance Level: Moderate assistance  Skilled Clinical Factors: to bathe lower legs and feet, able to bathe buttocks/groin in stance with cues and SPV  Upper Extremity Dressing  Assistance Level: Independent  Skilled Clinical Factors: to doff/don shirt  Lower Extremity Dressing  Equipment Provided: Reachers  Assistance Level: Supervision  Skilled Clinical Factors: cues for use of reacher  Putting On/Taking Off Footwear  Equipment Provided: Reachers;Long-handle shoe horn  Assistance Level: Minimal assistance  Skilled Clinical Factors: good ability to doff shoes with reacher, able to mostly don shoes and assist for heel in L shoe only, min A to fully don socks after use of sock aid  Toileting  Assistance Level: Supervision  Toilet Transfers  Technique: Stand step  Equipment: Standard toilet  Assistance Level: Independent  Tub/Shower Transfers  Type: Shower  Transfer From: Standing without device  Transfer To: Tub transfer bench  Assistance Level: Independent          Functional Mobility  Device:  (no AD)  Activity: To/From therapy gym; To/From bathroom  Assistance Level: Independent  Transfers  Surface: From chair with arms; To chair with arms;Standard toilet; To mat;From mat  Additional Factors: Increased time to complete  Device:  (no AD)  Sit to Stand  Assistance Level: Independent  Stand to Sit  Assistance Level: Independent  Bed To/From Chair  Technique: Stand step  Assistance Level: Independent   OT Exercises  Resistive Exercises: 4# medicine ball for chest press and obliques for 15 reps each with BUEs       Assessment:   Assessment  Assessment: Pt agreeable to OT session. Pt continues to require mod A for LB bathing and min A for footwear due to limited reach to LEs and anxiety about wounds/woud vac. Pt demonstrated improved balance to stand with independence at sink and for static standing. Pt demonstrated fair to good use of a/e. Spouse has been educated and is able to assist with ADLs as needed. Pt performed mobility with no unsteadiness or fatigue noted and good BUE strength for ther ex. Activity Tolerance: Patient limited by fatigue;Patient limited by endurance; Patient tolerated treatment well  Discharge Recommendations: Home with Home health OT;S Level 1;Home with assist PRN  OT Equipment Recommendations  Equipment Needed: Yes  Mobility Devices: ADL Assistive Devices  ADL Assistive Devices: Transfer Tub Bench  Safety Devices  Safety Devices in place: Yes  Type of devices: Call light within reach;Gait belt;Nurse notified; Left in chair    Equipment Recommendations:  Tub Transfer Bench         Home Exercise Program  Provided Pt with handout for BUE therex. Pt and spouse demonstrated understanding of all exercises. Signs and Symptoms of Delirium (from CAM)  A. Acute Onset Mental Status Change  Is there evidence of an acute change in mental status from the patient's baseline? [x] 0. No [] 1. Yes    B. Inattention: Did the patient have difficulty focusing attention, for example being easily distractible or having difficulty keeping track of what was being said? [x] 0. Behavior not present    [] 1. Behavior continuously present, does not fluctuate   [] 2. Behavior present, fluctuates (comes and goes, changes in severity)    C.  Disorganized thinking: Was the patient's thinking disorganized or incoherent (rambling or irrelevant conversation, unclear or illogical flow of ideas, or unpredictable switching from subject to subject)? [x] 0. Behavior not present    [] 1. Behavior continuously present, does not fluctuate   [] 2. Behavior present, fluctuates (comes and goes, changes in severity)    D. Altered level of consciousness: Did the patient have altered level of consciousness as indicated by any of the following criteria? Vigilant: startled easily to any sound or touch  Lethargic: repeatedly dozed off when being asked questions, but responded to voice or touch  Stuporous: very difficult to arouse and keep aroused for the interview  Comatose: could not be aroused  [x] 0. Behavior not present    [] 1. Behavior continuously present, does not fluctuate   [] 2.  Behavior present, fluctuates (comes and goes, changes in severity)      Electronically signed by Mira Washington OT on 2/10/2023 at 11:44 AM

## 2023-02-10 NOTE — PROGRESS NOTES
[facility]: ACUTE REHAB UNIT  SPEECH-LANGUAGE PATHOLOGY      [x] Daily  [] Weekly Care Conference Note  [x] Discharge    Patient:Eddie Moya      :1990  LOT:[id number]  Rehab Dx/Hx: Aortic dissection (HCC) [I71.00]    Precautions: falls  Home situation: Spouse and three children. Works full-time. Wasn't taking meds prior to admission, shares finances with wife. 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   Cognition: [] WFL  [x] Mild  [] Moderate  [] Severe [] Unable to Assess  [] Other:  Memory: [] WFL  [x] Mild  [] Moderate  [] Severe [] Unable to Assess  [] Other:  Behavior: [x] Alert  [x] Cooperative  [x]  Pleasant  [] Confused  [] Agitated  [] Uncooperative  [] Distractible [] Motivated  [] Self-Limiting [] Anxious  [] Other:  Endurance:  [x] Adequate for participation in SLP sessions  [] Reduced overall  [] Lethargic  [] Other:  Safety: [x] No concerns at this time  [] Reduced insight into deficits  []  Reduced safety awareness [] Not following call light procedures  [] Unable to Assess  [] Other:    Current Diet Order:ADULT ORAL NUTRITION SUPPLEMENT; Breakfast, Dinner; Standard High Calorie/High Protein Oral Supplement  ADULT DIET; Regular;  No Added Salt (3-4 gm)  Swallowing Precautions: Sit up for all meals and thereafter for 30 minutes, Eat with small bites (1/2 tsp; 1 tsp), and Alternate solids with liquids        Date: 2/10/2023      Tx session 1  1400 - 1500 DISCHARGE SUMMARY   Total Timed Code Min 0 0   Total Treatment Minutes 60 0   Individual Treatment Minutes 60 0   Group Treatment Minutes 0 0   Co-Treat Minutes 0 0   Variance/Reason:  N/a  N/A   Pain Denies  Denies    Pain Intervention [] RN notified  [] Repositioned  [] Intervention offered and patient declined  [x] N/A  [] Other:  [] RN notified  [] Repositioned  [] Intervention offered and patient declined  [x] N/A  [] Other:   Subjective     Pt alert and cooperative, agreeable to tx. Pt upright in bedside chair for session. Objective:  Goals     Short Term Goals  Time Frame for Short Term Goals: 18 Days (02/13/23)    Goal 1: Pt will complete vocal functioning exercises / resonant voice therapy exercises 10/10 given min cues    Warm-ups at a comfortable pitch:   -\"whoo\" as in whoop: 10x  -\"oh\" as in \"knoll\": 10x  -hum: 10x  -lip trills: 10x    Stretching- glide from lowest to highest:  -\"whoop\" - 10x  -\"oh\" as in \"knoll\": 10x  -hum : 10x  -lip trills: 10x  . Contraction- glide from highest pitch to lowest pitch   -\"boom\" - 10x   -\"oh\" as in \"knoll\" - 10x    Chanting: complete on a single pitch   -dcwao-awz-lxn-sara  -kgtkz-cyd-wpr-muu   -tbikp-odn-xvy-[facility]   -ntum-kc-hn-my   -zlbdr-jyu-zjr-sarah  -mmmay-may-may-may      Resonant Voice Therapy with min cues:   -hold out /mmm/ and say: meet me Monday, meet me Monday Morning  -hold out /mm/ and say: my, my mice, my mice might, my mice might make, my mice might make me, my mice might make me crazy  -hold out /mm/ and say: Daniel, Daniel mopped, Daniel mopped more, Daniel mopped more mud, Daniel mopped more mud than than Kristan  -hold out /mm/ may, may made, may made many, may made many muffins, may made many muffins Monday     Semi-occluded voice therapy  -pt blew bubbles for as long as he could; 5 trials - averaged 21.7 seconds   Goal met - Pt has demonstrated ability to complete exercises, occ benefiting from min cues. Pt will benefit from ongoing ST to continue to target voice.     Goal 2: Pt will complete respiratory retraining exercises 10/10 given min cues   Pt completed the following exercises given min cues:  -exhale while sustaining \"f\": 10x  -exhale while sustaining \"Shhh\": 10x  -exhale while sustaining \"z\": 10x  -breathe in nose and out straw: 10x  -Sniff x2 through nose, then breathe out through straw: 10x  -breathe in nose out through pursed lips: 10x  -Sniff x2 through nose, then breathe out through pursed lips: 10x  -produce a pulse of air using the sound /ha/, then sniff in through nose: x10  -produce a pulse of air using the sound /sha/, then sniff in through nose: x10   Goal met - Pt has demonstrated ability to complete these exercises, occ benefits from min cues. Goal met 02/07/23. Goal met 02/07/23. Goal met 02/07/23. Goal met 02/07/23. Goal met 02/07/23. Goal met 02/07/23. Other areas targeted: N/A N/A   Education:   SLP edu pt re: rationale of VFE and respiratory retraining exercises, d/c recs    Safety Devices: [x] Call light within reach  [x] Chair alarm activated  [] Bed alarm activated  [] Other: [] Call light within reach  [] Chair alarm activated  [] Bed alarm activated  [] Other: wife present     Assessment: Tx session: Pt alert and cooperative, agreeable to tx. Pt completed 10 reps of the vocal functioning exercises / resonant voice therapy exercises. A few new respiratory retraining exercises introduced. Pt with improvement with semi-occluded voice therapy with blowing bubbles - increased avg from 19 seconds to 21.7 seconds (which is improved from 7.32 from initial sessions). Discharge Summary: Pt is tolerating IDDSI 7 Regular, IDDSI 0 Thin, and meds crushed in puree (pt preference) as LRD. Pt previously met cognitive goals - cognition WFL. Recommend home with assistance. Pt states \"it's getting better, I think\" when asked about his voice. Voice continues to be hoarse, but has been getting stronger with completion of vocal functioning exercises.  Overall, breath support has improved with respiratory retraining exercises. Recommend ENT f/u in 4-6 weeks, as well as ongoing ST services for voice tx. Plan: Continue as per plan of care. Additional Information:     Barriers toward progress: N/a   Discharge recommendations:  [] Home independently  [x] Home with assistance []  24 hour supervision  [] ECF [] Other:  Continued Tx Upon Discharge: ? [x] Yes [] No [] TBD based on progress while on ARU [] Vital Stim indicated [] Other:   Estimated discharge date: 02/11/2023    Interventions used this date:  [x] Speech/Language Treatment  [] Instruction in HEP [] Group [] Dysphagia Treatment [x] Cognitive Treatment   [] Other:       Total Time Breakdown / Charges    Time in Time out Total Time / units   Cognitive Tx      Speech Tx 1400 1500 60 min / 1 unit    Dysphagia Tx -- -- --       Electronically Signed by     Daniela Vanessa [address] [place] [address] [id number]  Speech Language Pathologist

## 2023-02-10 NOTE — CARE COORDINATION
CM received a VM from [employer] with 3M wound vac and patient is approved for his home wound vac.    0851 Picked up wound vac from  office with paperwork and delivered to patients room. Patient has signed wound vac benefit statement and faxed to [employer].     Fabiano Escamilla RN

## 2023-02-10 NOTE — DISCHARGE INSTR - COC
Continuity of Care Form    Patient Name: Byron Goodwin   :  1990  MRN:  [id number]    Admit date:  2023  Discharge date:  23    Code Status Order: Full Code   Advance Directives:     Admitting Physician:  Ivonne Mao MD  PCP: Unique Velasquez DO    Discharging Nurse: [address] Unit/Room#: 4817/1760-70  Discharging Unit Phone Number: [phone]    Emergency Contact:   Extended Emergency Contact Information  Primary Emergency Contact: [address] [place] [address] Phone: [phone]  Relation: Spouse    Past Surgical History:  Past Surgical History:   Procedure Laterality Date    FEMORAL-FEMORAL BYPASS GRAFT Bilateral 2023    FEMORAL FEMORAL BYPASS performed by Mendy Paredes MD at [address] N/A 1/15/2023    ASCENDING AORTA  REPLACEMENT WITH TUBE GRAFT USING AN EPPERSON GRAFT SIZE 28CM  ANTEGRADE FLOW TO THE LEFT LEG, RESUSPENSION OF AORTIC VALVE performed by Geovany Cuadra MD at 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    Aortic dissection ([facility] [address] I71.00       Isolation/Infection:   Isolation            No Isolation          Patient Infection Status       Infection Onset Added Last Indicated Last Indicated By Review Planned Expiration Resolved Resolved By    None active    Resolved    Influenza 20 Rapid influenza A/B antigens   20             Nurse Assessment:  Last Vital Signs: /77   Pulse (!) 104   Temp 97.9 °F (36.6 °C) (Oral)   Resp 16   Ht 6' 4\" (1.93 m)   Wt (!) 322 lb (146.1 kg)   SpO2 94%   BMI 39.20 kg/m²     Last documented pain score (0-10 scale): Pain Level: 2  Last Weight:   Wt Readings from Last 1 Encounters:   23 (!) 322 lb (146.1 kg)     Mental Status:  oriented    IV Access:  - None    Nursing Mobility/ADLs:  Walking   Independent  Transfer  Assisted  Bathing  Assisted  Dressing  Assisted  Toileting  Assisted  Feeding  Independent  Med Admin  Assisted  Med Delivery   crushed and prefers mixed with applesauce    Wound Care Documentation and Therapy:  Negative Pressure Wound Therapy Leg Anterior;Proximal;Right;Upper (Active)   $ Standard NPWT <=50 sq cm PER TX $ Yes 02/10/23 1141   Wound Type Surgical 02/10/23 1141   Unit Type [id number] 02/10/23 1141   Dressing Type Black Foam 02/10/23 1141   Number of pieces used 2 02/10/23 1141   Number of pieces removed 2 02/10/23 1141   Cycle Continuous 02/10/23 1141   Target Pressure (mmHg) 125 02/10/23 1141   Intensity 1 02/10/23 1141   Canister changed? No 02/10/23 1141   Dressing Status New dressing applied 02/10/23 1141   Dressing Changed Changed/New 02/10/23 1141   Drainage Amount Small 02/10/23 1141   Drainage Description Serosanguinous 02/10/23 1141   Dressing Change Due 02/13/23 02/10/23 1141   Output (ml) 200 ml 02/08/23 1252   Wound Assessment Granulation tissue;Slough 02/10/23 1141   Shirlene-wound Assessment Blanchable erythema 02/10/23 1141   Shape long 02/10/23 1141   Odor None 02/10/23 1141   Number of days: 4       Incision 01/16/23 Sternum Anterior (Active)   Dressing Status Other (Comment) 02/10/23 0815   Incision Cleansed Not Cleansed 02/06/23 2115   Dressing/Treatment Surgical glue; Open to air 02/10/23 1141   Closure Surgical glue; Open to air 02/10/23 0815   Incision Assessment Dry 02/06/23 2115   Drainage Amount None 02/10/23 0815   Odor None 02/06/23 2115   Shirlene-incision Assessment Intact 02/10/23 0815   Number of days: 25       Incision Femoral Anterior;Proximal;Right (Active)   Wound Image   02/10/23 1141   Dressing Status New dressing applied 02/10/23 1141   Dressing Change Due 02/13/23 02/10/23 1141   Incision Cleansed Cleansed with saline 02/10/23 1141 Dressing/Treatment Negative pressure wound therapy 02/10/23 1141   Incision Length (cm) 8.5 02/10/23 1141   Incision Width (cm) 1 cm 02/10/23 1141   Incision Depth (cm) 3.5 cm 02/10/23 1141   Closure Other (Comment) 02/10/23 1141   Margins Approximated 02/10/23 1141   Incision Assessment Eschar 02/10/23 1141   Drainage Amount Small 02/10/23 1141   Drainage Description Serosanguinous 02/10/23 1141   Odor None 02/10/23 1141   Shirlene-incision Assessment Dry/flaky; Blanchable erythema 02/10/23 1141   Number of days:        Incision Groin Anterior;Left;Proximal (Active)   Wound Image   02/10/23 1141   Dressing Status New dressing applied 02/10/23 1141   Dressing Change Due 02/13/23 02/10/23 1141   Incision Cleansed Cleansed with saline 02/10/23 1141   Dressing/Treatment Negative pressure wound therapy 02/10/23 1141   Incision Length (cm) 12.5 02/10/23 1141   Incision Width (cm) 1 cm 02/10/23 1141   Incision Depth (cm) 2 cm 02/10/23 1141   Closure Other (Comment) 02/10/23 1141   Margins Approximated 02/10/23 1141   Incision Assessment Eschar;Other (Comment) 02/10/23 1141   Drainage Amount Scant 02/10/23 1141   Drainage Description Serous 02/10/23 1141   Odor None 02/10/23 1141   Shirlene-incision Assessment Dry/flaky 02/10/23 1141   Number of days:    Right groin accessed, moistened with normal saline to remove black and white sponge from wound bed. Eschar and fatty tissue with muscle present. Granulation present. Wound Flushed with normal saline. Barrier film applied around wound, Vac drape applied around wound, posterior end hydrocolloid strip applied. 1 black sponge , covered with Vac drape. Suction applied @ 125 Hg mm ,Continuous cycle. Left groin dressing removed ( guaze ). Wound flushed with normal saline, pat dry. Barrier film around wound, Vac Drape around wound. 1 black sponge cut to fit placed over wound with extra for suction device.   Covered with VAc drape, suction attached and attached to \"Y\" connecter to NPWT machine. Run @ 125 Hg mm, continuous cycle. Change M-W-F   Change cannister once a week or when full. If suction fails or patient is discharged:  - remove vac dressing  - cleanse wound with normal saline   - pack wound with saline moistened gauze and change every 12 hours        Elimination:  Continence: Bowel: Yes  Bladder: Yes  Urinary Catheter: None   Colostomy/Ileostomy/Ileal Conduit: No       Date of Last BM: 02/10/23      Intake/Output Summary (Last 24 hours) at 2/10/2023 1157  Last data filed at 2/10/2023 0525  Gross per 24 hour   Intake 100 ml   Output 600 ml   Net -500 ml     I/O last 3 completed shifts: In: 12 [P.O.:560]  Out: 1500 [Urine:1500]    Safety Concerns: At Risk for Falls    Impairments/Disabilities:      None    Nutrition Therapy:  Current Nutrition Therapy:   - Oral Diet:  General    Routes of Feeding: Oral  Liquids: Thin Liquids  Daily Fluid Restriction: no  Last Modified Barium Swallow with Video (Video Swallowing Test): not done    Treatments at the Time of Hospital Discharge:   Respiratory Treatments:   Oxygen Therapy:  is not on home oxygen therapy.   Ventilator:    - No ventilator support    Rehab Therapies: Physical Therapy, Occupational Therapy, Speech/Language Therapy, and Nurse; HRS if patient agreeable  Weight Bearing Status/Restrictions: No weight bearing restrictions  Other Medical Equipment (for information only, NOT a DME order):  bath bench  Other Treatments: HOME HEALTH CARE: LEVEL 3 [address] to establish plan of care for patient over 60 day period   Nursing  Initial home SN evaluation visit to occur within 24-48 hours for:  1)  medication management  2)  VS and clinical assessment  3)  S&S chronic disease exacerbation education + when to contact MD/NP  4)  care coordination  Medication Reconciliation during 1st SN visit  PT/OT/Speech   Evaluations in home within 24-48 hours of discharge to include DME and home safety   Rizwan Laguna therapy 5 days, then 3x a week   OT to evaluate if patient has [address] needs for personal care    evaluation within 24-48 hours to evaluate resources & insurance for potential AL, [place], LTC, and Medicaid options   Palliative Care referral within 5 days of hospital discharge   PCP Visit scheduled within 3 - 7 days of hospital discharge    [address] (If patient is agreeable and meets guidelines)      Patient's personal belongings (please select all that are sent with patient):  None    RN SIGNATURE:  Electronically signed by Natalie Patton on 2/10/23 at 6:10 PM EST    CASE MANAGEMENT/SOCIAL WORK SECTION    Inpatient Status Date: 01/26/2023    Readmission Risk Assessment Score:  Readmission Risk              Risk of Unplanned Readmission:  16       Discharging to Facility/ [address]   [address]   [address] [facility]   [phone]    / signature: Electronically signed by Michael Stewart RN on 2/10/23 at 1:47 PM EST    PHYSICIAN SECTION    Prognosis: Good    Condition at Discharge: Stable    Rehab Potential (if transferring to Rehab): Good    Recommended Labs or Other Treatments After Discharge:   - Follow up with Cardiothoracic surgery, Vascular Surgery, and family doctor  - Do not bear weight through your arms  - No driving until cleared by a physician     Physician Certification: I certify the above information and transfer of Sujey Paul  is necessary for the continuing treatment of the diagnosis listed and that he requires [address] for less 30 days.      Update Admission H&P: No change in H&P    PHYSICIAN SIGNATURE:  Electronically signed by Alexey Malcolm MD on 2/10/23 at 2:51 PM EST

## 2023-02-10 NOTE — PROGRESS NOTES
[facility] Nurse  Follow-up Progress Note       NAME:  Tacos Postal  MEDICAL RECORD NUMBER:  [id number]  AGE:  34 y.o. GENDER:  male  :  1990  TODAY'S DATE:  2/10/2023    Subjective: Oh I am just starting to feel my pain medication. Spouse at bedside. Per Dr. Catie Lepe placed NPWT appliance to left groin with dressing change to right groin. Slough with scant serous discharge on right groin present. Wound Identification:Wound Identification:2023 ascending aorta valve replacement with tube graft incisions, right and left groins cannulation sites  Wound Type: surgical  Contributing Factors: thoracic aortic aneurysm repair 2023           Patient Goal of Care:  [x] Wound Healing  [] Odor Control  [] Palliative Care  [x] Pain Control   [] Other    Objective:lying in bed    /77   Pulse (!) 104   Temp 97.9 °F (36.6 °C) (Oral)   Resp 16   Ht 6' 4\" (1.93 m)   Wt (!) 322 lb (146.1 kg)   SpO2 94%   BMI 39.20 kg/m²   Magan Risk Score: Magan Scale Score: 18  Assessment:right groin red, moist, slight slough within. Left groin slough along incision line with scant serous drainage. Measurements:  Negative Pressure Wound Therapy Leg Anterior;Proximal;Right;Upper (Active)   $ Standard NPWT <=50 sq cm PER TX $ Yes 02/10/23 1141   Wound Type Surgical 02/10/23 1141   Unit Type [id number] 02/10/23 1141   Dressing Type Black Foam 02/10/23 1141   Number of pieces used 2 02/10/23 1141   Number of pieces removed 2 02/10/23 1141   Cycle Continuous 02/10/23 1141   Target Pressure (mmHg) 125 02/10/23 1141   Intensity 1 02/10/23 1141   Canister changed?  No 02/10/23 1141   Dressing Status New dressing applied 02/10/23 1141   Dressing Changed Changed/New 02/10/23 1141   Drainage Amount Small 02/10/23 1141   Drainage Description Serosanguinous 02/10/23 1141   Dressing Change Due 02/13/23 02/10/23 1141   Output (ml) 200 ml 23 1252   Wound Assessment Granulation tissue;Slough 02/10/23 1141   Shirlene-wound Assessment Blanchable erythema 02/10/23 1141   Shape long 02/10/23 1141   Odor None 02/10/23 1141   Number of days: 4     Incision 01/16/23 Sternum Anterior (Active)   Dressing Status Other (Comment) 02/10/23 0815   Incision Cleansed Not Cleansed 02/06/23 2115   Dressing/Treatment Surgical glue; Open to air 02/10/23 1141   Closure Surgical glue; Open to air 02/10/23 0815   Incision Assessment Dry 02/06/23 2115   Drainage Amount None 02/10/23 0815   Odor None 02/06/23 2115   Shirlene-incision Assessment Intact 02/10/23 0815   Number of days: 25       Incision Femoral Anterior;Proximal;Right (Active)   Wound Image   02/10/23 1141   Dressing Status New dressing applied 02/10/23 1141   Dressing Change Due 02/13/23 02/10/23 1141   Incision Cleansed Cleansed with saline 02/10/23 1141   Dressing/Treatment Negative pressure wound therapy 02/10/23 1141   Incision Length (cm) 8.5 02/10/23 1141   Incision Width (cm) 1 cm 02/10/23 1141   Incision Depth (cm) 3.5 cm 02/10/23 1141   Closure Other (Comment) 02/10/23 1141   Margins Approximated 02/10/23 1141   Incision Assessment Eschar 02/10/23 1141   Drainage Amount Small 02/10/23 1141   Drainage Description Serosanguinous 02/10/23 1141   Odor None 02/10/23 1141   Shirlene-incision Assessment Dry/flaky; Blanchable erythema 02/10/23 1141   Number of days:     Rt groin       Incision Groin Anterior;Left;Proximal (Active)   Wound Image   02/10/23 1141   Dressing Status New dressing applied 02/10/23 1141   Dressing Change Due 02/13/23 02/10/23 1141   Incision Cleansed Cleansed with saline 02/10/23 1141   Dressing/Treatment Negative pressure wound therapy 02/10/23 1141   Incision Length (cm) 12.5 02/10/23 1141   Incision Width (cm) 1 cm 02/10/23 1141   Incision Depth (cm) 2 cm 02/10/23 1141   Closure Other (Comment) 02/10/23 1141   Margins Approximated 02/10/23 1141   Incision Assessment Eschar;Other (Comment) 02/10/23 1141   Drainage Amount Scant 02/10/23 1141   Drainage Description Serous 02/10/23 1141   Odor None 02/10/23 1141   Shirlene-incision Assessment Dry/flaky 02/10/23 1141   Number of days:      Left groin      Response to treatment:  Well tolerated by patient. Pain Assessment:  Severity:  4 / 10  Quality of pain: aching, tender  Wound Pain Timing/Severity: intermittent  Premedicated: Yes  Plan:   Plan of Care:    Right groin accessed, moistened with normal saline to remove black and white sponge from wound bed. Eschar and fatty tissue with muscle present. Granulation present. Wound Flushed with normal saline. Barrier film applied around wound, Vac drape applied around wound, posterior end hydrocolloid strip applied. 1 black sponge , covered with Vac drape. Suction applied @ 125 Hg mm ,Continuous cycle. Left groin dressing removed ( dillon ). Wound flushed with normal saline, pat dry. Barrier film around wound, Vac Drape around wound. 1 black sponge cut to fit placed over wound with extra for suction device. Covered with VAc drape, suction attached and attached to \"Y\" connecter to NPWT machine. Run @ 125 Hg mm, continuous cycle. Change M-W-F   Change cannister once a week or when full. If suction fails or patient is discharged:  - remove vac dressing  - cleanse wound with normal saline   - pack wound with saline moistened gauze and change every 12 hours      Home Vac in room with patient. Specialty Bed Required : Yes   [x] Low Air Loss   [x] Pressure Redistribution  [] Fluid Immersion  [] Bariatric  [] Total Pressure Relief  [] Other:     Current Diet: ADULT ORAL NUTRITION SUPPLEMENT; Breakfast, Dinner; Standard High Calorie/High Protein Oral Supplement  ADULT DIET; Regular;  No Added Salt (3-4 gm)  Dietician consult:  Yes    Discharge Plan:  Placement for patient upon discharge: home with support   Patient appropriate for Outpatient [address]: Yes    Referrals:  [x]  following  [] [address] [place] [address] [facility]  [] Supplies  [] Other    Patient/Caregiver Teaching:Explainned the home [employer] VAC to spouse. Verbalized understanding.   Level of patient/caregiver understanding able to:   [] Indicates understanding       [] Needs reinforcement  [] Unsuccessful      [x] Verbal Understanding  [] Demonstrated understanding       [] No evidence of learning  [] Refused teaching         [] N/A       Electronically signed by Sean Wagoner RN, on 2/10/2023 at 11:48 AM

## 2023-02-10 NOTE — PROGRESS NOTES
ACUTE REHAB UNIT: [address]    Patient:Eddie Moya     Rehab Dx/Hx: Aortic dissection ([facility] [address] [I71.00]   [place]  [facility]:[id number]  Date of Admit: 2023   Date of Discharge: 2/10/2023    Subjective:   Order for patient discharge. Reviewed discharge summary (AVS) with patient and __family_____ including medications, physical instructions (safety) and diet. Pt in stable condition. Reconciled Medication List - Patient:   Medications were reviewed by RN at time of discharge with patient and/or family:  [x]  Via EMR   []  Via health information exchange  []  Verbal (e.g. in person, telephone, video conferencing)  [x]  Paper-based (e.g. fax, copies, printouts)   []  Other Methods (e.g. texting, email, CDs)    Reconciled Medication List - Subsequent provider:   [] No, current reconciled medication list not provided to the subsequent provider. [x] Yes, current reconciled medication list provided to the subsequent provider. [x] Via EMR    [] Via health information exchange  [] Verbal (e.g. in person, telephone, video conferencing)  [x] Paper-based (e.g. fax, copies, printouts)   [] Other Methods (e.g. texting, email, CDs)    Discharge disposition:  Pt was discharged via:  [x]  Wheelchair  []  Stretcher  []  Safe ambulation and use of assistive device  []  Other:     Pt was discharged to:  [x]  Private car  []  Transportation service to next destination  []  Other:     Discharge destination:  [x]  Home  []  [facility]  []  [facility] [address]  []  Other:        Discharge BIMS:  Number of word repeated after first attempt:  []  0. None []  1. One []  2. Two [x]  3. Three    Able to report correct year:  []  0. Missed by >5 years, or no answer  []  1. Missed by 2-5 years  []  2. Missed by 1 year  [x]  3. Correct    Able to report correct month:   []  0. Missed by >1 month, or no answer  []  1. Missed by 6 days to 1 month  [x]  2.  Accurate within 5 days    Able to report correct day of the week:  []  0. Incorrect or no answer  [x]  1. Correct    Recall:   Able to recall \"sock\":  []  0. No could not recall  []  1. Yes, after cueing  [x]  2. Yes, no cue required  Able to recall \"blue\":  []  0. No could not recall  []  1. Yes, after cueing  [x]  2. Yes, no cue required  Able to recall \"bed\":  []  0. No could not recall  []  1. Yes, after cueing  [x]  2. Yes, no cue required      Patient Mood Interview    Symptom Presence  0. No (enter 0 in column 2)  1. Yes (enter 0-3 in column 2)  9. No response (leave column 2 blank  Symptom Frequency  0. Never or 1 day  1. 2-6 days (several days)  2. 7-11 days (half or more days)  3. 12-14 days (nearly everyday) 1. Symptom Presence 2. Symptom Frequency    Enter scores in boxes   Little interest or pleasure in doing things   0 0   Feeling down, depressed, or hopeless   0 0   If either A or B is coded 2 or 3, CONTINUE asking the questions below. If not, END the interview. Trouble falling or staying asleep, or sleeping too much       Feeling tired or having little energy       Poor appetite or overeating       Feeling bad about yourself - or that you are a failure or have let yourself or your family down       Trouble concentrating on things, such as reading the newspaper or watching television       Moving or speaking so slowly that other people could have noticed. Or the opposite- being so fidgety or restless that you have been moving around a lot more than usual.       Thoughts that you would be better off dead, or of hurting yourself in some way. Total Severity: Add scores for all frequency responses in column 2       Social isolation (from [employer])  How often do you feel lonely or isolated from those around you? [x] 0. Never  [] 1. Rarely  [] 2. Sometimes  [] 3. Often  [] 4. Always  [] 8. Patient unable to respond.            Discharging Nurse Signature:  Emilio Quiroz RN

## 2023-02-10 NOTE — PROGRESS NOTES
IRF NELLI Discharge 17 [facility]    Patient:Eddie Moya     Rehab Dx/Hx: Aortic dissection ([facility] [address] [I71.00]   GOI:2[birth year]  YCO:[id number]  Date of Admit: 1/26/2023     Pain Effect on Sleep:  Over the past 5 days, how much of the time has pain made it hard for you to sleep at night?  []  0. Does not apply - I have not had any pain or hurting in the past 5 days  [x]  1. Rarely or not at all  []  2. Occasionally  []  3. Frequently  []  4. Almost constantly  []  8. Unable to answer    Over the past 5 days, how often have you limited your participation in rehabilitation therapy sessions due to pain?  []  0. Does not apply - I have not received rehabilitation therapy in the past 5 days  [x]  1. Rarely or not at all  []  2. Occasionally  []  3. Frequently  []  4. Almost constantly  []  8. Unable to answer    Pain Interference with Day-to-Day Activities:  Over the past 5 days, how often have you limited your day-to-day activities (excluding rehabilitation therapy session)? [x]  1. Rarely or not at all  []  2. Occasionally  []  3. Frequently  []  4. Almost constantly  []  8. Unable to answer      High-Risk Drug Classes: Use and Indication   Is taking: Check if the pt is taking any medications by pharmacological classification  Indication noted: If column 1 is checked, check if there is an indication noted for all meds in the drug class Is taking  (check all that apply) Indication noted (check all that apply)   Antipsychotic [] []   Anticoagulant [] []   Antibiotic [] []   Opioid [] []   Antiplatelet [] []   Hypoglycemic (including insulin) [] []   None of the above [x] [x]     Special Treatments, Procedures, and Programs   Check all of the following treatments, procedures, and programs that apply on discharge. On discharge (check all that apply)   Cancer Treatments    A1. Chemotherapy []   A2. IV []   A3. Oral []   A10. Other []   B1. Radiation []   Respiratory Therapies    C1.  Oxygen Therapy []   C2. Continuous []   C3. Intermittent []   C4. High-concentration []   D1. Suctioning []   D2. Scheduled []   D3. As needed []   E1. Tracheostomy Care []   F1. Invasive Mechanical Ventilator (ventilator or respirator) []   G1. Non-invasive Mechanical Ventilator []   G2. BiPAP []   G3. CPAP []   Other    H1. IV Medications []   H2. Vasoactive medications []   H3. Antibiotics []   H4. Anticoagulation []   H10. Other []   I1. Transfusions []   J1. Dialysis []   J2. Hemodialysis []   J3. Peritoneal dialysis []   O1. IV access []   O2. Peripheral []   O3. Midline []   O4.  Central (PICC, tunneled, port) []   None of the above [x]     Signature:  Maria Del Rosario Gooden RN

## 2023-02-10 NOTE — PROGRESS NOTES
Physical Therapy  Facility/Department: [address] [place] [address]  Rehabilitation Physical Therapy Treatment Note    NAME: Gautam Valdovinos  : 1990 (34 y.o.)  MRN: [id number]  CODE STATUS: Full Code    Date of Service: 2/10/23       Restrictions:  Restrictions/Precautions: Fall Risk;Surgical Protocols; General Precautions;Cardiac;Modified Diet  Position Activity Restriction  Sternal Precautions: No Pushing; No Pulling;5# Lifting Restrictions  Other position/activity restrictions: [address] YOUR INCISIONS DAILY WITH A CLEAN WASHCLOTH AND ANTIBACTERIAL SOAP. Do not wash your incisions after you have cleansed other parts of your body; up with assist     SUBJECTIVE  Subjective  Subjective: Pt seated in w/c on approach, agreeable to PT tx  Pain: Pt denies c/o pain               OBJECTIVE  Cognition  Overall Cognitive Status: Exceptions  Arousal/Alertness: Appropriate responses to stimuli  Following Commands: Follows all commands without difficulty  Attention Span: Appears intact  Memory: Appears intact  Safety Judgement: Decreased awareness of need for safety  Problem Solving: Assistance required to generate solutions  Insights: Fully aware of deficits  Initiation: Does not require cues  Sequencing: Requires cues for some  Orientation  Overall Orientation Status: Within Functional Limits    Functional Mobility  Roll Left  Assistance Level: Modified independent  Skilled Clinical Factors: on flat mat table without BR, increased time to  Roll Right  Assistance Level: Modified independent  Skilled Clinical Factors: on flat mat table without BR, increased time to  Sit to Supine  Assistance Level: Modified independent  Skilled Clinical Factors: on flat mat table without BR, increased time to complete, x 2 reps  Supine to Sit  Assistance Level:  Moderate assistance  Skilled Clinical Factors: on flat mat table without BR, cues for sequence, maintains sternal px with min cues, increased time to complete x 2 reps    Balance  Sitting Balance: Independent  Standing Balance: Independent  Standing Balance  Activity: I without AD  Pt is able to retrieve item from ground with S, limited by dizziness during task. Transfers  Surface: From chair with arms; Wheelchair;From bed  Additional Factors: Increased time to complete  Device:  (no AD)  Sit to Stand  Assistance Level: Independent  Skilled Clinical Factors: Pt completes multiple t/f throughout session from various surfaces, able to complete with I, maintains sternal px without cues  Stand to Sit  Assistance Level: Independent  Skilled Clinical Factors: Without AD, maintains sternal px without cues  Bed To/From Chair  Technique: Stand pivot  Assistance Level: Independent  Skilled Clinical Factors: Without AD  Car Transfer  Assistance Level: Minimal assistance  Skilled Clinical Factors: Without AD, requires Kostas to get LE out car, increased time to complete, min cues for safety      Environmental Mobility  Ambulation  Surface: Level surface  Device:  (no AD)  Distance: 200' + 350' (including 50' over turf) + 48' + 100' + 200'  Activity: Within Room; Within Unit  Activity Comments: HR elevated to 130 during longer bout with increased gait speed, HR recovers with seated rest break to resting rate  Additional Factors: Increased time to complete  Assistance Level: Independent  Gait Deviations: Slow julita;Decreased step length bilateral;Unsteady gait; Decreased heel strike right;Decreased heel strike left;Decreased weight shift left; Wide base of support;Decreased trunk rotation;Decreased arm swing bilateral  Stairs  Stair Height: 6''  Device: Bilateral handrails  Number of Stairs: 16  Additional Factors: Verbal cues; Increased time to complete;Reciprocal going up;Reciprocal going down  Assistance Level: Modified independent; Independent  Skilled Clinical Factors: Initially non-reciprocal progressing to reciprocal pattern  Skilled Clinical Factors - Comments: Limited by fatigue  Curb  Curb Height: 6''  Device:  (no AD)  Number of Curbs: 1  Assistance Level: Stand by assist             PT Exercises  Exercise Treatment: Standing BLE TE in // bars: calf raises x 10, marches x 10, hip abduction x 10, hip extension x 10, mini squat x 10; cues for sequence/technique, provided with written HEP and instructed in completion of exercises  Circulation/Endurance Exercises: x 5 minutes on SCIFIT level 2.2 with BLE only to improve endurance and improve gait through reciprocal LE movements. Maintains average SPM >60 with cues      ASSESSMENT/PROGRESS TOWARDS GOALS  /77  //hr104  SpO2 94% on RA    Assessment  Assessment: Pt seen in am for PT tx, pt reports no concerns regarding upcoming d/c, has demonstrated good progress with all functional mobility. Pt requires modA for supine to sit, MI for sit to supine, I for t/f without AD, I for gait community distances without AD, I for 16 steps with HR. Pt with plans to d/c home with 24hrA from wife and HHPT, no DME needs. Activity Tolerance: Patient limited by fatigue;Patient limited by endurance; Patient tolerated treatment well  Discharge Recommendations: 24 hour supervision or assist;Home with Home health PT  PT Equipment Recommendations  Equipment Needed: No    Goals  Patient Goals   Patient Goals :  \"Be able to walk\"  Short Term Goals  Time Frame for Short Term Goals: 10 days 2/05/23  Short Term Goal 1: Pt will complete bed mobility with modA -- 2/08: GOAL MET MODA to SBA  Short Term Goal 2: Pt will complete sit to/from stand with modA -- GOAL MET CGA to Kostas x 1-2 with RW  Short Term Goal 3: Pt will complete bed <> chair with LRAD with modA -- GOAL MET Kostas x 1-2 with RW  Short Term Goal 4: Pt will demonstrate gait x 10' in // bars with modA without LOB -- GOAL MET x 100' with beti RW CGA + Kostas  Short Term Goal 5: Pt will tolerate assessment of steps and appropriate goal to be set -- GOAL MET x 8 steps with HR CGA  Long Term Goals  Time Frame for Long Term Goals : 3 weeks 2/16/23  Long Term Goal 1: Pt will demonstrate bed mobility with MI -- 2/10: GOAL PARTIALLY MET supine to sit requires modA, sit to supine MI  Long Term Goal 2: Pt will complete functional t/f with LRAD with SBA -- 2/08: GOAL MET S without AD  Long Term Goal 3: Pt will ambulate >50' with LRAD with SBA without LOB -- 2/06: GOAL MET upgrade goal: Pt will ambulate >150' with LRAD with MI without LOB -- 2/10: GOAL MET >150' without AD I  Long Term Goal 4: Pt will demonstrate car t/f with LRAD with modA -- 2/07: GOAL MET Kostas; upgrade goal: Pt will complete car t/f with LRAD and SBA -- 2/10: GOAL NOT MET Kostas  Long Term Goal 5: Pt will ascend/descend 12 steps with HR with S -- 2/08: GOAL MET >12 steps with HR with S  Additional Goals?: Yes  Long term goal 6: Pt will demonstrate improved  ability on 6MWT to > 500 Ft to demostrate improved endurance and dynamic balance. -- GOAL MET [address]: 5-7 times per week  Therapy Duration: 3 Weeks  Specific Instructions for Next Treatment: Progress mobility as tolerated  Current Treatment Recommendations: Strengthening;ROM;Balance training;Gait training;Home exercise program;Safety education & training;Stair training;Functional mobility training;Neuromuscular re-education;Patient/Caregiver education & training;Transfer training; Therapeutic activities; Endurance training;Equipment evaluation, education, & procurement;Positioning; Wheelchair mobility training;Vestibular rehab  Safety Devices  Type of Devices: Patient at risk for falls; All fall risk precautions in place; All nikolas prominences offloaded;Call light within reach;Nurse notified;Gait belt (pt up to commode at end of session, instructed to use pull cord)    EDUCATION  Education  Education Given To: Patient; Family  Education Provided: Role of Therapy;Plan of Care;Precautions; Fall Prevention Strategies;Transfer Training; Safety;ADL Function; Energy Conservation;Mobility Training;Home Exercise Program  Education Provided Comments: Educated on role of PT, safe progression of activity, monitoring vitals with mobility  Education Method: Demonstration;Verbal  Barriers to Learning: None  Education Outcome: Verbalized understanding;Continued education needed        Therapy Time   Individual Concurrent Group Co-treatment   Time In 0900         Time Out 1000         Minutes 60           Timed Code Treatment Minutes: [facility], PT, DPT 02/10/23 at 11:18 AM

## 2023-02-11 VITALS
OXYGEN SATURATION: 97 % | TEMPERATURE: 97.7 F | HEART RATE: 93 BPM | SYSTOLIC BLOOD PRESSURE: 152 MMHG | WEIGHT: 315 LBS | DIASTOLIC BLOOD PRESSURE: 81 MMHG | HEIGHT: 76 IN | RESPIRATION RATE: 18 BRPM | BODY MASS INDEX: 38.36 KG/M2

## 2023-02-11 PROCEDURE — 6360000002 HC RX W HCPCS: Performed by: STUDENT IN AN ORGANIZED HEALTH CARE EDUCATION/TRAINING PROGRAM

## 2023-02-11 PROCEDURE — 6370000000 HC RX 637 (ALT 250 FOR IP): Performed by: STUDENT IN AN ORGANIZED HEALTH CARE EDUCATION/TRAINING PROGRAM

## 2023-02-11 RX ADMIN — GABAPENTIN 100 MG: 100 CAPSULE ORAL at 09:49

## 2023-02-11 RX ADMIN — ASPIRIN 81 MG: 81 TABLET, COATED ORAL at 09:49

## 2023-02-11 RX ADMIN — FAMOTIDINE 20 MG: 20 TABLET, FILM COATED ORAL at 09:50

## 2023-02-11 RX ADMIN — ENOXAPARIN SODIUM 30 MG: 100 INJECTION SUBCUTANEOUS at 09:50

## 2023-02-11 RX ADMIN — BISACODYL 5 MG: 5 TABLET, COATED ORAL at 09:50

## 2023-02-11 RX ADMIN — METOPROLOL TARTRATE 50 MG: 50 TABLET, FILM COATED ORAL at 09:49

## 2023-02-11 NOTE — PROGRESS NOTES
ACUTE REHAB UNIT: [address]    Patient:Eddie Moya     Rehab Dx/Hx: Aortic dissection ([facility] [address] [I71.00]   UOV:8/77/[birth year]40  CNQ:[id number]  Date of Admit: 1/26/2023   Date of Discharge: 2/11/2023    Subjective:   Order for patient discharge. Reviewed discharge summary (AVS) with patient and _Pt's wife______ including medications, physical instructions (safety) and diet. Pt in stable condition. Reconciled Medication List - Patient:   Medications were reviewed by RN at time of discharge with patient and/or family:  []  Via EMR   []  Via health information exchange  [x]  Verbal (e.g. in person, telephone, video conferencing)  [x]  Paper-based (e.g. fax, copies, printouts)   []  Other Methods (e.g. texting, email, CDs)    Reconciled Medication List - Subsequent provider:   [] No, current reconciled medication list not provided to the subsequent provider. [x] Yes, current reconciled medication list provided to the subsequent provider. [] Via EMR    [] Via health information exchange  [x] Verbal (e.g. in person, telephone, video conferencing)  [x] Paper-based (e.g. fax, copies, printouts)   [] Other Methods (e.g. texting, email, CDs)    Discharge disposition:  Pt was discharged via:  [x]  Wheelchair  []  Stretcher  []  Safe ambulation and use of assistive device  []  Other:     Pt was discharged to:  [x]  Private car  []  Transportation service to next destination  []  Other:     Discharge destination:  [x]  Home  []  [facility]  []  [address]  []  Other:        Discharge BIMS:  Number of word repeated after first attempt:  []  0. None []  1. One []  2. Two [x]  3. Three    Able to report correct year:  []  0. Missed by >5 years, or no answer  []  1. Missed by 2-5 years  []  2. Missed by 1 year  [x]  3. Correct    Able to report correct month:   []  0. Missed by >1 month, or no answer  []  1. Missed by 6 days to 1 month  [x]  2.  Accurate within 5 days    Able to report correct day of the week:  []  0. Incorrect or no answer  [x]  1. Correct    Recall:   Able to recall \"sock\":  []  0. No could not recall  []  1. Yes, after cueing  [x]  2. Yes, no cue required  Able to recall \"blue\":  []  0. No could not recall  []  1. Yes, after cueing  [x]  2. Yes, no cue required  Able to recall \"bed\":  []  0. No could not recall  []  1. Yes, after cueing  [x]  2. Yes, no cue required      Patient Mood Interview    Symptom Presence  0. No (enter 0 in column 2)  1. Yes (enter 0-3 in column 2)  9. No response (leave column 2 blank  Symptom Frequency  0. Never or 1 day  1. 2-6 days (several days)  2. 7-11 days (half or more days)  3. 12-14 days (nearly everyday) 1. Symptom Presence 2. Symptom Frequency    Enter scores in boxes   Little interest or pleasure in doing things   0    Feeling down, depressed, or hopeless   0    If either A or B is coded 2 or 3, CONTINUE asking the questions below. If not, END the interview. Trouble falling or staying asleep, or sleeping too much       Feeling tired or having little energy       Poor appetite or overeating       Feeling bad about yourself - or that you are a failure or have let yourself or your family down       Trouble concentrating on things, such as reading the newspaper or watching television       Moving or speaking so slowly that other people could have noticed. Or the opposite- being so fidgety or restless that you have been moving around a lot more than usual.       Thoughts that you would be better off dead, or of hurting yourself in some way. Total Severity: Add scores for all frequency responses in column 2    0   Social isolation (from [employer])  How often do you feel lonely or isolated from those around you? [x] 0. Never  [] 1. Rarely  [] 2. Sometimes  [] 3. Often  [] 4. Always  [] 8. Patient unable to respond.            Discharging Nurse Signature: Idalia Sykes RN

## 2023-02-11 NOTE — PROGRESS NOTES
IRF NELLI Discharge 17 [facility]    Patient:Eddie Moya     Rehab Dx/Hx: Aortic dissection ([facility] [address] [I71.00]   HCL:[birth year]06/[birth year]90  D:[id number]  Date of Admit: 1/26/2023     Pain Effect on Sleep:  Over the past 5 days, how much of the time has pain made it hard for you to sleep at night?  []  0. Does not apply - I have not had any pain or hurting in the past 5 days  [x]  1. Rarely or not at all  []  2. Occasionally  []  3. Frequently  []  4. Almost constantly  []  8. Unable to answer    Over the past 5 days, how often have you limited your participation in rehabilitation therapy sessions due to pain?  []  0. Does not apply - I have not received rehabilitation therapy in the past 5 days  [x]  1. Rarely or not at all  []  2. Occasionally  []  3. Frequently  []  4. Almost constantly  []  8. Unable to answer    Pain Interference with Day-to-Day Activities:  Over the past 5 days, how often have you limited your day-to-day activities (excluding rehabilitation therapy session)? [x]  1. Rarely or not at all  []  2. Occasionally  []  3. Frequently  []  4. Almost constantly  []  8. Unable to answer      High-Risk Drug Classes: Use and Indication   Is taking: Check if the pt is taking any medications by pharmacological classification  Indication noted: If column 1 is checked, check if there is an indication noted for all meds in the drug class Is taking  (check all that apply) Indication noted (check all that apply)   Antipsychotic [] []   Anticoagulant [] []   Antibiotic [] []   Opioid [] []   Antiplatelet [] []   Hypoglycemic (including insulin) [] []   None of the above [x] []     Special Treatments, Procedures, and Programs   Check all of the following treatments, procedures, and programs that apply on discharge. On discharge (check all that apply)   Cancer Treatments    A1. Chemotherapy []   A2. IV []   A3. Oral []   A10. Other []   B1. Radiation []   Respiratory Therapies    C1.  Oxygen Therapy []   C2. Continuous []   C3. Intermittent []   C4. High-concentration []   D1. Suctioning []   D2. Scheduled []   D3. As needed []   E1. Tracheostomy Care []   F1. Invasive Mechanical Ventilator (ventilator or respirator) []   G1. Non-invasive Mechanical Ventilator []   G2. BiPAP []   G3. CPAP []   Other    H1. IV Medications []   H2. Vasoactive medications []   H3. Antibiotics []   H4. Anticoagulation []   H10. Other []   I1. Transfusions []   J1. Dialysis []   J2. Hemodialysis []   J3. Peritoneal dialysis []   O1. IV access []   O2. Peripheral []   O3. Midline []   O4.  Central (PICC, tunneled, port) []   None of the above [x]     Signature:  Nelma Mohs RN

## 2023-02-13 NOTE — DISCHARGE SUMMARY
Physical Medicine & Rehabilitation  Discharge Summary     Patient Identification:  Nicolas Alpers  : 1990  Admit date: 2023  Discharge date: 2023   Attending provider: Royal Muñoz. Aneudy Mason MD       Primary care provider: Keanu Wu DO     Discharge Diagnoses:   Patient Active Problem List   Diagnosis    Vocal fold paresis, left    Aortic dissection [facility])       History of Present Illness/Acute Hospital Course:  Seven Hodgson is a 34year old male with a past medical history significant for Marfan syndrome and morbid obesity who presented to [facility] on 1/15/23 with severe chest pain and lower extremity numbness. CT revealed type A aortic dissection. CT surgery was consulted and on  he underwent ascending aorta replacement. Vascular surgery was consulted and on  he underwent left to right femoral-femoral bypass. Course notable for left true vocal cord paralysis. ENT was consulted. Patient and family elected to continue with voice therapy and not proceed with injection. He was admitted to [facility] on 23 due to functional deficits below his baseline. Today he is seen with his wife present. He reports continued weakness, worse on the left. He reports continued numbness in the bottoms of his feet. Inpatient Rehabilitation Course:   Nicolas Alpers is a 34 y.o. male admitted to inpatient rehabilitation on 2023 with Aortic dissection ([address] The patient participated in an aggressive multidisciplinary inpatient rehabilitation program involving 3 hours of therapy per day, at least 5 days per week. Discharging home with wife. Patient will follow-up with CT surgery, Vascular surgery, PCP. Medical Management:    Type A Aortic Dissection s/p replacement  - sternal precautions  - asa, statin BB  - goal SBP<140  - PT, OT     Right to left fem-fem bypass  - asa, statin, BB  - every other staple removed   - Vascular following, appreciate assistance.  Wound vac bilateral groin incisions     Vocal cord paralysis  - ENT evaluated during acute stay  - ST     Aspiration pneumonia, resolved  - completed augmentin     Fluid overload, improved  - completed week of lasix  - discontinued K and Mg and monitor     Acute Blood Loss Anemia  - Hb stable     Marfan Syndrome  - noted     Obesity  - BMI 38  - encourage lifestyle modifications     Discharge Functional Status:    Physical therapy:  Bed Mobility:  Overall Assistance Level: Maximum Assistance  Additional Factors: Increased time to complete, Verbal cues  Sit>supine:  Assistance Level: Modified independent  Skilled Clinical Factors: on flat mat table without BR, increased time to complete, x 2 reps  Supine>sit:  Assistance Level: Moderate assistance  Skilled Clinical Factors: on flat mat table without BR, cues for sequence, maintains sternal px with min cues, increased time to complete x 2 reps  Transfers:  Surface: From chair with arms, Wheelchair, From bed  Additional Factors: Increased time to complete  Device:  (no AD)  Sit>stand:  Assistance Level: Independent  Skilled Clinical Factors: Pt completes multiple t/f throughout session from various surfaces, able to complete with I, maintains sternal px without cues  Stand>sit:  Assistance Level: Independent  Skilled Clinical Factors: Without AD, maintains sternal px without cues  Bed<>chair  Technique: Stand pivot  Assistance Level: Independent  Skilled Clinical Factors: Without AD  Stand Pivot:  Assistance Level: Minimal assistance, Moderate assistance, Requires x 2 assistance  Skilled Clinical Factors: Kostas/modA x 2 with RW, cues for hand placement and sternal px  Lateral transfer:     Car transfer:  Assistance Level: Minimal assistance  Skilled Clinical Factors:  Without AD, requires Kostas to get LE out car, increased time to complete, min cues for safety  Ambulation:  Surface: Level surface  Device:  (no AD)  Distance: 200' + 350' (including 50' over turf) + 48' + 100' + 200'  Activity: Within Room, Within Unit  Activity Comments: HR elevated to 130 during longer bout with increased gait speed, HR recovers with seated rest break to resting rate  Additional Factors: Increased time to complete  Assistance Level: Independent  Gait Deviations: Slow julita, Decreased step length bilateral, Unsteady gait, Decreased heel strike right, Decreased heel strike left, Decreased weight shift left, Wide base of support, Decreased trunk rotation, Decreased arm swing bilateral  Skilled Clinical Factors: Grossly reciprcoal pattern, B decreased heel strike, slight antalgic pattern, increased truncal sway. Intemrittent change in gait velocity  Stairs:  Stair Height: 6''  Device: Bilateral handrails  Number of Stairs: 16  Additional Factors: Verbal cues, Increased time to complete, Reciprocal going up, Reciprocal going down  Assistance Level: Modified independent, Independent  Skilled Clinical Factors: Initially non-reciprocal progressing to reciprocal pattern  Skilled Clinical Factors - Comments: Limited by fatigue  Curb:  Curb Height: 6''  Device:  (no AD)  Number of Curbs: 1  Assistance Level: Stand by assist  Wheelchair:  Surface: Level surface  Device: Standard wheelchair, Standard Cushion  Additional Factors: Increased time to complete  Assistance Required to Manage Parts: Brakes  Assistance Level for Propulsion: Stand by assist  Propulsion Method: Bilateral lower extremities  Propulsion Quality: Slow velocity, Decreased fluidity  Propulsion Distance: 100'  Skilled Clinical Factors: Increased time to complete, pt navigates through doorway  Assessment:  Assessment: Pt seen in am for PT tx, pt reports no concerns regarding upcoming d/c, has demonstrated good progress with all functional mobility. Pt requires modA for supine to sit, MI for sit to supine, I for t/f without AD, I for gait community distances without AD, I for 16 steps with HR.  Pt with plans to d/c home with 24hrA from wife and HHPT, no DME needs. Activity Tolerance: Patient limited by fatigue, Patient limited by endurance, Patient tolerated treatment well  Discharge Recommendations: 24 hour supervision or assist, Home with Home health PT      Occupational therapy:   Feeding  Assistance Level: Independent  Grooming/Oral Hygiene  Assistance Level: Independent  Skilled Clinical Factors: to stand at sink to brush teeth  UE Bathing  Assistance Level: Supervision  Skilled Clinical Factors: cues for washing self with wash cloth; improved ability to bathe incision/wounds  LE Bathing  Assistance Level: Moderate assistance  Skilled Clinical Factors: to bathe lower legs and feet, able to bathe buttocks/groin in stance with cues and SPV  UE Dressing  Assistance Level: Independent  Skilled Clinical Factors: to doff/don shirt  LE Dressing  Equipment Provided: Reachers  Assistance Level: Supervision  Skilled Clinical Factors: cues for use of reacher  Putting On/Taking Off Footwear  Equipment Provided: Reachers, Long-handle shoe horn  Assistance Level: Minimal assistance  Skilled Clinical Factors: good ability to doff shoes with reacher, able to mostly don shoes and assist for heel in L shoe only, min A to fully don socks after use of sock aid  Toileting  Assistance Level: Supervision  Skilled Clinical Factors: assist to hand pt wipes, good ability to complete hygiene in stance  Transfers: Toilet Transfers  Technique: Stand step  Equipment: Standard toilet  Additional Factors: Verbal cues  Assistance Level: Independent  Tub/Shower Transfers  Type: Shower  Transfer From: Standing without device  Transfer To: Tub transfer bench  Additional Factors: Set-up, Increased time to complete, Cues for hand placement  Assistance Level: Independent  Skilled Clinical Factors: cues to swing legs into/out of tub while seated on TTB  IADLs:  Meal Prep  Meal Prep Level:  Other  Meal Prep Level of Assistance: Independent  Meal Preparation: to gather items from kitchen and transport across room and then back to kitchen to place items in [address] Level: Other (no AD)  Community Re-entry Level of Assistance: Stand by assistance  Community Re-Entry: organizing grocery items around gym and to place on shelves in simulated grocery store; SBA for balance/mobility, tolerance for 1:30, 3:30, and 2:15  Pet Care     Health Management     Assessment:  Assessment: Pt agreeable to OT session. Pt continues to require mod A for LB bathing and min A for footwear due to limited reach to LEs and anxiety about wounds/woud vac. Pt demonstrated improved balance to stand with independence at sink and for static standing. Pt demonstrated fair to good use of a/e. Spouse has been educated and is able to assist with ADLs as needed. Pt performed mobility with no unsteadiness or fatigue noted and good BUE strength for ther ex.   Activity Tolerance: Patient limited by fatigue, Patient limited by endurance, Patient tolerated treatment well  Discharge Recommendations: Home with Home health OT, S Level 1, Home with assist PRN    Speech therapy:              Significant Diagnostics:   Lab Results   Component Value Date    CREATININE 0.6 (L) 02/10/2023    BUN 9 02/10/2023     02/10/2023    K 4.1 02/10/2023     02/10/2023    CO2 23 02/10/2023       Lab Results   Component Value Date    WBC 6.7 02/10/2023    HGB 10.1 (L) 02/10/2023    HCT 31.0 (L) 02/10/2023    MCV 78.9 (L) 02/10/2023     02/10/2023       Disposition:  home    Discharge Condition: Stable    Follow-up:  See after visit summary from hospitalization    Discharge Medications:     Medication List        START taking these medications      aspirin 81 MG EC tablet  Take 1 tablet by mouth daily     atorvastatin 40 MG tablet  Commonly known as: LIPITOR  Take 1 tablet by mouth daily     bisacodyl 5 MG EC tablet  Commonly known as: DULCOLAX  Take 1 tablet by mouth daily     famotidine 20 MG tablet  Commonly known as: PEPCID  Take 1 tablet by mouth 2 times daily     gabapentin 100 MG capsule  Commonly known as: NEURONTIN  Take 1 capsule by mouth 3 times daily for 30 days. metoprolol tartrate 50 MG tablet  Commonly known as: LOPRESSOR  Take 1 tablet by mouth 2 times daily     traZODone 100 MG tablet  Commonly known as: DESYREL  Take 1 tablet by mouth nightly               Where to Get Your Medications        These medications were sent to [address] [facility]      Phone: [phone]   aspirin 81 MG EC tablet  atorvastatin 40 MG tablet  bisacodyl 5 MG EC tablet  famotidine 20 MG tablet  gabapentin 100 MG capsule  metoprolol tartrate 50 MG tablet  traZODone 100 MG tablet           I spent over 35 minutes on this discharge encounter between counseling, coordination of care, and medication reconciliation. To comply with [facility] by R.II.4.1:   Discharge order placed in advance to facilitate patients discharge needs.       Rocio Cortez MD

## 2023-02-14 ENCOUNTER — TELEPHONE (OUTPATIENT)
Dept: VASCULAR SURGERY | Age: 33
End: 2023-02-14

## 2023-02-14 NOTE — TELEPHONE ENCOUNTER
Called Spouse, Karen Oscar regarding Rx for pain meds. Was sent to [facility]. shilpa

## 2023-02-15 NOTE — CARE COORDINATION
Discharge summaries sent to [facility]  Hanna Gabriel MPH, RRT  Clinical Liaison [address]  ([phone] (j)[phone]   Electronically signed by Hanna Gabriel on 2/15/2023 at 10:08 AM

## 2023-02-22 ENCOUNTER — OFFICE VISIT (OUTPATIENT)
Dept: VASCULAR SURGERY | Age: 33
End: 2023-02-22

## 2023-02-22 VITALS
BODY MASS INDEX: 37.51 KG/M2 | HEIGHT: 76 IN | WEIGHT: 308 LBS | DIASTOLIC BLOOD PRESSURE: 80 MMHG | SYSTOLIC BLOOD PRESSURE: 130 MMHG

## 2023-02-22 DIAGNOSIS — Z09 POSTOPERATIVE EXAMINATION: Primary | ICD-10-CM

## 2023-02-22 PROCEDURE — 99024 POSTOP FOLLOW-UP VISIT: CPT | Performed by: SURGERY

## 2023-02-22 NOTE — PROGRESS NOTES
Postop Progress Note    Subjective    Eddie Moya presents to the office for postop follow up.  S/P Fem-fem bypass.  Bilateral groin wound VAC's in place.      Objective    Vitals:    02/22/23 1341   BP: 130/80     General: alert, cooperative and no distress  Incisions:  Excellent granulation, no purulence.  Wound now only ~ 1cm deep.     Assessment  Doing well postoperatively.  Wounds progressing well since placement of Wound VAC's.     Plan  Continue Wound VACs at least for another week.    F/u in 3 weeks.   Repeat CTA C/A/P 3 months.     Electronically signed by Elvis Thorpe MD on 2/22/2023 at 2:09 PM

## 2023-03-02 ENCOUNTER — TELEPHONE (OUTPATIENT)
Dept: VASCULAR SURGERY | Age: 33
End: 2023-03-02

## 2023-03-02 NOTE — TELEPHONE ENCOUNTER
Verbal order give to Alden Hinds (home care nurse) for wet to dry dressings to both groin, okay'd by Dr. Nuria Bonilla.

## 2023-03-07 ENCOUNTER — OFFICE VISIT (OUTPATIENT)
Dept: CARDIOLOGY CLINIC | Age: 33
End: 2023-03-07
Payer: COMMERCIAL

## 2023-03-07 VITALS
WEIGHT: 307.5 LBS | SYSTOLIC BLOOD PRESSURE: 128 MMHG | OXYGEN SATURATION: 98 % | HEIGHT: 76 IN | BODY MASS INDEX: 37.45 KG/M2 | DIASTOLIC BLOOD PRESSURE: 68 MMHG | HEART RATE: 97 BPM

## 2023-03-07 DIAGNOSIS — I71.010 DISSECTION OF ASCENDING AORTA: Primary | ICD-10-CM

## 2023-03-07 DIAGNOSIS — E78.2 MIXED HYPERLIPIDEMIA: ICD-10-CM

## 2023-03-07 DIAGNOSIS — Q87.40 MARFAN SYNDROME: ICD-10-CM

## 2023-03-07 PROCEDURE — 99204 OFFICE O/P NEW MOD 45 MIN: CPT | Performed by: INTERNAL MEDICINE

## 2023-03-07 RX ORDER — ATORVASTATIN CALCIUM 40 MG/1
40 TABLET, FILM COATED ORAL DAILY
Qty: 90 TABLET | Refills: 3 | Status: SHIPPED | OUTPATIENT
Start: 2023-03-07

## 2023-03-07 RX ORDER — LISINOPRIL 10 MG/1
10 TABLET ORAL DAILY
Qty: 90 TABLET | Refills: 3 | Status: SHIPPED | OUTPATIENT
Start: 2023-03-07

## 2023-03-07 RX ORDER — METOPROLOL TARTRATE 50 MG/1
75 TABLET, FILM COATED ORAL 2 TIMES DAILY
Qty: 270 TABLET | Refills: 3 | Status: SHIPPED | OUTPATIENT
Start: 2023-03-07

## 2023-03-07 NOTE — PATIENT INSTRUCTIONS
Plan:  Call central scheduling at [phone]  ~Continue to avoid alcohol   ~Avoid caffeine and over the counter medications that could raise blood pressure. Discuss medications you buy with pharmacist   ~Recommend an echocardiogram which is an ultrasound of your heart to evaluate heart function, structures and valves. ~Increase Metoprolol to 75 mg twice a day   Repeat fasting lipids and CMP prior to next visit   Cardiac medications reviewed including indications and pertinent side effects. Medication list updated at this visit.    Check blood pressure and heart rate at home a few times per week- keep a log with dates and times and bring to office visit   Regular exercise and following a healthy diet encouraged   Follow up with NP in one month and me in 3 months

## 2023-03-07 NOTE — PROGRESS NOTES
[address]   Cardiac Consultation    Referring Provider:  Diana Honeycutt DO     Chief Complaint   Patient presents with    New Patient    Other      Amari French   1990    History of Present Illness:   Amari French is a 34 y.o. male who is here today as a new patient consult at request Dr Astrid Zamora for recent  aortic thoracic dissection that extended to Iliac arteries and HTN. He has a past medical history of Marfan syndrome, hypertension, and hyperlipidemia. presented to [facility] on 1/15/23 with severe chest pain and lower extremity numbness. CT revealed type A aortic dissection. CT surgery was consulted and on 1/16 he underwent ascending aorta replacement. Vascular surgery was consulted and on 1/16 he underwent left to right femoral-femoral bypass. Course notable for left true vocal cord paralysis. ENT was consulted. Patient and family elected to continue with voice therapy and not proceed with injection. He was admitted to [facility] on 1/26/23 due to functional deficits below his baseline. S/p ascending aortic replacement w/ tube graft & resuspension of aortic valve on 01/15/23 w/ MOM and Left to right femoral-femoral bypass using 8 mm internally  reinforced GORE Propaten graft. (Dr. Herminio Cheek). Today he is here with his wife to establish care. He states he previously followed at [address] [place] [address] for Marfan's. States he has been drinking 6 energy drinks per day. He is now back home from rehab. States he has some pain in his chest when he lays down at night, resolves when he belches. Pain does not radiates, last for 30 minutes. States worse with certain foods. Never has when up and active. He has stopped energy drinks and caffeine. He still has some sensitivity at his incision site. He has finished home rehab. He has been walking at home for exercise. Overall feeling better. He will be seeing Dr. Herminio Cheek and Dr. Norma Ventura next week for follow up.  Blood pressure was running 140's, Lisinopril was increased to 10 mg and and blood pressure is now in the 130's. Patient currently denies any weight gain, edema, palpitations, shortness of breath, dizziness, and syncope. Past Medical History:   has a past medical history of Influenza A and Marfan syndrome. Surgical History:   has a past surgical history that includes Femoral-femoral Bypass Graft (Bilateral, 1/16/2023) and Thoracic aortic aneurysm repair (N/A, 1/15/2023). Social History:   reports that he has never smoked. He has never used smokeless tobacco. He reports that he does not currently use alcohol. He reports that he does not use drugs. Family History:  family history is not on file. Home Medications:  Prior to Admission medications    Medication Sig Start Date End Date Taking? Authorizing Provider   lisinopril (PRINIVIL;ZESTRIL) 5 MG tablet Take 1 tablet by mouth daily 3/1/23  Yes GUSTAVO Ruiz   acetaminophen (TYLENOL) 500 MG tablet Take 1,000 mg by mouth every 6 hours as needed for Pain   Yes Historical Provider, MD   aspirin 81 MG EC tablet Take 1 tablet by mouth daily 2/11/23  Yes Aubrey Olea MD   bisacodyl (DULCOLAX) 5 MG EC tablet Take 1 tablet by mouth daily 2/11/23  Yes Aubrey Olea MD   famotidine (PEPCID) 20 MG tablet Take 1 tablet by mouth 2 times daily 2/10/23  Yes Aubrey Olea MD   gabapentin (NEURONTIN) 100 MG capsule Take 1 capsule by mouth 3 times daily for 30 days. 2/10/23 3/12/23 Yes Aubrey Olea MD   metoprolol tartrate (LOPRESSOR) 50 MG tablet Take 1 tablet by mouth 2 times daily 2/10/23  Yes Aubrey Olea MD   traZODone (DESYREL) 100 MG tablet Take 1 tablet by mouth nightly 2/10/23  Yes Aubrey Olea MD   atorvastatin (LIPITOR) 40 MG tablet Take 1 tablet by mouth daily 2/10/23   Aubrey Olea MD        Allergies:  Patient has no known allergies. Review of Systems:   Constitutional: there has been no unanticipated weight loss.  There's been no change in energy level, sleep pattern, or activity level.     Eyes: No visual changes or diplopia. No scleral icterus.  ENT: No Headaches, hearing loss or vertigo. No mouth sores or sore throat.  Cardiovascular: Reviewed in HPI  Respiratory: No cough or wheezing, no sputum production. No hematemesis.    Gastrointestinal: No abdominal pain, appetite loss, blood in stools. No change in bowel or bladder habits.  Genitourinary: No dysuria, trouble voiding, or hematuria.  Musculoskeletal:  No gait disturbance, weakness or joint complaints.  Integumentary: No rash or pruritis.  Neurological: No headache, diplopia, change in muscle strength, numbness or tingling. No change in gait, balance, coordination, mood, affect, memory, mentation, behavior.  Psychiatric: No anxiety, no depression.  Endocrine: No malaise, fatigue or temperature intolerance. No excessive thirst, fluid intake, or urination. No tremor.  Hematologic/Lymphatic: No abnormal bruising or bleeding, blood clots or swollen lymph nodes.  Allergic/Immunologic: No nasal congestion or hives.    Physical Examination:    Vitals:    03/07/23 1453   BP: 128/68   Pulse: 97   SpO2: 98%        Constitutional and General Appearance: NAD   Respiratory:  Normal excursion and expansion without use of accessory muscles  Resp Auscultation: Normal breath sounds without dullness  Cardiovascular:  The apical impulses not displaced  Heart tones are crisp and normal  Cervical veins are not engorged  The carotid upstroke is normal in amplitude and contour without delay or bruit  Normal S1S2, No S3, No Murmur  Peripheral pulses are symmetrical and full  There is no clubbing, cyanosis of the extremities.  No edema  Femoral Arteries: 2+ and equal  Pedal Pulses: 2+ and equal   Abdomen:  No masses or tenderness  Liver/Spleen: No Abnormalities Noted  Neurological/Psychiatric:  Alert and oriented in all spheres  Moves all extremities well  Exhibits normal gait balance and coordination  No  abnormalities of mood, affect, memory, mentation, or behavior are noted    Surgery 1/15/2023 Dr. Delphine Parada   Procedure(s):  [address] [facility] GRAFT USING AN EPPERSON GRAFT SIZE 28CM  ANTEGRADE FLOW TO THE LEFT LEG, Iesha Rajan     Surgery Dr. Hays Se 1/15/2023  Left to right femoral-femoral bypass using 8 mm internally  reinforced GORE Propaten graft. EKG 1/15/2023  NSR      CT chest abdomen/ and Pelvis 1/22/2023  1. 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 Slight interval increase in extent of intraluminal thrombus. 2. Suspected pneumatosis within the right colon, concerning for mesenteric ischemia. Clinical correlation recommended. 3. Median sternotomy with trace right pneumothorax. Small new pericardial effusion, possibly hemopericardium. 4. Patent femoral-femoral bypass graft. 5. Additional findings, as above. Assessment:   Chest pain c/w GERD  Acute aortic dissection extending to left iliac artery 1/15/2023  S/p ascending aortic replacement w/ tube graft & resuspension of aortic valve on 01/15/23 w/ MOM. S/P Fem-fem bypass- Bilateral groin wound VAC's- Dr. Saúl Lan   Hypertension - suboptimal  Hyperlipidemia - stable  Non smoker   Marfan's    Plan:  Continue to avoid alcohol   Avoid caffeine and over the counter medications that could raise blood pressure. Discuss medications you buy with pharmacist   Recommend an echocardiogram.   Increase Metoprolol to 75 mg twice a day   Repeat fasting lipids and CMP prior to next visit   Cardiac medications reviewed including indications and pertinent side effects. Medication list updated at this visit.    Check blood pressure and heart rate at home a few times per week- keep a log with dates and times and bring to office visit   Regular exercise and following a healthy diet encouraged   Follow up with NP in one month and me in 3 months Scribe's attestation: This note was scribed in the presence of Dr. Juana Robles M.D. By Carlee Bradley RN    The scribes documentation has been prepared under my direction and personally reviewed by me in its entirety. I confirm that the note above accurately reflects all work, treatment, procedures, and medical decision making performed by me. Dr. Juana Robles MD    Thank you for allowing me to participate in the care of this individual.      Tommie Friday.  Nadege Tijerina M.D., Leonor Adler

## 2023-03-17 ENCOUNTER — OFFICE VISIT (OUTPATIENT)
Dept: VASCULAR SURGERY | Age: 33
End: 2023-03-17

## 2023-03-17 VITALS
HEIGHT: 76 IN | WEIGHT: 304 LBS | DIASTOLIC BLOOD PRESSURE: 72 MMHG | SYSTOLIC BLOOD PRESSURE: 110 MMHG | BODY MASS INDEX: 37.02 KG/M2

## 2023-03-17 DIAGNOSIS — Z09 POSTOPERATIVE EXAMINATION: Primary | ICD-10-CM

## 2023-03-17 PROCEDURE — 99024 POSTOP FOLLOW-UP VISIT: CPT | Performed by: SURGERY

## 2023-03-17 NOTE — PROGRESS NOTES
Postop Progress Note    Subjective    Javi Reynoso presents to the office for postop follow up. Wounds nealry healed. Objective    Vitals:    03/17/23 0842   BP: 110/72     General: alert, cooperative and no distress  Incisions: Right groin with small band of granulation. Left healed. Palp L PT pulse    Assessment  Doing well postoperatively. Plan  Dry gauze to right groin.    F/u CTA 3 months post-op    Electronically signed by Pop Begum MD on 3/17/2023 at 9:47 AM

## 2023-03-29 ENCOUNTER — HOSPITAL ENCOUNTER (OUTPATIENT)
Age: 33
Discharge: HOME | End: 2023-03-29
Payer: COMMERCIAL

## 2023-03-29 ENCOUNTER — TELEPHONE (OUTPATIENT)
Dept: CARDIOLOGY CLINIC | Age: 33
End: 2023-03-29

## 2023-03-29 DIAGNOSIS — E78.2 MIXED HYPERLIPIDEMIA: ICD-10-CM

## 2023-03-29 LAB
ALBUMIN SERPL-MCNC: 4 G/DL (ref 3.4–5)
ALBUMIN/GLOB SERPL: 1.3 {RATIO} (ref 1.1–2.2)
ALP SERPL-CCNC: 90 U/L (ref 40–129)
ALT SERPL-CCNC: 25 U/L (ref 10–40)
ANION GAP SERPL CALCULATED.3IONS-SCNC: 11 MMOL/L (ref 3–16)
AST SERPL-CCNC: 21 U/L (ref 15–37)
BILIRUB SERPL-MCNC: 0.4 MG/DL (ref 0–1)
BUN SERPL-MCNC: 13 MG/DL (ref 7–20)
CALCIUM SERPL-MCNC: 9.3 MG/DL (ref 8.3–10.6)
CHLORIDE SERPL-SCNC: 105 MMOL/L (ref 99–110)
CHOLEST SERPL-MCNC: 102 MG/DL (ref 0–199)
CO2 SERPL-SCNC: 24 MMOL/L (ref 21–32)
CREAT SERPL-MCNC: 0.6 MG/DL (ref 0.9–1.3)
GFR SERPLBLD CREATININE-BSD FMLA CKD-EPI: >60 ML/MIN/{1.73_M2}
GLUCOSE SERPL-MCNC: 122 MG/DL (ref 70–99)
HDLC SERPL-MCNC: 38 MG/DL (ref 40–60)
LDLC SERPL CALC-MCNC: 41 MG/DL
POTASSIUM SERPL-SCNC: 4.2 MMOL/L (ref 3.5–5.1)
PROT SERPL-MCNC: 7.1 G/DL (ref 6.4–8.2)
SODIUM SERPL-SCNC: 140 MMOL/L (ref 136–145)
TRIGL SERPL-MCNC: 116 MG/DL (ref 0–150)
VLDLC SERPL CALC-MCNC: 23 MG/DL

## 2023-03-29 PROCEDURE — 36415 COLL VENOUS BLD VENIPUNCTURE: CPT

## 2023-03-29 PROCEDURE — 80053 COMPREHEN METABOLIC PANEL: CPT

## 2023-03-29 PROCEDURE — 80061 LIPID PANEL: CPT

## 2023-03-29 NOTE — TELEPHONE ENCOUNTER
----- Message from Ana Bran MD sent at 3/29/2023 11:42 AM EDT -----  Please notify patient that their lab results are ok, stable

## 2023-03-29 NOTE — TELEPHONE ENCOUNTER
Attempted to reach patient. No answer. Left VM with normal [place] [facility]

## 2023-04-03 NOTE — PROGRESS NOTES
Pulse 61   Ht 6' 4\" (1.93 m)   Wt (!) 304 lb 5 oz (138 kg)   SpO2 97%   BMI 37.04 kg/m²   Wt Readings from Last 2 Encounters:   04/04/23 (!) 304 lb 5 oz (138 kg)   03/17/23 (!) 304 lb (137.9 kg)     Constitutional: He is oriented to person, place, and time. He appears well-developed and well-nourished. In no acute distress. HEENT: Normocephalic and atraumatic. Sclerae anicteric. No xanthelasmas. Neck: Supple. No JVD present. Carotids without bruits. No thyromegaly present. Cardiovascular: RRR, normal S1 and S2; no murmur/gallop or rub  Pulmonary/Chest: Effort normal.  Lungs clear to auscultation. Chest wall nontender  Abdominal: obese, soft, nontender, nondistended. + bowel sounds  Extremities: No edema, cyanosis, or clubbing. Pulses are 2+ radial/carotid/DP/PT bilaterally. Cap refill brisk. Neurological: No focal deficit. Skin: Skin is warm and dry. Psychiatric: He has a normal mood and affect.  His speech is normal and behavior is normal.       Lab Review:   Lab Results   Component Value Date/Time    TRIG 116 03/29/2023 10:28 AM    HDL 38 03/29/2023 10:28 AM    LDLCALC 41 03/29/2023 10:28 AM    LABVLDL 23 03/29/2023 10:28 AM     Lab Results   Component Value Date/Time     03/29/2023 10:28 AM    K 4.2 03/29/2023 10:28 AM    K 4.4 02/01/2023 07:29 AM     03/29/2023 10:28 AM    CO2 24 03/29/2023 10:28 AM    BUN 13 03/29/2023 10:28 AM    CREATININE 0.6 03/29/2023 10:28 AM    GLUCOSE 122 03/29/2023 10:28 AM    CALCIUM 9.3 03/29/2023 10:28 AM      Lab Results   Component Value Date    WBC 6.7 02/10/2023    HGB 10.1 (L) 02/10/2023    HCT 31.0 (L) 02/10/2023    MCV 78.9 (L) 02/10/2023     02/10/2023     Surgery 1/15/2023 Dr. Hui Rater   Procedure(s):  ASCENDING AORTA  REPLACEMENT WITH TUBE GRAFT USING AN EPPERSON GRAFT SIZE 28CM  ANTEGRADE FLOW TO THE LEFT LEG, RESUSPENSION OF AORTIC VALVE     Surgery Dr. Netta Lu 1/15/2023  Left to right femoral-femoral bypass using 8 mm internally  reinforced

## 2023-04-04 ENCOUNTER — OFFICE VISIT (OUTPATIENT)
Dept: CARDIOLOGY CLINIC | Age: 33
End: 2023-04-04
Payer: COMMERCIAL

## 2023-04-04 VITALS
HEIGHT: 76 IN | DIASTOLIC BLOOD PRESSURE: 64 MMHG | HEART RATE: 61 BPM | OXYGEN SATURATION: 97 % | BODY MASS INDEX: 37.06 KG/M2 | SYSTOLIC BLOOD PRESSURE: 110 MMHG | WEIGHT: 304.31 LBS

## 2023-04-04 DIAGNOSIS — I10 PRIMARY HYPERTENSION: ICD-10-CM

## 2023-04-04 DIAGNOSIS — Q87.40 MARFAN SYNDROME: ICD-10-CM

## 2023-04-04 DIAGNOSIS — I71.010 DISSECTION OF ASCENDING AORTA (HCC): Primary | ICD-10-CM

## 2023-04-04 DIAGNOSIS — D64.9 ANEMIA, UNSPECIFIED TYPE: ICD-10-CM

## 2023-04-04 PROCEDURE — 3078F DIAST BP <80 MM HG: CPT | Performed by: NURSE PRACTITIONER

## 2023-04-04 PROCEDURE — 99214 OFFICE O/P EST MOD 30 MIN: CPT | Performed by: NURSE PRACTITIONER

## 2023-04-04 PROCEDURE — 3074F SYST BP LT 130 MM HG: CPT | Performed by: NURSE PRACTITIONER

## 2023-04-04 NOTE — PATIENT INSTRUCTIONS
Call [phone] ([phone]) to schedule   -call and schedule echo and CTA    Continue same medications

## 2023-04-05 ENCOUNTER — TELEPHONE (OUTPATIENT)
Dept: CARDIOLOGY CLINIC | Age: 33
End: 2023-04-05

## 2023-04-05 NOTE — TELEPHONE ENCOUNTER
Resumption of driving should be at direction of Cardiothoracic surgery since he had aortic aneurysm repair.

## 2023-04-25 ENCOUNTER — HOSPITAL ENCOUNTER (OUTPATIENT)
Dept: CT IMAGING | Age: 33
Discharge: HOME | End: 2023-04-25
Payer: COMMERCIAL

## 2023-04-25 DIAGNOSIS — I71.010 DISSECTION OF ASCENDING AORTA (HCC): ICD-10-CM

## 2023-04-25 PROCEDURE — 6360000004 HC RX CONTRAST MEDICATION: Performed by: THORACIC SURGERY (CARDIOTHORACIC VASCULAR SURGERY)

## 2023-04-25 PROCEDURE — 74174 CTA ABD&PLVS W/CONTRAST: CPT

## 2023-04-25 RX ADMIN — IOPAMIDOL 100 ML: 755 INJECTION, SOLUTION INTRAVENOUS at 10:20

## 2023-05-15 ENCOUNTER — TELEPHONE (OUTPATIENT)
Dept: CARDIOLOGY CLINIC | Age: 33
End: 2023-05-15

## 2023-05-30 ENCOUNTER — HOSPITAL ENCOUNTER (OUTPATIENT)
Dept: CARDIOLOGY | Age: 33
Discharge: HOME | End: 2023-05-30
Payer: COMMERCIAL

## 2023-05-30 DIAGNOSIS — Q87.40 MARFAN SYNDROME: ICD-10-CM

## 2023-05-30 DIAGNOSIS — I71.010 DISSECTION OF ASCENDING AORTA (HCC): ICD-10-CM

## 2023-05-30 LAB
LV EF: 55 %
LVEF MODALITY: NORMAL

## 2023-05-30 PROCEDURE — 93306 TTE W/DOPPLER COMPLETE: CPT

## 2023-06-01 ENCOUNTER — TELEPHONE (OUTPATIENT)
Dept: CARDIOLOGY CLINIC | Age: 33
End: 2023-06-01

## 2023-06-01 NOTE — TELEPHONE ENCOUNTER
----- Message from Darling Corona MD sent at 5/31/2023  4:46 PM EDT -----  Please notify patient that their echo looks ok  Heart fxn normal  Valve functioning well  Aorta stable

## 2023-06-21 ENCOUNTER — OFFICE VISIT (OUTPATIENT)
Dept: CARDIOLOGY CLINIC | Age: 33
End: 2023-06-21
Payer: COMMERCIAL

## 2023-06-21 VITALS
WEIGHT: 315 LBS | SYSTOLIC BLOOD PRESSURE: 138 MMHG | DIASTOLIC BLOOD PRESSURE: 84 MMHG | BODY MASS INDEX: 38.36 KG/M2 | HEIGHT: 76 IN | OXYGEN SATURATION: 99 % | HEART RATE: 67 BPM

## 2023-06-21 DIAGNOSIS — I71.010 DISSECTION OF ASCENDING AORTA (HCC): ICD-10-CM

## 2023-06-21 PROCEDURE — 99214 OFFICE O/P EST MOD 30 MIN: CPT | Performed by: INTERNAL MEDICINE

## 2023-06-21 RX ORDER — LISINOPRIL 10 MG/1
10 TABLET ORAL 2 TIMES DAILY
Qty: 180 TABLET | Refills: 3 | Status: SHIPPED | OUTPATIENT
Start: 2023-06-21

## 2023-06-21 NOTE — PROGRESS NOTES
tenderness  Liver/Spleen: No Abnormalities Noted  Neurological/Psychiatric:  Alert and oriented in all spheres  Moves all extremities well  Exhibits normal gait balance and coordination  No abnormalities of mood, affect, memory, mentation, or behavior are noted    Surgery 1/15/2023 Dr. Tara Camejo   Procedure(s):  [address] [facility] GRAFT USING AN EPPERSON GRAFT SIZE 28CM  ANTEGRADE FLOW TO THE LEFT LEG, [facility]     Surgery Dr. Daria Soulier 1/15/2023  Left to right femoral-femoral bypass using 8 mm internally  reinforced GORE Propaten graft. EKG 1/15/2023  NSR      CT chest abdomen/ and Pelvis 1/22/2023  1. 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 Slight interval increase in extent of intraluminal thrombus. 2. Suspected pneumatosis within the right colon, concerning for mesenteric ischemia. Clinical correlation recommended. 3. Median sternotomy with trace right pneumothorax. Small new pericardial effusion, possibly hemopericardium. 4. Patent femoral-femoral bypass graft. 5. Additional findings, as above. Echo 05/30/23    Summary   Surgery for a aortic dissection on 1/15/2023. S/P Ascending aorta   replacement with tube graft using an Epperson graft (28 cm) with resuspension   of the aortic valve. Technically difficult examination. Unable to get IV access. Normal left ventricle systolic function with an estimated ejection fraction   of 55%. There is mild concentric left ventricular hypertrophy. No regional wall motion abnormalities are seen. Normal left ventricular diastolic filling pressure. The aortic root is mildly dilated at 4.26 cm. The ascending aorta is mildly dilated at 4.3 cm. Trace aortic, mitral and tricuspid regurgitation. Systolic pulmonary artery pressure (SPAP) is normal and estimated at 31 mmHg   (right atrial pressure 3 mmHg).       Assessment:

## 2023-08-24 ENCOUNTER — HOSPITAL ENCOUNTER (OUTPATIENT)
Dept: CT IMAGING | Age: 33
Discharge: HOME | End: 2023-08-24
Payer: COMMERCIAL

## 2023-08-24 DIAGNOSIS — I71.010 DISSECTION OF ASCENDING AORTA (HCC): ICD-10-CM

## 2023-08-24 LAB
PERFORMED ON: ABNORMAL
POC CREATININE: 0.7 MG/DL (ref 0.9–1.3)
POC SAMPLE TYPE: ABNORMAL

## 2023-08-24 PROCEDURE — 6360000004 HC RX CONTRAST MEDICATION

## 2023-08-24 PROCEDURE — 82565 ASSAY OF CREATININE: CPT

## 2023-08-24 PROCEDURE — 74174 CTA ABD&PLVS W/CONTRAST: CPT

## 2023-08-24 RX ADMIN — IOPAMIDOL 75 ML: 755 INJECTION, SOLUTION INTRAVENOUS at 10:08

## 2023-08-31 RX ORDER — VALSARTAN 160 MG/1
160 TABLET ORAL DAILY
Qty: 90 TABLET | Refills: 1 | Status: SHIPPED | OUTPATIENT
Start: 2023-08-31

## 2023-08-31 NOTE — TELEPHONE ENCOUNTER
Spoke with Meredith Mena and his wife, relayed [address] message, they would like med sent to [facility]. Med list has been updated.

## 2023-08-31 NOTE — TELEPHONE ENCOUNTER
Pts wife stated that they spoke with Dr. Ifeoma Bone office [address] his aorta scan and they were advised that there office was reaching out to us regarding pts BP. They want pts bp to be at 120/80. Pts bp has been averaging high 130s/80s. Pt was taking the Lisinopril 2 tablets 1x a day. Pt is now taking Lisinopril 1 tablet bid but they have not seen any changes in bp. If a new prescription needs to be written, [facility] in [address] [place] [address]. Pts wife would like a return call to [phone], she is on his hippa.

## 2023-09-01 NOTE — TELEPHONE ENCOUNTER
Pt wife called and was confused of dosage for pt to take Valsartan. Read message from [facility] to take bid.   Wife VU

## 2023-10-13 RX ORDER — VALSARTAN 160 MG/1
160 TABLET ORAL DAILY
Qty: 90 TABLET | Refills: 1 | Status: SHIPPED | OUTPATIENT
Start: 2023-10-13

## 2023-10-13 NOTE — TELEPHONE ENCOUNTER
Medication Refill    Medication needing refilled:  Valsartan   Dosage of the medication:  160 mg  How are you taking this medication (QD, BID, TID, QID, PRN):  BID   30 or 90 day supply called in:  90  When will you run out of your medication:  2 days   Which Pharmacy are we sending the medication to?:  [facility]-  06.21.23 [facility]- 1 year

## 2024-03-07 RX ORDER — VALSARTAN 160 MG/1
160 TABLET ORAL 2 TIMES DAILY
Qty: 90 TABLET | Refills: 1 | Status: SHIPPED | OUTPATIENT
Start: 2024-03-07

## 2024-03-07 NOTE — TELEPHONE ENCOUNTER
Pt would like refills of valsartan sent to [facility] in [place].    Last OV- 06.21.23 [facility]  Next OV- 07.01.24 [facility]

## 2024-03-18 ENCOUNTER — TELEPHONE (OUTPATIENT)
Dept: CARDIOLOGY CLINIC | Age: 34
End: 2024-03-18

## 2024-03-18 RX ORDER — METOPROLOL TARTRATE 50 MG/1
75 TABLET, FILM COATED ORAL 2 TIMES DAILY
Qty: 270 TABLET | Refills: 1 | Status: SHIPPED | OUTPATIENT
Start: 2024-03-18

## 2024-03-18 RX ORDER — ATORVASTATIN CALCIUM 40 MG/1
40 TABLET, FILM COATED ORAL DAILY
Qty: 90 TABLET | Refills: 1 | Status: SHIPPED | OUTPATIENT
Start: 2024-03-18

## 2024-03-18 NOTE — TELEPHONE ENCOUNTER
Confirmation receipt for medications 3/18/2024, LMOM for pt, letting him know that confirmation was received and to contact the pharmacy for refills. Contact the office if needed.

## 2024-04-19 ENCOUNTER — TELEPHONE (OUTPATIENT)
Dept: CARDIOLOGY CLINIC | Age: 34
End: 2024-04-19

## 2024-04-19 ENCOUNTER — APPOINTMENT (OUTPATIENT)
Dept: VASCULAR LAB | Age: 34
End: 2024-04-19
Payer: COMMERCIAL

## 2024-04-19 ENCOUNTER — HOSPITAL ENCOUNTER (EMERGENCY)
Age: 34
Discharge: HOME | End: 2024-04-19
Payer: COMMERCIAL

## 2024-04-19 VITALS
BODY MASS INDEX: 38.36 KG/M2 | DIASTOLIC BLOOD PRESSURE: 73 MMHG | SYSTOLIC BLOOD PRESSURE: 141 MMHG | RESPIRATION RATE: 16 BRPM | OXYGEN SATURATION: 96 % | HEIGHT: 76 IN | WEIGHT: 315 LBS | TEMPERATURE: 97.9 F | HEART RATE: 61 BPM

## 2024-04-19 DIAGNOSIS — M79.89 LEG SWELLING: Primary | ICD-10-CM

## 2024-04-19 PROCEDURE — 93971 EXTREMITY STUDY: CPT

## 2024-04-19 PROCEDURE — 99284 EMERGENCY DEPT VISIT MOD MDM: CPT

## 2024-04-19 ASSESSMENT — PAIN SCALES - GENERAL: PAINLEVEL_OUTOF10: 0

## 2024-04-19 ASSESSMENT — PAIN - FUNCTIONAL ASSESSMENT: PAIN_FUNCTIONAL_ASSESSMENT: 0-10

## 2024-04-19 NOTE — TELEPHONE ENCOUNTER
Pts wife called and stated pt works 3rd shift and he had orthopedic socks on and when he get home from work he took socks off and left leg below knee was extremely swollen, states no cp, sob or any symptoms. Stated they have just not seen this before. Stated pt is going to bed for the day and to please call pts wife back @ [phone]

## 2024-04-19 NOTE — TELEPHONE ENCOUNTER
Swelling in one leg can be a blood clot  Rec he be evaluated in ER where they can do U/S if necessary

## 2024-04-19 NOTE — TELEPHONE ENCOUNTER
Spoke with Alexa, relayed [facility] message, they will head to ER.

## 2024-04-22 NOTE — ED PROVIDER NOTES
[facility]  ED  EMERGENCY DEPARTMENT ENCOUNTER        Pt Name: Eddie Moya  MRN: [id number]  Birthdate 1990  Date of evaluation: 4/19/2024  Provider: [facility]  PCP: Gianni Pratt DO        REJI. I have evaluated this patient.        CHIEF COMPLAINT       Chief Complaint   Patient presents with    Leg Swelling     Pt sent over by cardiologist for US of leg for potential blood clot.        HISTORY OF PRESENT ILLNESS: 1 or more Elements     History From: the patient  Limitations to history : None    Eddie Moya is a 33 y.o. male who presents to the emergency room today with complaints of left leg swelling, concern for blood clot.  Patient states that his left leg was swollen today although he noticed that his sock had gotten rolled down and he thinks that is why it was swollen, he denies any pain to the area but he does have unilateral swelling.  He is here for further evaluation.    Nursing Notes were all reviewed and agreed with or any disagreements were addressed in the HPI.    REVIEW OF SYSTEMS :      Review of Systems    Positives and Pertinent negatives as per HPI.     SURGICAL HISTORY     Past Surgical History:   Procedure Laterality Date    FEMORAL-FEMORAL BYPASS GRAFT Bilateral 1/16/2023    FEMORAL FEMORAL BYPASS performed by Elvis Thorpe MD at [facility] OR    THORACIC AORTIC ANEURYSM REPAIR N/A 1/15/2023    ASCENDING AORTA  REPLACEMENT WITH TUBE GRAFT USING AN EPPERSON GRAFT SIZE 28CM  ANTEGRADE FLOW TO THE LEFT LEG, RESUSPENSION OF AORTIC VALVE performed by Kia Ann MD at [facility] CVOR       CURRENTMEDICATIONS       Discharge Medication List as of 4/19/2024  4:15 PM        CONTINUE these medications which have NOT CHANGED    Details   atorvastatin (LIPITOR) 40 MG tablet Take 1 tablet by mouth daily, Disp-90 tablet, R-1Normal      metoprolol tartrate (LOPRESSOR) 50 MG tablet Take 1.5 tablets by mouth 2 times daily, Disp-270 tablet, R-1Normal      valsartan

## 2024-05-30 ENCOUNTER — TELEPHONE (OUTPATIENT)
Dept: CARDIOLOGY CLINIC | Age: 34
End: 2024-05-30

## 2024-05-30 RX ORDER — VALSARTAN 160 MG/1
160 TABLET ORAL 2 TIMES DAILY
Qty: 180 TABLET | Refills: 3 | Status: SHIPPED | OUTPATIENT
Start: 2024-05-30

## 2024-05-30 RX ORDER — VALSARTAN 160 MG/1
160 TABLET ORAL 2 TIMES DAILY
Qty: 60 TABLET | Refills: 1 | Status: SHIPPED | OUTPATIENT
Start: 2024-05-30 | End: 2024-05-30 | Stop reason: SDUPTHER

## 2024-05-30 NOTE — TELEPHONE ENCOUNTER
Pts wife is requesting refill of Valsartan 160mg. Preferred pharmacy is    [facility] [id number] - [place], [place] - [address] [facility] [address] - P [phone]     Last ov [facility]/21/2023 [facility]  Next ov 07/01/20245 [facility]

## 2024-06-28 NOTE — PROGRESS NOTES
entirety.  I confirm that the note above accurately reflects all work, treatment, procedures, and medical decision making performed by me.    Dr. Ed Hernandez MD    Thank you for allowing me to participate in the care of this individual.      Ez Hernandez M.D., [facility], [facility]

## 2024-07-01 ENCOUNTER — OFFICE VISIT (OUTPATIENT)
Dept: CARDIOLOGY CLINIC | Age: 34
End: 2024-07-01
Payer: COMMERCIAL

## 2024-07-01 VITALS
DIASTOLIC BLOOD PRESSURE: 50 MMHG | SYSTOLIC BLOOD PRESSURE: 86 MMHG | WEIGHT: 315 LBS | HEART RATE: 80 BPM | HEIGHT: 76 IN | BODY MASS INDEX: 38.36 KG/M2 | OXYGEN SATURATION: 96 %

## 2024-07-01 DIAGNOSIS — Q87.40 MARFAN SYNDROME: ICD-10-CM

## 2024-07-01 DIAGNOSIS — Z95.828 HX OF ASCENDING AORTA REPLACEMENT: ICD-10-CM

## 2024-07-01 DIAGNOSIS — I10 PRIMARY HYPERTENSION: ICD-10-CM

## 2024-07-01 DIAGNOSIS — R07.2 PRECORDIAL PAIN: ICD-10-CM

## 2024-07-01 DIAGNOSIS — R07.9 CHEST PAIN, UNSPECIFIED TYPE: Primary | ICD-10-CM

## 2024-07-01 PROCEDURE — 99214 OFFICE O/P EST MOD 30 MIN: CPT | Performed by: INTERNAL MEDICINE

## 2024-07-01 PROCEDURE — 3078F DIAST BP <80 MM HG: CPT | Performed by: INTERNAL MEDICINE

## 2024-07-01 PROCEDURE — 3074F SYST BP LT 130 MM HG: CPT | Performed by: INTERNAL MEDICINE

## 2024-07-01 NOTE — PATIENT INSTRUCTIONS
Plan:  ~Send your blood pressure readings through my chart to the [facility] at their request    ~Recommend a stress test- Lexiscan Myoview to evaluate chest pain. Patient is not able to walk on a treadmill due to activity restrictions    ~Call [facility] scheduling at [phone] to schedule testing    ~call if blood pressure is running below 100 or over 130   Cardiac medications reviewed including indications and pertinent side effects. Medication list updated at this visit.   Patient verbalizes understanding of the need for treatment and education has been provided at today's visit. Additional education material will be provided in after visit summary.    Check blood pressure and heart rate at home a few times per week- keep a log with dates and times and bring to office visit   Regular exercise and following a healthy diet encouraged   Follow up with me in November

## 2024-09-09 LAB
ALBUMIN: 4.5 G/DL
ALP BLD-CCNC: 79 U/L
ALT SERPL-CCNC: 24 U/L
ANION GAP SERPL CALCULATED.3IONS-SCNC: NORMAL MMOL/L
AST SERPL-CCNC: 25 U/L
BASOPHILS ABSOLUTE: 0.1 /ΜL
BASOPHILS RELATIVE PERCENT: 1 %
BILIRUB SERPL-MCNC: 0.9 MG/DL (ref 0.1–1.4)
BUN BLDV-MCNC: 14 MG/DL
CALCIUM SERPL-MCNC: 9.5 MG/DL
CHLORIDE BLD-SCNC: 105 MMOL/L
CHOLESTEROL, TOTAL: 95 MG/DL
CHOLESTEROL/HDL RATIO: ABNORMAL
CO2: 19 MMOL/L
CREAT SERPL-MCNC: 0.86 MG/DL
EOSINOPHILS ABSOLUTE: 0.2 /ΜL
EOSINOPHILS RELATIVE PERCENT: 4 %
GFR, ESTIMATED: 116
GLUCOSE BLD-MCNC: 79 MG/DL
HCT VFR BLD CALC: 47.8 % (ref 41–53)
HDLC SERPL-MCNC: 33 MG/DL (ref 35–70)
HEMOGLOBIN: 15.1 G/DL (ref 13.5–17.5)
LDL CHOLESTEROL: 48
LYMPHOCYTES ABSOLUTE: 2.2 /ΜL
LYMPHOCYTES RELATIVE PERCENT: 34 %
MCH RBC QN AUTO: 26.1 PG
MCHC RBC AUTO-ENTMCNC: 31.6 G/DL
MCV RBC AUTO: 83 FL
MONOCYTES ABSOLUTE: 0.5 /ΜL
MONOCYTES RELATIVE PERCENT: 8 %
NEUTROPHILS ABSOLUTE: 3.3 /ΜL
NEUTROPHILS RELATIVE PERCENT: 53 %
NONHDLC SERPL-MCNC: ABNORMAL MG/DL
PLATELET # BLD: 230 K/ΜL
PMV BLD AUTO: NORMAL FL
POTASSIUM SERPL-SCNC: 4.3 MMOL/L
RBC # BLD: 5.79 10^6/ΜL
SODIUM BLD-SCNC: 140 MMOL/L
TOTAL PROTEIN: 7.1 G/DL (ref 6.4–8.2)
TRIGL SERPL-MCNC: 59 MG/DL
VLDLC SERPL CALC-MCNC: 14 MG/DL
WBC # BLD: 6.3 10^3/ML

## 2024-09-09 NOTE — PROGRESS NOTES
01/16/23 0816   Patient Observation   Heart Rate 94   Resp 23   SpO2 98 %   Breath Sounds   Right Upper Lobe Diminished   Right Middle Lobe Diminished   Right Lower Lobe Diminished   Left Upper Lobe Diminished   Left Lower Lobe Diminished   Vent Information   Vent Mode AC/VC   Ventilator Settings   Vt (Set, mL) 600 mL   Resp Rate (Set) 16 bmp   PEEP/CPAP (cmH2O) 5   FiO2  100 %   Pressure Support (cm H2O) 0 cm H2O   Vent Patient Data (Readings)   Vt (Measured) 605 mL   Peak Inspiratory Pressure (cmH2O) 21 cmH2O   Rate Measured 22 br/min   Minute Volume (L/min) 13.7 Liters   Mean Airway Pressure (cmH2O) 7.1 cmH20   I:E Ratio 1.30:1   Vent Alarm Settings   High Pressure (cmH2O) 40 cmH2O   Low Minute Volume (lpm) 2 L/min   Low Exhaled Vt (ml) 200 mL   RR High (bpm) 40 br/min   Apnea (secs) 20 secs   Additional Respiratoray Assessments   Humidification Source HME   Airway Clearance   Suction ET Tube   Suction Device Inline suction catheter   Sputum Method Obtained Endotracheal   Sputum Amount Other (comment)  (none)   ETT    Placement Date/Time: 01/16/23 0036   Present on Admission/Arrival: No  Placed By: In surgery  Placement Verified By: Auscultation;Capnometry  Preoxygenation: Yes  Mask Ventilation: Ventilated by mask (1)  Technique: Video laryngoscopy  Airway Type: Cu...    Secured At 23 cm   Measured From Lips   ETT Placement Right   Secured By Commercial tube stanton   Cuff Pressure 30 cm H2O   Treatment   $Treatment Type $Inhaled Therapy/Meds Regular

## 2024-10-28 ENCOUNTER — OFFICE VISIT (OUTPATIENT)
Dept: CARDIOLOGY CLINIC | Age: 34
End: 2024-10-28

## 2024-10-28 VITALS
BODY MASS INDEX: 37.63 KG/M2 | HEIGHT: 76 IN | DIASTOLIC BLOOD PRESSURE: 68 MMHG | WEIGHT: 309 LBS | OXYGEN SATURATION: 96 % | HEART RATE: 68 BPM | SYSTOLIC BLOOD PRESSURE: 120 MMHG

## 2024-10-28 DIAGNOSIS — I10 PRIMARY HYPERTENSION: Primary | ICD-10-CM

## 2024-10-28 DIAGNOSIS — I71.010 DISSECTION OF ASCENDING AORTA (HCC): ICD-10-CM

## 2024-10-28 NOTE — PATIENT INSTRUCTIONS
Plan:  ~We will hold off on stress test since you are not having any symptoms     Cardiac medications reviewed including indications and pertinent side effects. Medication list updated at this visit.   Patient verbalizes understanding of the need for treatment and education has been provided at today's visit. Additional education material will be provided in after visit summary.    Check blood pressure and heart rate at home a few times per week- keep a log with dates and times and bring to office visit   Regular exercise and following a healthy diet encouraged   Follow up with me in 1 year

## 2024-10-28 NOTE — PROGRESS NOTES
common iliac artery which is dilated to   2.8 cm     Mild focal thickening/prominence of lower esophagus similar to prior   study, could correlate with clinical presentation for relevance,   assessment limited without endoluminal contrast.  Small leiomyoma is a   consideration, at clinical discretion could consider esophagram.   ACTIONABLE RESULT: FOLLOW-UP   Acuity: Actionable   Findings: Digestive Tract Routing Code:  GI_1   Recommendation: Unlisted Recommendation (see report)   Time Frame: At the discretion of the clinical team.   COMMUNICATION: Results will be communicated with the ordering provider   via Epic staff message or phone message by Imaging Support Services   within 2 business days of report finalization.   --END OF FINDING--       Echocardiogram 3/13/2024  CONCLUSIONS:   - Exam indication: Pre-op aortic aneurysm Known residual aortic dissection.   - The left ventricle is normal in size. Left ventricular systolic function is   normal. EF = 64 ± 5% (2D biplane) Definity contrast used for endocardial border   detection.   - The right ventricle is normal in size. Right ventricular systolic function is   normal.   - The visualized aorta is dilated with a maximal dimension of 4.6 cm.   - Aortic dissection flap seen in the arch and visualized portion of the upper   abdominal aorta.   - Asymmetrical prominence of the RCC of the aortic sinus, ? focal dissection /   pseudoaneursym. Correlate with CTA from today.   - The patient has not had a prior CC echocardiographic exam for comparison.     Echocardiogram 9/18/2024  FINDINGS:     LEFT VENTRICLE   The left ventricle is normal in size.   Left ventricular systolic function is normal.   Normal left ventricular diastolic function.   Mitral annular septal E/e': 8.0.   Wall Motion:   All scored segments are normal.        Latest Reference Range & Units 03/29/23 10:28   Cholesterol, Total 0 - 199 mg/dL 102   HDL Cholesterol 40 - 60 mg/dL 38 (L)   LDL Cholesterol

## 2025-03-24 RX ORDER — METOPROLOL TARTRATE 50 MG
TABLET ORAL
Qty: 270 TABLET | Refills: 1 | Status: SHIPPED | OUTPATIENT
Start: 2025-03-24

## 2025-03-26 NOTE — TELEPHONE ENCOUNTER
Pt returned call.  Scheduled to see [facility] for annual appt.  Please send medication for refill

## 2025-04-02 ENCOUNTER — TELEPHONE (OUTPATIENT)
Dept: CARDIOLOGY CLINIC | Age: 35
End: 2025-04-02

## 2025-04-02 ENCOUNTER — APPOINTMENT (OUTPATIENT)
Dept: CT IMAGING | Age: 35
End: 2025-04-02
Payer: COMMERCIAL

## 2025-04-02 ENCOUNTER — HOSPITAL ENCOUNTER (EMERGENCY)
Age: 35
Discharge: HOME | End: 2025-04-02
Attending: EMERGENCY MEDICINE
Payer: COMMERCIAL

## 2025-04-02 VITALS
TEMPERATURE: 97.8 F | DIASTOLIC BLOOD PRESSURE: 59 MMHG | WEIGHT: 315 LBS | OXYGEN SATURATION: 97 % | BODY MASS INDEX: 38.36 KG/M2 | RESPIRATION RATE: 16 BRPM | HEART RATE: 94 BPM | SYSTOLIC BLOOD PRESSURE: 113 MMHG | HEIGHT: 76 IN

## 2025-04-02 DIAGNOSIS — Z86.79 HX OF THORACIC AORTIC ANEURYSM REPAIR: ICD-10-CM

## 2025-04-02 DIAGNOSIS — Z98.890 HX OF THORACIC AORTIC ANEURYSM REPAIR: ICD-10-CM

## 2025-04-02 DIAGNOSIS — R07.9 CHEST PAIN, UNSPECIFIED TYPE: Primary | ICD-10-CM

## 2025-04-02 LAB
ALBUMIN SERPL-MCNC: 4.3 G/DL (ref 3.4–5)
ALBUMIN/GLOB SERPL: 1.6 {RATIO} (ref 1.1–2.2)
ALP SERPL-CCNC: 63 U/L (ref 40–129)
ALT SERPL-CCNC: 31 U/L (ref 10–40)
ANION GAP SERPL CALCULATED.3IONS-SCNC: 11 MMOL/L (ref 3–16)
APTT BLD: 28.3 SEC (ref 22.1–36.4)
AST SERPL-CCNC: 31 U/L (ref 15–37)
BASOPHILS # BLD: 0 K/UL (ref 0–0.2)
BASOPHILS NFR BLD: 0.9 %
BILIRUB SERPL-MCNC: 0.5 MG/DL (ref 0–1)
BUN SERPL-MCNC: 36 MG/DL (ref 7–20)
CALCIUM SERPL-MCNC: 9 MG/DL (ref 8.3–10.6)
CHLORIDE SERPL-SCNC: 105 MMOL/L (ref 99–110)
CO2 SERPL-SCNC: 20 MMOL/L (ref 21–32)
CREAT SERPL-MCNC: 1.1 MG/DL (ref 0.9–1.3)
DEPRECATED RDW RBC AUTO: 15.6 % (ref 12.4–15.4)
EKG ATRIAL RATE: 102 BPM
EKG DIAGNOSIS: NORMAL
EKG P AXIS: 22 DEGREES
EKG P-R INTERVAL: 160 MS
EKG Q-T INTERVAL: 344 MS
EKG QRS DURATION: 100 MS
EKG QTC CALCULATION (BAZETT): 448 MS
EKG R AXIS: 0 DEGREES
EKG T AXIS: 43 DEGREES
EKG VENTRICULAR RATE: 102 BPM
EOSINOPHIL # BLD: 0.1 K/UL (ref 0–0.6)
EOSINOPHIL NFR BLD: 2.1 %
GFR SERPLBLD CREATININE-BSD FMLA CKD-EPI: 89 ML/MIN/{1.73_M2}
GLUCOSE SERPL-MCNC: 97 MG/DL (ref 70–99)
HCT VFR BLD AUTO: 43.7 % (ref 40.5–52.5)
HGB BLD-MCNC: 14.8 G/DL (ref 13.5–17.5)
INR PPP: 1.19 (ref 0.85–1.15)
LYMPHOCYTES # BLD: 0.7 K/UL (ref 1–5.1)
LYMPHOCYTES NFR BLD: 14.3 %
MCH RBC QN AUTO: 27.1 PG (ref 26–34)
MCHC RBC AUTO-ENTMCNC: 33.8 G/DL (ref 31–36)
MCV RBC AUTO: 80.1 FL (ref 80–100)
MONOCYTES # BLD: 0.7 K/UL (ref 0–1.3)
MONOCYTES NFR BLD: 14.6 %
NEUTROPHILS # BLD: 3.1 K/UL (ref 1.7–7.7)
NEUTROPHILS NFR BLD: 68.1 %
PLATELET # BLD AUTO: 179 K/UL (ref 135–450)
PMV BLD AUTO: 7.8 FL (ref 5–10.5)
POTASSIUM SERPL-SCNC: 3.9 MMOL/L (ref 3.5–5.1)
PROT SERPL-MCNC: 7 G/DL (ref 6.4–8.2)
PROTHROMBIN TIME: 15.3 SEC (ref 11.9–14.9)
RBC # BLD AUTO: 5.45 M/UL (ref 4.2–5.9)
SODIUM SERPL-SCNC: 136 MMOL/L (ref 136–145)
TROPONIN, HIGH SENSITIVITY: 12 NG/L (ref 0–22)
TROPONIN, HIGH SENSITIVITY: 13 NG/L (ref 0–22)
WBC # BLD AUTO: 4.6 K/UL (ref 4–11)

## 2025-04-02 PROCEDURE — 85610 PROTHROMBIN TIME: CPT

## 2025-04-02 PROCEDURE — 93010 ELECTROCARDIOGRAM REPORT: CPT | Performed by: INTERNAL MEDICINE

## 2025-04-02 PROCEDURE — 84484 ASSAY OF TROPONIN QUANT: CPT

## 2025-04-02 PROCEDURE — 99285 EMERGENCY DEPT VISIT HI MDM: CPT

## 2025-04-02 PROCEDURE — 93005 ELECTROCARDIOGRAM TRACING: CPT | Performed by: EMERGENCY MEDICINE

## 2025-04-02 PROCEDURE — 85730 THROMBOPLASTIN TIME PARTIAL: CPT

## 2025-04-02 PROCEDURE — 85025 COMPLETE CBC W/AUTO DIFF WBC: CPT

## 2025-04-02 PROCEDURE — 71275 CT ANGIOGRAPHY CHEST: CPT

## 2025-04-02 PROCEDURE — 6360000004 HC RX CONTRAST MEDICATION: Performed by: EMERGENCY MEDICINE

## 2025-04-02 PROCEDURE — 80053 COMPREHEN METABOLIC PANEL: CPT

## 2025-04-02 RX ORDER — IOPAMIDOL 755 MG/ML
75 INJECTION, SOLUTION INTRAVASCULAR
Status: COMPLETED | OUTPATIENT
Start: 2025-04-02 | End: 2025-04-02

## 2025-04-02 RX ADMIN — IOPAMIDOL 75 ML: 755 INJECTION, SOLUTION INTRAVENOUS at 17:41

## 2025-04-02 ASSESSMENT — ENCOUNTER SYMPTOMS
SHORTNESS OF BREATH: 0
BACK PAIN: 0
ABDOMINAL PAIN: 0
RESPIRATORY NEGATIVE: 1
GASTROINTESTINAL NEGATIVE: 1

## 2025-04-02 ASSESSMENT — PAIN - FUNCTIONAL ASSESSMENT: PAIN_FUNCTIONAL_ASSESSMENT: 0-10

## 2025-04-02 ASSESSMENT — PAIN SCALES - GENERAL: PAINLEVEL_OUTOF10: 0

## 2025-04-02 ASSESSMENT — HEART SCORE: ECG: NORMAL

## 2025-04-02 NOTE — ED NOTES
Discharged with instructions and follow up discussed, patient verbalized understanding. Left stable ambulatory.

## 2025-04-02 NOTE — TELEPHONE ENCOUNTER
Pt called office. Pt has been having bad chest pain, light headed, dizziness, HR is high.  Pt said chest pain hurts so bad it makes him cry, Pt said Apple watch goes off when he has the chest pain and due to high heart rate. I advised pt go to the ER especially with the bad chest pain, pt said he hasn't had chest pain today but does feel off. Please advise.Thank you!  Best call back number:[phone]

## 2025-04-02 NOTE — ED PROVIDER NOTES
[facility] EMERGENCY DEPARTMENT  EMERGENCY DEPARTMENT ENCOUNTER        Pt Name: Eddie Moya  MRN: [id number]  Birthdate 1990  Date of evaluation: 4/2/2025  Provider: Man hSahid MD  PCP: Gianni Pratt DO  Note Started: 5:32 PM EDT 4/2/25    CHIEF COMPLAINT       Chief Complaint   Patient presents with    Chest Pain     Chest pain that started two days ago.   Pt reports he was getting aggravated with his children when his pain started.   Pt reports history of aortic dissection.        HISTORY OF PRESENT ILLNESS: 1 or more Elements     History from : Patient    Limitations to history : None    Eddie Moya is a 34 y.o. male who presents for anterior chest pain that started 2 days ago he was very aggravated at his children and was arguing with his spouse.  Patient was also having some dizziness at that time and noted that his heart rate was high on his Apple Watch.  The dizziness is since gone away his heart rate is improved.  He currently has no chest pain his pain is a 0.  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  He had surgery for this in 2023 as well as a previous femorofemoral bypass graft.  Patient follows with cardiology here at [place] as well as continues to follow with genetics at [facility] and then follows with CT surgery at [facility].  States they are planning on seeing him again in the summer for discussion of a possible secondary repair of his aortic aneurysm.  He was told to come to the ER anytime he has chest pain.  He denies any other recent illness no abdominal pain no nausea vomiting or diarrhea no cough.  No shortness of breath no sore throat.  No other modifying factors at this time.    Nursing Notes were all reviewed and agreed with or any disagreements were addressed in the HPI.    REVIEW OF SYSTEMS :      Review of Systems

## 2025-04-02 NOTE — CONSULTS
Placed call to Cardiology @ 1854  RE:  Chest pain, hx of Aortic dissection and aortic root dilation per MD Mary Shahid MD called back @ 1856

## 2025-04-25 ENCOUNTER — TELEPHONE (OUTPATIENT)
Dept: CARDIOLOGY CLINIC | Age: 35
End: 2025-04-25

## 2025-04-25 NOTE — TELEPHONE ENCOUNTER
Eddie Moya, 1990    Cardiac Risk Assessment    What type of procedure are you having?  Deep cleaning, 4 quadrants, 2 wisdom teeth extractions, fillings     When is your procedure scheduled for?  Not listed    Medications to be stopped.  Not listed    What physician is performing your procedure?  [facility] ( not listed)     Phone Number:   [phone]    Fax number to send the letter:   [phone]    Cardiologist:   melvin    Last Appointment:   10/28/24    Next Appointment:   5/28/25

## 2025-04-28 RX ORDER — AMOXICILLIN 500 MG/1
2000 CAPSULE ORAL PRN
Qty: 20 CAPSULE | Refills: 0 | Status: SHIPPED | OUTPATIENT
Start: 2025-04-28

## 2025-04-28 NOTE — TELEPHONE ENCOUNTER
Ok procedure and cleaning  Amoxicillin 2000 mg 1 hour prior to procedure and cleanings  Confirm no allergy to PCN or amoxicillin   Script sent

## 2025-04-29 NOTE — TELEPHONE ENCOUNTER
Spoke with pt, he is not allergic.  Cardiac Clrx letter created. Will scan when confirmation is received.

## 2025-05-12 ENCOUNTER — TELEPHONE (OUTPATIENT)
Dept: CARDIOLOGY CLINIC | Age: 35
End: 2025-05-12

## 2025-05-12 NOTE — TELEPHONE ENCOUNTER
OK write letter stating \"due to history of ascending aorta replacement surgery, he requires deep dental cleaning\"

## 2025-05-12 NOTE — TELEPHONE ENCOUNTER
Pt's spouse called stating that pt needs a letter stating that he had an Aortic Dissection done so his deep cleaning dental appt will be cover by insurance. Please advice.

## 2025-06-10 RX ORDER — VALSARTAN 160 MG/1
160 TABLET ORAL 2 TIMES DAILY
Qty: 180 TABLET | Refills: 1 | Status: SHIPPED | OUTPATIENT
Start: 2025-06-10

## 2025-06-10 NOTE — TELEPHONE ENCOUNTER
Last Office Visit: 10/28/2024 Provider: MARLENE  **Is provider OOT? No    Next Office Visit: Visit date not found Provider:   **If no OV, when does pt need to be seen? in 1 year(s)      LAST LABS:   BMP:  Lab Results   Component Value Date/Time     04/02/2025 05:18 PM    K 3.9 04/02/2025 05:18 PM     04/02/2025 05:18 PM    CO2 20 04/02/2025 05:18 PM    BUN 36 04/02/2025 05:18 PM    CREATININE 1.1 04/02/2025 05:18 PM    GLUCOSE 97 04/02/2025 05:18 PM    CALCIUM 9.0 04/02/2025 05:18 PM    LABGLOM 89 04/02/2025 05:18 PM    LABGLOM >60 08/24/2023 09:58 AM      Lab orders needed? no

## 2025-06-13 ENCOUNTER — TELEPHONE (OUTPATIENT)
Dept: CARDIOLOGY CLINIC | Age: 35
End: 2025-06-13

## 2025-06-28 ENCOUNTER — HOSPITAL ENCOUNTER (EMERGENCY)
Age: 35
Discharge: HOME | End: 2025-06-28
Payer: COMMERCIAL

## 2025-06-28 ENCOUNTER — APPOINTMENT (OUTPATIENT)
Dept: CT IMAGING | Age: 35
End: 2025-06-28
Payer: COMMERCIAL

## 2025-06-28 ENCOUNTER — APPOINTMENT (OUTPATIENT)
Dept: GENERAL RADIOLOGY | Age: 35
End: 2025-06-28
Payer: COMMERCIAL

## 2025-06-28 VITALS
RESPIRATION RATE: 15 BRPM | SYSTOLIC BLOOD PRESSURE: 107 MMHG | DIASTOLIC BLOOD PRESSURE: 66 MMHG | HEIGHT: 76 IN | WEIGHT: 298.6 LBS | OXYGEN SATURATION: 94 % | BODY MASS INDEX: 36.36 KG/M2 | HEART RATE: 58 BPM | TEMPERATURE: 98.2 F

## 2025-06-28 DIAGNOSIS — R07.9 CHEST PAIN, UNSPECIFIED TYPE: Primary | ICD-10-CM

## 2025-06-28 LAB
ALBUMIN SERPL-MCNC: 3.9 G/DL (ref 3.4–5)
ALBUMIN/GLOB SERPL: 1.6 {RATIO} (ref 1.1–2.2)
ALP SERPL-CCNC: 63 U/L (ref 40–129)
ALT SERPL-CCNC: 21 U/L (ref 10–40)
ANION GAP SERPL CALCULATED.3IONS-SCNC: 10 MMOL/L (ref 3–16)
AST SERPL-CCNC: 18 U/L (ref 15–37)
BASOPHILS # BLD: 0.1 K/UL (ref 0–0.2)
BASOPHILS NFR BLD: 1.2 %
BILIRUB SERPL-MCNC: 0.4 MG/DL (ref 0–1)
BUN SERPL-MCNC: 32 MG/DL (ref 7–20)
CALCIUM SERPL-MCNC: 8.6 MG/DL (ref 8.3–10.6)
CHLORIDE SERPL-SCNC: 109 MMOL/L (ref 99–110)
CO2 SERPL-SCNC: 21 MMOL/L (ref 21–32)
CREAT SERPL-MCNC: 0.8 MG/DL (ref 0.9–1.3)
DEPRECATED RDW RBC AUTO: 15.1 % (ref 12.4–15.4)
EKG ATRIAL RATE: 60 BPM
EKG DIAGNOSIS: NORMAL
EKG P AXIS: 30 DEGREES
EKG P-R INTERVAL: 172 MS
EKG Q-T INTERVAL: 386 MS
EKG QRS DURATION: 98 MS
EKG QTC CALCULATION (BAZETT): 386 MS
EKG R AXIS: 16 DEGREES
EKG T AXIS: 36 DEGREES
EKG VENTRICULAR RATE: 60 BPM
EOSINOPHIL # BLD: 0.2 K/UL (ref 0–0.6)
EOSINOPHIL NFR BLD: 3 %
GFR SERPLBLD CREATININE-BSD FMLA CKD-EPI: >90 ML/MIN/{1.73_M2}
GLUCOSE SERPL-MCNC: 97 MG/DL (ref 70–99)
HCT VFR BLD AUTO: 40.4 % (ref 40.5–52.5)
HGB BLD-MCNC: 13.6 G/DL (ref 13.5–17.5)
LYMPHOCYTES # BLD: 2.5 K/UL (ref 1–5.1)
LYMPHOCYTES NFR BLD: 36.8 %
MCH RBC QN AUTO: 27.1 PG (ref 26–34)
MCHC RBC AUTO-ENTMCNC: 33.6 G/DL (ref 31–36)
MCV RBC AUTO: 80.5 FL (ref 80–100)
MONOCYTES # BLD: 0.7 K/UL (ref 0–1.3)
MONOCYTES NFR BLD: 9.8 %
NEUTROPHILS # BLD: 3.4 K/UL (ref 1.7–7.7)
NEUTROPHILS NFR BLD: 49.2 %
PLATELET # BLD AUTO: 200 K/UL (ref 135–450)
PMV BLD AUTO: 7.7 FL (ref 5–10.5)
POTASSIUM SERPL-SCNC: 3.8 MMOL/L (ref 3.5–5.1)
PROT SERPL-MCNC: 6.3 G/DL (ref 6.4–8.2)
RBC # BLD AUTO: 5.02 M/UL (ref 4.2–5.9)
SODIUM SERPL-SCNC: 140 MMOL/L (ref 136–145)
TROPONIN, HIGH SENSITIVITY: 10 NG/L (ref 0–22)
TROPONIN, HIGH SENSITIVITY: 13 NG/L (ref 0–22)
WBC # BLD AUTO: 6.9 K/UL (ref 4–11)

## 2025-06-28 PROCEDURE — 6370000000 HC RX 637 (ALT 250 FOR IP)

## 2025-06-28 PROCEDURE — 6360000004 HC RX CONTRAST MEDICATION

## 2025-06-28 PROCEDURE — 85025 COMPLETE CBC W/AUTO DIFF WBC: CPT

## 2025-06-28 PROCEDURE — 71045 X-RAY EXAM CHEST 1 VIEW: CPT

## 2025-06-28 PROCEDURE — 71275 CT ANGIOGRAPHY CHEST: CPT

## 2025-06-28 PROCEDURE — 84484 ASSAY OF TROPONIN QUANT: CPT

## 2025-06-28 PROCEDURE — 93005 ELECTROCARDIOGRAM TRACING: CPT | Performed by: EMERGENCY MEDICINE

## 2025-06-28 PROCEDURE — 36415 COLL VENOUS BLD VENIPUNCTURE: CPT

## 2025-06-28 PROCEDURE — 80053 COMPREHEN METABOLIC PANEL: CPT

## 2025-06-28 PROCEDURE — 93010 ELECTROCARDIOGRAM REPORT: CPT | Performed by: INTERNAL MEDICINE

## 2025-06-28 PROCEDURE — 99285 EMERGENCY DEPT VISIT HI MDM: CPT

## 2025-06-28 RX ORDER — IOPAMIDOL 755 MG/ML
75 INJECTION, SOLUTION INTRAVASCULAR
Status: COMPLETED | OUTPATIENT
Start: 2025-06-28 | End: 2025-06-28

## 2025-06-28 RX ORDER — ASPIRIN 325 MG
325 TABLET ORAL ONCE
Status: COMPLETED | OUTPATIENT
Start: 2025-06-28 | End: 2025-06-28

## 2025-06-28 RX ADMIN — IOPAMIDOL 75 ML: 755 INJECTION, SOLUTION INTRAVENOUS at 01:48

## 2025-06-28 RX ADMIN — ASPIRIN 325 MG: 325 TABLET ORAL at 01:16

## 2025-06-28 ASSESSMENT — PAIN - FUNCTIONAL ASSESSMENT: PAIN_FUNCTIONAL_ASSESSMENT: 0-10

## 2025-06-28 ASSESSMENT — PAIN DESCRIPTION - LOCATION
LOCATION: CHEST
LOCATION: CHEST

## 2025-06-28 ASSESSMENT — PAIN DESCRIPTION - DESCRIPTORS: DESCRIPTORS: ACHING

## 2025-06-28 ASSESSMENT — PAIN DESCRIPTION - ORIENTATION: ORIENTATION: MID

## 2025-06-28 ASSESSMENT — LIFESTYLE VARIABLES
HOW MANY STANDARD DRINKS CONTAINING ALCOHOL DO YOU HAVE ON A TYPICAL DAY: PATIENT DOES NOT DRINK
HOW OFTEN DO YOU HAVE A DRINK CONTAINING ALCOHOL: NEVER

## 2025-06-28 ASSESSMENT — PAIN SCALES - GENERAL
PAINLEVEL_OUTOF10: 2
PAINLEVEL_OUTOF10: 4

## 2025-06-28 ASSESSMENT — HEART SCORE: ECG: NORMAL

## 2025-06-28 NOTE — ED PROVIDER NOTES
[facility] EMERGENCY DEPARTMENT  Emergency Department Encounter    Patient Name: Eddie Moya  MRN: [id number]  YOB: 1990  Date of Evaluation: 6/28/2025  Provider: Gianni Pratt DO  Note Started: 2:48 AM EDT 6/28/25    CHIEF COMPLAINT  Chest Pain (Came for chest pain started last night. Denies any dizziness, flu like symptoms, no loss of consciousness.)    SHARED SERVICE VISIT  REJI. I have evaluated this patient.      HISTORY OF PRESENT ILLNESS  History From: Patient.    Limitations to history : None    Eddie Moya is a 35 y.o. male who presents to the ED for evaluation of chest pain onset last night.  Patient states that for the past 6 hours he has been having some midsternal nonradiating constant chest pain.  Denies any exertional component.  States that the pain is mild however he does have a history of aortic dissection in the past and states that he made his wife aware that he is having this pain.  States that she is very concerned and is adamant that she come to the ED for further management.  States that this pain does not feel at all similar to when he has had his previous aortic dissection.  Denies any numbness or tingling.  Denies any focal neurological weakness.  Denies shortness of breath.  Denies any lower extremity swelling.    No other complaints, modifying factors or associated symptoms.     Nursing notes reviewed were all reviewed and agreed with or any disagreements were addressed in the HPI.    PMH:  Past Medical History:   Diagnosis Date    Influenza A 03/12/2020    Marfan syndrome     Primary hypertension 7/1/2024     Surgical History:  Past Surgical History:   Procedure Laterality Date    FEMORAL-FEMORAL BYPASS GRAFT Bilateral 1/16/2023    FEMORAL FEMORAL BYPASS performed by Elvis Thorpe MD at [facility] OR    THORACIC AORTIC ANEURYSM REPAIR N/A 1/15/2023    ASCENDING AORTA  REPLACEMENT WITH TUBE GRAFT USING AN EPPERSON GRAFT SIZE 28CM  ANTEGRADE FLOW TO THE LEFT

## 2025-06-28 NOTE — ED PROVIDER NOTES
I was asked for an EKG interpretation.  Otherwise, I did not evaluate the patient.  I was available for consultation.    EKG  The Ekg interpreted by me in the absence of a cardiologist shows.  normal sinus rhythm with a rate of 60  Axis is   Normal  QTc is  normal  Intervals and Durations are unremarkable.      No specific ST-T wave changes appreciated.  No evidence of acute ischemia.   Compared to prior EKG dated April 2, 2025, patient is no longer tachycardic.  Previous EKG shows sinus tachycardia with a heart rate of 102.     Devante Jordan MD  06/28/25 0596

## 2025-07-02 ENCOUNTER — TELEPHONE (OUTPATIENT)
Dept: CARDIOLOGY CLINIC | Age: 35
End: 2025-07-02

## 2025-07-02 NOTE — TELEPHONE ENCOUNTER
Spoke with pt wife, she sts he doesn't get the cp often but once every couple months, she sts she makes him go to ER every time he has one. She sts she thinks it happens when pt is stressed.  We went over medications, everything is accurate except She believes pt is taking 1 Toprol bid, in chart its says take 1 1/2 bid.please advise if pt should be seen sooner on if nov 11/2025 is okay    Also Pt wife sts pt is also having a deep cleaning done in a couple weeks and wanted to know it pt needed antibiotic for it, and if youd be able to give anything for his nerves. please advise.

## 2025-07-02 NOTE — TELEPHONE ENCOUNTER
Pt wife called  was present stated that he is having reoccurring chest pain.  Pt was seen in the ER on 06/28 for chest pain.  The chest pains aren't frequent, but have been happening more than before.  Pt wife asked to please call [phone] since  works 3rd shift.  Pt has appt scheduled for 11/10/24 w/ [facility].

## 2025-07-22 ENCOUNTER — TELEPHONE (OUTPATIENT)
Dept: CARDIOLOGY CLINIC | Age: 35
End: 2025-07-22

## 2025-07-22 RX ORDER — AMOXICILLIN 500 MG/1
2000 CAPSULE ORAL PRN
Qty: 4 CAPSULE | Refills: 3 | Status: SHIPPED | OUTPATIENT
Start: 2025-07-22

## 2025-07-22 NOTE — TELEPHONE ENCOUNTER
Called and spoke with pt, relayed message, he v/u. Medication pended for sign off  
MG please advise   
Ok procedure and cleaning w/ sedation   Amoxicillin 2000 mg 1 hour prior to procedure and cleanings. Provide 3 refills   
Pts wife and pt called office and stated that pt is having a deep cleaning and needs [facility] to refill the amoxicillin (AMOXIL) 500 MG capsule [[id number]] again like he did previously. Pt is also possibly going to be under nitrous during the deep cleaning and they wanted to make sure that was okay with [facility]. Pt has a fear of the dentist which is why the Nitrous would be used. 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 Please advise. Thank you!    Best call back: [phone]     Preferred Pharm:  [facility] [id number] - [place], [place] - [address] -  [phone] - F [phone]   
return to ED if symptoms worsen, persist or questions arise/radiology results/need for outpatient follow-up

## 2025-07-29 ENCOUNTER — OFFICE VISIT (OUTPATIENT)
Dept: CARDIOLOGY CLINIC | Age: 35
End: 2025-07-29
Payer: COMMERCIAL

## 2025-07-29 VITALS
HEART RATE: 69 BPM | BODY MASS INDEX: 36.29 KG/M2 | WEIGHT: 298 LBS | OXYGEN SATURATION: 94 % | HEIGHT: 76 IN | SYSTOLIC BLOOD PRESSURE: 108 MMHG | DIASTOLIC BLOOD PRESSURE: 68 MMHG

## 2025-07-29 DIAGNOSIS — R07.9 CHEST PAIN, UNSPECIFIED TYPE: ICD-10-CM

## 2025-07-29 DIAGNOSIS — R07.2 PRECORDIAL PAIN: ICD-10-CM

## 2025-07-29 DIAGNOSIS — Z95.828 HX OF ASCENDING AORTA REPLACEMENT: Primary | ICD-10-CM

## 2025-07-29 DIAGNOSIS — I71.010 DISSECTION OF ASCENDING AORTA (HCC): ICD-10-CM

## 2025-07-29 DIAGNOSIS — I10 PRIMARY HYPERTENSION: ICD-10-CM

## 2025-07-29 DIAGNOSIS — Q87.40 MARFAN SYNDROME: ICD-10-CM

## 2025-07-29 DIAGNOSIS — R94.31 ABNORMAL ELECTROCARDIOGRAPHY: ICD-10-CM

## 2025-07-29 PROCEDURE — 99214 OFFICE O/P EST MOD 30 MIN: CPT | Performed by: INTERNAL MEDICINE

## 2025-07-29 PROCEDURE — 3078F DIAST BP <80 MM HG: CPT | Performed by: INTERNAL MEDICINE

## 2025-07-29 PROCEDURE — 3074F SYST BP LT 130 MM HG: CPT | Performed by: INTERNAL MEDICINE

## 2025-07-29 NOTE — PATIENT INSTRUCTIONS
~Monitor blood pressure and heart rate while at rest daily at home. After a few weeks- upload blood pressure and heart rate readings on My chart   ~Call [facility] Vascular surgeon to review CT's of the abdomen to evaluate aneurysm size. (CT's done at [facility])    ~Recommend a stress test- Lexiscan Myoview to evaluate chest pain. Patient is not able to walk on a treadmill due to history of aortic dissection     ~Call [facility] scheduling at [phone] to schedule testing      Cardiac medications reviewed including indications and pertinent side effects. Medication list updated at this visit.   Patient verbalizes understanding of the need for treatment and education has been provided at today's visit. Additional education material will be provided in after visit summary.    Check blood pressure and heart rate at home a few times per week- keep a log with dates and times and bring to office visit   Regular exercise and following a healthy diet encouraged   Follow up with me in 1 year

## 2025-07-29 NOTE — PROGRESS NOTES
Auscultation: Normal breath sounds without dullness  Cardiovascular:  The apical impulses not displaced  Heart tones are crisp and normal  Cervical veins are not engorged  The carotid upstroke is normal in amplitude and contour without delay or bruit  Normal S1S2, No S3, No Murmur  Peripheral pulses are symmetrical and full  There is no clubbing, cyanosis of the extremities.  No edema  Femoral Arteries: 2+ and equal  Pedal Pulses: 2+ and equal   Abdomen:  No masses or tenderness  Liver/Spleen: No Abnormalities Noted  Neurological/Psychiatric:  Alert and oriented in all spheres  Moves all extremities well  Exhibits normal gait balance and coordination  No abnormalities of mood, affect, memory, mentation, or behavior are noted    Surgery 1/15/2023 Dr. Ann   Procedure(s):  ASCENDING AORTA  REPLACEMENT WITH TUBE GRAFT USING AN EPPERSON GRAFT SIZE 28CM  ANTEGRADE FLOW TO THE LEFT LEG, RESUSPENSION OF AORTIC VALVE     Surgery Dr. Thorpe 1/15/2023  Left to right femoral-femoral bypass using 8 mm internally  reinforced GORE Propaten graft.      CT chest abdomen/ and Pelvis 1/22/2023  1.  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  Slight interval increase in extent of intraluminal thrombus.   2. Suspected pneumatosis within the right colon, concerning for mesenteric ischemia. Clinical correlation recommended.   3. Median sternotomy with trace right pneumothorax.  Small new pericardial effusion, possibly hemopericardium.   4. Patent femoral-femoral bypass graft.   5. Additional findings, as above.       Echo 05/30/23  Summary   Surgery for a aortic dissection on 1/15/2023. S/P Ascending aorta   replacement with tube graft using an Epperson graft (28 cm) with resuspension   of the aortic valve.   Technically difficult examination. Unable to get IV access.   Normal left ventricle systolic function with an estimated ejection fraction   of

## 2025-08-07 ENCOUNTER — HOSPITAL ENCOUNTER (OUTPATIENT)
Age: 35
Discharge: HOME | End: 2025-08-09
Attending: INTERNAL MEDICINE
Payer: COMMERCIAL

## 2025-08-07 ENCOUNTER — HOSPITAL ENCOUNTER (OUTPATIENT)
Dept: NUCLEAR MEDICINE | Age: 35
Discharge: HOME | End: 2025-08-07
Attending: INTERNAL MEDICINE
Payer: COMMERCIAL

## 2025-08-07 DIAGNOSIS — R07.9 CHEST PAIN, UNSPECIFIED TYPE: ICD-10-CM

## 2025-08-07 DIAGNOSIS — R94.31 ABNORMAL ELECTROCARDIOGRAPHY: ICD-10-CM

## 2025-08-07 LAB
NUC STRESS EJECTION FRACTION: 61 %
NUC STRESS LV EDV: 131 ML (ref 67–155)
NUC STRESS LV ESV: 51 ML (ref 22–58)
NUC STRESS LV MASS: 152 G
STRESS BASELINE DIAS BP: 73 MMHG
STRESS BASELINE HR: 60 BPM
STRESS BASELINE SYS BP: 134 MMHG
STRESS ESTIMATED WORKLOAD: 1 METS
STRESS PEAK DIAS BP: 73 MMHG
STRESS PEAK SYS BP: 134 MMHG
STRESS PERCENT HR ACHIEVED: 57 %
STRESS POST PEAK HR: 104 BPM
STRESS RATE PRESSURE PRODUCT: NORMAL BPM*MMHG
STRESS TARGET HR: 184 BPM

## 2025-08-07 PROCEDURE — 93016 CV STRESS TEST SUPVJ ONLY: CPT | Performed by: INTERNAL MEDICINE

## 2025-08-07 PROCEDURE — 6360000002 HC RX W HCPCS: Performed by: INTERNAL MEDICINE

## 2025-08-07 PROCEDURE — 78452 HT MUSCLE IMAGE SPECT MULT: CPT

## 2025-08-07 PROCEDURE — A9502 TC99M TETROFOSMIN: HCPCS | Performed by: INTERNAL MEDICINE

## 2025-08-07 PROCEDURE — 78452 HT MUSCLE IMAGE SPECT MULT: CPT | Performed by: INTERNAL MEDICINE

## 2025-08-07 PROCEDURE — 93017 CV STRESS TEST TRACING ONLY: CPT

## 2025-08-07 PROCEDURE — 3430000000 HC RX DIAGNOSTIC RADIOPHARMACEUTICAL: Performed by: INTERNAL MEDICINE

## 2025-08-07 PROCEDURE — 93018 CV STRESS TEST I&R ONLY: CPT | Performed by: INTERNAL MEDICINE

## 2025-08-07 RX ORDER — REGADENOSON 0.08 MG/ML
0.4 INJECTION, SOLUTION INTRAVENOUS
Status: COMPLETED | OUTPATIENT
Start: 2025-08-07 | End: 2025-08-07

## 2025-08-07 RX ADMIN — REGADENOSON 0.4 MG: 0.08 INJECTION, SOLUTION INTRAVENOUS at 09:23

## 2025-08-07 RX ADMIN — TETROFOSMIN 33 MILLICURIE: 1.38 INJECTION, POWDER, LYOPHILIZED, FOR SOLUTION INTRAVENOUS at 09:23

## 2025-08-07 RX ADMIN — TETROFOSMIN 11.1 MILLICURIE: 1.38 INJECTION, POWDER, LYOPHILIZED, FOR SOLUTION INTRAVENOUS at 08:18

## 2025-08-08 ENCOUNTER — RESULTS FOLLOW-UP (OUTPATIENT)
Dept: CARDIOLOGY CLINIC | Age: 35
End: 2025-08-08

## 2025-08-18 ENCOUNTER — TELEPHONE (OUTPATIENT)
Dept: CARDIOLOGY CLINIC | Age: 35
End: 2025-08-18

## (undated) DEVICE — PREVENA PEEL & PLACE SYSTEM KIT- 13 CM: Brand: PREVENA™ PEEL & PLACE™

## (undated) DEVICE — CONNECTOR PERF W3 8XH3 8XL3 8IN BASE EQL Y SHP W O LUERLOCK

## (undated) DEVICE — 12 FOOT DISPOSABLE EXTENSION CABLE WITH SAFE CONNECT / SCREW-DOWN

## (undated) DEVICE — AGENT HEMSTAT W6XL9IN OXIDIZED REGENERATED CELOS ABSRB FOR

## (undated) DEVICE — GOWN SIRUS NONREIN XL W/TWL: Brand: MEDLINE INDUSTRIES, INC.

## (undated) DEVICE — SUTURE NONABSORBABLE MONOFILAMENT 6-0 C-1 1X30 IN PROLENE 8706H

## (undated) DEVICE — SYRINGE MED 50ML LUERLOCK TIP

## (undated) DEVICE — BASIC SINGLE BASIN 1-LF: Brand: MEDLINE INDUSTRIES, INC.

## (undated) DEVICE — SOLUTION IV IRRIG POUR BRL 0.9% SODIUM CHL 2F7124

## (undated) DEVICE — DECANTER BAG 9": Brand: MEDLINE INDUSTRIES, INC.

## (undated) DEVICE — PACK PROCEDURE SURG OPN HRT A BASIC

## (undated) DEVICE — JP CHANNEL DRAIN, 24FR HUBLESS: Brand: CARDINAL HEALTH

## (undated) DEVICE — INTENDED TO BE USED TO OCCLUDE, RETRACT AND IDENTIFY ARTERIES, VEINS, TENDONS AND NERVES IN SURGICAL PROCEDURES: Brand: STERION®  VESSEL LOOP

## (undated) DEVICE — SUTURE ETHLN SZ 3-0 L30IN NONABSORBABLE BLK FSL L30MM 3/8 1671H

## (undated) DEVICE — Z DISCONTINUED USE 2516375 APPLICATOR MEDICATED 3 CC CLR STRL CHLORAPREP

## (undated) DEVICE — SUTURE GOR TX SZ 4-0 L30IN NONABSORBABLE L13MM THC-13 1/2 5M12A

## (undated) DEVICE — TUBING, SUCTION, 3/16" X 12', STRAIGHT: Brand: MEDLINE

## (undated) DEVICE — SUTURE PDS + SZ 0 L36IN ABSRB VLT CT 1 L36MM 1 2 CIR PDP346H

## (undated) DEVICE — SUTURE VCRL + SZ 3-0 L27IN ABSRB WHT CT-1 1/2 CIR VCP258H

## (undated) DEVICE — SUTURE PERMA-HAND SZ 2-0 L30IN NONABSORBABLE BLK L26MM SH K833H

## (undated) DEVICE — Device

## (undated) DEVICE — CANNULA PERF 15FR L12.5IN RG STPCOCK WIREWOUND BODY

## (undated) DEVICE — SSC BONE WAX: Brand: SSC BONE WAX

## (undated) DEVICE — DISH PETRI ST LF

## (undated) DEVICE — Z CONVERTED USE 2275871 SPONGE GZ W4XL4IN WHT 8 PLY CURITY

## (undated) DEVICE — EVERGRIP INSERT SET: Brand: FOGARTY EVERGRIP

## (undated) DEVICE — SUTURE VCRL + SZ 2-0 L27IN ABSRB CLR CT-1 1/2 CIR TAPERCUT VCP259H

## (undated) DEVICE — SIZER GRAFT VASCULAR VASCUTEK DISP

## (undated) DEVICE — PLEDGET SURG W0.375XL0.062IN THK1.5MM WHT SFT PTFE RECT

## (undated) DEVICE — HEMASHIELD PLATINUM WOVEN STRAIGHT DOUBLE VELOUR VASCULAR GRAFT WITH GRAFT SIZER ACCESSORY
Type: IMPLANTABLE DEVICE | Site: HEART | Status: NON-FUNCTIONAL
Brand: HEMASHIELD

## (undated) DEVICE — SUTURE NONABSORBABLE MONOFILAMENT 5-0 C-1 1X24 IN PROLENE 8725H

## (undated) DEVICE — BLADE ES L4IN INSUL EDGE

## (undated) DEVICE — LOOP VES L406MM DIA1MM MAXI BLU SIL RADPQ DISP

## (undated) DEVICE — 3M™ TEGADERM™ TRANSPARENT FILM DRESSING FRAME STYLE, 1626W, 4 IN X 4-3/4 IN (10 CM X 12 CM), 50/CT 4CT/CASE: Brand: 3M™ TEGADERM™

## (undated) DEVICE — SUTURE ETHBND EXCEL SZ 0 L18IN NONABSORBABLE GRN L36MM CT-1 CX21D

## (undated) DEVICE — LEAD PACEMKR MYOCARDIAL UNIPOLAR TEMP

## (undated) DEVICE — SUTURE VCRL + SZ 0 L27IN ABSRB UD CT-1 L36MM 1/2 CIR TAPR VCP260H

## (undated) DEVICE — WAX SURG 2.5GM HEMSTAT BNE BEESWAX PARAFFIN ISO PALMITATE

## (undated) DEVICE — STRAP POS W5XL72IN FOAM KNEE AND BODY HK LOOP CLSR DISP

## (undated) DEVICE — SUTURE NONABSORBABLE MONOFILAMENT 4-0 RB-1 36 IN BLU PROLENE 8557H

## (undated) DEVICE — GEL US 20GM NONIRRITATING OVERWRAPPED FILE PCH TRNSMIT

## (undated) DEVICE — SET INTRO SHTH 8FR L11CM W/ INTEGR SIDE PRT HEMSTAS VLV 3 W

## (undated) DEVICE — SUTURE PROL SZ 4-0 L36IN NONABSORBABLE BLU L26MM SH 1/2 CIR 8521H

## (undated) DEVICE — APPLICATOR PREP 26ML 0.7% IOD POVACRYLEX 74% ISO ALC ST

## (undated) DEVICE — EVERGRIP INSERT SET 61MM: Brand: FOGARTY EVERGRIP

## (undated) DEVICE — DRESSING KIT INCISION MGMT 20 CM PREVENA + DUO PEEL PLACE

## (undated) DEVICE — SUTURE MCRYL SZ 4-0 L18IN ABSRB UD L19MM PS-2 3/8 CIR PRIM Y496G

## (undated) DEVICE — SUTURE VCRL + SZ 3-0 L27IN ABSRB UD L26MM SH 1/2 CIR VCP416H

## (undated) DEVICE — PACK SURG PROC PGTL TBNG CUST JUMP

## (undated) DEVICE — SUTURE MCRYL + SZ 4-0 L18IN ABSRB UD L19MM PS-2 3/8 CIR MCP496G

## (undated) DEVICE — STAPLER SKIN H3.9MM WIRE DIA0.58MM CRWN 6.9MM 35 STPL FIX

## (undated) DEVICE — STERILE LATEX POWDER-FREE SURGICAL GLOVESWITH NITRILE COATING: Brand: PROTEXIS

## (undated) DEVICE — STERNUM BLADE, OFFSET (31.7 X 0.64 X 6.3MM)

## (undated) DEVICE — 3M™ TEGADERM™ TRANSPARENT FILM DRESSING FRAME STYLE WITH BORDER, 1616, 4 IN X 4-3/4 IN (10 CM X 12 CM), 50/CT 4CT/CASE: Brand: 3M™ TEGADERM™

## (undated) DEVICE — BLADE CLIPPER GEN PURP NS

## (undated) DEVICE — SET PERF L15IN BLU CLMP MULT FEM LUER ON SGL INLET LEG DLP

## (undated) DEVICE — SUTURE VCRL SZ 0 L27IN ABSRB UD L36MM CT-1 1/2 CIR J260H

## (undated) DEVICE — CHLORAPREP 26ML ORANGE

## (undated) DEVICE — PERCUTANEOUS ENTRY THINWALL NEEDLE  ONE-PART: Brand: COOK

## (undated) DEVICE — ELECTRODE PT RET AD L9FT HI MOIST COND ADH HYDRGEL CORDED

## (undated) DEVICE — FLOSEAL HEMOSTATIC MATRIX, 5ML: Brand: FLOSEAL HEMOSTATIC MATRIX

## (undated) DEVICE — 3M™ IOBAN™ 2 ANTIMICROBIAL INCISE DRAPE 6650EZ: Brand: IOBAN™ 2

## (undated) DEVICE — GLOVE SURG SZ 8 L11.77IN FNGR THK9.8MIL STRW LTX POLYMER

## (undated) DEVICE — COSEAL SURGICAL SEALANT (COSEAL) IS COMPOSED OF TWO SYNTHETIC POLYETHYLENE GLYCOLS (PEGS), A DILUTE HYDROGEN CHLORIDE SOLUTION AND A SODIUM PHOSPHATE/SODIUM CARBONATE SOLUTION. THESE COMPONENTS COME IN A KIT THAT INCLUDES AN APPLICATOR(S). AT THE TIME OF ADMINISTRATION, THE MIXED PEGS AND SOLUTIONS FORM A HYDROGEL THAT ADHERES TO TISSUE, SYNTHETIC GRAFT MATERIALS AND COVALENTLY BONDS TO ITSELF: Brand: COSEAL SURGICAL SEALANT

## (undated) DEVICE — ADHESIVE SKIN CLOSURE 0.7CC TOP MICROBIAL APPL DERMBND ADV

## (undated) DEVICE — CONNECTOR PERF W0.25XH3/8IN BASE Y SHP REDUC W/O LUERLOCK